# Patient Record
Sex: MALE | Race: WHITE | NOT HISPANIC OR LATINO | Employment: OTHER | URBAN - METROPOLITAN AREA
[De-identification: names, ages, dates, MRNs, and addresses within clinical notes are randomized per-mention and may not be internally consistent; named-entity substitution may affect disease eponyms.]

---

## 2017-01-04 ENCOUNTER — OFFICE VISIT (OUTPATIENT)
Dept: FAMILY MEDICINE | Facility: CLINIC | Age: 52
End: 2017-01-04
Payer: MEDICARE

## 2017-01-04 ENCOUNTER — MYC MEDICAL ADVICE (OUTPATIENT)
Dept: PHARMACY | Facility: CLINIC | Age: 52
End: 2017-01-04

## 2017-01-04 DIAGNOSIS — L30.9 ECZEMA, UNSPECIFIED TYPE: ICD-10-CM

## 2017-01-04 DIAGNOSIS — L71.9 ROSACEA: Primary | ICD-10-CM

## 2017-01-04 PROCEDURE — T1013 SIGN LANG/ORAL INTERPRETER: HCPCS | Mod: U3 | Performed by: FAMILY MEDICINE

## 2017-01-04 PROCEDURE — 99214 OFFICE O/P EST MOD 30 MIN: CPT | Performed by: FAMILY MEDICINE

## 2017-01-04 RX ORDER — TRIAMCINOLONE ACETONIDE 1 MG/G
OINTMENT TOPICAL 2 TIMES DAILY
Qty: 80 G | Refills: 0 | Status: SHIPPED | OUTPATIENT
Start: 2017-01-04 | End: 2019-02-06

## 2017-01-04 NOTE — MR AVS SNAPSHOT
"              After Visit Summary   1/4/2017    Oliver Agarwal    MRN: [id number]           Patient Information     Date Of Birth          1965        Visit Information        Provider Department      1/4/2017 2:00 PM Natty Dobson; Melisa Rose MD [facility] - Primary Care Skin        Today's Diagnoses     Rosacea    -  1     Eczema, unspecified type           Care Instructions    FUTURE APPOINTMENTS  Follow up as needed.    ROSACEA  Follow up as needed for re-fill of medications.  Continue applying metronidazole 0.75% gel twice daily to help prevent symptoms from flaring up.  Continue taking 1 capsule of doxycycline 50 mg once daily for control of symptoms.    Alcohol, chocolate, caffeine, spicy foods, and sun exposure can all aggravate rosacea, so be reasonable in preventing excessive consumption or exposure.    Apply the following in this order : 1) CeraVe moisturizer cream, 2) topical steroid ointment    TOPICAL STEROID INSTRUCTIONS  Triamcinolone 0.1% ointment.    Apply an amount equal to half of a fingertip (0.25 g) to the affected area(s) on the hands, two times per day for 10-14 days.    \"Fingertip Amount\"      Not to be used on face or groin.    Not to be used consecutively for more than 10-14 days.    Topical steroid use is for short-term treatment only. Although you can use this as needed for flare-ups, if after initial treatment, you are using this for prolonged periods of time (i.e. every day of the week), re-check for evaluation of treatment.    Keep in mind to also regularly use moisturizer, as this preventative measure can help maintain your skin's natural moisture barrier.    Wear white, cotton gloves (can purchase at any retail pharmacy) after application of moisturizer and topical steroid nightly at bedtime and wear until the morning.    You may continue using desonide 0.05% cream only as needed for symptoms on the eyelids.    DRY SKIN INSTRUCTIONS  Routine use of " moisturizer is important for healthy, resilient skin.    Twice daily use of a moisturizer such as over-the-counter (OTC) CeraVe moisturizer cream (in the jar) or OTC Cetaphil RestoraDerm moisturizer. These contain ceramides and filaggrin proteins that can help to maintain the body's moisture layer.    Always apply moisturizer after washing, within 3 minutes of drying off for best effect.    Do not overuse soap. Just apply soap on the groin and armpits, unless you have been sweating extensively. Recommended products include OTC unscented Dove sensitive skin or OTC Vanicream cleansing bar.    Good facial cleansers include OTC CeraVe hydrating facial cleanser or OTC Cetaphil daily facial cleanser.    Avoid use of scented products and/or antistatic dryer sheets.    Always try to wear latex-free gloves when washing dishes or cleaning.    You may use Sarna lotion OTC (put in the fridge for extra relief) for itchiness. Make sure to use a separate moisturizer cream as Sarna is not a moisturizer.        Follow-ups after your visit        Who to contact     If you have questions or need follow up information about today's clinic visit or your schedule please contact [facility] - PRIMARY CARE SKIN directly at [phone].  Normal or non-critical lab and imaging results will be communicated to you by [employer], letter or phone within 4 business days after the clinic has received the results. If you do not hear from us within 7 days, please contact the clinic through [employer] or phone. If you have a critical or abnormal lab result, we will notify you by phone as soon as possible.  Submit refill requests through [employer] or call your pharmacy and they will forward the refill request to us. Please allow 3 business days for your refill to be completed.          Additional Information About Your Visit        [employer] Information     [employer] gives you secure access to your electronic health record. If you see a primary care  provider, you can also send messages to your care team and make appointments. If you have questions, please call your primary care clinic.  If you do not have a primary care provider, please call [phone] and they will assist you.        Care EveryWhere ID     This is your Care EveryWhere ID. This could be used by other organizations to access your [place] medical records  [id number]         Blood Pressure from Last 3 Encounters:   10/11/16 116/69   09/02/16 119/75   05/24/16 118/64    Weight from Last 3 Encounters:   10/11/16 306 lb (138.801 kg)   09/02/16 301 lb 12.8 oz (136.896 kg)   05/24/16 293 lb 9.6 oz (133.176 kg)              Today, you had the following     No orders found for display         Today's Medication Changes          These changes are accurate as of: 1/4/17  2:17 PM.  If you have any questions, ask your nurse or doctor.               Start taking these medicines.        Dose/Directions    triamcinolone 0.1 % ointment   Commonly known as:  KENALOG   Used for:  Eczema, unspecified type   Started by:  Melisa Rose MD        Apply topically 2 times daily Apply to affected areas on hands bid for 14 days and then when needed.Not for face or groin   Quantity:  80 g   Refills:  0            Where to get your medicines      These medications were sent to [id number] - [place], [place] - [address] AT [facility]  [address], [place] [address]    Hours:  24-hours Phone:  [phone]    - triamcinolone 0.1 % ointment             Primary Care Provider Office Phone # Fax #    Angelica BROOK [facility] [phone]       [address] [address]        Thank you!     Thank you for choosing [facility] - PRIMARY CARE SKIN  for your care. Our goal is always to provide you with excellent care. Hearing back from our patients is one way we can continue to improve our services. Please  take a few minutes to complete the written survey that you may receive in the mail after your visit with us. Thank you!             Your Updated Medication List - Protect others around you: Learn how to safely use, store and throw away your medicines at www.disposemymeds.org.          This list is accurate as of: 1/4/17  2:17 PM.  Always use your most recent med list.                   Brand Name Dispense Instructions for use    * ADVAIR DISKUS 250-50 MCG/DOSE diskus inhaler   Generic drug:  fluticasone-salmeterol      Inhale 1 puff into the lungs 2 times daily Taking prn       * fluticasone-salmeterol 500-50 MCG/DOSE diskus inhaler    ADVAIR    3 Inhaler    Inhale 1 puff into the lungs 2 times daily       * albuterol (2.5 MG/3ML) 0.083% neb solution     90 vial    Take 1 vial (2.5 mg) by nebulization every 4 hours as needed for shortness of breath / dyspnea       * albuterol 108 (90 BASE) MCG/ACT Inhaler    albuterol    1 Inhaler    Inhale 2 puffs into the lungs every 6 hours as needed       alclomethasone 0.05 % cream    ACLOVATE    15 g    Apply to affected areas on face once per day but only for 3 consecutive days       azithromycin 250 MG tablet    ZITHROMAX    6 tablet    Two tablets first day, then one tablet daily for four days.       benzonatate 200 MG capsule    TESSALON    21 capsule    Take 1 capsule (200 mg) by mouth 3 times daily as needed for cough       blood glucose monitoring lancets     1 Box    Use to test blood sugars once daily as directed.       blood glucose monitoring test strip    ANTONIO CONTOUR NEXT    100 each    Use to test blood sugar one x  daily or as directed.       CENTRUM Tabs     100 tablet    Take 1 tablet by mouth daily       cyanocolbalamin 500 MCG tablet    vitamin  B-12     Take 500 mcg by mouth daily       desonide 0.05 % cream    DESOWEN    15 g    Apply sparingly to affected area two times per day for only 3 consecutive days       doxycycline Monohydrate 50 MG Caps capsule      90 capsule    1 tab per day       fluticasone 50 MCG/ACT spray    FLONASE         ibuprofen 600 MG tablet    ADVIL/MOTRIN    120 tablet    Take 1 tablet (600 mg) by mouth every 6 hours as needed for moderate pain       metroNIDAZOLE 0.75 % topical gel    METROGEL    45 g    Apply topically 2 times daily       montelukast 10 MG tablet    SINGULAIR    90 tablet    Take 1 tablet (10 mg) by mouth At Bedtime       triamcinolone 0.1 % ointment    KENALOG    80 g    Apply topically 2 times daily Apply to affected areas on hands bid for 14 days and then when needed.Not for face or groin       vitamin D 2000 UNITS Caps      Take 4,000 Units by mouth daily       * Notice:  This list has 4 medication(s) that are the same as other medications prescribed for you. Read the directions carefully, and ask your doctor or other care provider to review them with you.

## 2017-01-04 NOTE — PATIENT INSTRUCTIONS
"FUTURE APPOINTMENTS  Follow up as needed.    ROSACEA  Follow up as needed for re-fill of medications.  Continue applying metronidazole 0.75% gel twice daily to help prevent symptoms from flaring up.  Continue taking 1 capsule of doxycycline 50 mg once daily for control of symptoms.    Alcohol, chocolate, caffeine, spicy foods, and sun exposure can all aggravate rosacea, so be reasonable in preventing excessive consumption or exposure.    Apply the following in this order : 1) CeraVe moisturizer cream, 2) topical steroid ointment    TOPICAL STEROID INSTRUCTIONS  Triamcinolone 0.1% ointment.    Apply an amount equal to half of a fingertip (0.25 g) to the affected area(s) on the hands, two times per day for 10-14 days.    \"Fingertip Amount\"      Not to be used on face or groin.    Not to be used consecutively for more than 10-14 days.    Topical steroid use is for short-term treatment only. Although you can use this as needed for flare-ups, if after initial treatment, you are using this for prolonged periods of time (i.e. every day of the week), re-check for evaluation of treatment.    Keep in mind to also regularly use moisturizer, as this preventative measure can help maintain your skin's natural moisture barrier.    Wear white, cotton gloves (can purchase at any retail pharmacy) after application of moisturizer and topical steroid nightly at bedtime and wear until the morning.    You may continue using desonide 0.05% cream only as needed for symptoms on the eyelids.    DRY SKIN INSTRUCTIONS  Routine use of moisturizer is important for healthy, resilient skin.    Twice daily use of a moisturizer such as over-the-counter (OTC) CeraVe moisturizer cream (in the jar) or OTC Cetaphil RestoraDerm moisturizer. These contain ceramides and filaggrin proteins that can help to maintain the body's moisture layer.    Always apply moisturizer after washing, within 3 minutes of drying off for best effect.    Do not overuse soap. " Just apply soap on the groin and armpits, unless you have been sweating extensively. Recommended products include OTC unscented Dove sensitive skin or OTC Vanicream cleansing bar.    Good facial cleansers include OTC CeraVe hydrating facial cleanser or OTC Cetaphil daily facial cleanser.    Avoid use of scented products and/or antistatic dryer sheets.    Always try to wear latex-free gloves when washing dishes or cleaning.    You may use Sarna lotion OTC (put in the fridge for extra relief) for itchiness. Make sure to use a separate moisturizer cream as Sarna is not a moisturizer.

## 2017-01-04 NOTE — PROGRESS NOTES
[facility] - PRIMARY CARE SKIN    CC : Rosacea and dermatitis  SUBJECTIVE:                                                    Oliver Agarwal is a 51 year old male with his  who presents to clinic today for follow-up of rosacea and dermatitis.     Issue One : Rosacea follow-up  Symptoms have begun to improve with regular use of medication. He has recently discontinued use of doxycycline about 10 days ago. He has not been as motivated in regular application of metronidazole due to recent stressors at home. One bump noted on the nose, but he does not recall as many bumps with current treatment.    Current treatment : metronidazole 0.75% gel applied BID, doxycycline 50 mg QD  Response to treatment : Control of rosacea had begun to improve, but he is satisfied with current control of symptoms.    Issue Two : Eczema follow-up  Symptoms were previously on the eyelids, for which he was prescribed desonide 0.05% cream. However, he now complains of redness on the hands. He has also been washing his hands more frequently. Tenderness reported in areas of involvement.    Current treatment : desonide 0.05% cream applied on eyelids, CeraVe moisturizer. No medications applied to hands.    For complete history, please review office visit notes from 3/11/2016    Family history of eczema/psoriasis/rosacea: unknown - brother may have facial skin issues.    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    Patient Active Problem List   Diagnosis     CARDIOVASCULAR SCREENING; LDL GOAL LESS THAN 160     Atopic rhinitis     Prediabetes     Obesity     Hypovitaminosis D     Mild persistent asthma     Deaf - reads lips well       History reviewed. No pertinent past medical history. History reviewed. No pertinent past surgical history.   Social History   Substance Use Topics     Smoking status: Former Smoker     Quit date: 05/11/1999     Smokeless tobacco: Never Used     Alcohol Use: No    Family  History     Problem (# of Occurrences) Relation (Name,Age of Onset)    HEART DISEASE (1) Unspecified           Prescription Medications as of 1/4/2017             triamcinolone (KENALOG) 0.1 % ointment Apply topically 2 times daily Apply to affected areas on hands bid for 14 days and then when needed.Not for face or groin    fluticasone-salmeterol (ADVAIR) 500-50 MCG/DOSE diskus inhaler Inhale 1 puff into the lungs 2 times daily    benzonatate (TESSALON) 200 MG capsule Take 1 capsule (200 mg) by mouth 3 times daily as needed for cough    azithromycin (ZITHROMAX) 250 MG tablet Two tablets first day, then one tablet daily for four days.    metroNIDAZOLE (METROGEL) 0.75 % gel Apply topically 2 times daily    doxycycline Monohydrate 50 MG CAPS 1 tab per day    desonide (DESOWEN) 0.05 % cream Apply sparingly to affected area two times per day for only 3 consecutive days    alclomethasone (ACLOVATE) 0.05 % cream Apply to affected areas on face once per day but only for 3 consecutive days    albuterol (ALBUTEROL) 108 (90 BASE) MCG/ACT inhaler Inhale 2 puffs into the lungs every 6 hours as needed    montelukast (SINGULAIR) 10 MG tablet Take 1 tablet (10 mg) by mouth At Bedtime    ibuprofen (ADVIL,MOTRIN) 600 MG tablet Take 1 tablet (600 mg) by mouth every 6 hours as needed for moderate pain    blood glucose monitoring (ANTONIO CONTOUR NEXT) test strip Use to test blood sugar one x  daily or as directed.    blood glucose monitoring (ANTONIO MICROLET) lancets Use to test blood sugars once daily as directed.    fluticasone (FLONASE) 50 MCG/ACT nasal spray     fluticasone-salmeterol (ADVAIR DISKUS) 250-50 MCG/DOSE diskus inhaler Inhale 1 puff into the lungs 2 times daily Taking prn    albuterol (2.5 MG/3ML) 0.083% nebulizer solution Take 1 vial (2.5 mg) by nebulization every 4 hours as needed for shortness of breath / dyspnea    cyanocolbalamin (VITAMIN  B-12) 500 MCG tablet Take 500 mcg by mouth daily    Cholecalciferol (VITAMIN  D) 2000 UNITS CAPS Take 4,000 Units by mouth daily    Multiple Vitamins-Minerals (CENTRUM) TABS Take 1 tablet by mouth daily            Allergies   Allergen Reactions     Nkda [No Known Drug Allergies]         ROS : 14 point review of systems was negative except the symptoms listed above in the HPI.    This document serves as a record of the services and decisions personally performed and made by Reba Rose MD. It was created on her behalf by Alvaro Calvo, a trained medical scribe.  The creation of this document is based on the scribe's personal observations and the provider's statements to the medical scribe.  Alvaro Calvo, January 4, 2017 1:59 PM      OBJECTIVE:                                                    GENERAL: alert, no distress, obese and unable to speak  SKIN: Stone Skin Type - II.  Face and Hands were examined. The dermatoscope was used to help evaluate pigmented lesions.  Skin Pertinent Findings:  Nose : Minimal erythema. Single papule.    Hands, dorsa : Maculopapular eruption with scaling and fissures between both hands' 1st and 2nd metacarpals.    MDM :discussed preventive measures for decreasing eczema flares      ASSESSMENT:                                                      Encounter Diagnoses   Name Primary?     Rosacea Yes     Eczema, unspecified type          PLAN:                                                    Patient Instructions   FUTURE APPOINTMENTS  Follow up as needed.    ROSACEA  Follow up as needed for re-fill of medications.  Continue applying metronidazole 0.75% gel twice daily to help prevent symptoms from flaring up.  Continue taking 1 capsule of doxycycline 50 mg once daily for control of symptoms.    Alcohol, chocolate, caffeine, spicy foods, and sun exposure can all aggravate rosacea, so be reasonable in preventing excessive consumption or exposure.    Apply the following in this order : 1) CeraVe moisturizer cream, 2) topical steroid ointment    TOPICAL STEROID  "INSTRUCTIONS  Triamcinolone 0.1% ointment.    Apply an amount equal to half of a fingertip (0.25 g) to the affected area(s) on the hands, two times per day for 10-14 days.    \"Fingertip Amount\"      Not to be used on face or groin.    Not to be used consecutively for more than 10-14 days.    Topical steroid use is for short-term treatment only. Although you can use this as needed for flare-ups, if after initial treatment, you are using this for prolonged periods of time (i.e. every day of the week), re-check for evaluation of treatment.    Keep in mind to also regularly use moisturizer, as this preventative measure can help maintain your skin's natural moisture barrier.    Wear white, cotton gloves (can purchase at any retail pharmacy) after application of moisturizer and topical steroid nightly at bedtime and wear until the morning.    You may continue using desonide 0.05% cream only as needed for symptoms on the eyelids.    DRY SKIN INSTRUCTIONS  Routine use of moisturizer is important for healthy, resilient skin.    Twice daily use of a moisturizer such as over-the-counter (OTC) CeraVe moisturizer cream (in the jar) or OTC Cetaphil RestoraDerm moisturizer. These contain ceramides and filaggrin proteins that can help to maintain the body's moisture layer.    Always apply moisturizer after washing, within 3 minutes of drying off for best effect.    Do not overuse soap. Just apply soap on the groin and armpits, unless you have been sweating extensively. Recommended products include OTC unscented Dove sensitive skin or OTC Vanicream cleansing bar.    Good facial cleansers include OTC CeraVe hydrating facial cleanser or OTC Cetaphil daily facial cleanser.    Avoid use of scented products and/or antistatic dryer sheets.    Always try to wear latex-free gloves when washing dishes or cleaning.    You may use Sarna lotion OTC (put in the fridge for extra relief) for itchiness. Make sure to use a separate moisturizer cream " as Sarna is not a moisturizer.       PROCEDURES:                                                    None.    TT : 5minutes.  CT : 15 minutes.      The information in this document, created by the medical scribe for me, accurately reflects the services I personally performed and the decisions made by me. I have reviewed and approved this document for accuracy prior to leaving the patient care area.  Reba Rose MD January 4, 2017 1:59 PM  [facility] - PRIMARY CARE SKIN

## 2017-01-27 ENCOUNTER — TELEPHONE (OUTPATIENT)
Dept: FAMILY MEDICINE | Facility: CLINIC | Age: 52
End: 2017-01-27

## 2017-01-27 DIAGNOSIS — R73.01 ELEVATED FASTING GLUCOSE: ICD-10-CM

## 2017-01-27 DIAGNOSIS — Z13.6 CARDIOVASCULAR SCREENING; LDL GOAL LESS THAN 160: Primary | ICD-10-CM

## 2017-01-27 DIAGNOSIS — R73.03 PREDIABETES: ICD-10-CM

## 2017-01-27 NOTE — TELEPHONE ENCOUNTER
Patient is coming in for diabetes appt but would like to have labs done first. He will also be scheduling his annual preventative next month.   Please add orders for A1C, kidney and liver function, cholesterol and any other labs that may be needed for physical.     Patient is scheduled for fasting labs Monday 1/30/17

## 2017-01-30 DIAGNOSIS — R73.01 ELEVATED FASTING GLUCOSE: ICD-10-CM

## 2017-01-30 DIAGNOSIS — Z13.6 CARDIOVASCULAR SCREENING; LDL GOAL LESS THAN 160: ICD-10-CM

## 2017-01-30 DIAGNOSIS — R73.03 PREDIABETES: ICD-10-CM

## 2017-01-30 DIAGNOSIS — R52 PAIN: ICD-10-CM

## 2017-01-30 LAB — HBA1C MFR BLD: 5.7 % (ref 4.3–6)

## 2017-01-30 PROCEDURE — 80061 LIPID PANEL: CPT | Performed by: NURSE PRACTITIONER

## 2017-01-30 PROCEDURE — 83036 HEMOGLOBIN GLYCOSYLATED A1C: CPT | Performed by: NURSE PRACTITIONER

## 2017-01-30 PROCEDURE — 80053 COMPREHEN METABOLIC PANEL: CPT | Performed by: NURSE PRACTITIONER

## 2017-01-30 PROCEDURE — 36415 COLL VENOUS BLD VENIPUNCTURE: CPT | Performed by: NURSE PRACTITIONER

## 2017-01-31 LAB
ALBUMIN SERPL-MCNC: 3.9 G/DL (ref 3.4–5)
ALP SERPL-CCNC: 71 U/L (ref 40–150)
ALT SERPL W P-5'-P-CCNC: 53 U/L (ref 0–70)
ANION GAP SERPL CALCULATED.3IONS-SCNC: 7 MMOL/L (ref 3–14)
AST SERPL W P-5'-P-CCNC: 32 U/L (ref 0–45)
BILIRUB SERPL-MCNC: 0.6 MG/DL (ref 0.2–1.3)
BUN SERPL-MCNC: 17 MG/DL (ref 7–30)
CALCIUM SERPL-MCNC: 9.2 MG/DL (ref 8.5–10.1)
CHLORIDE SERPL-SCNC: 105 MMOL/L (ref 94–109)
CHOLEST SERPL-MCNC: 138 MG/DL
CO2 SERPL-SCNC: 26 MMOL/L (ref 20–32)
CREAT SERPL-MCNC: 0.92 MG/DL (ref 0.66–1.25)
GFR SERPL CREATININE-BSD FRML MDRD: 88 ML/MIN/1.7M2
GLUCOSE SERPL-MCNC: 102 MG/DL (ref 70–99)
HDLC SERPL-MCNC: 56 MG/DL
LDLC SERPL CALC-MCNC: 69 MG/DL
NONHDLC SERPL-MCNC: 82 MG/DL
POTASSIUM SERPL-SCNC: 4 MMOL/L (ref 3.4–5.3)
PROT SERPL-MCNC: 7.4 G/DL (ref 6.8–8.8)
SODIUM SERPL-SCNC: 138 MMOL/L (ref 133–144)
TRIGL SERPL-MCNC: 64 MG/DL

## 2017-04-18 ENCOUNTER — OFFICE VISIT (OUTPATIENT)
Dept: FAMILY MEDICINE | Facility: CLINIC | Age: 52
End: 2017-04-18
Payer: MEDICARE

## 2017-04-18 VITALS
HEART RATE: 70 BPM | RESPIRATION RATE: 18 BRPM | SYSTOLIC BLOOD PRESSURE: 109 MMHG | WEIGHT: 295.75 LBS | BODY MASS INDEX: 44.31 KG/M2 | OXYGEN SATURATION: 95 % | DIASTOLIC BLOOD PRESSURE: 55 MMHG | TEMPERATURE: 98 F

## 2017-04-18 DIAGNOSIS — M54.50 ACUTE RIGHT-SIDED LOW BACK PAIN WITHOUT SCIATICA: Primary | ICD-10-CM

## 2017-04-18 DIAGNOSIS — B35.3 TINEA PEDIS OF BOTH FEET: ICD-10-CM

## 2017-04-18 PROCEDURE — T1013 SIGN LANG/ORAL INTERPRETER: HCPCS | Mod: U3 | Performed by: FAMILY MEDICINE

## 2017-04-18 PROCEDURE — 99214 OFFICE O/P EST MOD 30 MIN: CPT | Performed by: FAMILY MEDICINE

## 2017-04-18 RX ORDER — CYCLOBENZAPRINE HCL 10 MG
5-10 TABLET ORAL 3 TIMES DAILY PRN
Qty: 30 TABLET | Refills: 1 | Status: SHIPPED | OUTPATIENT
Start: 2017-04-18 | End: 2018-12-18

## 2017-04-18 NOTE — PATIENT INSTRUCTIONS
1. Apply over the counter athlete's foot cream or powder to your feet two times a day for 2-3 weeks until the rash resolves.  2. Apply an over the counter antibiotic cream (Bacitracin) to the spot on the left side of your neck. You could also try applying a warm pack to help with drainage.  3. Schedule with the [facility] for physical therapy.  4. You can take Flexeril 5-10 MG up to three times daily as needed for pain.  5. You can continue taking Ibuprofen or Tylenol as well for pain.  6. If pain does not improve by 6-8 weeks with physical therapy, or if the pain is getting worse let me know and I will give you a referral to the back specialist.

## 2017-04-18 NOTE — PROGRESS NOTES
SUBJECTIVE:                                                    Oliver Agarwal is a 52 year old male who presents to clinic today with  for the following health issues:    Back Pain      Duration: on going issue        Specific cause: none    Description:   Location of pain: low back right  Character of pain: dull ache  Pain radiation:none  New numbness or weakness in legs, not attributed to pain:  YES- right leg    Intensity, moderate    History:   Pain interferes with job: Not applicable,   History of back problems: previous herniated disc  Any previous MRI or X-rays: Yes--at [facility].  Date 7851-3659  Sees a specialist for back pain:  No  Therapies tried without relief: None    Alleviating factors:   Improved by: nothing      Precipitating factors:  Worsened by: Sitting and Lying Flat    Functional and Psychosocial Screen ([employer] STar[facility] Back):      Not performed today       Accompanying Signs & Symptoms:  Risk of Fracture:  None  Risk of Cauda Equina: None  Risk of Infection:  None  Risk of Cancer:  None  Risk of Ankylosing Spondylitis:  Onset at age <35, male, AND morning back stiffness. no                    Patient has right side back pain that radiates down into his buttock. Since Saturday he's has difficulties getting out of bed- very painful. He has a herniated disc that he had  in 2011 as well. He was seen in the ER and did PT at that time. First session of PT did not work so he saw a different therapist and pain resolved at that time. Patient has been cautious since. 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 Four months ago the pain worsened. Last Saturday he moved three piles of chairs and experienced pain later that afternoon.     The pain is in a different area of the back than it was previous.   Yesterday and today he noticed difficulty when lifting his right leg to get into the car with weakness and pain.  Getting up from bed or out of the  chair is painful.  Denies urine or stool leakage, saddle anesthesia, or fever.   He's tried Ibuprofen without relief.     On his bilateral feet under his toes he has redness with itching and burning.      Problem list and histories reviewed & adjusted, as indicated.  Additional history: as documented    Patient Active Problem List   Diagnosis     CARDIOVASCULAR SCREENING; LDL GOAL LESS THAN 160     Atopic rhinitis     Prediabetes     Obesity     Hypovitaminosis D     Mild persistent asthma     Deaf - reads lips well     History reviewed. No pertinent surgical history.    Social History   Substance Use Topics     Smoking status: Former Smoker     Quit date: 5/11/1999     Smokeless tobacco: Never Used     Alcohol use No     Family History   Problem Relation Age of Onset     HEART DISEASE Other          Current Outpatient Prescriptions   Medication Sig Dispense Refill     cyclobenzaprine (FLEXERIL) 10 MG tablet Take 0.5-1 tablets (5-10 mg) by mouth 3 times daily as needed for muscle spasms 30 tablet 1     triamcinolone (KENALOG) 0.1 % ointment Apply topically 2 times daily Apply to affected areas on hands bid for 14 days and then when needed.Not for face or groin 80 g 0     fluticasone-salmeterol (ADVAIR) 500-50 MCG/DOSE diskus inhaler Inhale 1 puff into the lungs 2 times daily 3 Inhaler 3     benzonatate (TESSALON) 200 MG capsule Take 1 capsule (200 mg) by mouth 3 times daily as needed for cough 21 capsule 0     metroNIDAZOLE (METROGEL) 0.75 % gel Apply topically 2 times daily 45 g 11     doxycycline Monohydrate 50 MG CAPS 1 tab per day 90 capsule 1     desonide (DESOWEN) 0.05 % cream Apply sparingly to affected area two times per day for only 3 consecutive days 15 g 0     alclomethasone (ACLOVATE) 0.05 % cream Apply to affected areas on face once per day but only for 3 consecutive days 15 g 0     albuterol (ALBUTEROL) 108 (90 BASE) MCG/ACT inhaler Inhale 2 puffs into the lungs every 6 hours as needed 1 Inhaler 11      montelukast (SINGULAIR) 10 MG tablet Take 1 tablet (10 mg) by mouth At Bedtime 90 tablet 1     ibuprofen (ADVIL,MOTRIN) 600 MG tablet Take 1 tablet (600 mg) by mouth every 6 hours as needed for moderate pain 120 tablet 1     blood glucose monitoring (ANTONIO CONTOUR NEXT) test strip Use to test blood sugar one x  daily or as directed. 100 each 2     blood glucose monitoring (ANTONIO MICROLET) lancets Use to test blood sugars once daily as directed. 1 Box 11     fluticasone (FLONASE) 50 MCG/ACT nasal spray   6     fluticasone-salmeterol (ADVAIR DISKUS) 250-50 MCG/DOSE diskus inhaler Inhale 1 puff into the lungs 2 times daily Taking prn       albuterol (2.5 MG/3ML) 0.083% nebulizer solution Take 1 vial (2.5 mg) by nebulization every 4 hours as needed for shortness of breath / dyspnea 90 vial 0     cyanocolbalamin (VITAMIN  B-12) 500 MCG tablet Take 500 mcg by mouth daily       Cholecalciferol (VITAMIN D) 2000 UNITS CAPS Take 4,000 Units by mouth daily       Multiple Vitamins-Minerals (CENTRUM) TABS Take 1 tablet by mouth daily 100 tablet 3     Allergies   Allergen Reactions     Nkda [No Known Drug Allergies]      Recent Labs   Lab Test  01/30/17   1059  02/11/16   0950  11/04/15   1459  10/31/14   1109   A1C  5.7  5.9  5.8  6.0   LDL  69  88   --   96   HDL  56  54   --   50   TRIG  64  111   --   136   ALT  53   --    --    --    CR  0.92   --    --    --    GFRESTIMATED  88   --    --    --    GFRESTBLACK  >90   GFR Calc     --    --    --    POTASSIUM  4.0   --    --    --       BP Readings from Last 3 Encounters:   04/18/17 109/55   10/11/16 116/69   09/02/16 119/75    Wt Readings from Last 3 Encounters:   04/18/17 134.2 kg (295 lb 12 oz)   10/11/16 (!) 138.8 kg (306 lb)   09/02/16 (!) 136.9 kg (301 lb 12.8 oz)        Reviewed and updated as needed this visit by clinical staff  Reviewed and updated as needed this visit by Provider    ROS:  See above.    This document serves as a record of the services  and decisions personally performed and made by Irma Xavier MD. It was created on his/her behalf by Isha Tsai, trained medical scribe. The creation of this document is based the provider's statements to the medical scribes.    Scribrosa Tsai, April 18, 2017  OBJECTIVE:                                                    /55 (BP Location: Right arm, Patient Position: Chair, Cuff Size: Adult Large)  Pulse 70  Temp 98  F (36.7  C) (Oral)  Resp 18  Wt 134.2 kg (295 lb 12 oz)  SpO2 95%  BMI 44.31 kg/m2  Body mass index is 44.31 kg/(m^2).  GENERAL: healthy, alert and no distress  SKIN: skin spot on the left side of his neck  Comprehensive back pain exam:  Tenderness of right low back and upper buttock, no vertebral tenderness, Pain limits the following motions: all motions mildly restricted due to pain, Lower extremity strength functional and equal on both sides, Lower extremity reflexes within normal limits bilaterally, Lower extremity sensation normal and equal on both sides and Straight leg negative  BILATERAL FEET: bilateral plantar surfaces with peeling over the metatarsal heads and maceration between the toes  PSYCH: mentation appears normal, affect normal/bright    Diagnostic Test Results: none      ASSESSMENT/PLAN:                                                    1. Acute right-sided low back pain without sciatica  Prescribed Flexeril 5-10 MG for him to take up to three times daily PRN for pain. Discussed he can continue taking Ibuprofen and Tylenol as needed. He will schedule with PT for further evaluation and treatment. If pain not improved in 6-8 weeks with PT or if the pain worsens in the meantime, will refer him to the back specialist. Advised of sedating effect of Flexeril and that he should not drive after taking the medication.   - [facility] REFERRAL  - cyclobenzaprine (FLEXERIL) 10 MG tablet; Take 0.5-1 tablets (5-10 mg) by mouth 3 times daily as needed  for muscle spasms  Dispense: 30 tablet; Refill: 1    2. Tinea pedis of both feet  He will try using OTC athlete's foot cream or power bid for 2-3 weeks until the rash resolves.      Patient Instructions   1. Apply over the counter athlete's foot cream or powder to your feet two times a day for 2-3 weeks until the rash resolves.  2. Apply an over the counter antibiotic cream (Bacitracin) to the spot on the left side of your neck. You could also try applying a warm pack to help with drainage.  3. Schedule with the [facility] for physical therapy.  4. You can take Flexeril 5-10 MG up to three times daily as needed for pain.  5. You can continue taking Ibuprofen or Tylenol as well for pain.  6. If pain does not improve by 6-8 weeks with physical therapy, or if the pain is getting worse let me know and I will give you a referral to the back specialist.       The information in this document, created by the medical scribe for me, accurately reflects the services I personally performed and the decisions made by me. I have reviewed and approved this document for accuracy. 04/18/17      Irma Xavier MD  [facility] [place]

## 2017-04-18 NOTE — NURSING NOTE
"Chief Complaint   Patient presents with     Back Pain       Initial /55 (BP Location: Right arm, Patient Position: Chair, Cuff Size: Adult Large)  Pulse 70  Temp 98  F (36.7  C) (Oral)  Resp 18  Wt 295 lb 12 oz (134.2 kg)  SpO2 95%  BMI 44.31 kg/m2 Estimated body mass index is 44.31 kg/(m^2) as calculated from the following:    Height as of 9/2/16: 5' 8.5\" (1.74 m).    Weight as of this encounter: 295 lb 12 oz (134.2 kg).  Medication Reconciliation: complete  Theresa Hernandez/    "

## 2017-04-18 NOTE — MR AVS SNAPSHOT
After Visit Summary   4/18/2017    Oliver Agarwal    MRN: [id number]           Patient Information     Date Of Birth          1965        Visit Information        Provider Department      4/18/2017 1:40 PM Zhane Danielson; Irma Xavier MD [facility]        Today's Diagnoses     Acute right-sided low back pain without sciatica    -  1    Tinea pedis of both feet          Care Instructions    1. Apply over the counter athlete's foot cream or powder to your feet two times a day for 2-3 weeks until the rash resolves.  2. Apply an over the counter antibiotic cream (Bacitracin) to the spot on the left side of your neck. You could also try applying a warm pack to help with drainage.  3. Schedule with the [facility] for physical therapy.  4. You can take Flexeril 5-10 MG up to three times daily as needed for pain.  5. You can continue taking Ibuprofen or Tylenol as well for pain.  6. If pain does not improve by 6-8 weeks with physical therapy, or if the pain is getting worse let me know and I will give you a referral to the back specialist.         Follow-ups after your visit        Additional Services     [facility] REFERRAL       **This order will print in the [facility] Scheduling Office**    Physical Therapy, Hand Therapy and Chiropractic Care are available through:    *[facility]  *[facility]  *[facility]    Call one number to schedule at any of the above locations: [phone].    Your provider has referred you to: Physical Therapy at [facility] or [facility]    Indication/Reason for Referral: Low Back Pain  Onset of Illness: Three days ago   Therapy Orders: Evaluate and Treat  Special Programs: None  Special Request: None    Lulu Lawson      Additional Comments for the Therapist or Chiropractor:     Please be aware that coverage of these services is subject to the terms and limitations of  your health insurance plan.  Call member services at your health plan with any benefit or coverage questions.      Please bring the following to your appointment:    *Your personal calendar for scheduling future appointments  *Comfortable clothing                  Who to contact     If you have questions or need follow up information about today's clinic visit or your schedule please contact [facility] directly at [phone].  Normal or non-critical lab and imaging results will be communicated to you by MyChart, letter or phone within 4 business days after the clinic has received the results. If you do not hear from us within 7 days, please contact the clinic through [employer] or phone. If you have a critical or abnormal lab result, we will notify you by phone as soon as possible.  Submit refill requests through [employer] or call your pharmacy and they will forward the refill request to us. Please allow 3 business days for your refill to be completed.          Additional Information About Your Visit        [email or web address] Information     [employer] gives you secure access to your electronic health record. If you see a primary care provider, you can also send messages to your care team and make appointments. If you have questions, please call your primary care clinic.  If you do not have a primary care provider, please call [phone] and they will assist you.        Care EveryWhere ID     This is your Care EveryWhere ID. This could be used by other organizations to access your [facility] medical records  [id number]        Your Vitals Were     Pulse Temperature Respirations Pulse Oximetry BMI (Body Mass Index)       70 98  F (36.7  C) (Oral) 18 95% 44.31 kg/m2        Blood Pressure from Last 3 Encounters:   04/18/17 109/55   10/11/16 116/69   09/02/16 119/75    Weight from Last 3 Encounters:   04/18/17 295 lb 12 oz (134.2 kg)   10/11/16 (!) 306 lb (138.8 kg)   09/02/16 (!) 301 lb 12.8 oz (136.9 kg)               We Performed the Following     [facility] REFERRAL          Today's Medication Changes          These changes are accurate as of: 4/18/17  2:29 PM.  If you have any questions, ask your nurse or doctor.               Start taking these medicines.        Dose/Directions    cyclobenzaprine 10 MG tablet   Commonly known as:  FLEXERIL   Used for:  Acute right-sided low back pain without sciatica   Started by:  Irma Xavier MD        Dose:  5-10 mg   Take 0.5-1 tablets (5-10 mg) by mouth 3 times daily as needed for muscle spasms   Quantity:  30 tablet   Refills:  1            Where to get your medicines      These medications were sent to [id number] - [place], [place] - [address] AT [facility] [address]  [address], [place] [address]    Hours:  24-hours Phone:  [phone]     cyclobenzaprine 10 MG tablet                Primary Care Provider Office Phone # Fax #    Angelica BROOK [facility] [phone]       [address] [address]        Thank you!     Thank you for choosing [facility]  for your care. Our goal is always to provide you with excellent care. Hearing back from our patients is one way we can continue to improve our services. Please take a few minutes to complete the written survey that you may receive in the mail after your visit with us. Thank you!             Your Updated Medication List - Protect others around you: Learn how to safely use, store and throw away your medicines at www.disposemymeds.org.          This list is accurate as of: 4/18/17  2:29 PM.  Always use your most recent med list.                   Brand Name Dispense Instructions for use    * ADVAIR DISKUS 250-50 MCG/DOSE diskus inhaler   Generic drug:  fluticasone-salmeterol      Inhale 1 puff into the lungs 2 times daily Taking prn       * fluticasone-salmeterol 500-50 MCG/DOSE diskus inhaler     ADVAIR    3 Inhaler    Inhale 1 puff into the lungs 2 times daily       * albuterol (2.5 MG/3ML) 0.083% neb solution     90 vial    Take 1 vial (2.5 mg) by nebulization every 4 hours as needed for shortness of breath / dyspnea       * albuterol 108 (90 BASE) MCG/ACT Inhaler    albuterol    1 Inhaler    Inhale 2 puffs into the lungs every 6 hours as needed       alclomethasone 0.05 % cream    ACLOVATE    15 g    Apply to affected areas on face once per day but only for 3 consecutive days       benzonatate 200 MG capsule    TESSALON    21 capsule    Take 1 capsule (200 mg) by mouth 3 times daily as needed for cough       blood glucose monitoring lancets     1 Box    Use to test blood sugars once daily as directed.       blood glucose monitoring test strip    ANTONIO CONTOUR NEXT    100 each    Use to test blood sugar one x  daily or as directed.       CENTRUM Tabs     100 tablet    Take 1 tablet by mouth daily       cyanocolbalamin 500 MCG tablet    vitamin  B-12     Take 500 mcg by mouth daily       cyclobenzaprine 10 MG tablet    FLEXERIL    30 tablet    Take 0.5-1 tablets (5-10 mg) by mouth 3 times daily as needed for muscle spasms       desonide 0.05 % cream    DESOWEN    15 g    Apply sparingly to affected area two times per day for only 3 consecutive days       doxycycline Monohydrate 50 MG Caps capsule     90 capsule    1 tab per day       fluticasone 50 MCG/ACT spray    FLONASE         ibuprofen 600 MG tablet    ADVIL/MOTRIN    120 tablet    Take 1 tablet (600 mg) by mouth every 6 hours as needed for moderate pain       metroNIDAZOLE 0.75 % topical gel    METROGEL    45 g    Apply topically 2 times daily       montelukast 10 MG tablet    SINGULAIR    90 tablet    Take 1 tablet (10 mg) by mouth At Bedtime       triamcinolone 0.1 % ointment    KENALOG    80 g    Apply topically 2 times daily Apply to affected areas on hands bid for 14 days and then when needed.Not for face or groin       vitamin D 2000 UNITS Caps       Take 4,000 Units by mouth daily       * Notice:  This list has 4 medication(s) that are the same as other medications prescribed for you. Read the directions carefully, and ask your doctor or other care provider to review them with you.

## 2017-04-20 ENCOUNTER — THERAPY VISIT (OUTPATIENT)
Dept: PHYSICAL THERAPY | Facility: CLINIC | Age: 52
End: 2017-04-20
Payer: MEDICARE

## 2017-04-20 DIAGNOSIS — M54.50 LOW BACK PAIN: Primary | ICD-10-CM

## 2017-04-20 PROCEDURE — 97110 THERAPEUTIC EXERCISES: CPT | Mod: GP | Performed by: PHYSICAL THERAPIST

## 2017-04-20 PROCEDURE — 97161 PT EVAL LOW COMPLEX 20 MIN: CPT | Mod: GP | Performed by: PHYSICAL THERAPIST

## 2017-04-20 PROCEDURE — G8979 MOBILITY GOAL STATUS: HCPCS | Mod: GP | Performed by: PHYSICAL THERAPIST

## 2017-04-20 PROCEDURE — G8978 MOBILITY CURRENT STATUS: HCPCS | Mod: GP | Performed by: PHYSICAL THERAPIST

## 2017-04-20 NOTE — MR AVS SNAPSHOT
After Visit Summary   4/20/2017    Olivre Agarwal    MRN: [id number]           Patient Information     Date Of Birth          1965        Visit Information        Provider Department      4/20/2017 10:10 AM Keli Sheffield PT [facility] Physical Therapy        Today's Diagnoses     Low back pain    -  1       Follow-ups after your visit        Your next 10 appointments already scheduled     May 03, 2017 10:50 AM CDT   ZENAIDA Spine with Keli Sheffield PT   [facility] ([facility]  )    [address] [place] [address]   [phone]            May 10, 2017 12:40 PM CDT   ZENAIDA Spine with Keli Sheffield PT   [facility] ([facility]  )    [address] [place] [address]   [phone]            May 17, 2017 10:50 AM CDT   ZENAIDA Spine with Keli Sheffield PT   [facility] ([facility]  )    [address] [place] [id number] [phone]            May 24, 2017 10:50 AM CDT   ZENAIDA Spine with Keli Sheffield PT   [facility] ([facility]  )    [address] [place] [id number] [phone]            May 31, 2017 10:50 AM CDT   ZENAIDA Spine with Keli Sheffield PT   [facility] ([facility]  )    [address] [place] [address]   [phone]              Who to contact     If you have questions or need follow up information about today's clinic visit or your schedule please contact [facility]ERAP[facility] directly at [phone].  Normal or non-critical lab and imaging results will be communicated to you by MyChart, letter or phone within 4 business days after the clinic has received the results. If you do not  hear from us within 7 days, please contact the clinic through [employer] or phone. If you have a critical or abnormal lab result, we will notify you by phone as soon as possible.  Submit refill requests through [employer] or call your pharmacy and they will forward the refill request to us. Please allow 3 business days for your refill to be completed.          Additional Information About Your Visit        [employer] Information     [employer] gives you secure access to your electronic health record. If you see a primary care provider, you can also send messages to your care team and make appointments. If you have questions, please call your primary care clinic.  If you do not have a primary care provider, please call [phone] and they will assist you.        Care EveryWhere ID     This is your Care EveryWhere ID. This could be used by other organizations to access your [facility] medical records  [id number]         Blood Pressure from Last 3 Encounters:   04/18/17 109/55   10/11/16 116/69   09/02/16 119/75    Weight from Last 3 Encounters:   04/18/17 134.2 kg (295 lb 12 oz)   10/11/16 (!) 138.8 kg (306 lb)   09/02/16 (!) 136.9 kg (301 lb 12.8 oz)              We Performed the Following     HC PT EVAL, LOW COMPLEXITY     ZENAIDA CERT REPORT     THERAPEUTIC EXERCISES        Primary Care Provider Office Phone # Fax #    Angelica Pate APRBRAD [facility] [phone]       [address] [address]        Thank you!     Thank you for choosing [facility]ER[facility]  for your care. Our goal is always to provide you with excellent care. Hearing back from our patients is one way we can continue to improve our services. Please take a few minutes to complete the written survey that you may receive in the mail after your visit with us. Thank you!             Your Updated Medication List - Protect others around you: Learn how to safely use, store  and throw away your medicines at www.disposemymeds.org.          This list is accurate as of: 4/20/17  1:37 PM.  Always use your most recent med list.                   Brand Name Dispense Instructions for use    * ADVAIR DISKUS 250-50 MCG/DOSE diskus inhaler   Generic drug:  fluticasone-salmeterol      Inhale 1 puff into the lungs 2 times daily Taking prn       * fluticasone-salmeterol 500-50 MCG/DOSE diskus inhaler    ADVAIR    3 Inhaler    Inhale 1 puff into the lungs 2 times daily       * albuterol (2.5 MG/3ML) 0.083% neb solution     90 vial    Take 1 vial (2.5 mg) by nebulization every 4 hours as needed for shortness of breath / dyspnea       * albuterol 108 (90 BASE) MCG/ACT Inhaler    albuterol    1 Inhaler    Inhale 2 puffs into the lungs every 6 hours as needed       alclomethasone 0.05 % cream    ACLOVATE    15 g    Apply to affected areas on face once per day but only for 3 consecutive days       benzonatate 200 MG capsule    TESSALON    21 capsule    Take 1 capsule (200 mg) by mouth 3 times daily as needed for cough       blood glucose monitoring lancets     1 Box    Use to test blood sugars once daily as directed.       blood glucose monitoring test strip    ANTONIO CONTOUR NEXT    100 each    Use to test blood sugar one x  daily or as directed.       CENTRUM Tabs     100 tablet    Take 1 tablet by mouth daily       cyanocolbalamin 500 MCG tablet    vitamin  B-12     Take 500 mcg by mouth daily       cyclobenzaprine 10 MG tablet    FLEXERIL    30 tablet    Take 0.5-1 tablets (5-10 mg) by mouth 3 times daily as needed for muscle spasms       desonide 0.05 % cream    DESOWEN    15 g    Apply sparingly to affected area two times per day for only 3 consecutive days       doxycycline Monohydrate 50 MG Caps capsule     90 capsule    1 tab per day       fluticasone 50 MCG/ACT spray    FLONASE         ibuprofen 600 MG tablet    ADVIL/MOTRIN    120 tablet    Take 1 tablet (600 mg) by mouth every 6 hours as needed  for moderate pain       metroNIDAZOLE 0.75 % topical gel    METROGEL    45 g    Apply topically 2 times daily       montelukast 10 MG tablet    SINGULAIR    90 tablet    Take 1 tablet (10 mg) by mouth At Bedtime       triamcinolone 0.1 % ointment    KENALOG    80 g    Apply topically 2 times daily Apply to affected areas on hands bid for 14 days and then when needed.Not for face or groin       vitamin D 2000 UNITS Caps      Take 4,000 Units by mouth daily       * Notice:  This list has 4 medication(s) that are the same as other medications prescribed for you. Read the directions carefully, and ask your doctor or other care provider to review them with you.

## 2017-04-20 NOTE — PROGRESS NOTES
"Subjective:    Patient is a 52 year old male presenting with rehab back hpi.   Oliver Agarwal is a 52 year old male with a lumbar condition.  Condition occurred with:  Lifting.  Condition occurred: in the community.  This is a new condition  4/15/17.  Pt was lifting chairs, moving them with a jignesh.  back pain begain.  Felt a little sore, so he decided to stop; about an hour later, he had some pain, worse next morning.  R lower part of back, hard to get out bed.  Hard to go back to sleep after that. Pt stated if he is sitting and relaxing, he can feel the \"bones or discs bubbling\" like the nerves are irritated in lower back.    Pt has hx of herniated disc, feels that it was in a different area - was on left, down leg, feels different this time..    Patient reports pain:  Lumbar spine right.  Radiates to:  Gluteals right.  Pain is described as sharp, burning and other (throbbing) and is constant and reported as 6/10.  Associated symptoms:  Loss of strength (denies N/T). Pain is worse in the A.M..  Symptoms are exacerbated by lifting, sitting and bending and relieved by nothing (walking is ok).  Since onset symptoms are gradually improving.        General health as reported by patient is good and fair.  Pertinent medical history includes:  Overweight and asthma.  Medical allergies: possible latex allergy.  Other surgeries include:  No.  Medication history: list not provided by patient, see Epic chart.  Current occupation is none.    Primary job tasks include:  Prolonged sitting, prolonged standing and driving.    Barriers include:  Other (difficulty putting on shoes and socks, difficulty with self care in the bathroom).    Red flags:  None as reported by the patient.                        Objective:    Standing Alignment:        Lumbar:  Lordosis decr  Pelvis deviations alignment: CCW rotation.  Hip deviations alignment: B femoral rotation.                  Physical Exam     Functional Tests:  Forward bend: " increased sxs bending and upon return, reverses lumbar curve  Back bend: slight increase in sxs  L trunk rotation: no change in sxs  R trunk rotation: slight increase in sxs  L trunk lateral flexion: moderate increase in sxs  R trunk lateral flexion: increased sxs, limited ROM  L single leg stance: L trunk lean, no change in sxs  R single leg stance: Trendelenberg, no change in sxs  sidelying hip abd/LR: B rotates in lumbar spine    Sensation:  Light touch intact and symmetrical except slightly impaired L medial ankle    Strength:  Hip flexion: pain L 4/5, 5/5 R pain free  Knee extension: B 5/5 pain free  Dorsiflexion: B 5/5 pain free  Glut med: L 4+/5, R 5/5 pain free    ROM:  AROM of trunk WNL except for 10% R lateral flexion, limited by pain  Hip PROM: L IR 10%, R IR 25% with pain, R flexion 100% with pain, otherwise WNL and pain free    Relative Flexibility:  B hip joints are relatively more stiff than lumbar spine    Palpation:  Not assessed today    Edema:  None noted    Gait:  Antalgic, will assess further in subsequent sessions      General     ROS    Assessment/Plan:      Patient is a 52 year old male with lumbar complaints.    Patient has the following significant findings with corresponding treatment plan.                Diagnosis 1:  LBP  Pain -  hot/cold therapy, manual therapy, education and home program  Decreased ROM/flexibility - manual therapy, therapeutic exercise, therapeutic activity and home program  Decreased strength - therapeutic exercise, therapeutic activities and home program  Impaired gait - gait training and home program  Impaired muscle performance - neuro re-education and home program  Decreased function - therapeutic activities and home program  Impaired posture - neuro re-education, therapeutic activities and home program    Therapy Evaluation Codes:   1) History comprised of:   Personal factors that impact the plan of care:      Past/current experiences and impaired hearing.     Comorbidity factors that impact the plan of care are:      Asthma and Overweight.     Medications impacting care: Anti-inflammatory and Muscle relaxant.  2) Examination of Body Systems comprised of:   Body structures and functions that impact the plan of care:      Lumbar spine.   Activity limitations that impact the plan of care are:      Bending, Dressing, Lifting and Sitting.  3) Clinical presentation characteristics are:   Stable/Uncomplicated.  4) Decision-Making    Low complexity using standardized patient assessment instrument and/or measureable assessment of functional outcome.  Cumulative Therapy Evaluation is: Low complexity.    Previous and current functional limitations:  (See Goal Flow Sheet for this information)    Short term and Long term goals: (See Goal Flow Sheet for this information)     Communication ability:  Patient appears to be able to clearly communicate and understand verbal and written communication and follow directions correctly.  Treatment Explanation - The following has been discussed with the patient:   RX ordered/plan of care  Anticipated outcomes  Possible risks and side effects  This patient would benefit from PT intervention to resume normal activities.   Rehab potential is good.    Frequency:  1 X week, once daily  Duration:  for 6 weeks  Discharge Plan:  Achieve all LTG.  Independent in home treatment program.  Reach maximal therapeutic benefit.    Please refer to the daily flowsheet for treatment today, total treatment time and time spent performing 1:1 timed codes.

## 2017-04-20 NOTE — LETTER
"DEPARTMENT OF HEALTH AND HUMAN SERVICES  [facility] MEDICA[facility] SERVICES    PLAN/UPDATED PLAN OF PROGRESS FOR OUTPATIENT REHABILITATION    PATIENTS NAME:  Oliver Agarwal   : 1965  PROVIDER NUMBER:    [id number]  [id number]  PROVIDER NAME: [facility]  MEDICAL RECORD NUMBER: [id number]   START OF CARE DATE:  SOC Date: 17   TYPE:  PT  PRIMARY/TREATMENT DIAGNOSIS: (Pertinent Medical Diagnosis)  Low back pain  VISITS FROM START OF CARE:  1  Rxs Used: 1     Subjective:  Patient is a 52 year old male presenting with rehab back hpi.   Oliver Agarwal is a 52 year old male with a lumbar condition.  Condition occurred with:  Lifting.  Condition occurred: in the community.  This is a new condition  4/15/17.  Pt was lifting chairs, moving them with a jignesh.  back pain begain.  Felt a little sore, so he decided to stop; about an hour later, he had some pain, worse next morning.  R lower part of back, hard to get out bed.  Hard to go back to sleep after that. Pt stated if he is sitting and relaxing, he can feel the \"bones or discs bubbling\" like the nerves are irritated in lower back.    Pt has hx of herniated disc, feels that it was in a different area - was on left, down leg, feels different this time..    Patient reports pain:  Lumbar spine right.  Radiates to:  Gluteals right.  Pain is described as sharp, burning and other (throbbing) and is constant and reported as 6/10.  Associated symptoms:  Loss of strength (denies N/T). Pain is worse in the A.M..  Symptoms are exacerbated by lifting, sitting and bending and relieved by nothing (walking is ok).  Since onset symptoms are gradually improving.        General health as reported by patient is good and fair.  Pertinent medical history includes:  Overweight and asthma.  Medical allergies: possible latex allergy.  Other surgeries include:  No.  Medication history: list not provided by " patient, see Epic chart.  Current occupation is none.    Primary job tasks include:  Prolonged sitting, prolonged standing and driving.  Barriers include:  Other (difficulty putting on shoes and socks, difficulty with self care in the bathroom).  Red flags:  None as reported by the patient.                Objective:  Standing Alignment:    Lumbar:  Lordosis decr  Pelvis deviations alignment: CCW rotation.  Hip deviations alignment: B femoral rotation.  Functional Tests:  Forward bend: increased sxs bending and upon return, reverses lumbar curve  Back bend: slight increase in sxs  L trunk rotation: no change in sxs  R trunk rotation: slight increase in sxs  L trunk lateral flexion: moderate increase in sxs  PATIENTS NAME:  Oliver Agarwal   : 1965    R trunk lateral flexion: increased sxs, limited ROM  L single leg stance: L trunk lean, no change in sxs  R single leg stance: Trendelenberg, no change in sxs  sidelying hip abd/LR: B rotates in lumbar spine  Sensation:  Light touch intact and symmetrical except slightly impaired L medial ankle  Strength:  Hip flexion: pain L 4/5, 5/5 R pain free  Knee extension: B 5/5 pain free  Dorsiflexion: B 5/5 pain free  Glut med: L 4+/5, R 5/5 pain free  ROM:  AROM of trunk WNL except for 10% R lateral flexion, limited by pain  Hip PROM: L IR 10%, R IR 25% with pain, R flexion 100% with pain, otherwise WNL and pain free  Relative Flexibility:  B hip joints are relatively more stiff than lumbar spine  Palpation:  Not assessed today  Edema:  None noted  Gait:  Antalgic, will assess further in subsequent sessions    Assessment/Plan:    Patient is a 52 year old male with lumbar complaints.    Patient has the following significant findings with corresponding treatment plan.                Diagnosis 1:  LBP  Pain -  hot/cold therapy, manual therapy, education and home program  Decreased ROM/flexibility - manual therapy, therapeutic exercise, therapeutic activity and home  program  Decreased strength - therapeutic exercise, therapeutic activities and home program  Impaired gait - gait training and home program  Impaired muscle performance - neuro re-education and home program  Decreased function - therapeutic activities and home program  Impaired posture - neuro re-education, therapeutic activities and home program  Therapy Evaluation Codes:   1) History comprised of:   Personal factors that impact the plan of care:      Past/current experiences and impaired hearing.    Comorbidity factors that impact the plan of care are:      Asthma and Overweight.     Medications impacting care: Anti-inflammatory and Muscle relaxant.  2) Examination of Body Systems comprised of:   Body structures and functions that impact the plan of care:      Lumbar spine.   Activity limitations that impact the plan of care are:    PATIENTS NAME:  Oliver Agarwal   : 1965      Bending, Dressing, Lifting and Sitting.  3) Clinical presentation characteristics are:   Stable/Uncomplicated.  4) Decision-Making    Low complexity using standardized patient assessment instrument and/or measureable assessment of functional outcome.  Cumulative Therapy Evaluation is: Low complexity.  Previous and current functional limitations:  (See Goal Flow Sheet for this information)    Short term and Long term goals: (See Goal Flow Sheet for this information)   Communication ability:  Patient appears to be able to clearly communicate and understand verbal and written communication and follow directions correctly.  Treatment Explanation - The following has been discussed with the patient:   RX ordered/plan of care  Anticipated outcomes  Possible risks and side effects  This patient would benefit from PT intervention to resume normal activities.   Rehab potential is good.  Frequency:  1 X week, once daily  Duration:  for 6 weeks  Discharge Plan:  Achieve all LTG.  Independent in home treatment program.  Reach maximal  "therapeutic benefit.                  Caregiver Signature/Credentials _____________________________ Date ________            Keli Sheffield PT, DPT   I have reviewed and certified the need for these services and plan of treatment while under my care.        PHYSICIAN'S SIGNATURE:   ____________________________________  Date___________                 Irma Xavier MD    Certification period:  Beginning of Cert date period: 04/20/17 to  End of Cert period date: 07/18/17     Functional Level Progress Report: Please see attached \"Goal Flow sheet for Functional level.\"    ____X____ Continue Services or       ________ DC Services                Service dates: From  SOC Date: 04/20/17 date to present                         "

## 2017-05-03 ENCOUNTER — THERAPY VISIT (OUTPATIENT)
Dept: PHYSICAL THERAPY | Facility: CLINIC | Age: 52
End: 2017-05-03
Payer: MEDICARE

## 2017-05-03 DIAGNOSIS — M54.50 LOW BACK PAIN: Primary | ICD-10-CM

## 2017-05-03 PROCEDURE — 97112 NEUROMUSCULAR REEDUCATION: CPT | Mod: GP | Performed by: PHYSICAL THERAPIST

## 2017-05-03 PROCEDURE — 97110 THERAPEUTIC EXERCISES: CPT | Mod: GP | Performed by: PHYSICAL THERAPIST

## 2017-05-08 ENCOUNTER — MYC MEDICAL ADVICE (OUTPATIENT)
Dept: FAMILY MEDICINE | Facility: CLINIC | Age: 52
End: 2017-05-08

## 2017-05-17 ENCOUNTER — THERAPY VISIT (OUTPATIENT)
Dept: PHYSICAL THERAPY | Facility: CLINIC | Age: 52
End: 2017-05-17
Payer: MEDICARE

## 2017-05-17 DIAGNOSIS — M54.50 LOW BACK PAIN: ICD-10-CM

## 2017-05-17 PROCEDURE — 97110 THERAPEUTIC EXERCISES: CPT | Mod: GP | Performed by: PHYSICAL THERAPIST

## 2017-05-17 PROCEDURE — 97112 NEUROMUSCULAR REEDUCATION: CPT | Mod: GP | Performed by: PHYSICAL THERAPIST

## 2017-05-21 ENCOUNTER — MYC REFILL (OUTPATIENT)
Dept: FAMILY MEDICINE | Facility: CLINIC | Age: 52
End: 2017-05-21

## 2017-05-21 DIAGNOSIS — E56.9 VITAMIN DEFICIENCY: ICD-10-CM

## 2017-05-22 NOTE — TELEPHONE ENCOUNTER
Message from [employer]:  Original authorizing provider: BROOK Rosa CNP would like a refill of the following medications:  Multiple Vitamins-Minerals (CENTRUM) TABS [BROOK Rosa CNP]    Preferred pharmacy: [id number] - [place], [place] - [address] AT [facility]    Comment:

## 2017-05-22 NOTE — TELEPHONE ENCOUNTER
Significance: Moderate     Warning: CENTRUM may impair the gastrointestinal absorption and decrease the antimicrobial effectiveness of doxycycline Monohydrate.

## 2017-05-31 ENCOUNTER — THERAPY VISIT (OUTPATIENT)
Dept: PHYSICAL THERAPY | Facility: CLINIC | Age: 52
End: 2017-05-31
Payer: MEDICARE

## 2017-05-31 DIAGNOSIS — M54.50 LOW BACK PAIN: ICD-10-CM

## 2017-05-31 PROCEDURE — 97110 THERAPEUTIC EXERCISES: CPT | Mod: GP | Performed by: PHYSICAL THERAPIST

## 2017-05-31 PROCEDURE — 97530 THERAPEUTIC ACTIVITIES: CPT | Mod: GP | Performed by: PHYSICAL THERAPIST

## 2017-05-31 NOTE — MR AVS SNAPSHOT
After Visit Summary   5/31/2017    Oliver Agarwal    MRN: [id number]           Patient Information     Date Of Birth          1965        Visit Information        Provider Department      5/31/2017 10:50 AM Keli Sheffield PT [facility]        Today's Diagnoses     Low back pain           Follow-ups after your visit        Who to contact     If you have questions or need follow up information about today's clinic visit or your schedule please contact [facility] PHYSICAL [facility] directly at [phone].  Normal or non-critical lab and imaging results will be communicated to you by [employer], letter or phone within 4 business days after the clinic has received the results. If you do not hear from us within 7 days, please contact the clinic through [employer] or phone. If you have a critical or abnormal lab result, we will notify you by phone as soon as possible.  Submit refill requests through [employer] or call your pharmacy and they will forward the refill request to us. Please allow 3 business days for your refill to be completed.          Additional Information About Your Visit        MyChart Information     [employer] gives you secure access to your electronic health record. If you see a primary care provider, you can also send messages to your care team and make appointments. If you have questions, please call your primary care clinic.  If you do not have a primary care provider, please call [phone] and they will assist you.        Care EveryWhere ID     This is your Care EveryWhere ID. This could be used by other organizations to access your [facility] medical records  [id number]         Blood Pressure from Last 3 Encounters:   04/18/17 109/55   10/11/16 116/69   09/02/16 119/75    Weight from Last 3 Encounters:   04/18/17 134.2 kg (295 lb 12 oz)   10/11/16 (!) 138.8 kg (306 lb)   09/02/16 (!) 136.9 kg  (301 lb 12.8 oz)              We Performed the Following     THERAPEUTIC ACTIVITIES     THERAPEUTIC EXERCISES        Primary Care Provider Office Phone # Fax #    BROOK [facility] [phone]       [facility] [address]  [place] [address]        Thank you!     Thank you for [facility]  for your care. Our goal is always to provide you with excellent care. Hearing back from our patients is one way we can continue to improve our services. Please take a few minutes to complete the written survey that you may receive in the mail after your visit with us. Thank you!             Your Updated Medication List - Protect others around you: Learn how to safely use, store and throw away your medicines at www.disposemymeds.org.          This list is accurate as of: 5/31/17 11:38 AM.  Always use your most recent med list.                   Brand Name Dispense Instructions for use    * ADVAIR DISKUS 250-50 MCG/DOSE diskus inhaler   Generic drug:  fluticasone-salmeterol      Inhale 1 puff into the lungs 2 times daily Taking prn       * fluticasone-salmeterol 500-50 MCG/DOSE diskus inhaler    ADVAIR    3 Inhaler    Inhale 1 puff into the lungs 2 times daily       * albuterol (2.5 MG/3ML) 0.083% neb solution     90 vial    Take 1 vial (2.5 mg) by nebulization every 4 hours as needed for shortness of breath / dyspnea       * albuterol 108 (90 BASE) MCG/ACT Inhaler    albuterol    1 Inhaler    Inhale 2 puffs into the lungs every 6 hours as needed       alclomethasone 0.05 % cream    ACLOVATE    15 g    Apply to affected areas on face once per day but only for 3 consecutive days       benzonatate 200 MG capsule    TESSALON    21 capsule    Take 1 capsule (200 mg) by mouth 3 times daily as needed for cough       blood glucose monitoring lancets     1 Box    Use to test blood sugars once daily as directed.       blood glucose  monitoring test strip    ANTONIO CONTOUR NEXT    100 each    Use to test blood sugar one x  daily or as directed.       CENTRUM Tabs     100 tablet    Take 1 tablet by mouth daily       cyanocolbalamin 500 MCG tablet    vitamin  B-12     Take 500 mcg by mouth daily       cyclobenzaprine 10 MG tablet    FLEXERIL    30 tablet    Take 0.5-1 tablets (5-10 mg) by mouth 3 times daily as needed for muscle spasms       desonide 0.05 % cream    DESOWEN    15 g    Apply sparingly to affected area two times per day for only 3 consecutive days       doxycycline Monohydrate 50 MG Caps capsule     90 capsule    1 tab per day       fluticasone 50 MCG/ACT spray    FLONASE         ibuprofen 600 MG tablet    ADVIL/MOTRIN    120 tablet    Take 1 tablet (600 mg) by mouth every 6 hours as needed for moderate pain       metroNIDAZOLE 0.75 % topical gel    METROGEL    45 g    Apply topically 2 times daily       montelukast 10 MG tablet    SINGULAIR    90 tablet    Take 1 tablet (10 mg) by mouth At Bedtime       triamcinolone 0.1 % ointment    KENALOG    80 g    Apply topically 2 times daily Apply to affected areas on hands bid for 14 days and then when needed.Not for face or groin       vitamin D 2000 UNITS Caps      Take 4,000 Units by mouth daily       * Notice:  This list has 4 medication(s) that are the same as other medications prescribed for you. Read the directions carefully, and ask your doctor or other care provider to review them with you.

## 2017-06-06 ENCOUNTER — MEDICAL CORRESPONDENCE (OUTPATIENT)
Dept: HEALTH INFORMATION MANAGEMENT | Facility: CLINIC | Age: 52
End: 2017-06-06

## 2017-06-13 ENCOUNTER — OFFICE VISIT (OUTPATIENT)
Dept: PHARMACY | Facility: CLINIC | Age: 52
End: 2017-06-13
Payer: COMMERCIAL

## 2017-06-13 ENCOUNTER — ALLIED HEALTH/NURSE VISIT (OUTPATIENT)
Dept: NURSING | Facility: CLINIC | Age: 52
End: 2017-06-13
Payer: MEDICARE

## 2017-06-13 VITALS — WEIGHT: 296 LBS | BODY MASS INDEX: 44.35 KG/M2 | SYSTOLIC BLOOD PRESSURE: 126 MMHG | DIASTOLIC BLOOD PRESSURE: 72 MMHG

## 2017-06-13 DIAGNOSIS — J45.30 MILD PERSISTENT ASTHMA WITHOUT COMPLICATION: Primary | ICD-10-CM

## 2017-06-13 DIAGNOSIS — Z23 NEED FOR PROPHYLACTIC VACCINATION AGAINST STREPTOCOCCUS PNEUMONIAE (PNEUMOCOCCUS): Primary | ICD-10-CM

## 2017-06-13 DIAGNOSIS — Z23 ENCOUNTER FOR IMMUNIZATION: ICD-10-CM

## 2017-06-13 DIAGNOSIS — F51.02 TRANSIENT INSOMNIA: ICD-10-CM

## 2017-06-13 DIAGNOSIS — R73.03 PREDIABETES: ICD-10-CM

## 2017-06-13 DIAGNOSIS — E55.9 HYPOVITAMINOSIS D: ICD-10-CM

## 2017-06-13 LAB — HBA1C MFR BLD: 5.7 % (ref 4.3–6)

## 2017-06-13 PROCEDURE — 99207 ZZC NO CHARGE NURSE ONLY: CPT

## 2017-06-13 PROCEDURE — 99607 MTMS BY PHARM ADDL 15 MIN: CPT | Performed by: PHARMACIST

## 2017-06-13 PROCEDURE — 99605 MTMS BY PHARM NP 15 MIN: CPT | Performed by: PHARMACIST

## 2017-06-13 PROCEDURE — G0009 ADMIN PNEUMOCOCCAL VACCINE: HCPCS

## 2017-06-13 PROCEDURE — 90732 PPSV23 VACC 2 YRS+ SUBQ/IM: CPT

## 2017-06-13 PROCEDURE — 36415 COLL VENOUS BLD VENIPUNCTURE: CPT | Performed by: NURSE PRACTITIONER

## 2017-06-13 PROCEDURE — 83036 HEMOGLOBIN GLYCOSYLATED A1C: CPT | Performed by: NURSE PRACTITIONER

## 2017-06-13 NOTE — MR AVS SNAPSHOT
After Visit Summary   6/13/2017    Oliver Agarwal    MRN: [id number]           Patient Information     Date Of Birth          1965        Visit Information        Provider Department      6/13/2017 3:00 PM [facility]        Today's Diagnoses     Need for prophylactic vaccination against Streptococcus pneumoniae (pneumococcus)    -  1       Follow-ups after your visit        Who to contact     If you have questions or need follow up information about today's clinic visit or your schedule please contact [facility] directly at [phone].  Normal or non-critical lab and imaging results will be communicated to you by [employer], letter or phone within 4 business days after the clinic has received the results. If you do not hear from us within 7 days, please contact the clinic through [employer] or phone. If you have a critical or abnormal lab result, we will notify you by phone as soon as possible.  Submit refill requests through [employer] or call your pharmacy and they will forward the refill request to us. Please allow 3 business days for your refill to be completed.          Additional Information About Your Visit        MyChart Information     [employer] gives you secure access to your electronic health record. If you see a primary care provider, you can also send messages to your care team and make appointments. If you have questions, please call your primary care clinic.  If you do not have a primary care provider, please call [phone] and they will assist you.        Care EveryWhere ID     This is your Care EveryWhere ID. This could be used by other organizations to access your [facility] medical records  [id number]         Blood Pressure from Last 3 Encounters:   06/13/17 126/72   04/18/17 109/55   10/11/16 116/69    Weight from Last 3 Encounters:   06/13/17 296 lb (134.3 kg)   04/18/17 295 lb 12 oz (134.2 kg)   10/11/16 (!) 306 lb  (138.8 kg)              We Performed the Following     ADMIN 1st VACCINE     PNEUMOCOCCAL VACCINE,ADULT,SQ OR IM          Today's Medication Changes          These changes are accurate as of: 6/13/17  3:04 PM.  If you have any questions, ask your nurse or doctor.               These medicines have changed or have updated prescriptions.        Dose/Directions    ADVAIR DISKUS 250-50 MCG/DOSE diskus inhaler   This may have changed:  Another medication with the same name was removed. Continue taking this medication, and follow the directions you see here.   Generic drug:  fluticasone-salmeterol   Changed by:  Bernarda Mooney RPH        Dose:  1 puff   Inhale 1 puff into the lungs 2 times daily Taking prn   Refills:  0                Primary Care Provider Office Phone # Fax #    Angelica PerezBROOK Salomon [phone]       [address] [address]        Thank you!     Thank you for choosing [facility]  for your care. Our goal is always to provide you with excellent care. Hearing back from our patients is one way we can continue to improve our services. Please take a few minutes to complete the written survey that you may receive in the mail after your visit with us. Thank you!             Your Updated Medication List - Protect others around you: Learn how to safely use, store and throw away your medicines at www.disposemymeds.org.          This list is accurate as of: 6/13/17  3:04 PM.  Always use your most recent med list.                   Brand Name Dispense Instructions for use    ADVAIR DISKUS 250-50 MCG/DOSE diskus inhaler   Generic drug:  fluticasone-salmeterol      Inhale 1 puff into the lungs 2 times daily Taking prn       * albuterol (2.5 MG/3ML) 0.083% neb solution     90 vial    Take 1 vial (2.5 mg) by nebulization every 4 hours as needed for shortness of breath / dyspnea       * albuterol 108 (90 BASE) MCG/ACT Inhaler     albuterol    1 Inhaler    Inhale 2 puffs into the lungs every 6 hours as needed       alclomethasone 0.05 % cream    ACLOVATE    15 g    Apply to affected areas on face once per day but only for 3 consecutive days       benzonatate 200 MG capsule    TESSALON    21 capsule    Take 1 capsule (200 mg) by mouth 3 times daily as needed for cough       blood glucose monitoring lancets     1 Box    Use to test blood sugars once daily as directed.       blood glucose monitoring test strip    ANTONIO CONTOUR NEXT    100 each    Use to test blood sugar one x  daily or as directed.       CENTRUM Tabs     100 tablet    Take 1 tablet by mouth daily       cyanocolbalamin 500 MCG tablet    vitamin  B-12     Take 500 mcg by mouth daily       cyclobenzaprine 10 MG tablet    FLEXERIL    30 tablet    Take 0.5-1 tablets (5-10 mg) by mouth 3 times daily as needed for muscle spasms       desonide 0.05 % cream    DESOWEN    15 g    Apply sparingly to affected area two times per day for only 3 consecutive days       doxycycline Monohydrate 50 MG Caps capsule     90 capsule    1 tab per day       fluticasone 50 MCG/ACT spray    FLONASE         ibuprofen 600 MG tablet    ADVIL/MOTRIN    120 tablet    Take 1 tablet (600 mg) by mouth every 6 hours as needed for moderate pain       metroNIDAZOLE 0.75 % topical gel    METROGEL    45 g    Apply topically 2 times daily       montelukast 10 MG tablet    SINGULAIR    90 tablet    Take 1 tablet (10 mg) by mouth At Bedtime       triamcinolone 0.1 % ointment    KENALOG    80 g    Apply topically 2 times daily Apply to affected areas on hands bid for 14 days and then when needed.Not for face or groin       vitamin D 2000 UNITS Caps      Take 4,000 Units by mouth daily       * Notice:  This list has 2 medication(s) that are the same as other medications prescribed for you. Read the directions carefully, and ask your doctor or other care provider to review them with you.

## 2017-06-13 NOTE — PROGRESS NOTES
SUBJECTIVE/OBJECTIVE:                                                    Oliver Agarwal is a 52 year old male coming in for a follow  5- visit for Medication Therapy Management.  He was referred to me from his PCP.     Chief Complaint: Follow up blood sugar review    He is deaf --here at visit today with Interpretor Natty Dobson ,  interpretative services.        Personal Healthcare Goals: weight loss (goal of 275 lb), control of pre-diabetes with diet    Allergies/ADRs: Reviewed in Epic  Tobacco:  History of tobacco dependence - quit 1999  Alcohol: Quit 1999   Caffeine: Varies; 3 cups/day of coffee (2 cups in the am, 1 at night/afternoon), drinks coffee when socializing, started drinking Mountain Dew again.  Activity: Too busy at work to exercise  Diet: Got a new job ([employer], 4 total jobs), started working, lack of energy - started eating and drinking sugary drinks from the stress. Considering quitting job because he is going back to his old ways and because the hours aren't stable.  PMH: Reviewed in Epic    Medication Adherence: no issues reported by patient     Pre-diabetes: Patient brought last 18 daily readings at various times (breakfast to lunch/afternoon) - highest reading is 125 on 5/26/17     Patient not currently on any diabetic medication as of now. A1C today is 5.7%      Date FBG/ 2hours post Lunch/2hours post Dinner /2hours post    06/11/17  111     06/10 118      06/09 107      06/08 105      06/07  118     06/06 104      06/05 118            Insomnia: sleep is better --off trazodone due to SE's.       Asthma:  Current asthma medications: Albuterol MDI - uses when it is humid - always uses 4 puffs, maybe 1-2x per week. Using Nebs only once in a while.    Not taking Singulair - doesn't remember why - didn't think he needed it.     Per [facility] records, patient has not yet had PPSV23 pneumonia shot - may be necessary for adult patient with asthma.     ACT score today =  20  AAP on file: YES    Reports daily use of albuterol inhaler or nebulizer and PRN use of Advair. Discussed difference between controller and rescue medications and correct dosing.    Immunizations: Will get yearly influenza vaccine at work.not yet adult pneumovax.    Vitamin D3: level was 36 on 11-24-15. Not taking Vitamin D right now.    Vitamin B-12: Not taking right now - may start in future.    Current labs include:  BP Readings from Last 3 Encounters:   06/13/17 126/72   04/18/17 109/55   10/11/16 116/69     Today's Vitals: /72  Wt 296 lb (134.3 kg)  BMI 44.35 kg/m2  Lab Results   Component Value Date    A1C 5.7 06/13/2017   .  Lab Results   Component Value Date    CHOL 138 01/30/2017     Lab Results   Component Value Date    TRIG 64 01/30/2017     Lab Results   Component Value Date    HDL 56 01/30/2017     Lab Results   Component Value Date    LDL 69 01/30/2017       Liver Function Studies -   Recent Labs   Lab Test  01/30/17   1059   PROTTOTAL  7.4   ALBUMIN  3.9   BILITOTAL  0.6   ALKPHOS  71   AST  32   ALT  53       No results found for: UCRR, MICROL, UMALCR    Last Basic Metabolic Panel:  Lab Results   Component Value Date     01/30/2017      Lab Results   Component Value Date    POTASSIUM 4.0 01/30/2017     Lab Results   Component Value Date    CHLORIDE 105 01/30/2017     Lab Results   Component Value Date    BUN 17 01/30/2017     Lab Results   Component Value Date    CR 0.92 01/30/2017     GFR Estimate   Date Value Ref Range Status   01/30/2017 88 >60 mL/min/1.7m2 Final     Comment:     Non  GFR Calc     GFR Estimate If Black   Date Value Ref Range Status   01/30/2017 >90   GFR Calc   >60 mL/min/1.7m2 Final     No results found for: TSH]    Most Recent Immunizations   Administered Date(s) Administered     DTAP (<7y) 07/28/1982     Hepatitis A Vac Ped/Adol-2 Dose 10/12/2006     Hepatitis B 06/07/2001     Influenza (IIV3) 11/02/2016     MMR 07/29/1993      Pneumococcal 23 valent 06/13/2017     TD (ADULT, 7+) 10/12/2006     Tdap (Adacel,Boostrix) 09/24/2009           ASSESSMENT:                                                       Current medications were reviewed with him today.      Medication Adherence: no issues reported by patient    Pre-DM: a1c <6% --reassured patient he doesn't need medication today to treat his bs's --see plan for diet/exercise .    Asthma: Stable. Up to date and at goal of ACT > or equal to 20.  Just needs adult ppsv23    Insomnia: Stable.    Vitamin D3: is not at goal of 50-75ng/mL. Pt would benefit from vitamin D3 lab recheck in 6 months.    Immunizations: Got yearly flu shot last year - getting PPSV23 today per [place] Nurse.                                                           1. Asthma: Your ACT score today is 20 - this is at goal - we will take Singulair off your medication list for now. Stay on other medications for now - try to limit albuterol to 2 puffs per session.     Thank you for getting the Pneumovax vaccine today!    2. Your A1C today is 5.7% - continue good diet habits (low carbohydrate choices if possible), and try to walk/exercise more. We will keep an eye on this, and if need be, we can add a medication to help keep blood sugars low in future.    3. Try to remember to take one 5,000 unit Vitamin D pill per day - take with food. This will help with your immune system - you can buy this over the counter.    4. If you want to take Vitamin B12 for memory/energy, take 250 micrograms (mcg) per day - or 500 mcg every other day if you cannot find the 250 mcg in stores.      Next MTM visit: June 2018 - I will mychart you to let you know when you should make an appointment.    To schedule another MTM appointment, please call the clinic directly or you may call the MTM scheduling line at [phone] or toll-free at [phone].    I spent 45 minutes  with this patient today.  All changes were made via collaborative practice  agreement with Angelica Pate. A copy of the visit note was provided to the patient's primary care provider.    The patient was given a summary of these recommendations as an after visit summary.     Bernarda Mooney [facility].  Medication Therapy Management Provider  Pager: [phone]  Isaias Kim, Pharm-D-4.

## 2017-06-13 NOTE — MR AVS SNAPSHOT
After Visit Summary   6/13/2017    Oliver Agarwal    MRN: [id number]           Patient Information     Date Of Birth          1965        Visit Information        Provider Department      6/13/2017 2:00 PM Natty Dobson; Bernarda Mooney, [facility]        Today's Diagnoses     Prediabetes    -  1      Care Instructions    Recommendations from today's MT visit:                                                      1. Asthma: Your ACT score today is 20 - this is at goal - we will take Singulair off your medication list for now. Stay on other medications for now - try to limit albuterol to 2 puffs per session.     Thank you for getting the Pneumovax vaccine today!    2. Your A1C today is 5.7% - continue good diet habits (low carbohydrate choices if possible), and try to walk/exercise more. We will keep an eye on this, and if need be, we can add a medication to help keep blood sugars low in future.    3. Try to remember to take one 5,000 unit Vitamin D pill per day - take with food. This will help with your immune system - you can buy this over the counter.    4. If you want to take Vitamin B12 for memory/energy, take 250 micrograms (mcg) per day - or 500 mcg every other day if you cannot find the 250 mcg in stores.      Next MTM visit: June 2018 - I will mychart you to let you know when you should make an appointment.    To schedule another MT appointment, please call the clinic directly or you may call the MTM scheduling line at [phone] or toll-free at [phone].     My Clinical Pharmacist's contact information:                                                      It was a pleasure seeing you today!  Please feel free to contact me with any questions or concerns you have.      Bernarda Mooney [facility].  Medication Therapy Management Provider  [phone]  Isaias Kim, Pharm-D-4.      You may receive a survey about the MT services you received.  I would  appreciate your feedback to help me serve you better in the future. Please fill it out and return it when you can. Your comments will be anonymous.      My healthcare goals:                                                      **                Follow-ups after your visit        Who to contact     If you have questions or need follow up information about today's clinic visit or your schedule please contact [facility] MTM directly at [phone].  Normal or non-critical lab and imaging results will be communicated to you by [employer], letter or phone within 4 business days after the clinic has received the results. If you do not hear from us within 7 days, please contact the clinic through [employer] or phone. If you have a critical or abnormal lab result, we will notify you by phone as soon as possible.  Submit refill requests through [employer] or call your pharmacy and they will forward the refill request to us. Please allow 3 business days for your refill to be completed.          Additional Information About Your Visit        SecureDBhart Information     [employer] gives you secure access to your electronic health record. If you see a primary care provider, you can also send messages to your care team and make appointments. If you have questions, please call your primary care clinic.  If you do not have a primary care provider, please call [phone] and they will assist you.        Care EveryWhere ID     This is your Care EveryWhere ID. This could be used by other organizations to access your [facility] medical records  [id number]         Blood Pressure from Last 3 Encounters:   06/13/17 126/72   04/18/17 109/55   10/11/16 116/69    Weight from Last 3 Encounters:   04/18/17 295 lb 12 oz (134.2 kg)   10/11/16 (!) 306 lb (138.8 kg)   09/02/16 (!) 301 lb 12.8 oz (136.9 kg)                 Today's Medication Changes          These changes are accurate as of: 6/13/17  3:00 PM.  If you have any questions, ask  your nurse or doctor.               These medicines have changed or have updated prescriptions.        Dose/Directions    ADVAIR DISKUS 250-50 MCG/DOSE diskus inhaler   This may have changed:  Another medication with the same name was removed. Continue taking this medication, and follow the directions you see here.   Generic drug:  fluticasone-salmeterol   Changed by:  Bernarda Mooney RPH        Dose:  1 puff   Inhale 1 puff into the lungs 2 times daily Taking prn   Refills:  0                Primary Care Provider Office Phone # Fax #    Angelica Rodriguez [facility] [phone]       [address] [address]        Thank you!     Thank you for choosing [facility]  for your care. Our goal is always to provide you with excellent care. Hearing back from our patients is one way we can continue to improve our services. Please take a few minutes to complete the written survey that you may receive in the mail after your visit with us. Thank you!             Your Updated Medication List - Protect others around you: Learn how to safely use, store and throw away your medicines at www.disposemymeds.org.          This list is accurate as of: 6/13/17  3:00 PM.  Always use your most recent med list.                   Brand Name Dispense Instructions for use    ADVAIR DISKUS 250-50 MCG/DOSE diskus inhaler   Generic drug:  fluticasone-salmeterol      Inhale 1 puff into the lungs 2 times daily Taking prn       * albuterol (2.5 MG/3ML) 0.083% neb solution     90 vial    Take 1 vial (2.5 mg) by nebulization every 4 hours as needed for shortness of breath / dyspnea       * albuterol 108 (90 BASE) MCG/ACT Inhaler    albuterol    1 Inhaler    Inhale 2 puffs into the lungs every 6 hours as needed       alclomethasone 0.05 % cream    ACLOVATE    15 g    Apply to affected areas on face once per day but only for 3 consecutive days       benzonatate 200 MG capsule     TESSALON    21 capsule    Take 1 capsule (200 mg) by mouth 3 times daily as needed for cough       blood glucose monitoring lancets     1 Box    Use to test blood sugars once daily as directed.       blood glucose monitoring test strip    ANTONIO CONTOUR NEXT    100 each    Use to test blood sugar one x  daily or as directed.       CENTRUM Tabs     100 tablet    Take 1 tablet by mouth daily       cyanocolbalamin 500 MCG tablet    vitamin  B-12     Take 500 mcg by mouth daily       cyclobenzaprine 10 MG tablet    FLEXERIL    30 tablet    Take 0.5-1 tablets (5-10 mg) by mouth 3 times daily as needed for muscle spasms       desonide 0.05 % cream    DESOWEN    15 g    Apply sparingly to affected area two times per day for only 3 consecutive days       doxycycline Monohydrate 50 MG Caps capsule     90 capsule    1 tab per day       fluticasone 50 MCG/ACT spray    FLONASE         ibuprofen 600 MG tablet    ADVIL/MOTRIN    120 tablet    Take 1 tablet (600 mg) by mouth every 6 hours as needed for moderate pain       metroNIDAZOLE 0.75 % topical gel    METROGEL    45 g    Apply topically 2 times daily       montelukast 10 MG tablet    SINGULAIR    90 tablet    Take 1 tablet (10 mg) by mouth At Bedtime       triamcinolone 0.1 % ointment    KENALOG    80 g    Apply topically 2 times daily Apply to affected areas on hands bid for 14 days and then when needed.Not for face or groin       vitamin D 2000 UNITS Caps      Take 4,000 Units by mouth daily       * Notice:  This list has 2 medication(s) that are the same as other medications prescribed for you. Read the directions carefully, and ask your doctor or other care provider to review them with you.

## 2017-06-13 NOTE — NURSING NOTE
Prior to injection verified patient identity using patient's name and date of birth.    Screening Questionnaire for Adult Immunization    Are you sick today?   No   Do you have allergies to medications, food, a vaccine component or latex?   No   Have you ever had a serious reaction after receiving a vaccination?   No   Do you have a long-term health problem with heart disease, lung disease, asthma, kidney disease, metabolic disease (e.g. diabetes), anemia, or other blood disorder?   Asthma and prediabetic    Do you have cancer, leukemia, HIV/AIDS, or any other immune system problem?   No   In the past 3 months, have you taken medications that affect  your immune system, such as prednisone, other steroids, or anticancer drugs; drugs for the treatment of rheumatoid arthritis, Crohn s disease, or psoriasis; or have you had radiation treatments?   No, inhaler    Have you had a seizure, or a brain or other nervous system problem?   No   During the past year, have you received a transfusion of blood or blood     products, or been given immune (gamma) globulin or antiviral drug?   No   For women: Are you pregnant or is there a chance you could become        pregnant during the next month?   No   Have you received any vaccinations in the past 4 weeks?   No     Immunization questionnaire was positive for at least one answer.  Notified Bernarda ONEAL doesn't apply on this patient    Per orders of Bernarda Mooney, injection of Pneumovax 23 given by Glory Dietz. Patient instructed to remain in clinic for 20 minutes afterwards, and to report any adverse reaction to me immediately.       Screening performed by Glory Dietz on 6/13/2017 at 3:03 PM.

## 2017-06-13 NOTE — PATIENT INSTRUCTIONS
Recommendations from today's MTM visit:                                                      1. Asthma: Your ACT score today is 20 - this is at goal - we will take Singulair off your medication list for now. Stay on other medications for now - try to limit albuterol to 2 puffs per session.     Thank you for getting the Pneumovax vaccine today!    2. Your A1C today is 5.7% - continue good diet habits (low carbohydrate choices if possible), and try to walk/exercise more. We will keep an eye on this, and if need be, we can add a medication to help keep blood sugars low in future.    3. Try to remember to take one 5,000 unit Vitamin D pill per day - take with food. This will help with your immune system - you can buy this over the counter.    4. If you want to take Vitamin B12 for memory/energy, take 250 micrograms (mcg) per day - or 500 mcg every other day if you cannot find the 250 mcg in stores.      Next MTM visit: June 2018 - I will mychart you to let you know when you should make an appointment.    To schedule another MTM appointment, please call the clinic directly or you may call the MTM scheduling line at [phone] or toll-free at [phone].     My Clinical Pharmacist's contact information:                                                      It was a pleasure seeing you today!  Please feel free to contact me with any questions or concerns you have.      Bernarda Mooney [facility].  Medication Therapy Management Provider  [phone]  Isaias Kim, Pharm-D-4.      You may receive a survey about the MTM services you received.  I would appreciate your feedback to help me serve you better in the future. Please fill it out and return it when you can. Your comments will be anonymous.      My healthcare goals:                                                      **

## 2017-06-14 ASSESSMENT — ASTHMA QUESTIONNAIRES: ACT_TOTALSCORE: 20

## 2017-06-24 DIAGNOSIS — J45.30 MILD PERSISTENT ASTHMA WITHOUT COMPLICATION: ICD-10-CM

## 2017-06-24 DIAGNOSIS — R52 PAIN: ICD-10-CM

## 2017-06-24 NOTE — TELEPHONE ENCOUNTER
Medication Detail      Disp Refills Start End HELGA   ibuprofen (ADVIL,MOTRIN) 600 MG tablet 120 tablet 1 2/19/2016  No   Sig: Take 1 tablet (600 mg) by mouth every 6 hours as needed for moderate pain   Class: E-Prescribe   Notes to Pharmacy: PLEASE ASK SEBASTIÁN TO SCHEDULE LAB ONLY FOR MONITORING THIS MEDICATION.   Route: Oral   Order: [id number]       [place] [facility] [place] [facility] or [facility] prescribing provider: 4/18/2017       Creatinine   Date Value Ref Range Status   01/30/2017 0.92 0.66 - 1.25 mg/dL Final     Lab Results   Component Value Date    AST 32 01/30/2017     Lab Results   Component Value Date    ALT 53 01/30/2017     BP Readings from Last 3 Encounters:   06/13/17 126/72   04/18/17 109/55   10/11/16 116/69     Medication Detail      Disp Refills Start End HELGA   albuterol (ALBUTEROL) 108 (90 BASE) MCG/ACT inhaler 1 Inhaler 11 5/24/2016  No   Sig: Inhale 2 puffs into the lungs every 6 hours as needed   Class: E-Prescribe   Route: Inhalation        Last Office Visit [place] [facility] [place] [facility] [facility] or [facility] prescribing provider:  4/18/2017   Future Office Visit:       Date of Last Asthma Action Plan Letter:   Asthma Action Plan Q1 Year    Topic Date Due     Asthma Action Plan - yearly  12/01/2016      Asthma Control Test:   ACT Total Scores 6/13/2017   ACT TOTAL SCORE (Goal Greater than or Equal to 20) 20   In the past 12 months, how many times did you visit the emergency room for your asthma without being admitted to the hospital? 0   In the past 12 months, how many times were you hospitalized overnight because of your asthma? 0       Date of Last Spirometry Test:   No results found for this or any previous visit.

## 2017-06-26 RX ORDER — ALBUTEROL SULFATE 90 UG/1
AEROSOL, METERED RESPIRATORY (INHALATION)
Qty: 8.5 G | Refills: 0 | Status: SHIPPED | OUTPATIENT
Start: 2017-06-26 | End: 2018-06-18

## 2017-06-26 RX ORDER — IBUPROFEN 600 MG/1
TABLET, FILM COATED ORAL
Qty: 120 TABLET | Refills: 0 | Status: SHIPPED | OUTPATIENT
Start: 2017-06-26 | End: 2017-07-23

## 2017-06-26 NOTE — TELEPHONE ENCOUNTER
Routing refill request to provider for review/approval because:  Asthma action plan overdue    Ibuprofen [place]illed according [place] [facility] [place].    Angelica-please sign if agree.    Thank you!  EDUIN Sheikh, GEORGESN, RN

## 2017-06-29 ENCOUNTER — TELEPHONE (OUTPATIENT)
Dept: FAMILY MEDICINE | Facility: CLINIC | Age: 52
End: 2017-06-29

## 2017-06-29 DIAGNOSIS — M54.50 LOW BACK PAIN: Primary | ICD-10-CM

## 2017-06-29 NOTE — TELEPHONE ENCOUNTER
Patient called with  and spoke with writer.    Per patient:  1. Has back specialist appt on 7/10/17 but can Dr. Xavier prescribe a pain medication for him?   A. Muscle relaxer does not help back pain but oxycodone/acetaminophen 5-325 mg worked in the past  2. Has difficulty getting through the day with pain    See other 6/29/17 telephone encounter for more information.    Writer recommended patient schedule office visit and informed him of clinic policy regarding controlled substances and it is provider discretion whether or not to prescribe controlled substance.    Patient understood and in agreement with plan.    Appt scheduled with DINA Travis CNP, on 6/30/17.  Appt date, time and location confirmed with patient.    EDUIN Sheikh, GEORGESN, RN

## 2017-06-29 NOTE — TELEPHONE ENCOUNTER
Dr. Xavier contacted writer and asked writer to contact patient regarding today's appt:  1. Patient did PT for back pain, but instead of following up with primary care for back pain, recommend patient see back specialist.    Writer called patient on home phone, which is an  service and informed patient of Dr. Xavier's recommendation.    Patient in agreement with plan and stated he is available anytime after 1000 for back specialist appt.    Appt this afternoon with Dr. Xavier cancelled.    Orthopedic  referral placed.    XAVI Velázquez, , will contact orthopedic scheduling for appt and will contact patient with appt info.    GEORGES WoodsN, RN

## 2017-06-29 NOTE — PROGRESS NOTES
SUBJECTIVE:                                                    Oliver Agarwal is a 52 year old male who presents to clinic today for the following health issues:    Back Pain Follow Up      Description:   Location of pain:  Center, right low-back  Character of pain: constant, worsening  Pain radiation: Does not radiate  Since last visit, pain is:  worsened  New numbness or weakness in legs, not attributed to pain:  no     Intensity: Currently 7/10, At its worst 9/10    History:   Pain interferes with job: No, working part time  Therapies tried without relief: Physical Therapy  Therapies tried with relief: opioids     Interval history:  right low back pain that radiates to buttocks, flare ongoing since 1/2017.  Has done multiple physical therapy sessions.  Has tried ibuprofen 600mg tabs as well as flexeril.  Per PCP recommend follow up with spine specialist if not improving within 6-8 weeks of physical therapy.      Seeing spine specialist 7/10/17.  Never had MRI or seen spine specialist.      Update today:  Flexeril and physical therapy helped a little bit but then had worsening of symptoms.  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  Pain continues to come back.  Worse with walking.      He is taking ibuprofen once in a while for pain, sometimes it helps.      Oxycodone helped, he has that from 2013.  Took 1 last week and 2 yesterday.pain is to center lumbar spine and radiates to buttocks.  Worse with walking or steps.  Pain is relieved with sitting or laying down, constant with movement.  Does endorse weakness of right leg, difficulty lifting leg into car.  Has to lift his leg with his hand to get it into the car.   Npo paresthesias in right leg.      Patient Active Problem List   Diagnosis     CARDIOVASCULAR SCREENING; LDL GOAL LESS THAN 160     Atopic rhinitis     Prediabetes     Obesity     Hypovitaminosis D     Mild persistent asthma      Deaf - reads lips well     Low back pain     History reviewed. No pertinent surgical history.    Social History   Substance Use Topics     Smoking status: Former Smoker     Quit date: 5/11/1999     Smokeless tobacco: Never Used     Alcohol use No     Family History   Problem Relation Age of Onset     HEART DISEASE Other          Current Outpatient Prescriptions   Medication Sig Dispense Refill     oxyCODONE-acetaminophen (PERCOCET) 5-325 MG per tablet Take 1-2 tablets by mouth every 6 hours as needed for pain 18 tablet 0     ibuprofen (ADVIL/MOTRIN) 600 MG tablet TAKE 1 TABLET BY MOUTH EVERY 6 HOURS AS NEEDED FOR MODERATE PAIN 120 tablet 0     ALBUTEROL 108 (90 BASE) MCG/ACT inhaler INHALE 2 PUFFS BY MOUTH EVERY 6 HOURS AS NEEDED 8.5 g 0     blood glucose monitoring (ANTONIO CONTOUR NEXT) test strip Use to test blood sugar one x  daily or as directed. 100 each 2     blood glucose monitoring (ANTONIO MICROLET) lancets Use to test blood sugars once daily as directed. 1 Box 11     Multiple Vitamins-Minerals (CENTRUM) TABS Take 1 tablet by mouth daily 100 tablet 3     cyclobenzaprine (FLEXERIL) 10 MG tablet Take 0.5-1 tablets (5-10 mg) by mouth 3 times daily as needed for muscle spasms 30 tablet 1     triamcinolone (KENALOG) 0.1 % ointment Apply topically 2 times daily Apply to affected areas on hands bid for 14 days and then when needed.Not for face or groin 80 g 0     metroNIDAZOLE (METROGEL) 0.75 % gel Apply topically 2 times daily 45 g 11     doxycycline Monohydrate 50 MG CAPS 1 tab per day 90 capsule 1     desonide (DESOWEN) 0.05 % cream Apply sparingly to affected area two times per day for only 3 consecutive days 15 g 0     alclomethasone (ACLOVATE) 0.05 % cream Apply to affected areas on face once per day but only for 3 consecutive days 15 g 0     montelukast (SINGULAIR) 10 MG tablet Take 1 tablet (10 mg) by mouth At Bedtime 90 tablet 1     fluticasone (FLONASE) 50 MCG/ACT nasal spray   6     fluticasone-salmeterol  (ADVAIR DISKUS) 250-50 MCG/DOSE diskus inhaler Inhale 1 puff into the lungs 2 times daily Taking prn       albuterol (2.5 MG/3ML) 0.083% nebulizer solution Take 1 vial (2.5 mg) by nebulization every 4 hours as needed for shortness of breath / dyspnea 90 vial 0     cyanocolbalamin (VITAMIN  B-12) 500 MCG tablet Take 500 mcg by mouth daily       Cholecalciferol (VITAMIN D) 2000 UNITS CAPS Take 4,000 Units by mouth daily         ROS:  MSK, Neuro as above, otherwise negative       OBJECTIVE:                                                    /70 (BP Location: Right arm, Patient Position: Chair, Cuff Size: Adult Large)  Pulse 76  Temp 97.7  F (36.5  C) (Tympanic)  Resp 16  Wt 296 lb (134.3 kg)  SpO2 97%  BMI 44.35 kg/m2   GENERAL APPEARANCE: healthy, alert and no distress  EYES: Eyes grossly normal to inspection and conjunctivae and sclerae normal  RESP: respirations nonlabored  PSYCH: mentation appears normal and affect normal/bright        ASSESSMENT/PLAN:                                                    (M54.41,  G89.29) Chronic midline low back pain with right-sided sciatica  (primary encounter diagnosis)  Comment: no improvement with physical therapy, NSAIDs, flexeril.  No activity on MNPMP  Plan: oxyCODONE-acetaminophen (PERCOCET) 5-325 MG per        tablet        Follow up with spine specialist as scheduled.  Ok for limited supply percocet as needed for pain, reviewed not a good long term med for pain, he will not drive after taking it, discussed side effects of sedation and impaired judgment.  Discussed may benefit from interventional therapies through spine specialist, MRI may be indicated.  Discussed needs to follow up in office for refills but again stressed to be used for short term management    (Z51.81) Encounter for therapeutic drug monitoring  Plan: Basic metabolic panel                See Patient Instructions    Romelia Travis, CNP  [facility]    Patient Instructions   1.  See  spine specialist as scheduled    2.  For pain continue ibuprofen every 6 hours as needed.  Continue heat and ice and home exercises.  Avoid heavy lifting.      3.  Small supply of percocet for severe pain, not a long term medication for pain.  dont drive after taking it.

## 2017-06-30 ENCOUNTER — OFFICE VISIT (OUTPATIENT)
Dept: FAMILY MEDICINE | Facility: CLINIC | Age: 52
End: 2017-06-30
Payer: MEDICARE

## 2017-06-30 VITALS
BODY MASS INDEX: 44.35 KG/M2 | SYSTOLIC BLOOD PRESSURE: 132 MMHG | RESPIRATION RATE: 16 BRPM | TEMPERATURE: 97.7 F | DIASTOLIC BLOOD PRESSURE: 70 MMHG | HEART RATE: 76 BPM | OXYGEN SATURATION: 97 % | WEIGHT: 296 LBS

## 2017-06-30 DIAGNOSIS — M54.41 CHRONIC MIDLINE LOW BACK PAIN WITH RIGHT-SIDED SCIATICA: Primary | ICD-10-CM

## 2017-06-30 DIAGNOSIS — Z51.81 ENCOUNTER FOR THERAPEUTIC DRUG MONITORING: ICD-10-CM

## 2017-06-30 DIAGNOSIS — G89.29 CHRONIC MIDLINE LOW BACK PAIN WITH RIGHT-SIDED SCIATICA: Primary | ICD-10-CM

## 2017-06-30 PROCEDURE — 99213 OFFICE O/P EST LOW 20 MIN: CPT | Performed by: NURSE PRACTITIONER

## 2017-06-30 PROCEDURE — T1013 SIGN LANG/ORAL INTERPRETER: HCPCS | Mod: U3 | Performed by: NURSE PRACTITIONER

## 2017-06-30 RX ORDER — OXYCODONE AND ACETAMINOPHEN 5; 325 MG/1; MG/1
1-2 TABLET ORAL EVERY 6 HOURS PRN
Qty: 18 TABLET | Refills: 0 | Status: SHIPPED | OUTPATIENT
Start: 2017-06-30 | End: 2018-06-18

## 2017-06-30 NOTE — LETTER
My Asthma Action Plan  Name: Oliver Agarwal   YOB: 1965  Date: 6/30/2017   My doctor: BROOK Colunga CNP   My clinic: [facility]        My Control Medicine: singulair  My Rescue Medicine: Albuterol (Proair/Ventolin/Proventil) inhaler 2 puffs PRN   My Asthma Severity: mild persistent  Avoid your asthma triggers: Patient is unaware of triggers               GREEN ZONE   Good Control    I feel good    No cough or wheeze    Can work, sleep and play without asthma symptoms       Take your asthma control medicine every day.     1. If exercise triggers your asthma, take your rescue medication    15 minutes before exercise or sports, and    During exercise if you have asthma symptoms  2. Spacer to use with inhaler: If you have a spacer, make sure to use it with your inhaler             YELLOW ZONE Getting Worse  I have ANY of these:    I do not feel good    Cough or wheeze    Chest feels tight    Wake up at night   1. Keep taking your Green Zone medications  2. Start taking your rescue medicine:    every 20 minutes for up to 1 hour. Then every 4 hours for 24-48 hours.  3. If you stay in the Yellow Zone for more than 12-24 hours, contact your doctor.  4. If you do not return to the Green Zone in 12-24 hours or you get worse, start taking your oral steroid medicine if prescribed by your provider.           RED ZONE Medical Alert - Get Help  I have ANY of these:    I feel awful    Medicine is not helping    Breathing getting harder    Trouble walking or talking    Nose opens wide to breathe       1. Take your rescue medicine NOW  2. If your provider has prescribed an oral steroid medicine, start taking it NOW  3. Call your doctor NOW  4. If you are still in the Red Zone after 20 minutes and you have not reached your doctor:    Take your rescue medicine again and    Call 911 or go to the emergency room right away    See your regular doctor within 2 weeks of an Emergency Room or  Urgent Care visit for follow-up treatment.        Electronically signed by: Natty Haro, June 30, 2017    Annual Reminders:  Meet with Asthma Educator,  Flu Shot in the Fall, consider Pneumonia Vaccination for patients with asthma (aged 19 and older).    Pharmacy: [id number] - [place], [place] - [address] AT [facility] [address]                    Asthma Triggers  How To Control Things That Make Your Asthma Worse    Triggers are things that make your asthma worse.  Look at the list below to help you find your triggers and what you can do about them.  You can help prevent asthma flare-ups by staying away from your triggers.      Trigger                                                          What you can do   Cigarette Smoke  Tobacco smoke can make asthma worse. Do not allow smoking in your home, car or around you.  Be sure no one smokes at a child s day care or school.  If you smoke, ask your health care provider for ways to help you quit.  Ask family members to quit too.  Ask your health care provider for a referral to Quit Plan to help you quit smoking, or call 5-683-318-PLAN.     Colds, Flu, Bronchitis  These are common triggers of asthma. Wash your hands often.  Don t touch your eyes, nose or mouth.  Get a flu shot every year.     Dust Mites  These are tiny bugs that live in cloth or carpet. They are too small to see. Wash sheets and blankets in hot water every week.   Encase pillows and mattress in dust mite proof covers.  Avoid having carpet if you can. If you have carpet, vacuum weekly.   Use a dust mask and HEPA vacuum.   Pollen and Outdoor Mold  Some people are allergic to trees, grass, or weed pollen, or molds. Try to keep your windows closed.  Limit time out doors when pollen count is high.   Ask you health care provider about taking medicine during allergy season.     Animal Dander  Some people are allergic to skin flakes, urine or saliva from pets with fur or feathers.  Keep pets with fur or feathers out of your home.    If you can t keep the pet outdoors, then keep the pet out of your bedroom.  Keep the bedroom door closed.  Keep pets off cloth furniture and away from stuffed toys.     Mice, Rats, and Cockroaches  Some people are allergic to the waste from these pests.   Cover food and garbage.  Clean up spills and food crumbs.  Store grease in the refrigerator.   Keep food out of the bedroom.   Indoor Mold  This can be a trigger if your home has high moisture. Fix leaking faucets, pipes, or other sources of water.   Clean moldy surfaces.  Dehumidify basement if it is damp and smelly.   Smoke, Strong Odors, and Sprays  These can reduce air quality. Stay away from strong odors and sprays, such as perfume, powder, hair spray, paints, smoke incense, paint, cleaning products, candles and new carpet.   Exercise or Sports  Some people with asthma have this trigger. Be active!  Ask your doctor about taking medicine before sports or exercise to prevent symptoms.    Warm up for 5-10 minutes before and after sports or exercise.     Other Triggers of Asthma  Cold air:  Cover your nose and mouth with a scarf.  Sometimes laughing or crying can be a trigger.  Some medicines and food can trigger asthma.

## 2017-06-30 NOTE — MR AVS SNAPSHOT
After Visit Summary   6/30/2017    Oliver Agarwal    MRN: [id number]           Patient Information     Date Of Birth          1965        Visit Information        Provider Department      6/30/2017 3:15 PM Natty Dobson; Romelia Travis APRN CNP Formerly named [facility]        Today's Diagnoses     Chronic midline low back pain with right-sided sciatica    -  1    Encounter for therapeutic drug monitoring          Care Instructions    1.  See spine specialist as scheduled    2.  For pain continue ibuprofen every 6 hours as needed.  Continue heat and ice and home exercises.  Avoid heavy lifting.      3.  Small supply of percocet for severe pain, not a long term medication for pain.  dont drive after taking it.            Follow-ups after your visit        Your next 10 appointments already scheduled     Jul 10, 2017  2:20 PM CDT   MEDSPINE NEW with Cy Rowland, DO   [facility] ([facility])    [address]  [address]  [facility] [address]   [phone]              Who to contact     If you have questions or need follow up information about today's clinic visit or your schedule please contact [facility] directly at [phone].  Normal or non-critical lab and imaging results will be communicated to you by MyChart, letter or phone within 4 business days after the clinic has received the results. If you do not hear from us within 7 days, please contact the clinic through MyChart or phone. If you have a critical or abnormal lab result, we will notify you by phone as soon as possible.  Submit refill requests through [employer] or call your pharmacy and they will forward the refill request to us. Please allow 3 business days for your refill to be completed.          Additional Information About Your Visit        [employer] Information     [employer] gives you secure access to your electronic health record. If you see a primary  care provider, you can also send messages to your care team and make appointments. If you have questions, please call your primary care clinic.  If you do not have a primary care provider, please call [phone] and they will assist you.        Care EveryWhere ID     This is your Care EveryWhere ID. This could be used by other organizations to access your [place] medical records  [id number]        Your Vitals Were     Pulse Temperature Respirations Pulse Oximetry BMI (Body Mass Index)       76 97.7  F (36.5  C) (Tympanic) 16 97% 44.35 kg/m2        Blood Pressure from Last 3 Encounters:   06/30/17 132/70   06/13/17 126/72   04/18/17 109/55    Weight from Last 3 Encounters:   06/30/17 296 lb (134.3 kg)   06/13/17 296 lb (134.3 kg)   04/18/17 295 lb 12 oz (134.2 kg)              We Performed the Following     Asthma Action Plan (AAP)     Basic metabolic panel          Today's Medication Changes          These changes are accurate as of: 6/30/17  3:55 PM.  If you have any questions, ask your nurse or doctor.               Start taking these medicines.        Dose/Directions    oxyCODONE-acetaminophen 5-325 MG per tablet   Commonly known as:  PERCOCET   Used for:  Chronic midline low back pain with right-sided sciatica   Started by:  Romelia Travis APRN CNP        Dose:  1-2 tablet   Take 1-2 tablets by mouth every 6 hours as needed for pain   Quantity:  18 tablet   Refills:  0            Where to get your medicines      Some of these will need a paper prescription and others can be bought over the counter.  Ask your nurse if you have questions.     Bring a paper prescription for each of these medications     oxyCODONE-acetaminophen 5-325 MG per tablet                Primary Care Provider Office Phone # Fax #    BROOK Rosa [phone]       [address] [address]        Equal Access to Services     CATHERINE OCHOA AH: Daniel stockton  Mayurigwen, wadeniseda luqadaha, qakalinata kaella calloway, amadeo christiansonhansel sima. So [facility] [phone].    ATENCIÓN: Si casimiro sanchez, tiene a harkins disposición servicios gratuitos de asistencia lingüística. Rita al [phone].    We comply with applicable federal civil rights laws and [place] laws. We do not discriminate on the basis of race, color, national origin, age, disability sex, sexual orientation or gender identity.            Thank you!     Thank you for choosing [facility]  for your care. Our goal is always to provide you with excellent care. Hearing back from our patients is one way we can continue to improve our services. Please take a few minutes to complete the written survey that you may receive in the mail after your visit with us. Thank you!             Your Updated Medication List - Protect others around you: Learn how to safely use, store and throw away your medicines at www.disposemymeds.org.          This list is accurate as of: 6/30/17  3:55 PM.  Always use your most recent med list.                   Brand Name Dispense Instructions for use Diagnosis    ADVAIR DISKUS 250-50 MCG/DOSE diskus inhaler   Generic drug:  fluticasone-salmeterol      Inhale 1 puff into the lungs 2 times daily Taking prn        * albuterol (2.5 MG/3ML) 0.083% neb solution     90 vial    Take 1 vial (2.5 mg) by nebulization every 4 hours as needed for shortness of breath / dyspnea    Mild persistent asthma       * albuterol 108 (90 BASE) MCG/ACT Inhaler   Generic drug:  albuterol     8.5 g    INHALE 2 PUFFS BY MOUTH EVERY 6 HOURS AS NEEDED    Mild persistent asthma without complication       alclomethasone 0.05 % cream    ACLOVATE    15 g    Apply to affected areas on face once per day but only for 3 consecutive days    Seborrheic dermatitis       blood glucose monitoring lancets     1 Box    Use to test blood sugars once daily as directed.    Prediabetes       blood glucose monitoring  test strip    ANTONIO CONTOUR NEXT    100 each    Use to test blood sugar one x  daily or as directed.    Prediabetes       CENTRUM Tabs     100 tablet    Take 1 tablet by mouth daily    Vitamin deficiency       cyanocolbalamin 500 MCG tablet    vitamin  B-12     Take 500 mcg by mouth daily        cyclobenzaprine 10 MG tablet    FLEXERIL    30 tablet    Take 0.5-1 tablets (5-10 mg) by mouth 3 times daily as needed for muscle spasms    Acute right-sided low back pain without sciatica       desonide 0.05 % cream    DESOWEN    15 g    Apply sparingly to affected area two times per day for only 3 consecutive days    Eczema, unspecified type       doxycycline Monohydrate 50 MG Caps capsule     90 capsule    1 tab per day    Rosacea       fluticasone 50 MCG/ACT spray    FLONASE          ibuprofen 600 MG tablet    ADVIL/MOTRIN    120 tablet    TAKE 1 TABLET BY MOUTH EVERY 6 HOURS AS NEEDED FOR MODERATE PAIN    Pain       metroNIDAZOLE 0.75 % topical gel    METROGEL    45 g    Apply topically 2 times daily    Rosacea       montelukast 10 MG tablet    SINGULAIR    90 tablet    Take 1 tablet (10 mg) by mouth At Bedtime    Mild persistent asthma without complication       oxyCODONE-acetaminophen 5-325 MG per tablet    PERCOCET    18 tablet    Take 1-2 tablets by mouth every 6 hours as needed for pain    Chronic midline low back pain with right-sided sciatica       triamcinolone 0.1 % ointment    KENALOG    80 g    Apply topically 2 times daily Apply to affected areas on hands bid for 14 days and then when needed.Not for face or groin    Eczema, unspecified type       vitamin D 2000 UNITS Caps      Take 4,000 Units by mouth daily        * Notice:  This list has 2 medication(s) that are the same as other medications prescribed for you. Read the directions carefully, and ask your doctor or other care provider to review them with you.

## 2017-06-30 NOTE — NURSING NOTE
"Chief Complaint   Patient presents with     Back Pain     Pain       Initial /70 (BP Location: Right arm, Patient Position: Chair, Cuff Size: Adult Large)  Pulse 76  Temp 97.7  F (36.5  C) (Tympanic)  Resp 16  Wt 296 lb (134.3 kg)  SpO2 97%  BMI 44.35 kg/m2 Estimated body mass index is 44.35 kg/(m^2) as calculated from the following:    Height as of 9/2/16: 5' 8.5\" (1.74 m).    Weight as of this encounter: 296 lb (134.3 kg).  Medication Reconciliation: complete     Natty Haro, MICHAEL    "

## 2017-06-30 NOTE — PATIENT INSTRUCTIONS
1.  See spine specialist as scheduled    2.  For pain continue ibuprofen every 6 hours as needed.  Continue heat and ice and home exercises.  Avoid heavy lifting.      3.  Small supply of percocet for severe pain, not a long term medication for pain.  dont drive after taking it.

## 2017-07-10 ENCOUNTER — OFFICE VISIT (OUTPATIENT)
Dept: ORTHOPEDICS | Facility: CLINIC | Age: 52
End: 2017-07-10
Payer: MEDICARE

## 2017-07-10 ENCOUNTER — MYC REFILL (OUTPATIENT)
Dept: FAMILY MEDICINE | Facility: CLINIC | Age: 52
End: 2017-07-10

## 2017-07-10 ENCOUNTER — RADIANT APPOINTMENT (OUTPATIENT)
Dept: GENERAL RADIOLOGY | Facility: CLINIC | Age: 52
End: 2017-07-10
Attending: PHYSICAL MEDICINE & REHABILITATION
Payer: MEDICARE

## 2017-07-10 VITALS
SYSTOLIC BLOOD PRESSURE: 132 MMHG | WEIGHT: 296 LBS | DIASTOLIC BLOOD PRESSURE: 75 MMHG | HEIGHT: 69 IN | BODY MASS INDEX: 43.84 KG/M2

## 2017-07-10 DIAGNOSIS — M54.50 CHRONIC RIGHT-SIDED LOW BACK PAIN WITHOUT SCIATICA: Primary | ICD-10-CM

## 2017-07-10 DIAGNOSIS — G89.29 CHRONIC RIGHT-SIDED LOW BACK PAIN WITHOUT SCIATICA: Primary | ICD-10-CM

## 2017-07-10 DIAGNOSIS — G89.29 CHRONIC RIGHT-SIDED LOW BACK PAIN, WITH SCIATICA PRESENCE UNSPECIFIED: ICD-10-CM

## 2017-07-10 DIAGNOSIS — M54.5 CHRONIC RIGHT-SIDED LOW BACK PAIN, WITH SCIATICA PRESENCE UNSPECIFIED: ICD-10-CM

## 2017-07-10 DIAGNOSIS — L30.9 ECZEMA, UNSPECIFIED TYPE: ICD-10-CM

## 2017-07-10 DIAGNOSIS — M51.369 LUMBAR DEGENERATIVE DISC DISEASE: ICD-10-CM

## 2017-07-10 PROCEDURE — T1013 SIGN LANG/ORAL INTERPRETER: HCPCS | Mod: U3 | Performed by: PHYSICAL MEDICINE & REHABILITATION

## 2017-07-10 PROCEDURE — 99203 OFFICE O/P NEW LOW 30 MIN: CPT | Performed by: PHYSICAL MEDICINE & REHABILITATION

## 2017-07-10 PROCEDURE — 72100 X-RAY EXAM L-S SPINE 2/3 VWS: CPT

## 2017-07-10 NOTE — MR AVS SNAPSHOT
After Visit Summary   7/10/2017    Oliver Agarwal    MRN: [id number]           Patient Information     Date Of Birth          1965        Visit Information        Provider Department      7/10/2017 2:20 PM Zhane Danielson; Cy Rowland,  [facility]        Today's Diagnoses     Chronic right-sided low back pain without sciatica    -  1    Lumbar degenerative disc disease          Care Instructions    We addressed the following today:    1. Acute on chronic right-sided low back pain without radiation  2. Lumbar arthritis    Activity modification as discussed  Home exercise program as instructed  Physical therapy: PDR  Topical Treatments: Ice or heat  Over the counter medication: Acetaminophen (Tylenol) 1000 mg every 6 hours with food (Maximum of 3000 mg/day)  Ibuprofen (Advil) maximum of 800 mg four times a day with food  Call in 4-6 weeks if no improvement of symptoms for MRI of the lumbar spine without contrast for further evaluation/medical care (sooner if needed; call direct clinic number [[phone]] at any time with questions or concerns)              Follow-ups after your visit        Additional Services     PHYSICAL THERAPY REFERRAL       Diagnoses: Acute on chronic right-sided low back pain without radiation    Treatment: Formal physical therapy - specific exercises to include centralization with flexion/extension activities with mobilization/manipulation and core stabilization with use of modalities (ie ice, ultrasound, electrical stimulation, etc.) as needed with home exercise prescription.                  Who to contact     If you have questions or need follow up information about today's clinic visit or your schedule please contact [facility] directly at [phone].  Normal or non-critical lab and imaging results will be communicated to you by MyChart, letter or phone within 4 business days after the clinic has received the  "results. If you do not hear from us within 7 days, please contact the clinic through [employer] or phone. If you have a critical or abnormal lab result, we will notify you by phone as soon as possible.  Submit refill requests through [employer] or call your pharmacy and they will forward the refill request to us. Please allow 3 business days for your refill to be completed.          Additional Information About Your Visit        [employer] Information     [employer] gives you secure access to your electronic health record. If you see a primary care provider, you can also send messages to your care team and make appointments. If you have questions, please call your primary care clinic.  If you do not have a primary care provider, please call [phone] and they will assist you.        Care EveryWhere ID     This is your Care EveryWhere ID. This could be used by other organizations to access your [place] medical records  [id number]        Your Vitals Were     Height BMI (Body Mass Index)                5' 8.5\" (1.74 m) 44.35 kg/m2           Blood Pressure from Last 3 Encounters:   07/10/17 132/75   06/30/17 132/70   06/13/17 126/72    Weight from Last 3 Encounters:   07/10/17 296 lb (134.3 kg)   06/30/17 296 lb (134.3 kg)   06/13/17 296 lb (134.3 kg)              We Performed the Following     PHYSICAL THERAPY REFERRAL        Primary Care Provider Office Phone # Fax #    Angelica PerezBROOK Salomon [facility] [phone]       [place] [address] [facility] [address]        Equal Access to Services     CATHERINE OCHOA : Hadii aad ku hadasho Soomaali, waaxda luqadaha, qaybta kaalmada adeegyada, amadeo gao. So [facility] [phone].    ATENCIÓN: Si habla español, tiene a harkins disposición servicios gratuitos de asistencia lingüística. Llame al [phone].    We comply with applicable federal civil rights laws and [place] laws. We do not discriminate on the basis of race, " color, national origin, age, disability sex, sexual orientation or gender identity.            Thank you!     Thank you for choosing [facility]  for your care. Our goal is always to provide you with excellent care. Hearing back from our patients is one way we can continue to improve our services. Please take a few minutes to complete the written survey that you may receive in the mail after your visit with us. Thank you!             Your Updated Medication List - Protect others around you: Learn how to safely use, store and throw away your medicines at www.disposemymeds.org.          This list is accurate as of: 7/10/17  3:12 PM.  Always use your most recent med list.                   Brand Name Dispense Instructions for use Diagnosis    ADVAIR DISKUS 250-50 MCG/DOSE diskus inhaler   Generic drug:  fluticasone-salmeterol      Inhale 1 puff into the lungs 2 times daily Taking prn        * albuterol (2.5 MG/3ML) 0.083% neb solution     90 vial    Take 1 vial (2.5 mg) by nebulization every 4 hours as needed for shortness of breath / dyspnea    Mild persistent asthma       * albuterol 108 (90 BASE) MCG/ACT Inhaler   Generic drug:  albuterol     8.5 g    INHALE 2 PUFFS BY MOUTH EVERY 6 HOURS AS NEEDED    Mild persistent asthma without complication       alclomethasone 0.05 % cream    ACLOVATE    15 g    Apply to affected areas on face once per day but only for 3 consecutive days    Seborrheic dermatitis       blood glucose monitoring lancets     1 Box    Use to test blood sugars once daily as directed.    Prediabetes       blood glucose monitoring test strip    ANTONIO CONTOUR NEXT    100 each    Use to test blood sugar one x  daily or as directed.    Prediabetes       CENTRUM Tabs     100 tablet    Take 1 tablet by mouth daily    Vitamin deficiency       cyanocolbalamin 500 MCG tablet    vitamin  B-12     Take 500 mcg by mouth daily        cyclobenzaprine 10 MG tablet    FLEXERIL    30 tablet    Take  0.5-1 tablets (5-10 mg) by mouth 3 times daily as needed for muscle spasms    Acute right-sided low back pain without sciatica       desonide 0.05 % cream    DESOWEN    15 g    Apply sparingly to affected area two times per day for only 3 consecutive days    Eczema, unspecified type       doxycycline Monohydrate 50 MG Caps capsule     90 capsule    1 tab per day    Rosacea       fluticasone 50 MCG/ACT spray    FLONASE          ibuprofen 600 MG tablet    ADVIL/MOTRIN    120 tablet    TAKE 1 TABLET BY MOUTH EVERY 6 HOURS AS NEEDED FOR MODERATE PAIN    Pain       metroNIDAZOLE 0.75 % topical gel    METROGEL    45 g    Apply topically 2 times daily    Rosacea       montelukast 10 MG tablet    SINGULAIR    90 tablet    Take 1 tablet (10 mg) by mouth At Bedtime    Mild persistent asthma without complication       oxyCODONE-acetaminophen 5-325 MG per tablet    PERCOCET    18 tablet    Take 1-2 tablets by mouth every 6 hours as needed for pain    Chronic midline low back pain with right-sided sciatica       triamcinolone 0.1 % ointment    KENALOG    80 g    Apply topically 2 times daily Apply to affected areas on hands bid for 14 days and then when needed.Not for face or groin    Eczema, unspecified type       vitamin D 2000 UNITS Caps      Take 4,000 Units by mouth daily        * Notice:  This list has 2 medication(s) that are the same as other medications prescribed for you. Read the directions carefully, and ask your doctor or other care provider to review them with you.

## 2017-07-10 NOTE — NURSING NOTE
"Chief Complaint   Patient presents with     Musculoskeletal Problem     chronic low back pain       Initial /75  Ht 5' 8.5\" (1.74 m)  Wt 296 lb (134.3 kg)  BMI 44.35 kg/m2 Estimated body mass index is 44.35 kg/(m^2) as calculated from the following:    Height as of this encounter: 5' 8.5\" (1.74 m).    Weight as of this encounter: 296 lb (134.3 kg).  Medication Reconciliation: complete     Joe Neil, ATC      "

## 2017-07-10 NOTE — PROGRESS NOTES
" [facility]   Clinic Visit s Jul 10, 2017    Subjective:  Oliver Agarwal is a 52 year old male who is seen in consultation at the request of Dr. Xavier for evaluation of chronic right sided low back pain without radiation. Patient is seen in the presence of a .    Symptoms began 6 years ago and has been waxing and waning since that time.  Original injury happened while lifting something heavy in 2011, recently he aggravated his lower back while helping a friend mow his lawn at that time. Reports aching, sharp, and throbbing back pain that is located right low back with radiation absent.  Pain is 9/10 in maximal severity and 4/10 currently.  Symptoms are generally worse with transitional movements, lifting, with prolonged standing, and with bending backward and better with repositioning and activity modification.  Other treatment has consisted of Oxycodone, Flexeril, and previous physical therapy with minimal relief.  Associated symptoms include denies any bowel/bladder dysfunction or saddle anesthesia.  Denies any numbness/tingling/weakness of the lower extremities.  Denies any previous low back surgeries.    Patient's past medical, surgical, social, and family histories are reviewed today.  No pertinent past medical history    Review of Systems:  Constitutional: NEGATIVE for fever, chills, or change in weight  Skin: NEGATIVE for worrisome rashes, moles, or lesions  Neuro: NEGATIVE for weakness of the lower extremities  MSK: see HPI  Additional 10 point ROS is negative other than symptoms noted above and in HPI    Objective:  /75  Ht 5' 8.5\" (1.74 m)  Wt 296 lb (134.3 kg)  BMI 44.35 kg/m2  General: healthy, alert, no distress, and morbidly obese  Skin: no suspicious lesions or rashes  Psych: mentation appears normal and affect normal/bright  HEENT: no scleral icterus  CV: no pedal edema  Resp: normal respiratory effort without conversational dyspnea "   Neuro: decreased sensation to light touch of the left first/second dorsal web spaces.  Motor strength as noted below  Lymph: no palpable lymphadenopathy    MSK:    THORACIC/LUMBAR SPINE  Inspection:    No redness, swelling, overlying skin change, or gross deformity/asymmetry  Palpation:    No tenderness over the lumbar spinous processes, lumbar facet joints, left para lumbar muscles, right para lumbar muscles, left SI joint, right SI joint, left sciatic notch or right sciatic notch  Range of Motion:     Lumbar flexion within normal limits with pain    Lumbar extension within normal limits  Strength:    5/5 - quadriceps, hamstrings, tibialis anterior, gastrocsoleus, and extensor hallicus longus  Special Tests:    Positive: ASHOK (right)    Negative: Straight leg raise (right), SI joint compression (bilateral), and Gaenslen's test (right)    RIGHT HIP  Passive Range of Motion:    IR within normal limits / ER limited by pain  Special Tests:    Positive: None    Negative: Logroll and anterior impingement (FADIR)    Imaging:  Previous report was reviewed today  Lumbar spine x-rays were ordered, .independent visualization of images was performed, and interpreted in the office today  Preliminary Impression:   1. Degenerative changes with osteophytes present noted of the lower thoracic/upper lumbar spine region.  2. Negative for fracture, subluxation, or other acute osseous abnormalities.    ASSESSMENT:  1. Acute on chronic right-sided low back pain without radiation - differential diagnoses includes mechanical low back pain, lumbar disc herniation, lumbar facet arthropathy, etc.  2. Lumbar degenerative disc disease    PLAN:  1. Return to formal physical therapy at [facility] - specific exercises to include centralization with flexion/extension activities with mobilization/manipulation and core stabilization with use of modalities (ie ice, ultrasound, electrical stimulation, etc.) as needed with home exercise prescription..  2.  Activity modification as discussed, including limitation of activities that cause pain/discomfort.  3. Acetaminophen/Ibuprofen/ice/heat as needed for improved pain control.  4. Call in 4-6 weeks if no improvement of symptoms for MRI of the lumbar spine without contrast for further evaluation/medical care.  If symptoms resolve completely, can follow-up/call as needed.  Consider a right sacroiliac joint corticosteroid injection, etc. as deemed appropriate moving forward. Instructed to contact our office should the condition evolve or worsen, or should any new/progressive neurologic symptoms develop.    Patient's conditions were thoroughly discussed during today's visit with greater than 50% of the visit spent counseling the patient with total time spent face-to-face with the patient being 20 minutes.    Cy Rowland DO, [facility]  [facility]    Disclaimer: This note consists of symbols derived from keyboarding, dictation and/or voice recognition software. As a result, there may be errors in the script that have gone undetected. Please consider this when interpreting information found in this chart.

## 2017-07-10 NOTE — PATIENT INSTRUCTIONS
We addressed the following today:    1. Acute on chronic right-sided low back pain without radiation  2. Lumbar arthritis    Activity modification as discussed  Home exercise program as instructed  Physical therapy: PDR  Topical Treatments: Ice or heat  Over the counter medication: Acetaminophen (Tylenol) 1000 mg every 6 hours with food (Maximum of 3000 mg/day)  Ibuprofen (Advil) maximum of 800 mg four times a day with food  Call in 4-6 weeks if no improvement of symptoms for MRI of the lumbar spine without contrast for further evaluation/medical care (sooner if needed; call direct clinic number [[phone]] at any time with questions or concerns)

## 2017-07-10 NOTE — Clinical Note
Dear Oliver Almeida saw me at [facility] on Jul 10, 2017.  Please refer to the visit note at your convenience and feel free to contact me should you have any questions.  Sincerely,  Cy Rowland DO, [facility]

## 2017-07-12 RX ORDER — DESONIDE 0.5 MG/G
CREAM TOPICAL
Qty: 15 G | Refills: 1 | Status: SHIPPED | OUTPATIENT
Start: 2017-07-12 | End: 2018-08-22

## 2017-07-12 NOTE — TELEPHONE ENCOUNTER
desowen      Last Written Prescription Date: 09/14/16  Last Fill Quantity: 15g,  # refills: 0   Last Office Visit with G, P or [facility] prescribing provider: 01/14/17    Lashonda Tilley RN  [facility]  [phone]

## 2017-07-12 NOTE — TELEPHONE ENCOUNTER
Message from infibondhart:  Original authorizing provider: MD Oliver Bedoya would like a refill of the following medications:  desonide (DESOWEN) 0.05 % cream [Melisa Rose MD]    Preferred pharmacy: [id number] - [place], [place] - [address] AT [facility] [address]    Comment:

## 2017-07-17 ENCOUNTER — TELEPHONE (OUTPATIENT)
Dept: FAMILY MEDICINE | Facility: CLINIC | Age: 52
End: 2017-07-17

## 2017-07-17 NOTE — TELEPHONE ENCOUNTER
Called [facility] due to PA being cancelled on cover my meds. Was told that we should check with primary insurance to see if they will cover medication. Called pharmacy to get PA information for medicare. Called plan to start PA. Awaiting decision report.     Keely Barillas MA

## 2017-07-18 DIAGNOSIS — J45.30 MILD PERSISTENT ASTHMA WITHOUT COMPLICATION: ICD-10-CM

## 2017-07-18 NOTE — TELEPHONE ENCOUNTER
Medication Detail      Disp Refills Start End HELGA   ALBUTEROL 108 (90 BASE) MCG/ACT inhaler 8.5 g 0 6/26/2017  No   Sig: INHALE 2 PUFFS BY MOUTH EVERY 6 HOURS AS NEEDED        Last Office Visit with [facility], [facility] or [facility] prescribing provider:  6-30-17   Future Office Visit:       Date of Last Asthma Action Plan Letter:   Asthma Action Plan Q1 Year    Topic Date Due     Asthma Action Plan - yearly  06/30/2018      Asthma Control Test:   ACT Total Scores 6/13/2017   ACT TOTAL SCORE (Goal Greater than or Equal to 20) 20   In the past 12 months, how many times did you visit the emergency room for your asthma without being admitted to the hospital? 0   In the past 12 months, how many times were you hospitalized overnight because of your asthma? 0       Date of Last Spirometry Test:   No results found for this or any previous visit.

## 2017-07-20 RX ORDER — ALBUTEROL SULFATE 90 UG/1
AEROSOL, METERED RESPIRATORY (INHALATION)
Qty: 8.5 G | Refills: 3 | Status: SHIPPED | OUTPATIENT
Start: 2017-07-20 | End: 2018-05-03

## 2017-07-22 ENCOUNTER — MYC MEDICAL ADVICE (OUTPATIENT)
Dept: FAMILY MEDICINE | Facility: CLINIC | Age: 52
End: 2017-07-22

## 2017-07-23 DIAGNOSIS — R52 PAIN: ICD-10-CM

## 2017-07-24 NOTE — TELEPHONE ENCOUNTER
Please see My Chart message below and advise as appropriate.  Angie Lazo RN - Triage  [facility]

## 2017-07-24 NOTE — TELEPHONE ENCOUNTER
Received decision report from Appeals office and medication has been approved through 12/21/2017, pharmacy notified.     Keely Barillas MA

## 2017-07-25 PROBLEM — M54.50 LOW BACK PAIN: Status: RESOLVED | Noted: 2017-05-03 | Resolved: 2017-07-25

## 2017-07-25 RX ORDER — IBUPROFEN 600 MG/1
TABLET, FILM COATED ORAL
Qty: 120 TABLET | Refills: 0 | Status: SHIPPED | OUTPATIENT
Start: 2017-07-25 | End: 2018-05-06

## 2017-07-25 NOTE — PROGRESS NOTES
Patient has not returned to therapy.  Current status is unknown and discharge G code cannot be reported.  Discharging at this time.

## 2017-07-31 ENCOUNTER — TRANSFERRED RECORDS (OUTPATIENT)
Dept: HEALTH INFORMATION MANAGEMENT | Facility: CLINIC | Age: 52
End: 2017-07-31

## 2017-09-19 ENCOUNTER — OFFICE VISIT (OUTPATIENT)
Dept: FAMILY MEDICINE | Facility: CLINIC | Age: 52
End: 2017-09-19
Payer: MEDICARE

## 2017-09-19 VITALS
SYSTOLIC BLOOD PRESSURE: 121 MMHG | WEIGHT: 301 LBS | TEMPERATURE: 98.7 F | OXYGEN SATURATION: 95 % | BODY MASS INDEX: 45.1 KG/M2 | RESPIRATION RATE: 20 BRPM | HEART RATE: 60 BPM | DIASTOLIC BLOOD PRESSURE: 69 MMHG

## 2017-09-19 DIAGNOSIS — B35.3 TINEA PEDIS OF BOTH FEET: ICD-10-CM

## 2017-09-19 DIAGNOSIS — B07.9 VIRAL WARTS, UNSPECIFIED TYPE: Primary | ICD-10-CM

## 2017-09-19 PROCEDURE — 17110 DESTRUCTION B9 LES UP TO 14: CPT | Performed by: NURSE PRACTITIONER

## 2017-09-19 PROCEDURE — 99213 OFFICE O/P EST LOW 20 MIN: CPT | Mod: 25 | Performed by: NURSE PRACTITIONER

## 2017-09-19 RX ORDER — PRENATAL VIT 91/IRON/FOLIC/DHA 28-975-200
COMBINATION PACKAGE (EA) ORAL 2 TIMES DAILY
Qty: 42 G | Refills: 1 | Status: SHIPPED | OUTPATIENT
Start: 2017-09-19 | End: 2019-02-06

## 2017-09-19 NOTE — NURSING NOTE
"Chief Complaint   Patient presents with     Derm Problem       Initial /69  Pulse 60  Temp 98.7  F (37.1  C) (Oral)  Resp 20  Wt (!) 301 lb (136.5 kg)  SpO2 95%  BMI 45.1 kg/m2 Estimated body mass index is 45.1 kg/(m^2) as calculated from the following:    Height as of 7/10/17: 5' 8.5\" (1.74 m).    Weight as of this encounter: 301 lb (136.5 kg).  Medication Reconciliation: complete       Dominguez Harris MA       "

## 2017-09-19 NOTE — PATIENT INSTRUCTIONS
For your feet:  Use the cream twice a day for up to 2 weeks.  If your dry skin, itching, etc, does not complete resolve, follow up with dermatology.    Warts:  Have them retreated in 2-4 weeks if they do not resolve with today's treatment.

## 2017-09-19 NOTE — PROGRESS NOTES
SUBJECTIVE:   Oliver Agarwal is a 52 year old male who presents to clinic today accompanied by a  for the following health issues:  Chief Complaint   Patient presents with     Derm Problem     Dry, itchy scaly feet     Wart     On left lower leg and right hand fifth finger         Complains of itching on bottom of both feet. Has improved some. Small bump noted on the bottom of the foot.   This is been going on for more than 2 weeks.  [facility] and [facility]. He uses the shower at that facility.  He has not used over-the-counter products for her symptoms.    2 warts:  1 on right hand fifth finger and one on his left lower leg.  He has not treated these.  He is requesting treatment today.     Bump on back of the neck.  This started a few weeks ago as a small irritated bump. It did go away for approximately week and now it is back again. No acute pain.  He wonders if this is a wart.       Problem list and histories reviewed & adjusted, as indicated.  Additional history: as documented    Patient Active Problem List   Diagnosis     CARDIOVASCULAR SCREENING; LDL GOAL LESS THAN 160     Atopic rhinitis     Prediabetes     Obesity     Hypovitaminosis D     Mild persistent asthma     Deaf - reads lips well     History reviewed. No pertinent surgical history.    Social History   Substance Use Topics     Smoking status: Former Smoker     Quit date: 5/11/1999     Smokeless tobacco: Never Used     Alcohol use No     Family History   Problem Relation Age of Onset     HEART DISEASE Other          Current Outpatient Prescriptions   Medication Sig Dispense Refill     terbinafine (LAMISIL AT) 1 % cream Apply topically 2 times daily For fungal infection not resolved with other antifungals (e.g. Clotrimazole) 42 g 1     ibuprofen (ADVIL/MOTRIN) 600 MG tablet TAKE 1 TABLET BY MOUTH EVERY 6 HOURS AS NEEDED FOR MODERATE PAIN 120 tablet 0     ALBUTEROL 108 (90 BASE) MCG/ACT inhaler  INHALE 2 PUFFS BY MOUTH EVERY 6 HOURS AS NEEDED 8.5 g 3     desonide (DESOWEN) 0.05 % cream Apply sparingly to affected area two times per day for only 3 consecutive days 15 g 1     oxyCODONE-acetaminophen (PERCOCET) 5-325 MG per tablet Take 1-2 tablets by mouth every 6 hours as needed for pain 18 tablet 0     ALBUTEROL 108 (90 BASE) MCG/ACT inhaler INHALE 2 PUFFS BY MOUTH EVERY 6 HOURS AS NEEDED 8.5 g 0     blood glucose monitoring (ANTONIO CONTOUR NEXT) test strip Use to test blood sugar one x  daily or as directed. 100 each 2     blood glucose monitoring (ANTONIO MICROLET) lancets Use to test blood sugars once daily as directed. 1 Box 11     Multiple Vitamins-Minerals (CENTRUM) TABS Take 1 tablet by mouth daily 100 tablet 3     cyclobenzaprine (FLEXERIL) 10 MG tablet Take 0.5-1 tablets (5-10 mg) by mouth 3 times daily as needed for muscle spasms 30 tablet 1     triamcinolone (KENALOG) 0.1 % ointment Apply topically 2 times daily Apply to affected areas on hands bid for 14 days and then when needed.Not for face or groin 80 g 0     metroNIDAZOLE (METROGEL) 0.75 % gel Apply topically 2 times daily 45 g 11     doxycycline Monohydrate 50 MG CAPS 1 tab per day 90 capsule 1     alclomethasone (ACLOVATE) 0.05 % cream Apply to affected areas on face once per day but only for 3 consecutive days 15 g 0     montelukast (SINGULAIR) 10 MG tablet Take 1 tablet (10 mg) by mouth At Bedtime 90 tablet 1     fluticasone (FLONASE) 50 MCG/ACT nasal spray   6     fluticasone-salmeterol (ADVAIR DISKUS) 250-50 MCG/DOSE diskus inhaler Inhale 1 puff into the lungs 2 times daily Taking prn       albuterol (2.5 MG/3ML) 0.083% nebulizer solution Take 1 vial (2.5 mg) by nebulization every 4 hours as needed for shortness of breath / dyspnea 90 vial 0     cyanocolbalamin (VITAMIN  B-12) 500 MCG tablet Take 500 mcg by mouth daily       Cholecalciferol (VITAMIN D) 2000 UNITS CAPS Take 4,000 Units by mouth daily       Allergies   Allergen Reactions      Nkda [No Known Drug Allergies]      Recent Labs   Lab Test  06/13/17   1413  01/30/17   1059  02/11/16   0950   10/31/14   1109   A1C  5.7  5.7  5.9   < >  6.0   LDL   --   69  88   --   96   HDL   --   56  54   --   50   TRIG   --   64  111   --   136   ALT   --   53   --    --    --    CR   --   0.92   --    --    --    GFRESTIMATED   --   88   --    --    --    GFRESTBLACK   --   >90   GFR Calc     --    --    --    POTASSIUM   --   4.0   --    --    --     < > = values in this interval not displayed.      BP Readings from Last 3 Encounters:   09/19/17 121/69   07/10/17 132/75   06/30/17 132/70    Wt Readings from Last 3 Encounters:   09/19/17 (!) 301 lb (136.5 kg)   07/10/17 296 lb (134.3 kg)   06/30/17 296 lb (134.3 kg)                  Labs reviewed in EPIC          Reviewed and updated as needed this visit by clinical staff       Reviewed and updated as needed this visit by Provider         ROS:  Constitutional, HEENT, cardiovascular, pulmonary, gi and gu systems are negative, except as otherwise noted.      OBJECTIVE:   /69  Pulse 60  Temp 98.7  F (37.1  C) (Oral)  Resp 20  Wt (!) 301 lb (136.5 kg)  SpO2 95%  BMI 45.1 kg/m2  Body mass index is 45.1 kg/(m^2).  GENERAL: healthy, alert and no distress  NECK: no adenopathy, supple. There is a small irritated papule at the posterior base of the skull on the edge of his hairline consistent with an irritated hair follicle.  SKIN: Warm and dry.  Bilateral bottoms of his feet are dry, and scaly.   He has 2 irritated papules that he reports are consistent with areas of resolving blisters.  On his right hand fifth finger distal tip just proximal to the base of the nail and on his left lower anterior shin there are raised, rough papules consistent with warts.    PSYCH: mentation appears normal, affect normal/bright      ASSESSMENT/PLAN:     (B07.9) Viral warts, unspecified type  (primary encounter diagnosis)  Comment: Uncontrolled  Plan:  DESTRUCT BENIGN LESION, UP TO 14, DERMATOLOGY         REFERRAL          The warts on his right hand fifth digit and left anterior lower shin are both treated with liquid nitrogen for 2 20-second bursts. The expected skin reaction including erythema, pain, scabbing, blistering and hypopigmented scar formation was discussed. See at intervals until warts resolved. These may be retreated either here in primary care or by dermatology.      (B35.3) Tinea pedis of both feet  Comment: Uncontrolled  Plan: terbinafine (LAMISIL AT) 1 % cream, DERMATOLOGY        REFERRAL        He will start with the cream treatment.  If his symptoms do not improve, he will follow up with dermatology.  I did discuss with him that he may have a small wart in the ball of his left foot, plantar region. The skin around this area is extremely dry, scaly, athlete's foot. If this area becomes more evident for a wart after the athlete's foot proves, I did discuss with him he should have this treated as well. He will keep an eye on it and will have it treated if necessary.    BROOK Alarcon [facility]

## 2017-09-19 NOTE — MR AVS SNAPSHOT
After Visit Summary   9/19/2017    Oliver Agarwal    MRN: [id number]           Patient Information     Date Of Birth          1965        Visit Information        Provider Department      9/19/2017 1:20 PM Sarah Escobar APRN CNP; [facility]        Today's Diagnoses     Viral warts, unspecified type    -  1    Tinea pedis of both feet          Care Instructions    For your feet:  Use the cream twice a day for up to 2 weeks.  If your dry skin, itching, etc, does not complete resolve, follow up with dermatology.    Warts:  Have them retreated in 2-4 weeks if they do not resolve with today's treatment.              Follow-ups after your visit        Additional Services     DERMATOLOGY REFERRAL       Your provider has referred you to:   JENNIE: [facility] [place] [phone]  FHN: [facility] [place] [phone]   [email or web address]    Please be aware that coverage of these services is subject to the terms and limitations of your health insurance plan.  Call member services at your health plan with any benefit or coverage questions.      Please bring the following with you to your appointment:    (1) Any X-Rays, CTs or MRIs which have been performed.  Contact the facility where they were done to arrange for  prior to your scheduled appointment.    (2) List of current medications  (3) This referral request   (4) Any documents/labs given to you for this referral                  Who to contact     If you have questions or need follow up information about today's clinic visit or your schedule please contact [facility] directly at [phone].  Normal or non-critical lab and imaging results will be communicated to you by MyChart, letter or phone within 4 business days after the clinic has received the results. If you do not hear from us within 7 days, please contact the clinic through  Funj[employer] or phone. If you have a critical or abnormal lab result, we will notify you by phone as soon as possible.  Submit refill requests through [employer] or call your pharmacy and they will forward the refill request to us. Please allow 3 business days for your refill to be completed.          Additional Information About Your Visit        The [employer] Information     [employer] gives you secure access to your electronic health record. If you see a primary care provider, you can also send messages to your care team and make appointments. If you have questions, please call your primary care clinic.  If you do not have a primary care provider, please call [phone] and they will assist you.        Care EveryWhere ID     This is your Care EveryWhere ID. This could be used by other organizations to access your [place] medical records  [id number]        Your Vitals Were     Pulse Temperature Respirations Pulse Oximetry BMI (Body Mass Index)       60 98.7  F (37.1  C) (Oral) 20 95% 45.1 kg/m2        Blood Pressure from Last 3 Encounters:   09/19/17 121/69   07/10/17 132/75   06/30/17 132/70    Weight from Last 3 Encounters:   09/19/17 (!) 301 lb (136.5 kg)   07/10/17 296 lb (134.3 kg)   06/30/17 296 lb (134.3 kg)              We Performed the Following     DERMATOLOGY REFERRAL     DESTRUCT BENIGN LESION, UP TO 14          Today's Medication Changes          These changes are accurate as of: 9/19/17  1:40 PM.  If you have any questions, ask your nurse or doctor.               Start taking these medicines.        Dose/Directions    terbinafine 1 % cream   Commonly known as:  lamISIL AT   Used for:  Tinea pedis of both feet   Started by:  Sarah Escobar APRN CNP        Apply topically 2 times daily For fungal infection not resolved with other antifungals (e.g. Clotrimazole)   Quantity:  42 g   Refills:  1            Where to get your medicines      These medications were sent to [id number] - [place] - [address] AT [facility] [address] [facility] [address]  [address], [place] [address]    Hours:  24-hours Phone:  [phone]     terbinafine 1 % cream                Primary Care Provider Office Phone # Fax #    BROOK Rosa [phone] [address] [facility] [address]        Equal Access to Services     CATHERINE OCHOA AH: Hadii aad ku hadasho Soomaali, waaxda luqadaha, qaybta kaalmada adeegyada, waxay idiin hayaan adeeg kharash la'aan ah. So [facility] [phone].    ATENCIÓN: Si habla español, tiene a harkins disposición servicios gratuitos de asistencia lingüística. [facility] [phone].    We comply with applicable federal civil rights laws and [place] laws. We do not discriminate on the basis of race, color, national origin, age, disability sex, sexual orientation or gender identity.            Thank you!     Thank you for choosing [facility]  for your care. Our goal is always to provide you with excellent care. Hearing back from our patients is one way we can continue to improve our services. Please take a few minutes to complete the written survey that you may receive in the mail after your visit with us. Thank you!             Your Updated Medication List - Protect others around you: Learn how to safely use, store and throw away your medicines at www.disposemymeds.org.          This list is accurate as of: 9/19/17  1:40 PM.  Always use your most recent med list.                   Brand Name Dispense Instructions for use Diagnosis    ADVAIR DISKUS 250-50 MCG/DOSE diskus inhaler   Generic drug:  fluticasone-salmeterol      Inhale 1 puff into the lungs 2 times daily Taking prn        * albuterol (2.5 MG/3ML) 0.083% neb solution     90 vial    Take 1 vial (2.5 mg) by nebulization every 4 hours as needed for shortness of breath / dyspnea    Mild persistent asthma       * PROAIR  (90 BASE) MCG/ACT Inhaler   Generic drug:  albuterol      8.5 g    INHALE 2 PUFFS BY MOUTH EVERY 6 HOURS AS NEEDED    Mild persistent asthma without complication       * PROAIR  (90 BASE) MCG/ACT Inhaler   Generic drug:  albuterol     8.5 g    INHALE 2 PUFFS BY MOUTH EVERY 6 HOURS AS NEEDED    Mild persistent asthma without complication       alclomethasone 0.05 % cream    ACLOVATE    15 g    Apply to affected areas on face once per day but only for 3 consecutive days    Seborrheic dermatitis       blood glucose monitoring lancets     1 Box    Use to test blood sugars once daily as directed.    Prediabetes       blood glucose monitoring test strip    ANTONIO CONTOUR NEXT    100 each    Use to test blood sugar one x  daily or as directed.    Prediabetes       CENTRUM Tabs     100 tablet    Take 1 tablet by mouth daily    Vitamin deficiency       cyanocolbalamin 500 MCG tablet    vitamin  B-12     Take 500 mcg by mouth daily        cyclobenzaprine 10 MG tablet    FLEXERIL    30 tablet    Take 0.5-1 tablets (5-10 mg) by mouth 3 times daily as needed for muscle spasms    Acute right-sided low back pain without sciatica       desonide 0.05 % cream    DESOWEN    15 g    Apply sparingly to affected area two times per day for only 3 consecutive days    Eczema, unspecified type       doxycycline Monohydrate 50 MG Caps capsule     90 capsule    1 tab per day    Rosacea       fluticasone 50 MCG/ACT spray    FLONASE          ibuprofen 600 MG tablet    ADVIL/MOTRIN    120 tablet    TAKE 1 TABLET BY MOUTH EVERY 6 HOURS AS NEEDED FOR MODERATE PAIN    Pain       metroNIDAZOLE 0.75 % topical gel    METROGEL    45 g    Apply topically 2 times daily    Rosacea       montelukast 10 MG tablet    SINGULAIR    90 tablet    Take 1 tablet (10 mg) by mouth At Bedtime    Mild persistent asthma without complication       oxyCODONE-acetaminophen 5-325 MG per tablet    PERCOCET    18 tablet    Take 1-2 tablets by mouth every 6 hours as needed for pain    Chronic midline low back pain with  right-sided sciatica       terbinafine 1 % cream    lamISIL AT    42 g    Apply topically 2 times daily For fungal infection not resolved with other antifungals (e.g. Clotrimazole)    Tinea pedis of both feet       triamcinolone 0.1 % ointment    KENALOG    80 g    Apply topically 2 times daily Apply to affected areas on hands bid for 14 days and then when needed.Not for face or groin    Eczema, unspecified type       vitamin D 2000 UNITS Caps      Take 4,000 Units by mouth daily        * Notice:  This list has 3 medication(s) that are the same as other medications prescribed for you. Read the directions carefully, and ask your doctor or other care provider to review them with you.

## 2017-10-16 ENCOUNTER — TRANSFERRED RECORDS (OUTPATIENT)
Dept: HEALTH INFORMATION MANAGEMENT | Facility: CLINIC | Age: 52
End: 2017-10-16

## 2017-10-16 ENCOUNTER — TELEPHONE (OUTPATIENT)
Dept: FAMILY MEDICINE | Facility: CLINIC | Age: 52
End: 2017-10-16

## 2017-10-16 DIAGNOSIS — L71.9 ROSACEA: ICD-10-CM

## 2017-10-16 NOTE — TELEPHONE ENCOUNTER
Patient called regarding (reason for call): refill request  Is this regarding a medication?: yes  If yes, which medication?: doxycycline Monohydrate 50 MG CAPS  Pt Provider?: Dr. Rose  Phone Number Pt can be reached at: NA  Best Time: NA  Can we leave a detailed message on this number?: MANUEL    Central Scheduling  Johanna ARROYO

## 2017-10-17 RX ORDER — DOXYCYCLINE 50 MG/1
CAPSULE ORAL
Qty: 90 CAPSULE | Refills: 1 | Status: SHIPPED | OUTPATIENT
Start: 2017-10-17 | End: 2017-12-22

## 2017-10-17 NOTE — TELEPHONE ENCOUNTER
patient states that he has been taking continuously and needs refills. Has appointment scheduled for recheck    Lashonda SMITH RN  [facility]  [phone]  \

## 2017-10-17 NOTE — TELEPHONE ENCOUNTER
Called patient via [employer] for [employer]-  Need to know if he has been taking doxycycline daily? - last refill was 9/14/17 for 6 months- should have been out by March 2017  Why does he want a refill now.  DX is rosacea    Left message via interpretor for patient to call clinic and speak with a nurse.    Lashonda SMITH RN  [facility]  [phone]

## 2017-10-26 DIAGNOSIS — J45.30 MILD PERSISTENT ASTHMA WITHOUT COMPLICATION: ICD-10-CM

## 2017-10-30 RX ORDER — ALBUTEROL SULFATE 90 UG/1
AEROSOL, METERED RESPIRATORY (INHALATION)
Qty: 8.5 G | Refills: 1 | Status: SHIPPED | OUTPATIENT
Start: 2017-10-30 | End: 2018-06-18

## 2017-11-08 ENCOUNTER — OFFICE VISIT (OUTPATIENT)
Dept: FAMILY MEDICINE | Facility: CLINIC | Age: 52
End: 2017-11-08
Payer: MEDICARE

## 2017-11-08 ENCOUNTER — TELEPHONE (OUTPATIENT)
Dept: FAMILY MEDICINE | Facility: CLINIC | Age: 52
End: 2017-11-08

## 2017-11-08 DIAGNOSIS — L30.9 ECZEMA, UNSPECIFIED TYPE: ICD-10-CM

## 2017-11-08 DIAGNOSIS — L71.9 ROSACEA: Primary | ICD-10-CM

## 2017-11-08 DIAGNOSIS — L21.9 SEBORRHEIC DERMATITIS: ICD-10-CM

## 2017-11-08 PROCEDURE — 99214 OFFICE O/P EST MOD 30 MIN: CPT | Performed by: FAMILY MEDICINE

## 2017-11-08 RX ORDER — IVERMECTIN 10 MG/G
CREAM TOPICAL
Qty: 30 G | Refills: 3 | Status: SHIPPED | OUTPATIENT
Start: 2017-11-08 | End: 2019-02-06

## 2017-11-08 RX ORDER — AZELAIC ACID 0.15 G/G
0.5 GEL TOPICAL 2 TIMES DAILY
Qty: 50 G | Refills: 11 | Status: SHIPPED | OUTPATIENT
Start: 2017-11-08 | End: 2019-02-06

## 2017-11-08 RX ORDER — DOXYCYCLINE 50 MG/1
50 CAPSULE ORAL 2 TIMES DAILY
Qty: 60 CAPSULE | Refills: 3 | Status: SHIPPED | OUTPATIENT
Start: 2017-11-08 | End: 2018-06-18

## 2017-11-08 NOTE — TELEPHONE ENCOUNTER
Change this to finacea . Metronidazole gel has not been effective. Please call and let the patient know.

## 2017-11-08 NOTE — NURSING NOTE
Due to language barrier, an  was present during the history-taking and subsequent discussion (and for part of the physical exam) with this patient.

## 2017-11-08 NOTE — TELEPHONE ENCOUNTER
Incoming fax from [facility] pharmacy requesting a medication change for ivermectin (SOOLANTRA) 1 % cream due to medication not being on patients rx benefit plan. Alternatives include metronidazole or finacea.     Keely Barillas MA

## 2017-11-08 NOTE — MR AVS SNAPSHOT
"              After Visit Summary   11/8/2017    Oliver Agarwal    MRN: [id number]           Patient Information     Date Of Birth          1965        Visit Information        Provider Department      11/8/2017 11:20 AM Melisa Rose MD; [facility]        Today's Diagnoses     Rosacea    -  1    Eczema, unspecified type        Seborrheic dermatitis          Care Instructions    FUTURE APPOINTMENTS  Follow up in 4 weeks.    TOPICAL MEDICATION INSTRUCTIONS - for control of rosacea on mid-portion of face  Ivermectin (Soolantra) 1% cream.    Apply a thin layer to each affected area(s) of rosacea on nose and cheeks one time per day, everyday regardless if symptoms are present or not.    Keep in mind to also regularly use moisturizer, as this preventative measure can help maintain your skin's natural moisture barrier.    Apply moisturizer after application of medication.  Discontinue use of, but do not discard metronidazole gel    ORAL ANTIBIOTIC - for control of rosacea  Take by mouth doxycycline 50 mg two time(s) a day.    Avoid excessive alcohol, caffeine, chocolate, spicy food consumption or sun exposure.    TOPICAL STEROID INSTRUCTIONS - for control of eczema on eyelids  Desonide 0.05% cream.    Apply an amount equal to half of a fingertip (0.25 g) to the affected area(s) on the eyelids, two times per day for 3-5 days.    \"Fingertip Amount\"      This is a weak strength steroid, and it can be used on the face.    Topical steroid use is for short-term treatment only. If after initial treatment, you are using this for prolonged periods of time, return to clinic for re-evaluation of treatment.    Keep in mind to also regularly use moisturizer, as this preventative measure can help maintain your skin's natural moisture barrier.    Use Aquaphor ointment for moisturizer on the chin.    Apply in this order after showering : 1) topical medications, 2) Cetaphil facial " moisturizer.          Follow-ups after your visit        Who to contact     If you have questions or need follow up information about today's clinic visit or your schedule please contact [facility] directly at [phone].  Normal or non-critical lab and imaging results will be communicated to you by MyChart, letter or phone within 4 business days after the clinic has received the results. If you do not hear from us within 7 days, please contact the clinic through [employer]z!hart or phone. If you have a critical or abnormal lab result, we will notify you by phone as soon as possible.  Submit refill requests through [employer] or call your pharmacy and they will forward the refill request to us. Please allow 3 business days for your refill to be completed.          Additional Information About Your Visit        MyChart Information     [employer] gives you secure access to your electronic health record. If you see a primary care provider, you can also send messages to your care team and make appointments. If you have questions, please call your primary care clinic.  If you do not have a primary care provider, please call [phone] and they will assist you.        Care EveryWhere ID     This is your Care EveryWhere ID. This could be used by other organizations to access your [place] medical records  [id number]         Blood Pressure from Last 3 Encounters:   09/19/17 121/69   07/10/17 132/75   06/30/17 132/70    Weight from Last 3 Encounters:   09/19/17 (!) 301 lb (136.5 kg)   07/10/17 296 lb (134.3 kg)   06/30/17 296 lb (134.3 kg)              Today, you had the following     No orders found for display         Today's Medication Changes          These changes are accurate as of: 11/8/17 11:52 AM.  If you have any questions, ask your nurse or doctor.               Start taking these medicines.        Dose/Directions    ivermectin 1 % cream   Commonly known as:  SOOLANTRA   Used for:  Rosacea         Apply to the affected areas of the face once daily. Use a pea-size amount for each area of the face  that is affected. THIN LAYER   Quantity:  30 g   Refills:  3         These medicines have changed or have updated prescriptions.        Dose/Directions    * doxycycline monohydrate 50 MG capsule   This may have changed:  Another medication with the same name was added. Make sure you understand how and when to take each.   Used for:  Rosacea        1 tab per day   Quantity:  90 capsule   Refills:  1       * doxycycline monohydrate 50 MG capsule   This may have changed:  You were already taking a medication with the same name, and this prescription was added. Make sure you understand how and when to take each.   Used for:  Rosacea        Dose:  50 mg   Take 1 capsule (50 mg) by mouth 2 times daily   Quantity:  60 capsule   Refills:  3       * Notice:  This list has 2 medication(s) that are the same as other medications prescribed for you. Read the directions carefully, and ask your doctor or other care provider to review them with you.         Where to get your medicines      These medications were sent to [facility] [id number] - [place], [place] - [address] AT [facility]  [address], [place] [address]    Hours:  24-hours Phone:  [phone]     doxycycline monohydrate 50 MG capsule    ivermectin 1 % cream                Primary Care Provider Office Phone # Fax #    Angelica Pate, APRN [facility] [phone]       [address] [facility] [address]        Equal Access to Services     CATHERINE OCHOA AH: Hadii juaquin ku hadasho Soomaali, waaxda luqadaha, qaybta kaalmada adeegyada, amadeo gao. So [facility] [phone].    ATENCIÓN: Si habla español, tiene a harkins disposición servicios gratuitos de asistencia lingüística. Llame al [phone].    We comply with applicable federal civil rights laws and [place] laws. We do not discriminate on  the basis of race, color, national origin, age, disability, sex, sexual orientation, or gender identity.            Thank you!     Thank you for choosing [facility]  for your care. Our goal is always to provide you with excellent care. Hearing back from our patients is one way we can continue to improve our services. Please take a few minutes to complete the written survey that you may receive in the mail after your visit with us. Thank you!             Your Updated Medication List - Protect others around you: Learn how to safely use, store and throw away your medicines at www.disposemymeds.org.          This list is accurate as of: 11/8/17 11:52 AM.  Always use your most recent med list.                   Brand Name Dispense Instructions for use Diagnosis    ADVAIR DISKUS 250-50 MCG/DOSE diskus inhaler   Generic drug:  fluticasone-salmeterol      Inhale 1 puff into the lungs 2 times daily Taking prn        * albuterol (2.5 MG/3ML) 0.083% neb solution     90 vial    Take 1 vial (2.5 mg) by nebulization every 4 hours as needed for shortness of breath / dyspnea    Mild persistent asthma       * PROAIR  (90 BASE) MCG/ACT Inhaler   Generic drug:  albuterol     8.5 g    INHALE 2 PUFFS BY MOUTH EVERY 6 HOURS AS NEEDED    Mild persistent asthma without complication       * PROAIR  (90 BASE) MCG/ACT Inhaler   Generic drug:  albuterol     8.5 g    INHALE 2 PUFFS BY MOUTH EVERY 6 HOURS AS NEEDED    Mild persistent asthma without complication       * PROAIR  (90 BASE) MCG/ACT Inhaler   Generic drug:  albuterol     8.5 g    INHALE 2 PUFFS BY MOUTH EVERY 6 HOURS AS NEEDED    Mild persistent asthma without complication       alclomethasone 0.05 % cream    ACLOVATE    15 g    Apply to affected areas on face once per day but only for 3 consecutive days    Seborrheic dermatitis       blood glucose monitoring lancets     1 Box    Use to test blood sugars once daily as directed.     Prediabetes       blood glucose monitoring test strip    ANTONIO CONTOUR NEXT    100 each    Use to test blood sugar one x  daily or as directed.    Prediabetes       CENTRUM Tabs     100 tablet    Take 1 tablet by mouth daily    Vitamin deficiency       cyanocolbalamin 500 MCG tablet    vitamin  B-12     Take 500 mcg by mouth daily        cyclobenzaprine 10 MG tablet    FLEXERIL    30 tablet    Take 0.5-1 tablets (5-10 mg) by mouth 3 times daily as needed for muscle spasms    Acute right-sided low back pain without sciatica       desonide 0.05 % cream    DESOWEN    15 g    Apply sparingly to affected area two times per day for only 3 consecutive days    Eczema, unspecified type       * doxycycline monohydrate 50 MG capsule     90 capsule    1 tab per day    Rosacea       * doxycycline monohydrate 50 MG capsule     60 capsule    Take 1 capsule (50 mg) by mouth 2 times daily    Rosacea       fluticasone 50 MCG/ACT spray    FLONASE          ibuprofen 600 MG tablet    ADVIL/MOTRIN    120 tablet    TAKE 1 TABLET BY MOUTH EVERY 6 HOURS AS NEEDED FOR MODERATE PAIN    Pain       ivermectin 1 % cream    SOOLANTRA    30 g    Apply to the affected areas of the face once daily. Use a pea-size amount for each area of the face  that is affected. THIN LAYER    Rosacea       metroNIDAZOLE 0.75 % topical gel    METROGEL    45 g    Apply topically 2 times daily    Rosacea       montelukast 10 MG tablet    SINGULAIR    90 tablet    Take 1 tablet (10 mg) by mouth At Bedtime    Mild persistent asthma without complication       oxyCODONE-acetaminophen 5-325 MG per tablet    PERCOCET    18 tablet    Take 1-2 tablets by mouth every 6 hours as needed for pain    Chronic midline low back pain with right-sided sciatica       terbinafine 1 % cream    lamISIL AT    42 g    Apply topically 2 times daily For fungal infection not resolved with other antifungals (e.g. Clotrimazole)    Tinea pedis of both feet       triamcinolone 0.1 % ointment     KENALOG    80 g    Apply topically 2 times daily Apply to affected areas on hands bid for 14 days and then when needed.Not for face or groin    Eczema, unspecified type       vitamin D 2000 UNITS Caps      Take 4,000 Units by mouth daily        * Notice:  This list has 6 medication(s) that are the same as other medications prescribed for you. Read the directions carefully, and ask your doctor or other care provider to review them with you.

## 2017-11-08 NOTE — PROGRESS NOTES
"[facility] - PRIMARY CARE SKIN    CC : Rash   SUBJECTIVE:                                                    Oliver Agarwal is a 52 year old male who presents to clinic today with  for follow-up of skin irritation on the face. \"Acne\" on the face and nose is described as red bumps but not pimple. He has been treated for rosacea. He notes that itchiness on the face. He also complains of dry skin on ears but denies any dandruff on scalp.    Previous therapies include : doxycycline although he has been irregularly taking doxycycline (approximately 5-6 times/week) and applying metronidazole. He has not used desonide 0.05% on the eyelids He had previously been satisfied with control of rosacea at last office visit in January when using metronidazole gel and doxycycline 50 mg per day.  Products used : Cetaphil bar soap. Moisturizer infrequently used.        For complete history, please review office visit notes from 3/11/2016     Family history of eczema/psoriasis/rosacea: unknown - brother may have facial skin issues.     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    Patient Active Problem List   Diagnosis     CARDIOVASCULAR SCREENING; LDL GOAL LESS THAN 160     Atopic rhinitis     Prediabetes     Obesity     Hypovitaminosis D     Mild persistent asthma     Deaf - reads lips well       No past medical history on file. No past surgical history on file.   Social History   Substance Use Topics     Smoking status: Former Smoker     Quit date: 5/11/1999     Smokeless tobacco: Never Used     Alcohol use No    Family History     Problem (# of Occurrences) Relation (Name,Age of Onset)    HEART DISEASE (1) Other           Prescription Medications as of 11/8/2017             ivermectin (SOOLANTRA) 1 % cream Apply to the affected areas of the face once daily. Use a pea-size amount for each area of the face  that is affected. THIN LAYER    doxycycline monohydrate 50 MG capsule Take 1 " capsule (50 mg) by mouth 2 times daily    PROAIR  (90 BASE) MCG/ACT inhaler INHALE 2 PUFFS BY MOUTH EVERY 6 HOURS AS NEEDED    doxycycline monohydrate 50 MG capsule 1 tab per day    terbinafine (LAMISIL AT) 1 % cream Apply topically 2 times daily For fungal infection not resolved with other antifungals (e.g. Clotrimazole)    ibuprofen (ADVIL/MOTRIN) 600 MG tablet TAKE 1 TABLET BY MOUTH EVERY 6 HOURS AS NEEDED FOR MODERATE PAIN    ALBUTEROL 108 (90 BASE) MCG/ACT inhaler INHALE 2 PUFFS BY MOUTH EVERY 6 HOURS AS NEEDED    desonide (DESOWEN) 0.05 % cream Apply sparingly to affected area two times per day for only 3 consecutive days    oxyCODONE-acetaminophen (PERCOCET) 5-325 MG per tablet Take 1-2 tablets by mouth every 6 hours as needed for pain    ALBUTEROL 108 (90 BASE) MCG/ACT inhaler INHALE 2 PUFFS BY MOUTH EVERY 6 HOURS AS NEEDED    blood glucose monitoring (ANTONIO CONTOUR NEXT) test strip Use to test blood sugar one x  daily or as directed.    blood glucose monitoring (ANTONIO MICROLET) lancets Use to test blood sugars once daily as directed.    Multiple Vitamins-Minerals (CENTRUM) TABS Take 1 tablet by mouth daily    cyclobenzaprine (FLEXERIL) 10 MG tablet Take 0.5-1 tablets (5-10 mg) by mouth 3 times daily as needed for muscle spasms    triamcinolone (KENALOG) 0.1 % ointment Apply topically 2 times daily Apply to affected areas on hands bid for 14 days and then when needed.Not for face or groin    metroNIDAZOLE (METROGEL) 0.75 % gel Apply topically 2 times daily    alclomethasone (ACLOVATE) 0.05 % cream Apply to affected areas on face once per day but only for 3 consecutive days    montelukast (SINGULAIR) 10 MG tablet Take 1 tablet (10 mg) by mouth At Bedtime    fluticasone (FLONASE) 50 MCG/ACT nasal spray     fluticasone-salmeterol (ADVAIR DISKUS) 250-50 MCG/DOSE diskus inhaler Inhale 1 puff into the lungs 2 times daily Taking prn    albuterol (2.5 MG/3ML) 0.083% nebulizer solution Take 1 vial (2.5 mg) by  nebulization every 4 hours as needed for shortness of breath / dyspnea    cyanocolbalamin (VITAMIN  B-12) 500 MCG tablet Take 500 mcg by mouth daily    Cholecalciferol (VITAMIN D) 2000 UNITS CAPS Take 4,000 Units by mouth daily            Allergies   Allergen Reactions     Albumin, Egg Difficulty breathing     Nkda [No Known Drug Allergies]         INTEGUMENTARY/SKIN: POSITIVE for rash  ROS : 14 point review of systems was negative except the symptoms listed above in the HPI.    This document serves as a record of the services and decisions personally performed and made by Reba Rose MD. It was created on her behalf by Alvaro Calvo, a trained medical scribe.  The creation of this document is based on the scribe's personal observations and the provider's statements to the medical scribe.  Avlaro Calvo, November 8, 2017 11:33 AM      OBJECTIVE:                                                    GENERAL: alert, no distress, obese and unable to speak  SKIN: Stone Skin Type - II.  Face were examined. The dermatoscope was used to help evaluate pigmented lesions.  Skin Pertinent Findings:  Face : Scattered inflammatory papules across mid-portion of face with some erythema on the tip of the nose and few inflammatory papules.    Upper eyelids : Scaling and lichenification    Chin area : Few scattered erythematous papules and some light scaling.    Behind the ears : Clear          ASSESSMENT:                                                      Encounter Diagnoses   Name Primary?     Rosacea Yes     Eczema, unspecified type      Seborrheic dermatitis          PLAN:                                                    Patient Instructions   FUTURE APPOINTMENTS  Follow up in 4 weeks.    TOPICAL MEDICATION INSTRUCTIONS - for control of rosacea on mid-portion of face  Ivermectin (Soolantra) 1% cream.    Apply a thin layer to each affected area(s) of rosacea on nose and cheeks one time per day, everyday regardless if symptoms  "are present or not.    Keep in mind to also regularly use moisturizer, as this preventative measure can help maintain your skin's natural moisture barrier.    Apply moisturizer after application of medication.  Discontinue use of, but do not discard metronidazole gel    ORAL ANTIBIOTIC - for control of rosacea  Take by mouth doxycycline 50 mg two time(s) a day.    Avoid excessive alcohol, caffeine, chocolate, spicy food consumption or sun exposure.    TOPICAL STEROID INSTRUCTIONS - for control of eczema on eyelids  Desonide 0.05% cream.    Apply an amount equal to half of a fingertip (0.25 g) to the affected area(s) on the eyelids, two times per day for 3-5 days.    \"Fingertip Amount\"      This is a weak strength steroid, and it can be used on the face.    Topical steroid use is for short-term treatment only. If after initial treatment, you are using this for prolonged periods of time, return to clinic for re-evaluation of treatment.    Keep in mind to also regularly use moisturizer, as this preventative measure can help maintain your skin's natural moisture barrier.    Use Aquaphor ointment for moisturizer on the chin.    Apply in this order after showering : 1) topical medications, 2) Cetaphil facial moisturizer.      The patient was counseled to use products free of fragrance, dyes, and plants. The importance of using bland cleansers and the regular use of heavy bland emollient creams was impressed upon the patient.      PROCEDURES:                                                    None.    TT 25 minutes.  CT : 15 minutes.      The information in this document, created by the medical scribe for me, accurately reflects the services I personally performed and the decisions made by me. I have reviewed and approved this document for accuracy prior to leaving the patient care area.  Reba Rose MD November 8, 2017 11:33 AM  [facility] - PRIMARY CARE SKIN  "

## 2017-11-08 NOTE — LETTER
"    11/8/2017         RE: Oliver Agarwal  [address]  [place] [address]        Dear Colleague,    Thank you for referring your patient, Oliver Agarwal, to the [facility] - PRIMARY CARE SKIN. Please see a copy of my visit note below.    [facility] PRIMARY CARE SKIN    CC : Rash   SUBJECTIVE:                                                    Oliver Agarwal is a 52 year old male who presents to clinic today with  for follow-up of skin irritation on the face. \"Acne\" on the face and nose is described as red bumps but not pimple. He has been treated for rosacea. He notes that itchiness on the face. He also complains of dry skin on ears but denies any dandruff on scalp.    Previous therapies include : doxycycline although he has been irregularly taking doxycycline (approximately 5-6 times/week) and applying metronidazole. He has not used desonide 0.05% on the eyelids He had previously been satisfied with control of rosacea at last office visit in January when using metronidazole gel and doxycycline 50 mg per day.  Products used : Cetaphil bar soap. Moisturizer infrequently used.        For complete history, please review office visit notes from 3/11/2016     Family history of eczema/psoriasis/rosacea: unknown - brother may have facial skin issues.     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    Patient Active Problem List   Diagnosis     CARDIOVASCULAR SCREENING; LDL GOAL LESS THAN 160     Atopic rhinitis     Prediabetes     Obesity     Hypovitaminosis D     Mild persistent asthma     Deaf - reads lips well       No past medical history on file. No past surgical history on file.   Social History   Substance Use Topics     Smoking status: Former Smoker     Quit date: 5/11/1999     Smokeless tobacco: Never Used     Alcohol use No    Family History     Problem (# of Occurrences) Relation (Name,Age of Onset)    HEART DISEASE (1) Other       "     Prescription Medications as of 11/8/2017             ivermectin (SOOLANTRA) 1 % cream Apply to the affected areas of the face once daily. Use a pea-size amount for each area of the face  that is affected. THIN LAYER    doxycycline monohydrate 50 MG capsule Take 1 capsule (50 mg) by mouth 2 times daily    PROAIR  (90 BASE) MCG/ACT inhaler INHALE 2 PUFFS BY MOUTH EVERY 6 HOURS AS NEEDED    doxycycline monohydrate 50 MG capsule 1 tab per day    terbinafine (LAMISIL AT) 1 % cream Apply topically 2 times daily For fungal infection not resolved with other antifungals (e.g. Clotrimazole)    ibuprofen (ADVIL/MOTRIN) 600 MG tablet TAKE 1 TABLET BY MOUTH EVERY 6 HOURS AS NEEDED FOR MODERATE PAIN    ALBUTEROL 108 (90 BASE) MCG/ACT inhaler INHALE 2 PUFFS BY MOUTH EVERY 6 HOURS AS NEEDED    desonide (DESOWEN) 0.05 % cream Apply sparingly to affected area two times per day for only 3 consecutive days    oxyCODONE-acetaminophen (PERCOCET) 5-325 MG per tablet Take 1-2 tablets by mouth every 6 hours as needed for pain    ALBUTEROL 108 (90 BASE) MCG/ACT inhaler INHALE 2 PUFFS BY MOUTH EVERY 6 HOURS AS NEEDED    blood glucose monitoring (ANTONIO CONTOUR NEXT) test strip Use to test blood sugar one x  daily or as directed.    blood glucose monitoring (ANTONIO MICROLET) lancets Use to test blood sugars once daily as directed.    Multiple Vitamins-Minerals (CENTRUM) TABS Take 1 tablet by mouth daily    cyclobenzaprine (FLEXERIL) 10 MG tablet Take 0.5-1 tablets (5-10 mg) by mouth 3 times daily as needed for muscle spasms    triamcinolone (KENALOG) 0.1 % ointment Apply topically 2 times daily Apply to affected areas on hands bid for 14 days and then when needed.Not for face or groin    metroNIDAZOLE (METROGEL) 0.75 % gel Apply topically 2 times daily    alclomethasone (ACLOVATE) 0.05 % cream Apply to affected areas on face once per day but only for 3 consecutive days    montelukast (SINGULAIR) 10 MG tablet Take 1 tablet (10 mg) by  mouth At Bedtime    fluticasone (FLONASE) 50 MCG/ACT nasal spray     fluticasone-salmeterol (ADVAIR DISKUS) 250-50 MCG/DOSE diskus inhaler Inhale 1 puff into the lungs 2 times daily Taking prn    albuterol (2.5 MG/3ML) 0.083% nebulizer solution Take 1 vial (2.5 mg) by nebulization every 4 hours as needed for shortness of breath / dyspnea    cyanocolbalamin (VITAMIN  B-12) 500 MCG tablet Take 500 mcg by mouth daily    Cholecalciferol (VITAMIN D) 2000 UNITS CAPS Take 4,000 Units by mouth daily            Allergies   Allergen Reactions     Albumin, Egg Difficulty breathing     Nkda [No Known Drug Allergies]         INTEGUMENTARY/SKIN: POSITIVE for rash  ROS : 14 point review of systems was negative except the symptoms listed above in the HPI.    This document serves as a record of the services and decisions personally performed and made by Reba Rose MD. It was created on her behalf by Alvaro Calvo, a trained medical scribe.  The creation of this document is based on the scribe's personal observations and the provider's statements to the medical scribe.  Alvaro Calvo, November 8, 2017 11:33 AM      OBJECTIVE:                                                    GENERAL: alert, no distress, obese and unable to speak  SKIN: Stone Skin Type - II.  Face were examined. The dermatoscope was used to help evaluate pigmented lesions.  Skin Pertinent Findings:  Face : Scattered inflammatory papules across mid-portion of face with some erythema on the tip of the nose and few inflammatory papules.    Upper eyelids : Scaling and lichenification    Chin area : Few scattered erythematous papules and some light scaling.    Behind the ears : Clear          ASSESSMENT:                                                      Encounter Diagnoses   Name Primary?     Rosacea Yes     Eczema, unspecified type      Seborrheic dermatitis          PLAN:                                                    Patient Instructions   FUTURE  "APPOINTMENTS  Follow up in 4 weeks.    TOPICAL MEDICATION INSTRUCTIONS - for control of rosacea on mid-portion of face  Ivermectin (Soolantra) 1% cream.    Apply a thin layer to each affected area(s) of rosacea on nose and cheeks one time per day, everyday regardless if symptoms are present or not.    Keep in mind to also regularly use moisturizer, as this preventative measure can help maintain your skin's natural moisture barrier.    Apply moisturizer after application of medication.  Discontinue use of, but do not discard metronidazole gel    ORAL ANTIBIOTIC - for control of rosacea  Take by mouth doxycycline 50 mg two time(s) a day.    Avoid excessive alcohol, caffeine, chocolate, spicy food consumption or sun exposure.    TOPICAL STEROID INSTRUCTIONS - for control of eczema on eyelids  Desonide 0.05% cream.    Apply an amount equal to half of a fingertip (0.25 g) to the affected area(s) on the eyelids, two times per day for 3-5 days.    \"Fingertip Amount\"      This is a weak strength steroid, and it can be used on the face.    Topical steroid use is for short-term treatment only. If after initial treatment, you are using this for prolonged periods of time, return to clinic for re-evaluation of treatment.    Keep in mind to also regularly use moisturizer, as this preventative measure can help maintain your skin's natural moisture barrier.    Use Aquaphor ointment for moisturizer on the chin.    Apply in this order after showering : 1) topical medications, 2) Cetaphil facial moisturizer.      The patient was counseled to use products free of fragrance, dyes, and plants. The importance of using bland cleansers and the regular use of heavy bland emollient creams was impressed upon the patient.      PROCEDURES:                                                    None.    TT 25 minutes.  CT : 15 minutes.      The information in this document, created by the medical scribe for me, accurately reflects the services I " personally performed and the decisions made by me. I have reviewed and approved this document for accuracy prior to leaving the patient care area.  Reba Rose MD November 8, 2017 11:33 AM  [facility] - PRIMARY CARE SKIN    Again, thank you for allowing me to participate in the care of your patient.        Sincerely,        Melisa Rose MD

## 2017-11-09 NOTE — TELEPHONE ENCOUNTER
Left detailed message through service of the medication change  Lashonda Tilley,RN  [facility]  [phone]

## 2017-11-21 ENCOUNTER — TRANSFERRED RECORDS (OUTPATIENT)
Dept: HEALTH INFORMATION MANAGEMENT | Facility: CLINIC | Age: 52
End: 2017-11-21

## 2017-12-05 ENCOUNTER — OFFICE VISIT (OUTPATIENT)
Dept: PEDIATRICS | Facility: CLINIC | Age: 52
End: 2017-12-05
Payer: MEDICARE

## 2017-12-05 ENCOUNTER — OFFICE VISIT (OUTPATIENT)
Dept: OPTOMETRY | Facility: CLINIC | Age: 52
End: 2017-12-05
Payer: MEDICARE

## 2017-12-05 VITALS
SYSTOLIC BLOOD PRESSURE: 112 MMHG | DIASTOLIC BLOOD PRESSURE: 68 MMHG | WEIGHT: 301 LBS | HEART RATE: 72 BPM | BODY MASS INDEX: 45.1 KG/M2 | OXYGEN SATURATION: 98 % | TEMPERATURE: 98 F

## 2017-12-05 DIAGNOSIS — L71.9 BLEPHARITIS OF BOTH EYES WITH ROSACEA: ICD-10-CM

## 2017-12-05 DIAGNOSIS — J30.89 CHRONIC ALLERGIC RHINITIS DUE TO OTHER ALLERGIC TRIGGER, UNSPECIFIED SEASONALITY: ICD-10-CM

## 2017-12-05 DIAGNOSIS — H01.006 BLEPHARITIS OF BOTH EYES WITH ROSACEA: ICD-10-CM

## 2017-12-05 DIAGNOSIS — H16.001 CORNEAL EROSION, RIGHT: Primary | ICD-10-CM

## 2017-12-05 DIAGNOSIS — S05.01XA ABRASION OF RIGHT CORNEA, INITIAL ENCOUNTER: Primary | ICD-10-CM

## 2017-12-05 DIAGNOSIS — H01.003 BLEPHARITIS OF BOTH EYES WITH ROSACEA: ICD-10-CM

## 2017-12-05 PROCEDURE — 99213 OFFICE O/P EST LOW 20 MIN: CPT | Performed by: INTERNAL MEDICINE

## 2017-12-05 PROCEDURE — 99203 OFFICE O/P NEW LOW 30 MIN: CPT | Performed by: OPTOMETRIST

## 2017-12-05 RX ORDER — FLUTICASONE PROPIONATE 50 MCG
2 SPRAY, SUSPENSION (ML) NASAL DAILY
Qty: 1 BOTTLE | Refills: 5 | Status: SHIPPED | OUTPATIENT
Start: 2017-12-05 | End: 2018-06-18

## 2017-12-05 RX ORDER — TOBRAMYCIN AND DEXAMETHASONE 3; 1 MG/ML; MG/ML
1 SUSPENSION/ DROPS OPHTHALMIC 4 TIMES DAILY
Qty: 1 BOTTLE | Refills: 0 | Status: SHIPPED | OUTPATIENT
Start: 2017-12-05 | End: 2018-12-18

## 2017-12-05 RX ORDER — ERYTHROMYCIN 5 MG/G
0.25 OINTMENT OPHTHALMIC AT BEDTIME
Qty: 1 TUBE | Refills: 0 | Status: SHIPPED | OUTPATIENT
Start: 2017-12-05 | End: 2017-12-12

## 2017-12-05 ASSESSMENT — VISUAL ACUITY
METHOD: SNELLEN - LINEAR
OD_CC+: -1
OD_CC: 20/20
CORRECTION_TYPE: GLASSES

## 2017-12-05 ASSESSMENT — SLIT LAMP EXAM - LIDS: COMMENTS: MEIBOMIAN GLAND DYSFUNCTION

## 2017-12-05 NOTE — MR AVS SNAPSHOT
After Visit Summary   12/5/2017    Oliver Agarwal    MRN: [id number]           Patient Information     Date Of Birth          1965        Visit Information        Provider Department      12/5/2017 8:20 AM Atiya Kenny, OD; [facility]        Today's Diagnoses     Corneal erosion, right    -  1      Care Instructions    Recurrent corneal erosion is a condition which produces a painful sensation  on the eye. It most commonly occurs during sleep or upon first waking. This  happens due to upper eyelid sticking to the corneal surface and therefore  pulls off a layer of cells. Often the patient has a history of trauma or infection  in the eye.    Use artificial tears  During the day such as refresh or systane up to 4x daily    Use drops as prescribed 4 times per day for one week   Use prescription ointment at night for one week then,   After the initial erosion is healed use   Use OTC Kaylah 128 5 % ( NaCl) ointment at night before bedtime for 6 weeks.  Be sure that the eye does not get too dry by using OTC artificial tears 2-3x daily (Refresh tears or Liquigel) after treatment with ointment.   After 6 weeks you can switch to systane gel or refresh pm or genteal gel at bedtime.    Use artificial tears  As needed for dryness such as systane or refresh drops up to 4 x daily                 Follow-ups after your visit        Your next 10 appointments already scheduled     Dec 13, 2017  2:00 PM CST   Office Visit with Melisa Rose MD   [facility] ([facility] )    [address] [place] [id number] [phone]           Bring a current list of meds and any records pertaining to this visit. For Physicals, please bring immunization records and any forms needing to be filled out. Please arrive 10 minutes early to complete paperwork.              Who to contact     If you have  questions or need follow up information about today's clinic visit or your schedule please contact [facility] directly at [phone].  Normal or non-critical lab and imaging results will be communicated to you by Berry [employer], letter or phone within 4 business days after the clinic has received the results. If you do not hear from us within 7 days, please contact the clinic through "[employer]t or phone. If you have a critical or abnormal lab result, we will notify you by phone as soon as possible.  Submit refill requests through [employer] or call your pharmacy and they will forward the refill request to us. Please allow 3 business days for your refill to be completed.          Additional Information About Your Visit        [employer] Information     [employer] gives you secure access to your electronic health record. If you see a primary care provider, you can also send messages to your care team and make appointments. If you have questions, please call your primary care clinic.  If you do not have a primary care provider, please call [phone] and they will assist you.        Care EveryWhere ID     This is your Care EveryWhere ID. This could be used by other organizations to access your [place] medical records  [id number]         Blood Pressure from Last 3 Encounters:   12/05/17 112/68   09/19/17 121/69   07/10/17 132/75    Weight from Last 3 Encounters:   12/05/17 (!) 136.5 kg (301 lb)   09/19/17 (!) 136.5 kg (301 lb)   07/10/17 134.3 kg (296 lb)              Today, you had the following     No orders found for display         Today's Medication Changes          These changes are accurate as of: 12/5/17  9:13 AM.  If you have any questions, ask your nurse or doctor.               These medicines have changed or have updated prescriptions.        Dose/Directions    fluticasone 50 MCG/ACT spray   Commonly known as:  FLONASE   This may have changed:    - how much to take  - how to take this  - when to take this    Used for:  Chronic allergic rhinitis due to other allergic trigger, unspecified seasonality   Changed by:  Cy Chappell MD        Dose:  2 spray   Spray 2 sprays into both nostrils daily   Quantity:  1 Bottle   Refills:  5            Where to get your medicines      These medications were sent to [id number] - [place], [place] - [address] AT [facility]  [address], [place] [address]    Hours:  24-hours Phone:  [phone]     fluticasone 50 MCG/ACT spray                Primary Care Provider Office Phone # Fax #    Angelica Pate, APRN [facility] [phone] [address] [facility] [address]        Equal Access to Services     CATHERINE OCHOA AH: Hadii juaquin wilson hadasho Soomaali, waaxda luqadaha, qaybta kaalmada adeegyada, amadeo pruett haysamanta chaney . So [facility] [phone].    ATENCIÓN: Si habla español, tiene a harkins disposición servicios gratuitos de asistencia lingüística. Ll[facility] [phone].    We comply with applicable federal civil rights laws and [place] laws. We do not discriminate on the basis of race, color, national origin, age, disability, sex, sexual orientation, or gender identity.            Thank you!     Thank you for choosing [facility]  for your care. Our goal is always to provide you with excellent care. Hearing back from our patients is one way we can continue to improve our services. Please take a few minutes to complete the written survey that you may receive in the mail after your visit with us. Thank you!             Your Updated Medication List - Protect others around you: Learn how to safely use, store and throw away your medicines at www.disposemymeds.org.          This list is accurate as of: 12/5/17  9:13 AM.  Always use your most recent med list.                   Brand Name Dispense Instructions for use Diagnosis    ADVAIR DISKUS 250-50 MCG/DOSE diskus inhaler   Generic drug:   fluticasone-salmeterol      Inhale 1 puff into the lungs 2 times daily Taking prn        * albuterol (2.5 MG/3ML) 0.083% neb solution     90 vial    Take 1 vial (2.5 mg) by nebulization every 4 hours as needed for shortness of breath / dyspnea    Mild persistent asthma       * PROAIR  (90 BASE) MCG/ACT Inhaler   Generic drug:  albuterol     8.5 g    INHALE 2 PUFFS BY MOUTH EVERY 6 HOURS AS NEEDED    Mild persistent asthma without complication       * PROAIR  (90 BASE) MCG/ACT Inhaler   Generic drug:  albuterol     8.5 g    INHALE 2 PUFFS BY MOUTH EVERY 6 HOURS AS NEEDED    Mild persistent asthma without complication       * PROAIR  (90 BASE) MCG/ACT Inhaler   Generic drug:  albuterol     8.5 g    INHALE 2 PUFFS BY MOUTH EVERY 6 HOURS AS NEEDED    Mild persistent asthma without complication       alclomethasone 0.05 % cream    ACLOVATE    15 g    Apply to affected areas on face once per day but only for 3 consecutive days    Seborrheic dermatitis       Azelaic Acid 15 % gel     50 g    Apply 0.5 inches topically 2 times daily Massage thin film gently into afected areas morning and evening.    Rosacea       blood glucose monitoring lancets     1 Box    Use to test blood sugars once daily as directed.    Prediabetes       blood glucose monitoring test strip    ANTONIO CONTOUR NEXT    100 each    Use to test blood sugar one x  daily or as directed.    Prediabetes       CENTRUM Tabs     100 tablet    Take 1 tablet by mouth daily    Vitamin deficiency       cyanocolbalamin 500 MCG tablet    vitamin  B-12     Take 500 mcg by mouth daily        cyclobenzaprine 10 MG tablet    FLEXERIL    30 tablet    Take 0.5-1 tablets (5-10 mg) by mouth 3 times daily as needed for muscle spasms    Acute right-sided low back pain without sciatica       desonide 0.05 % cream    DESOWEN    15 g    Apply sparingly to affected area two times per day for only 3 consecutive days    Eczema, unspecified type       * doxycycline  monohydrate 50 MG capsule     90 capsule    1 tab per day    Rosacea       * doxycycline monohydrate 50 MG capsule     60 capsule    Take 1 capsule (50 mg) by mouth 2 times daily    Rosacea       fluticasone 50 MCG/ACT spray    FLONASE    1 Bottle    Spray 2 sprays into both nostrils daily    Chronic allergic rhinitis due to other allergic trigger, unspecified seasonality       ibuprofen 600 MG tablet    ADVIL/MOTRIN    120 tablet    TAKE 1 TABLET BY MOUTH EVERY 6 HOURS AS NEEDED FOR MODERATE PAIN    Pain       ivermectin 1 % cream    SOOLANTRA    30 g    Apply to the affected areas of the face once daily. Use a pea-size amount for each area of the face  that is affected. THIN LAYER    Rosacea       metroNIDAZOLE 0.75 % topical gel    METROGEL    45 g    Apply topically 2 times daily    Rosacea       montelukast 10 MG tablet    SINGULAIR    90 tablet    Take 1 tablet (10 mg) by mouth At Bedtime    Mild persistent asthma without complication       oxyCODONE-acetaminophen 5-325 MG per tablet    PERCOCET    18 tablet    Take 1-2 tablets by mouth every 6 hours as needed for pain    Chronic midline low back pain with right-sided sciatica       terbinafine 1 % cream    lamISIL AT    42 g    Apply topically 2 times daily For fungal infection not resolved with other antifungals (e.g. Clotrimazole)    Tinea pedis of both feet       triamcinolone 0.1 % ointment    KENALOG    80 g    Apply topically 2 times daily Apply to affected areas on hands bid for 14 days and then when needed.Not for face or groin    Eczema, unspecified type       vitamin D 2000 UNITS Caps      Take 4,000 Units by mouth daily        * Notice:  This list has 6 medication(s) that are the same as other medications prescribed for you. Read the directions carefully, and ask your doctor or other care provider to review them with you.

## 2017-12-05 NOTE — PATIENT INSTRUCTIONS
Recurrent corneal erosion is a condition which produces a painful sensation  on the eye. It most commonly occurs during sleep or upon first waking. This  happens due to upper eyelid sticking to the corneal surface and therefore  pulls off a layer of cells. Often the patient has a history of trauma or infection  in the eye.    Use artificial tears  During the day such as refresh or systane up to 4x daily    Use drops as prescribed 4 times per day for one week   Use prescription ointment at night for one week then,   After the initial erosion is healed use   Use OTC Kaylah 128 5 % ( NaCl) ointment at night before bedtime for 6 weeks.  Be sure that the eye does not get too dry by using OTC artificial tears 2-3x daily (Refresh tears or Liquigel) after treatment with ointment.   After 6 weeks you can switch to systane gel or refresh pm or genteal gel at bedtime.    Use artificial tears  As needed for dryness such as systane or refresh drops up to 4 x daily

## 2017-12-05 NOTE — PROGRESS NOTES
SUBJECTIVE:   Oliver Agarwal is a 52 year old male who presents with a sign language interpretor to clinic today for the following health issues:      Eye(s) Problem      Duration: 3 days.     Description:  Location: right  Pain: YES  Redness: YES  Discharge: YES    Accompanying signs and symptoms: Sinus drainage.     History (Trauma, foreign body exposure,): None    Precipitating or alleviating factors (contact use): None    Therapies tried and outcome: Eye drops to help redness.     Rubbed his eye last Sunday, noted onset of pain following. No known foreign body in the right eye.  Felt as if something was on the eye intermittently for the past 2 days.  Scleral hyperemia. Increased tearing. Intermittent photophobia noted.   No colored discharge.   No blurry or double vision.    Is having sinus congestion for the past 1-2 days. Not currently taking allergy medications.    Problem list and histories reviewed & adjusted, as indicated.    Reviewed and updated as needed this visit by clinical staffTobacco  Allergies  Meds  Med Hx  Surg Hx  Fam Hx  Soc Hx      Reviewed and updated as needed this visit by Provider  Problems           OBJECTIVE:     /68 (Cuff Size: Adult Large)  Pulse 72  Temp 98  F (36.7  C) (Oral)  Wt (!) 301 lb (136.5 kg)  SpO2 98%  BMI 45.1 kg/m2  Body mass index is 45.1 kg/(m^2).  GEN: No distress  E: PERRL. EOMI. Right scleral hyperemia, increased tearing. No pain noted w/ direct or indirect light.   Fluorescein applied to the right eye, noted punctate ulceration just inferior and medial to the pupil, over the iris.   ENT: No nasal d/c. OP moist.     ASSESSMENT/PLAN:       ICD-10-CM    1. Abrasion of right cornea, initial encounter S05.01XA    2. Chronic allergic rhinitis due to other allergic trigger, unspecified seasonality J30.89 fluticasone (FLONASE) 50 MCG/ACT spray     Corneal abrasion. Since over the potential field of vision, recommend optometry evaluation.  I was  able to help schedule an urgent appointment at the eye clinic here this morning.     Refilled Flonase. Use Allegra or similar for congestion. Sx < 10 days so no clear need for atbx at this point.     Cy Chappell MD  [facility]

## 2017-12-05 NOTE — NURSING NOTE
"Chief Complaint   Patient presents with     Eye Problem       Initial /68 (Cuff Size: Adult Large)  Pulse 72  Temp 98  F (36.7  C) (Oral)  Wt (!) 301 lb (136.5 kg)  SpO2 98%  BMI 45.1 kg/m2 Estimated body mass index is 45.1 kg/(m^2) as calculated from the following:    Height as of 7/10/17: 5' 8.5\" (1.74 m).    Weight as of this encounter: 301 lb (136.5 kg).  Medication Reconciliation: complete    Elisabeth Roa   "

## 2017-12-07 ENCOUNTER — MYC MEDICAL ADVICE (OUTPATIENT)
Dept: OPTOMETRY | Facility: CLINIC | Age: 52
End: 2017-12-07

## 2017-12-13 ENCOUNTER — OFFICE VISIT (OUTPATIENT)
Dept: FAMILY MEDICINE | Facility: CLINIC | Age: 52
End: 2017-12-13
Payer: MEDICARE

## 2017-12-13 DIAGNOSIS — B07.8 COMMON WART: Primary | ICD-10-CM

## 2017-12-13 DIAGNOSIS — L08.89 PITTED KERATOLYSIS: ICD-10-CM

## 2017-12-13 PROCEDURE — 17110 DESTRUCTION B9 LES UP TO 14: CPT | Performed by: FAMILY MEDICINE

## 2017-12-13 PROCEDURE — 99213 OFFICE O/P EST LOW 20 MIN: CPT | Mod: 25 | Performed by: FAMILY MEDICINE

## 2017-12-13 NOTE — LETTER
12/13/2017         RE: Oliver Agarwal  [address]  [place] [address]        Dear Colleague,    Thank you for referring your patient, Oliver Agarwal, to the [facility]. Please see a copy of my visit note below.    [facility]    CC : Wart(s)   SUBJECTIVE:                                                    Oliver Agarwal is a 52 year old male who presents to clinic today with  because of warts on the hands and leg.    Associated symptoms : tender and enlarging.  Treatments tried : He has had at least one treatment of cryotherapy on 9/19/2017.    Issue Two : He complains of skin changes on the feet.  Issue Three : An area of constant friction on the left abdomen has not healed.    Issue Four : He has developed eye irritation in the last 1 week. He has had prescription Tobradex eye drops and erythromycin ophthalmic ointment. A provider has diagnosed this as a corneal abrasion. He is concerned about ocular rosacea.    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    Refer to electronic medical record (EMR) for past medical history and medications.    INTEGUMENTARY/SKIN: POSITIVE for non-healing lesions  ROS : 14 point review of systems was negative except the symptoms listed above in the HPI.    This document serves as a record of the services and decisions personally performed and made by Reba Rose MD. It was created on her behalf by Alvaro Calvo, a trained medical scribe.  The creation of this document is based on the scribe's personal observations and the provider's statements to the medical scribe.  Alvaro Calvo, December 13, 2017 2:11 PM      OBJECTIVE:                                                    GENERAL: alert and no distress, obese, unable to speak  SKIN: Stone Skin Type - II.  Legs, Hands and Feet were examined. The  dermatoscope was used to help evaluate pigmented lesions.  Skin Pertinent Findings:  Left foot :  most consistent with pitted keratolysis.    Left lateral abdomen : 4 mm in size superficial ulceration with some exudate and surrounding erythema at waistband area.    Right fifth finger : 3 mm in size verrucous lesion(s).  Left lower leg : 7 mm in size verrucous lesion(s).  Name : Liquid Nitrogen Cry-Ac Cryotherapy.  Indication : verrucous lesion(s).  Location(s) : right fifth finger, left lower leg.  Completed by : Reba Rose MD  Note : Prior to treatment, I discussed the risk of pain, blistering, infection, scarring, hypopigmentation, hyperpigmentation and recurrence or need for retreatment. Benefits of treatment and alternative treatments were also discussed.  Discussed that like all wart treatments, this is not a 100% effective treatment.    Clean technique was used throughout the procedure. Skin was cleaned and prepped with a soak of warm, soapy water. Hyperkeratotic tissue of the wart(s) was pared with a scalpel blade. Liquid nitrogen was applied to freeze the entire lesion(s) including a narrow margin of surrounding skin. After thawing, freezing was repeated once.     Patient tolerated the procedure well and left in satisfactory condition.  Total number of lesions treated : 2.  Treatment number : 1.    Diagnostic Test Results:  none     MDM : . Discussed the viral cause of warts and potential need for multiple treatments. Treatment options include observation, cryotherapy, curettage, candida, cantharidin and contact immunotherapy. Potential side effects include blister formation, scarring, and pain depending on the treatment. The patient decided to continue treatment of the wart(s) with cryotherapy.      ASSESSMENT:                                                      Encounter Diagnoses   Name Primary?     Common wart Yes     Pitted keratolysis          PLAN:                                                     Patient Instructions   FUTURE APPOINTMENTS  Follow up in 2-3 weeks.  If eye symptoms persist, consider following up with ophthalmology.    TOPICAL ANTIBIOTIC  Apply mupirocin (Bactroban) 2% ointment three time(s) a day for five days to the affected areas. Keep the area covered and well moisturized.    1. Change dressing daily.  2. Wash hands before every dressing change.  3. Wash the wound area with a mild soap, then rinse  4. Gently pat dry with a sterile gauze or Q-tip.  5. Apply mupirocin or Vaseline only over entire wound. Do NOT use Neosporin - as many people react to neomycin.  6. Finally, cover with a bandage or sterile non-stick gauze with micropore paper tape.  7. Repeat once daily until wound has healed.    Apply Lac-Hydrin 12% cream every night for 2 weeks.  After 2 weeks, decrease to every other night application and continue use as needed.  Apply moisturizer such as OTC Flexitol heel balm regularly on the feet.    CRYOTHERAPY FOR WARTS POST-TREATMENT CARE INSTRUCTIONS  Freezing a wart with liquid nitrogen is a procedure that is used to destroy the warty tissue and to activate your immune system to remove the remainder of the wart. The goal of this treatment is to lift the lesion out of the skin with a blister. However, multiple treatments may be necessary. Liquid nitrogen is mildly uncomfortable when applied to the skin, but the discomfort rapidly subsides.    Post-Treatment:  You may experience burning and/or stinging immediately following the procedure. The discomfort from the procedure may persist over the next 12-24 hours. The area treated will look pinker and slightly swollen before the healing process begins. You may also notice redness, swelling, tenderness, weeping and crusts or scabs. Healing time is approximately 10-14 days.    On occasion, you may also notice that there is a dark violet color to the blister (a blood blister). These are expected changes and indicate that the immune system is  responding.    Blister - You may or may notice blistering from the freezing. If you develop an uncomfortable blister from today's treatment, you may gently puncture this with a needle that has been cleaned with alcohol. However, do not remove the protective skin layer of the blister.    Scab - After a few days, you may notice scaliness or scab formation. Do not pick at the scabs because this may cause slower healing and a permanent scar.    The skin may appear temporarily darker at the treatment site, but this usually fades over a period of months, provided that the area is protected from the sun.    Care of the areas treated:    Wash the area with a mild cleanser.    Gently pat dry.    Do not rub.     Keep protected from the sun during the healing process and for a full year following treatment as the skin continues to remodel during this time.    If you experience dryness or persistent burning, you may use Vaseline or Aquaphor ointment sparingly.    Do not use Neosporin, as many people eventually develop a medication allergy, that can easily be confused with an infection, to Neomycin.    Rest and/or Elevate the treated area and use acetaminophen (Tylenol) or ibuprofen (Advil) to alleviate discomfort.    Signs of Infection:  Thankfully this is rare. However if you notice persistent colored drainage, increasing pain, fever or other signs of infection, please call us at: [phone]        PROCEDURES:                                                    None.    TT : 20 minutes.  CT : 15 minutes, discussing treatment options (including insurance considerations), side effects, risks and benefits of the treatment options, management of pain and blister formation and when to return if not improving.      The information in this document, created by the medical scribe for me, accurately reflects the services I personally performed and the decisions made by me. I have reviewed and approved this document for accuracy prior  to leaving the patient care area.  Reba Rose MD December 13, 2017 2:11 PM  [facility]    Again, thank you for allowing me to participate in the care of your patient.        Sincerely,        Melisa Rose MD

## 2017-12-13 NOTE — MR AVS SNAPSHOT
After Visit Summary   12/13/2017    Oliver Agarwal    MRN: [id number]           Patient Information     Date Of Birth          1965        Visit Information        Provider Department      12/13/2017 2:00 PM Melisa Rose MD; [facility] - Primary Care Skin        Today's Diagnoses     Common wart    -  1    Pitted keratolysis          Care Instructions    FUTURE APPOINTMENTS  Follow up in 2-3 weeks for re-treatment of warts and re-evaluation of facial rash.  If eye symptoms persist, consider following up with ophthalmology.    TOPICAL ANTIBIOTIC  Apply mupirocin (Bactroban) 2% ointment three time(s) a day for five days to the affected areas. Keep the area covered and well moisturized.    1. Change dressing daily.  2. Wash hands before every dressing change.  3. Wash the wound area with a mild soap, then rinse  4. Gently pat dry with a sterile gauze or Q-tip.  5. Apply mupirocin or Vaseline only over entire wound. Do NOT use Neosporin - as many people react to neomycin.  6. Finally, cover with a bandage or sterile non-stick gauze with micropore paper tape.  7. Repeat once daily until wound has healed.    Apply Lac-Hydrin 12% cream every night for 2 weeks.  After 2 weeks, decrease to every other night application and continue use as needed.  Apply moisturizer such as OTC Flexitol heel balm regularly on the feet.    CRYOTHERAPY FOR WARTS POST-TREATMENT CARE INSTRUCTIONS  Freezing a wart with liquid nitrogen is a procedure that is used to destroy the warty tissue and to activate your immune system to remove the remainder of the wart. The goal of this treatment is to lift the lesion out of the skin with a blister. However, multiple treatments may be necessary. Liquid nitrogen is mildly uncomfortable when applied to the skin, but the discomfort rapidly subsides.    Post-Treatment:  You may experience burning and/or stinging immediately following the procedure. The  discomfort from the procedure may persist over the next 12-24 hours. The area treated will look pinker and slightly swollen before the healing process begins. You may also notice redness, swelling, tenderness, weeping and crusts or scabs. Healing time is approximately 10-14 days.    On occasion, you may also notice that there is a dark violet color to the blister (a blood blister). These are expected changes and indicate that the immune system is responding.    Blister - You may or may notice blistering from the freezing. If you develop an uncomfortable blister from today's treatment, you may gently puncture this with a needle that has been cleaned with alcohol. However, do not remove the protective skin layer of the blister.    Scab - After a few days, you may notice scaliness or scab formation. Do not pick at the scabs because this may cause slower healing and a permanent scar.    The skin may appear temporarily darker at the treatment site, but this usually fades over a period of months, provided that the area is protected from the sun.    Care of the areas treated:    Wash the area with a mild cleanser.    Gently pat dry.    Do not rub.     Keep protected from the sun during the healing process and for a full year following treatment as the skin continues to remodel during this time.    If you experience dryness or persistent burning, you may use Vaseline or Aquaphor ointment sparingly.    Do not use Neosporin, as many people eventually develop a medication allergy, that can easily be confused with an infection, to Neomycin.    Rest and/or Elevate the treated area and use acetaminophen (Tylenol) or ibuprofen (Advil) to alleviate discomfort.    Signs of Infection:  Thankfully this is rare. However if you notice persistent colored drainage, increasing pain, fever or other signs of infection, please call us at: [phone]          Follow-ups after your visit        Who to contact     If you have questions or need  follow up information about today's clinic visit or your schedule please contact [facility] - PRIMARY CARE SKIN directly at [phone].  Normal or non-critical lab and imaging results will be communicated to you by MyChart, letter or phone within 4 business days after the clinic has received the results. If you do not hear from us within 7 days, please contact the clinic through [employer] or phone. If you have a critical or abnormal lab result, we will notify you by phone as soon as possible.  Submit refill requests through [employer] or call your pharmacy and they will forward the refill request to us. Please allow 3 business days for your refill to be completed.          Additional Information About Your Visit        [employer] Information     [employer] gives you secure access to your electronic health record. If you see a primary care provider, you can also send messages to your care team and make appointments. If you have questions, please call your primary care clinic.  If you do not have a primary care provider, please call [phone] and they will assist you.        Care EveryWhere ID     This is your Care EveryWhere ID. This could be used by other organizations to access your [facility] medical records  [id number]         Blood Pressure from Last 3 Encounters:   12/05/17 112/68   09/19/17 121/69   07/10/17 132/75    Weight from Last 3 Encounters:   12/05/17 (!) 301 lb (136.5 kg)   09/19/17 (!) 301 lb (136.5 kg)   07/10/17 296 lb (134.3 kg)              Today, you had the following     No orders found for display       Primary Care Provider Office Phone # Fax #    BROOK Rosa [facility] [phone] [address] [facility] [address] [facility] [address] [address]        Equal Access to Services     [facility] GABRIELA : Daniel Gamez, wacarlyle mcgowan, qaybta gamaalamadeo dhaliwal. So [facility] [phone].    ATENCIÓN: Si habla español, tiene a harkins disposición  servicios gratuitos de asistencia lingüística. Rita weeks [phone].    We comply with applicable federal civil rights laws and [place] laws. We do not discriminate on the basis of race, color, national origin, age, disability, sex, sexual orientation, or gender identity.            Thank you!     Thank you for choosing [facility] - PRIMARY CARE [facility]  for your care. Our goal is always to provide you with excellent care. Hearing back from our patients is one way we can continue to improve our services. Please take a few minutes to complete the written survey that you may receive in the mail after your visit with us. Thank you!             Your Updated Medication List - Protect others around you: Learn how to safely use, store and throw away your medicines at www.disposemymeds.org.          This list is accurate as of: 12/13/17  2:25 PM.  Always use your most recent med list.                   Brand Name Dispense Instructions for use Diagnosis    ADVAIR DISKUS 250-50 MCG/DOSE diskus inhaler   Generic drug:  fluticasone-salmeterol      Inhale 1 puff into the lungs 2 times daily Taking prn        * albuterol (2.5 MG/3ML) 0.083% neb solution     90 vial    Take 1 vial (2.5 mg) by nebulization every 4 hours as needed for shortness of breath / dyspnea    Mild persistent asthma       * PROAIR  (90 BASE) MCG/ACT Inhaler   Generic drug:  albuterol     8.5 g    INHALE 2 PUFFS BY MOUTH EVERY 6 HOURS AS NEEDED    Mild persistent asthma without complication       * PROAIR  (90 BASE) MCG/ACT Inhaler   Generic drug:  albuterol     8.5 g    INHALE 2 PUFFS BY MOUTH EVERY 6 HOURS AS NEEDED    Mild persistent asthma without complication       * PROAIR  (90 BASE) MCG/ACT Inhaler   Generic drug:  albuterol     8.5 g    INHALE 2 PUFFS BY MOUTH EVERY 6 HOURS AS NEEDED    Mild persistent asthma without complication       * PROAIR  (90 BASE) MCG/ACT Inhaler   Generic drug:  albuterol     8.5 g    INHALE 2  PUFFS BY MOUTH EVERY 6 HOURS AS NEEDED    Mild persistent asthma without complication       alclomethasone 0.05 % cream    ACLOVATE    15 g    Apply to affected areas on face once per day but only for 3 consecutive days    Seborrheic dermatitis       Azelaic Acid 15 % gel     50 g    Apply 0.5 inches topically 2 times daily Massage thin film gently into afected areas morning and evening.    Rosacea       blood glucose monitoring lancets     1 Box    Use to test blood sugars once daily as directed.    Prediabetes       blood glucose monitoring test strip    ANTONIO CONTOUR NEXT    100 each    Use to test blood sugar one x  daily or as directed.    Prediabetes       CENTRUM Tabs     100 tablet    Take 1 tablet by mouth daily    Vitamin deficiency       cyanocolbalamin 500 MCG tablet    vitamin  B-12     Take 500 mcg by mouth daily        cyclobenzaprine 10 MG tablet    FLEXERIL    30 tablet    Take 0.5-1 tablets (5-10 mg) by mouth 3 times daily as needed for muscle spasms    Acute right-sided low back pain without sciatica       desonide 0.05 % cream    DESOWEN    15 g    Apply sparingly to affected area two times per day for only 3 consecutive days    Eczema, unspecified type       * doxycycline monohydrate 50 MG capsule     90 capsule    1 tab per day    Rosacea       * doxycycline monohydrate 50 MG capsule     60 capsule    Take 1 capsule (50 mg) by mouth 2 times daily    Rosacea       fluticasone 50 MCG/ACT spray    FLONASE    1 Bottle    Spray 2 sprays into both nostrils daily    Chronic allergic rhinitis due to other allergic trigger, unspecified seasonality       ibuprofen 600 MG tablet    ADVIL/MOTRIN    120 tablet    TAKE 1 TABLET BY MOUTH EVERY 6 HOURS AS NEEDED FOR MODERATE PAIN    Pain       ivermectin 1 % cream    SOOLANTRA    30 g    Apply to the affected areas of the face once daily. Use a pea-size amount for each area of the face  that is affected. THIN LAYER    Rosacea       metroNIDAZOLE 0.75 % topical  gel    METROGEL    45 g    Apply topically 2 times daily    Rosacea       montelukast 10 MG tablet    SINGULAIR    90 tablet    Take 1 tablet (10 mg) by mouth At Bedtime    Mild persistent asthma without complication       oxyCODONE-acetaminophen 5-325 MG per tablet    PERCOCET    18 tablet    Take 1-2 tablets by mouth every 6 hours as needed for pain    Chronic midline low back pain with right-sided sciatica       terbinafine 1 % cream    lamISIL AT    42 g    Apply topically 2 times daily For fungal infection not resolved with other antifungals (e.g. Clotrimazole)    Tinea pedis of both feet       tobramycin-dexamethasone 0.3-0.1 % ophthalmic susp    TOBRADEX    1 Bottle    Place 1 drop into the right eye 4 times daily    Corneal erosion, right       triamcinolone 0.1 % ointment    KENALOG    80 g    Apply topically 2 times daily Apply to affected areas on hands bid for 14 days and then when needed.Not for face or groin    Eczema, unspecified type       vitamin D 2000 UNITS Caps      Take 4,000 Units by mouth daily        * Notice:  This list has 7 medication(s) that are the same as other medications prescribed for you. Read the directions carefully, and ask your doctor or other care provider to review them with you.

## 2017-12-13 NOTE — PROGRESS NOTES
[facility] - PRIMARY CARE SKIN    CC : Wart(s)   SUBJECTIVE:                                                    Oliver Agarwal is a 52 year old male who presents to clinic today with  because of warts on the hands and leg.    Associated symptoms : tender and enlarging.  Treatments tried : He has had at least one treatment of cryotherapy on 9/19/2017.    Issue Two : He complains of skin changes on the feet.  Issue Three : An area of constant friction on the left abdomen has not healed.    Issue Four : He has developed eye irritation in the last 1 week. He has had prescription Tobradex eye drops and erythromycin ophthalmic ointment. A provider has diagnosed this as a corneal abrasion. He is concerned about ocular rosacea.    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    Refer to electronic medical record (EMR) for past medical history and medications.    INTEGUMENTARY/SKIN: POSITIVE for non-healing lesions  ROS : 14 point review of systems was negative except the symptoms listed above in the HPI.    This document serves as a record of the services and decisions personally performed and made by Reba Rose MD. It was created on her behalf by Alvaro Calvo, a trained medical scribe.  The creation of this document is based on the scribe's personal observations and the provider's statements to the medical scribe.  Alvaro Calvo, December 13, 2017 2:11 PM      OBJECTIVE:                                                    GENERAL: alert and no distress, obese, unable to speak  SKIN: Stone Skin Type - II.  Legs, Hands and Feet were examined. The dermatoscope was used to help evaluate pigmented lesions.  Skin Pertinent Findings:  Left foot :  most consistent with pitted keratolysis.    Left lateral abdomen : 4 mm in size superficial ulceration with some exudate and surrounding erythema at waistband area.    Right fifth  finger : 3 mm in size verrucous lesion(s).  Left lower leg : 7 mm in size verrucous lesion(s).  Name : Liquid Nitrogen Cry-Ac Cryotherapy.  Indication : verrucous lesion(s).  Location(s) : right fifth finger, left lower leg.  Completed by : Reba Rose MD  Note : Prior to treatment, I discussed the risk of pain, blistering, infection, scarring, hypopigmentation, hyperpigmentation and recurrence or need for retreatment. Benefits of treatment and alternative treatments were also discussed.  Discussed that like all wart treatments, this is not a 100% effective treatment.    Clean technique was used throughout the procedure. Skin was cleaned and prepped with a soak of warm, soapy water. Hyperkeratotic tissue of the wart(s) was pared with a scalpel blade. Liquid nitrogen was applied to freeze the entire lesion(s) including a narrow margin of surrounding skin. After thawing, freezing was repeated once.     Patient tolerated the procedure well and left in satisfactory condition.  Total number of lesions treated : 2.  Treatment number : 1.    Diagnostic Test Results:  none     MDM : . Discussed the viral cause of warts and potential need for multiple treatments. Treatment options include observation, cryotherapy, curettage, candida, cantharidin and contact immunotherapy. Potential side effects include blister formation, scarring, and pain depending on the treatment. The patient decided to continue treatment of the wart(s) with cryotherapy.      ASSESSMENT:                                                      Encounter Diagnoses   Name Primary?     Common wart Yes     Pitted keratolysis          PLAN:                                                    Patient Instructions   FUTURE APPOINTMENTS  Follow up in 2-3 weeks.  If eye symptoms persist, consider following up with ophthalmology.    TOPICAL ANTIBIOTIC  Apply mupirocin (Bactroban) 2% ointment three time(s) a day for five days to the affected areas. Keep the area  covered and well moisturized.    1. Change dressing daily.  2. Wash hands before every dressing change.  3. Wash the wound area with a mild soap, then rinse  4. Gently pat dry with a sterile gauze or Q-tip.  5. Apply mupirocin or Vaseline only over entire wound. Do NOT use Neosporin - as many people react to neomycin.  6. Finally, cover with a bandage or sterile non-stick gauze with micropore paper tape.  7. Repeat once daily until wound has healed.    Apply Lac-Hydrin 12% cream every night for 2 weeks.  After 2 weeks, decrease to every other night application and continue use as needed.  Apply moisturizer such as OTC Flexitol heel balm regularly on the feet.    CRYOTHERAPY FOR WARTS POST-TREATMENT CARE INSTRUCTIONS  Freezing a wart with liquid nitrogen is a procedure that is used to destroy the warty tissue and to activate your immune system to remove the remainder of the wart. The goal of this treatment is to lift the lesion out of the skin with a blister. However, multiple treatments may be necessary. Liquid nitrogen is mildly uncomfortable when applied to the skin, but the discomfort rapidly subsides.    Post-Treatment:  You may experience burning and/or stinging immediately following the procedure. The discomfort from the procedure may persist over the next 12-24 hours. The area treated will look pinker and slightly swollen before the healing process begins. You may also notice redness, swelling, tenderness, weeping and crusts or scabs. Healing time is approximately 10-14 days.    On occasion, you may also notice that there is a dark violet color to the blister (a blood blister). These are expected changes and indicate that the immune system is responding.    Blister - You may or may notice blistering from the freezing. If you develop an uncomfortable blister from today's treatment, you may gently puncture this with a needle that has been cleaned with alcohol. However, do not remove the protective skin layer of  the blister.    Scab - After a few days, you may notice scaliness or scab formation. Do not pick at the scabs because this may cause slower healing and a permanent scar.    The skin may appear temporarily darker at the treatment site, but this usually fades over a period of months, provided that the area is protected from the sun.    Care of the areas treated:    Wash the area with a mild cleanser.    Gently pat dry.    Do not rub.     Keep protected from the sun during the healing process and for a full year following treatment as the skin continues to remodel during this time.    If you experience dryness or persistent burning, you may use Vaseline or Aquaphor ointment sparingly.    Do not use Neosporin, as many people eventually develop a medication allergy, that can easily be confused with an infection, to Neomycin.    Rest and/or Elevate the treated area and use acetaminophen (Tylenol) or ibuprofen (Advil) to alleviate discomfort.    Signs of Infection:  Thankfully this is rare. However if you notice persistent colored drainage, increasing pain, fever or other signs of infection, please call us at: [phone]        PROCEDURES:                                                    None.    TT : 20 minutes.  CT : 15 minutes, discussing treatment options (including insurance considerations), side effects, risks and benefits of the treatment options, management of pain and blister formation and when to return if not improving.      The information in this document, created by the medical scribe for me, accurately reflects the services I personally performed and the decisions made by me. I have reviewed and approved this document for accuracy prior to leaving the patient care area.  Reba Rose MD December 13, 2017 2:11 PM  [facility] - PRIMARY CARE SKIN

## 2017-12-19 ENCOUNTER — OFFICE VISIT (OUTPATIENT)
Dept: OPTOMETRY | Facility: CLINIC | Age: 52
End: 2017-12-19
Payer: MEDICARE

## 2017-12-19 DIAGNOSIS — L71.9 BLEPHARITIS OF BOTH EYES WITH ROSACEA: ICD-10-CM

## 2017-12-19 DIAGNOSIS — H01.003 BLEPHARITIS OF BOTH EYES WITH ROSACEA: ICD-10-CM

## 2017-12-19 DIAGNOSIS — H04.123 DRY EYES: Primary | ICD-10-CM

## 2017-12-19 DIAGNOSIS — H01.006 BLEPHARITIS OF BOTH EYES WITH ROSACEA: ICD-10-CM

## 2017-12-19 PROCEDURE — 99213 OFFICE O/P EST LOW 20 MIN: CPT | Performed by: OPTOMETRIST

## 2017-12-19 ASSESSMENT — VISUAL ACUITY
OS_CC+: -1
CORRECTION_TYPE: GLASSES
OD_CC: 20/30
METHOD: SNELLEN - LINEAR
OD_PH_CC: 20/20
OD_CC+: -1
OS_CC: 20/20

## 2017-12-19 ASSESSMENT — TONOMETRY
IOP_METHOD: APPLANATION
OD_IOP_MMHG: 14

## 2017-12-19 NOTE — PROGRESS NOTES
Chief Complaint   Patient presents with     Eye Problem Right Eye     Follow up   accompanied by a sign   He is better , his pain is gone, he is just having watery eyes and used prescription drops for 2 weeks and did not pay attention that he was to use them one week  However did get artificial tears  And has used once daily  Has systane gel for at bedtime and drops for during the day the pharmacist helped him with  Discontinued the other medication prescribed   He never read the AVS I gave him at the last visit    Do you wear contact lenses? No  Feeling better, using drops and gel as prescribed    HPI    Symptoms:     Tearing         Do you have eye pain now?:  No                Atiya Kenny, OD     See Review Of Systems     HPI and ROS performed by Optometrist  scribed by [unfilled]    Medical, surgical and family histories reviewed and updated 12/19/2017.         OBJECTIVE: See Ophthalmology exam    ASSESSMENT:    ICD-10-CM    1. Dry eyes H04.123 propylene glycol (SYSTANE BALANCE) 0.6 % SOLN ophthalmic solution     DISCONTINUED: propylene glycol (SYSTANE BALANCE) 0.6 % SOLN ophthalmic solution   2. Blepharitis of both eyes with rosacea H01.003 DISCONTINUED: propylene glycol (SYSTANE BALANCE) 0.6 % SOLN ophthalmic solution    H01.006     L71.9       PLAN:  45 min spent face to face with patient   Multiple questions answered  He is to follow the plan and return to clinic in about 6 weeks for an eye exam  He read today's AVS and asked questions until it made sense to him  He would like a note to go to his derm as he was told his rosacea was not related, I said it was not related to the recurrent corneal erosion which is what he had at the initial visit, but he also has blepharitis/ dry eye.      Atiya Kenny OD

## 2017-12-19 NOTE — Clinical Note
Hello! LAMAR, I just finished seeing this patient for dry eye, rosacea blepharitis, he wanted me to let you know as he thought you stated unrelated to his rosacea and I explained I think she meant unrelated to your recurrent corneal erosion. Thanks   Atiya Kenny OD

## 2017-12-19 NOTE — PATIENT INSTRUCTIONS
Warm compresses twice daily     Directions for warm soaks  There are few methods for hot compresses. Moisten a washcloth with hot water, or microwave for 10 seconds, being careful to not get the cloth too hot.   Then put the washcloth onto your eyelids for 5 minutes. It will cool quickly so a rice pack or eyemask that can be heated and laid on top of the washcloth will help retain the heat.         Overabundance of bacterial microorganisms along the eyelashes and lid margins induce stress on the tear film and promote inflammation.  Regular lid hygiene helps diminish the bacterial population to prevent inflammation and infection.  Use a warm compress to loosen crusts   Cleanse lids once daily with a lid cleansing product as directed such as** Ocusoft or Sterilid which can be purchased at most pharmacies. Diluted baby shampoo will also work, but not as well.         Artificial tears    I recommend using artificial tears for your dry eye. There are over the counter drops that work well and may be used up to 4 x daily. ( systane ,) If you need more than 4 drops daily, use a preservative free product which come in individual vials and may be used for 24 hours until finished and discarded.       NACL gel in R eye at bedtime for 6 weeks then switch to systane gel

## 2017-12-19 NOTE — MR AVS SNAPSHOT
After Visit Summary   12/19/2017    Oliver Agarwal    MRN: [id number]           Patient Information     Date Of Birth          1965        Visit Information        Provider Department      12/19/2017 12:40 PM Atiya Kenny, JOSH; MOE VANEGAS [facility] [place]        Care Instructions    Warm compresses twice daily     Directions for warm soaks  There are few methods for hot compresses. Moisten a washcloth with hot water, or microwave for 10 seconds, being careful to not get the cloth too hot.   Then put the washcloth onto your eyelids for 5 minutes. It will cool quickly so a rice pack or eyemask that can be heated and laid on top of the washcloth will help retain the heat.         Overabundance of bacterial microorganisms along the eyelashes and lid margins induce stress on the tear film and promote inflammation.  Regular lid hygiene helps diminish the bacterial population to prevent inflammation and infection.  Use a warm compress to loosen crusts   Cleanse lids once daily with a lid cleansing product as directed such as** Ocusoft or Sterilid which can be purchased at most pharmacies. Diluted baby shampoo will also work, but not as well.         Artificial tears    I recommend using artificial tears for your dry eye. There are over the counter drops that work well and may be used up to 4 x daily. ( systane ,) If you need more than 4 drops daily, use a preservative free product which come in individual vials and may be used for 24 hours until finished and discarded.       NACL gel in R eye at bedtime for 6 weeks then switch to systane gel                Follow-ups after your visit        Your next 10 appointments already scheduled     Valentin 10, 2018  2:20 PM CST   Office Visit with Melisa Rose MD   [facility] ([facility] )    [address] [place] [address]  [address] [place] [id number]   [phone]           Bring a  current list of meds and any records pertaining to this visit. For Physicals, please bring immunization records and any forms needing to be filled out. Please arrive 10 minutes early to complete paperwork.              Who to contact     If you have questions or need follow up information about today's clinic visit or your schedule please contact [facility] directly at [phone].  Normal or non-critical lab and imaging results will be communicated to you by MyChart, letter or phone within 4 business days after the clinic has received the results. If you do not hear from us within 7 days, please contact the clinic through LSA Sportshart or phone. If you have a critical or abnormal lab result, we will notify you by phone as soon as possible.  Submit refill requests through SensibleSelf or call your pharmacy and they will forward the refill request to us. Please allow 3 business days for your refill to be completed.          Additional Information About Your Visit        LSA SportsharFrameri Information     SensibleSelf gives you secure access to your electronic health record. If you see a primary care provider, you can also send messages to your care team and make appointments. If you have questions, please call your primary care clinic.  If you do not have a primary care provider, please call [phone] and they will assist you.        Care EveryWhere ID     This is your Care EveryWhere ID. This could be used by other organizations to access your [place] medical records  [id number]         Blood Pressure from Last 3 Encounters:   12/05/17 112/68   09/19/17 121/69   07/10/17 132/75    Weight from Last 3 Encounters:   12/05/17 (!) 136.5 kg (301 lb)   09/19/17 (!) 136.5 kg (301 lb)   07/10/17 134.3 kg (296 lb)              Today, you had the following     No orders found for display       Primary Care Provider Office Phone # Fax #    BROOK Rosa [facility] [phone] [address] [facility]  [address]        Equal Access to Services     [facility]: Hadii juaquin wilson mahin Gamez, wadeniseda luqadaha, qaybta gamabenluca calloway, amadeo izaguirrefeltonkaur gao. So [facility] [phone].    ATENCIÓN: Si habla español, tiene a harkins disposición servicios gratuitos de asistencia lingüística. Llame al [phone].    We comply with applicable federal civil rights laws and [place] laws. We do not discriminate on the basis of race, color, national origin, age, disability, sex, sexual orientation, or gender identity.            Thank you!     Thank you for choosing [facility]  for your care. Our goal is always to provide you with excellent care. Hearing back from our patients is one way we can continue to improve our services. Please take a few minutes to complete the written survey that you may receive in the mail after your visit with us. Thank you!             Your Updated Medication List - Protect others around you: Learn how to safely use, store and throw away your medicines at www.disposemymeds.org.          This list is accurate as of: 12/19/17  1:24 PM.  Always use your most recent med list.                   Brand Name Dispense Instructions for use Diagnosis    ADVAIR DISKUS 250-50 MCG/DOSE diskus inhaler   Generic drug:  fluticasone-salmeterol      Inhale 1 puff into the lungs 2 times daily Taking prn        * albuterol (2.5 MG/3ML) 0.083% neb solution     90 vial    Take 1 vial (2.5 mg) by nebulization every 4 hours as needed for shortness of breath / dyspnea    Mild persistent asthma       * PROAIR  (90 BASE) MCG/ACT Inhaler   Generic drug:  albuterol     8.5 g    INHALE 2 PUFFS BY MOUTH EVERY 6 HOURS AS NEEDED    Mild persistent asthma without complication       * PROAIR  (90 BASE) MCG/ACT Inhaler   Generic drug:  albuterol     8.5 g    INHALE 2 PUFFS BY MOUTH EVERY 6 HOURS AS NEEDED    Mild persistent asthma without complication       * PROAIR  (90 BASE) MCG/ACT  Inhaler   Generic drug:  albuterol     8.5 g    INHALE 2 PUFFS BY MOUTH EVERY 6 HOURS AS NEEDED    Mild persistent asthma without complication       * PROAIR  (90 BASE) MCG/ACT Inhaler   Generic drug:  albuterol     8.5 g    INHALE 2 PUFFS BY MOUTH EVERY 6 HOURS AS NEEDED    Mild persistent asthma without complication       alclomethasone 0.05 % cream    ACLOVATE    15 g    Apply to affected areas on face once per day but only for 3 consecutive days    Seborrheic dermatitis       Azelaic Acid 15 % gel     50 g    Apply 0.5 inches topically 2 times daily Massage thin film gently into afected areas morning and evening.    Rosacea       blood glucose monitoring lancets     1 Box    Use to test blood sugars once daily as directed.    Prediabetes       blood glucose monitoring test strip    ANTONIO CONTOUR NEXT    100 each    Use to test blood sugar one x  daily or as directed.    Prediabetes       CENTRUM Tabs     100 tablet    Take 1 tablet by mouth daily    Vitamin deficiency       cyanocolbalamin 500 MCG tablet    vitamin  B-12     Take 500 mcg by mouth daily        cyclobenzaprine 10 MG tablet    FLEXERIL    30 tablet    Take 0.5-1 tablets (5-10 mg) by mouth 3 times daily as needed for muscle spasms    Acute right-sided low back pain without sciatica       desonide 0.05 % cream    DESOWEN    15 g    Apply sparingly to affected area two times per day for only 3 consecutive days    Eczema, unspecified type       * doxycycline monohydrate 50 MG capsule     90 capsule    1 tab per day    Rosacea       * doxycycline monohydrate 50 MG capsule     60 capsule    Take 1 capsule (50 mg) by mouth 2 times daily    Rosacea       fluticasone 50 MCG/ACT spray    FLONASE    1 Bottle    Spray 2 sprays into both nostrils daily    Chronic allergic rhinitis due to other allergic trigger, unspecified seasonality       ibuprofen 600 MG tablet    ADVIL/MOTRIN    120 tablet    TAKE 1 TABLET BY MOUTH EVERY 6 HOURS AS NEEDED FOR  MODERATE PAIN    Pain       ivermectin 1 % cream    SOOLANTRA    30 g    Apply to the affected areas of the face once daily. Use a pea-size amount for each area of the face  that is affected. THIN LAYER    Rosacea       metroNIDAZOLE 0.75 % topical gel    METROGEL    45 g    Apply topically 2 times daily    Rosacea       montelukast 10 MG tablet    SINGULAIR    90 tablet    Take 1 tablet (10 mg) by mouth At Bedtime    Mild persistent asthma without complication       oxyCODONE-acetaminophen 5-325 MG per tablet    PERCOCET    18 tablet    Take 1-2 tablets by mouth every 6 hours as needed for pain    Chronic midline low back pain with right-sided sciatica       terbinafine 1 % cream    lamISIL AT    42 g    Apply topically 2 times daily For fungal infection not resolved with other antifungals (e.g. Clotrimazole)    Tinea pedis of both feet       tobramycin-dexamethasone 0.3-0.1 % ophthalmic susp    TOBRADEX    1 Bottle    Place 1 drop into the right eye 4 times daily    Corneal erosion, right       triamcinolone 0.1 % ointment    KENALOG    80 g    Apply topically 2 times daily Apply to affected areas on hands bid for 14 days and then when needed.Not for face or groin    Eczema, unspecified type       vitamin D 2000 UNITS Caps      Take 4,000 Units by mouth daily        * Notice:  This list has 7 medication(s) that are the same as other medications prescribed for you. Read the directions carefully, and ask your doctor or other care provider to review them with you.

## 2017-12-22 ENCOUNTER — MYC REFILL (OUTPATIENT)
Dept: FAMILY MEDICINE | Facility: CLINIC | Age: 52
End: 2017-12-22

## 2017-12-22 ENCOUNTER — MYC MEDICAL ADVICE (OUTPATIENT)
Dept: OPTOMETRY | Facility: CLINIC | Age: 52
End: 2017-12-22

## 2017-12-22 DIAGNOSIS — L71.9 ROSACEA: ICD-10-CM

## 2017-12-22 DIAGNOSIS — J45.30 MILD PERSISTENT ASTHMA: ICD-10-CM

## 2017-12-22 DIAGNOSIS — R73.03 PREDIABETES: ICD-10-CM

## 2017-12-22 DIAGNOSIS — J45.30 MILD PERSISTENT ASTHMA NOT DEPENDENT ON SYSTEMIC STEROIDS: Primary | ICD-10-CM

## 2017-12-22 NOTE — TELEPHONE ENCOUNTER
Message from [employer]:  Original authorizing provider: BROOK Rosa CNP would like a refill of the following medications:  albuterol (2.5 MG/3ML) 0.083% nebulizer solution [BROOK Rosa CNP]  blood glucose monitoring (ANTONIO MICROLET) lancets [BROOK Rosa CNP]    Preferred pharmacy: [id number] - [place], [place] - [address] AT [facility] [address]    Comment:      Medication renewals requested in this message routed to other providers:  doxycycline monohydrate 50 MG capsule [Melisa Rose MD]

## 2017-12-26 NOTE — TELEPHONE ENCOUNTER
Requested Prescriptions   Pending Prescriptions Disp Refills     doxycycline monohydrate 50 MG capsule 90 capsule 1     Si tab per day    There is no refill protocol information for this order        Lashonda Tilley RN  [facility]  [phone]

## 2017-12-27 RX ORDER — ALBUTEROL SULFATE 0.83 MG/ML
1 SOLUTION RESPIRATORY (INHALATION) EVERY 4 HOURS PRN
Qty: 30 VIAL | Refills: 0 | Status: SHIPPED | OUTPATIENT
Start: 2017-12-27 | End: 2019-02-06

## 2017-12-27 RX ORDER — DOXYCYCLINE 50 MG/1
CAPSULE ORAL
Qty: 90 CAPSULE | Refills: 1 | Status: SHIPPED | OUTPATIENT
Start: 2017-12-27 | End: 2019-02-06

## 2017-12-27 NOTE — TELEPHONE ENCOUNTER
Routing refill request to provider for review/approval because:  -Overdue for asthma follow up visit    -Lancet refills available    Angelica-Please sign if agree.    Writer responded as per below.      Thank you!  LEANNE Fraga, RN          ACT Total Scores 5/24/2016 5/24/2016 6/13/2017   ACT TOTAL SCORE - - -   ASTHMA ER VISITS - - -   ASTHMA HOSPITALIZATIONS - - -   ACT TOTAL SCORE (Goal Greater than or Equal to 20) 12 17 20   In the past 12 months, how many times did you visit the emergency room for your asthma without being admitted to the hospital? 0 0 0   In the past 12 months, how many times were you hospitalized overnight because of your asthma? 0 0 0       Requested Prescriptions   Pending Prescriptions Disp Refills     albuterol (2.5 MG/3ML) 0.083% neb solution 90 vial 0     Sig: Take 1 vial (2.5 mg) by nebulization every 4 hours as needed for shortness of breath / dyspnea    Asthma Maintenance Inhalers - Anticholinergics Failed    12/22/2017  1:06 PM       Failed - Asthma control test score is 20 or greater in last 6 months    Please review ACT score.          Passed - Patient is age 12 years or older       Passed - Recent (6 mo) or future visit with authorizing provider's specialty    Patient had office visit in the last 6 months or has a visit in the next 30 days with authorizing provider.  See chart review.             blood glucose monitoring (ANTONIO MICROLET) lancets       Sig: Use to test blood sugars once daily as directed.    Diabetic Supplies Protocol Passed    12/22/2017  1:06 PM       Passed - Patient is 18 years of age or older       Passed - Patient has had appt within past 6 mos    IV to PO - Antibiotics     None

## 2017-12-29 RX ORDER — AMMONIUM LACTATE 12 G/100G
CREAM TOPICAL
Qty: 385 G | Refills: 3 | Status: SHIPPED | OUTPATIENT
Start: 2017-12-29 | End: 2018-12-18

## 2018-01-07 DIAGNOSIS — J45.30 MILD PERSISTENT ASTHMA WITHOUT COMPLICATION: ICD-10-CM

## 2018-01-09 ENCOUNTER — TELEPHONE (OUTPATIENT)
Dept: FAMILY MEDICINE | Facility: CLINIC | Age: 53
End: 2018-01-09

## 2018-01-09 ENCOUNTER — MYC MEDICAL ADVICE (OUTPATIENT)
Dept: OPTOMETRY | Facility: CLINIC | Age: 53
End: 2018-01-09

## 2018-01-09 NOTE — TELEPHONE ENCOUNTER
Reason for Call: Request for an order or referral:    Order or referral being requested: A1C lab order    Date needed: as soon as possible    Has the patient been seen by the PCP for this problem? Not Applicable    Additional comments: Patient called to schedule a lab only visit for A1C however there is no current order. Please place, if appropriate, and call patient for scheduling. Thanks!    Phone number Patient can be reached at:  Home number on file [phone] (home)    Best Time:  Any    Can we leave a detailed message on this number?  YES    Call taken on 1/9/2018 at 9:17 AM by Berkley Diaz

## 2018-01-10 ENCOUNTER — OFFICE VISIT (OUTPATIENT)
Dept: FAMILY MEDICINE | Facility: CLINIC | Age: 53
End: 2018-01-10
Payer: MEDICARE

## 2018-01-10 DIAGNOSIS — Z09 FOLLOW-UP FOR RESOLVED CONDITION: ICD-10-CM

## 2018-01-10 DIAGNOSIS — L08.89 PITTED KERATOLYSIS: ICD-10-CM

## 2018-01-10 DIAGNOSIS — L71.9 ROSACEA: Primary | ICD-10-CM

## 2018-01-10 PROCEDURE — 99213 OFFICE O/P EST LOW 20 MIN: CPT | Performed by: FAMILY MEDICINE

## 2018-01-10 PROCEDURE — T1013 SIGN LANG/ORAL INTERPRETER: HCPCS | Mod: U3 | Performed by: FAMILY MEDICINE

## 2018-01-10 RX ORDER — ALBUTEROL SULFATE 90 UG/1
2 AEROSOL, METERED RESPIRATORY (INHALATION) EVERY 6 HOURS PRN
Qty: 8.5 G | Refills: 0 | Status: SHIPPED | OUTPATIENT
Start: 2018-01-10 | End: 2018-02-02

## 2018-01-10 NOTE — MR AVS SNAPSHOT
After Visit Summary   1/10/2018    Oliver Agarwal    MRN: [id number]           Patient Information     Date Of Birth          1965        Visit Information        Provider Department      1/10/2018 2:20 PM Jacques Dobson Debra A Solberg, MD [facility]        Today's Diagnoses     Rosacea    -  1    Pitted keratolysis        Follow-up for resolved condition          Care Instructions    FUTURE APPOINTMENTS  Follow up in 3 weeks for re-evaluation of previous wart treatment sites. Always bring medications you are using to each office visit.    Continue monitoring weekly for recurrence of wart(s) at right fifth finger and left lower leg.    Application instructions on feet  Apply Lac-Hydrin 12% cream every night to affected areas of skin on feet for 2 weeks.  After 2 weeks, decrease to every other night application and continue use as needed.    Apply moisturizer such as OTC Flexitol heel balm regularly on the feet.    WOUND CARE INSTRUCTIONS - for left lower leg  1. Change dressing daily.  2. Wash hands before every dressing change.  3. Wash the wound area with a mild soap, then rinse  4. Gently pat dry with a sterile gauze or Q-tip.  5. Apply Vaseline or Aquaphor only over entire wound. Do NOT use Neosporin - as many people react to neomycin.  6. Finally, cover with a bandage or sterile non-stick gauze with micropore paper tape.  7. Repeat once daily until wound has healed.    Do not let the wound dry out.  The wound will heal faster and with better cosmetic results if routinely kept moist with step #5. It is a false belief that a wound heals better when it is exposed to air and allowed to dry out.      Soap, water and shampoo will not hurt this area.    Do not go swimming or take baths, but showering is encouraged.    Limit use of the area where the procedure was done for a few days to allow for optimal healing.    If you experience bleeding:  Repeat steps  "#2-5 and hold firm pressure on the area for 10 minutes without checking to see if the bleeding has stopped. \"Checking\" pulls off the protective wound clot and restarts the bleeding all over again. Re-apply pressure for 10 minutes if necessary to stop bleeding.  Use additional sterile gauze and tape to maintain pressure once bleeding has stopped.    Signs of Infection:  Infection can occur in any area where skin has been disrupted.  If you notice persistent redness, swelling, colored drainage, increasing pain, fever or other signs of infection, please call us at: [phone] and ask to have me or my colleague paged. We will call you back to discuss.    TOPICAL MEDICATION  Apply topical azelaic acid 15% gel twice daily to face  Continue taking doxycycline as previously instructed.    TOPICAL STEROID INSTRUCTIONS  Desonide 0.05% cream.    Only as needed apply an amount equal to half of a fingertip (0.25 g) to the affected area(s) on the eyelids.    \"Fingertip Amount\"      This is a weak strength steroid, and it can be used on the face.    Topical steroid use is for short-term treatment only. If after initial treatment, you are using this for prolonged periods of time, return to clinic for re-evaluation of treatment.    Keep in mind to also regularly use moisturizer, as this preventative measure can help maintain your skin's natural moisture barrier.          Follow-ups after your visit        Your next 10 appointments already scheduled     Jan 24, 2018  1:00 PM CST   [facility]   [facility]    [address] [place] [address] [address]  [facility] [address] [phone]              Who to contact     If you have questions or need follow up information about today's clinic visit or your schedule please contact [facility] directly at [phone].  Normal or non-critical lab and imaging results will be communicated to you " by Newgisticshart, letter or phone within 4 business days after the clinic has received the results. If you do not hear from us within 7 days, please contact the clinic through SkillWizt or phone. If you have a critical or abnormal lab result, we will notify you by phone as soon as possible.  Submit refill requests through Unified Office or call your pharmacy and they will forward the refill request to us. Please allow 3 business days for your refill to be completed.          Additional Information About Your Visit        NewgisticsharINSOMENIA Information     Unified Office gives you secure access to your electronic health record. If you see a primary care provider, you can also send messages to your care team and make appointments. If you have questions, please call your primary care clinic.  If you do not have a primary care provider, please call [phone] and they will assist you.        Care EveryWhere ID     This is your Care EveryWhere ID. This could be used by other organizations to access your [place] medical records  [id number]         Blood Pressure from Last 3 Encounters:   12/05/17 112/68   09/19/17 121/69   07/10/17 132/75    Weight from Last 3 Encounters:   12/05/17 (!) 301 lb (136.5 kg)   09/19/17 (!) 301 lb (136.5 kg)   07/10/17 296 lb (134.3 kg)              Today, you had the following     No orders found for display       Primary Care Provider Office Phone # Fax #    Angelica SternBROOK contreras [facility] [phone] [address] [facility] [address]        Equal Access to Services     CATHERINE OCHOA : Hadii aad ku hadasho Soomaali, waaxda luqadaha, qaybta kaalmada adeegyada, waxay seble chaney . So [facility] [phone].    ATENCIÓN: Si habla español, tiene a harkins disposición servicios gratuitos de asistencia lingüística. Llame al [phone].    We comply with applicable federal civil rights laws and [place] laws. We do not discriminate on the basis of race, color, national origin, age,  disability, sex, sexual orientation, or gender identity.            Thank you!     Thank you for choosing [facility]  for your care. Our goal is always to provide you with excellent care. Hearing back from our patients is one way we can continue to improve our services. Please take a few minutes to complete the written survey that you may receive in the mail after your visit with us. Thank you!             Your Updated Medication List - Protect others around you: Learn how to safely use, store and throw away your medicines at www.disposemymeds.org.          This list is accurate as of: 1/10/18  2:44 PM.  Always use your most recent med list.                   Brand Name Dispense Instructions for use Diagnosis    ADVAIR DISKUS 250-50 MCG/DOSE diskus inhaler   Generic drug:  fluticasone-salmeterol      Inhale 1 puff into the lungs 2 times daily Taking prn        alclomethasone 0.05 % cream    ACLOVATE    15 g    Apply to affected areas on face once per day but only for 3 consecutive days    Seborrheic dermatitis       ammonium lactate 12 % cream    LAC-HYDRIN    385 g    Apply topically nightly as needed for dry skin    Pitted keratolysis       Azelaic Acid 15 % gel     50 g    Apply 0.5 inches topically 2 times daily Massage thin film gently into afected areas morning and evening.    Rosacea       blood glucose monitoring lancets     1 Box    Use to test blood sugars once daily as directed.    Prediabetes       blood glucose monitoring test strip    ANTONIO CONTOUR NEXT    100 each    Use to test blood sugar one x  daily or as directed.    Prediabetes       CENTRUM Tabs     100 tablet    Take 1 tablet by mouth daily    Vitamin deficiency       cyanocolbalamin 500 MCG tablet    vitamin  B-12     Take 500 mcg by mouth daily        cyclobenzaprine 10 MG tablet    FLEXERIL    30 tablet    Take 0.5-1 tablets (5-10 mg) by mouth 3 times daily as needed for muscle spasms    Acute right-sided low back  pain without sciatica       desonide 0.05 % cream    DESOWEN    15 g    Apply sparingly to affected area two times per day for only 3 consecutive days    Eczema, unspecified type       * doxycycline monohydrate 50 MG capsule     60 capsule    Take 1 capsule (50 mg) by mouth 2 times daily    Rosacea       * doxycycline monohydrate 50 MG capsule     90 capsule    1 tab per day    Rosacea       fluticasone 50 MCG/ACT spray    FLONASE    1 Bottle    Spray 2 sprays into both nostrils daily    Chronic allergic rhinitis due to other allergic trigger, unspecified seasonality       ibuprofen 600 MG tablet    ADVIL/MOTRIN    120 tablet    TAKE 1 TABLET BY MOUTH EVERY 6 HOURS AS NEEDED FOR MODERATE PAIN    Pain       ivermectin 1 % cream    SOOLANTRA    30 g    Apply to the affected areas of the face once daily. Use a pea-size amount for each area of the face  that is affected. THIN LAYER    Rosacea       metroNIDAZOLE 0.75 % topical gel    METROGEL    45 g    Apply topically 2 times daily    Rosacea       montelukast 10 MG tablet    SINGULAIR    90 tablet    Take 1 tablet (10 mg) by mouth At Bedtime    Mild persistent asthma without complication       oxyCODONE-acetaminophen 5-325 MG per tablet    PERCOCET    18 tablet    Take 1-2 tablets by mouth every 6 hours as needed for pain    Chronic midline low back pain with right-sided sciatica       * PROAIR  (90 BASE) MCG/ACT Inhaler   Generic drug:  albuterol     8.5 g    INHALE 2 PUFFS BY MOUTH EVERY 6 HOURS AS NEEDED    Mild persistent asthma without complication       * PROAIR  (90 BASE) MCG/ACT Inhaler   Generic drug:  albuterol     8.5 g    INHALE 2 PUFFS BY MOUTH EVERY 6 HOURS AS NEEDED    Mild persistent asthma without complication       * PROAIR  (90 BASE) MCG/ACT Inhaler   Generic drug:  albuterol     8.5 g    INHALE 2 PUFFS BY MOUTH EVERY 6 HOURS AS NEEDED    Mild persistent asthma without complication       * PROAIR  (90 BASE) MCG/ACT  Inhaler   Generic drug:  albuterol     8.5 g    INHALE 2 PUFFS BY MOUTH EVERY 6 HOURS AS NEEDED    Mild persistent asthma without complication       * albuterol (2.5 MG/3ML) 0.083% neb solution     30 vial    Take 1 vial (2.5 mg) by nebulization every 4 hours as needed for shortness of breath / dyspnea    Mild persistent asthma not dependent on systemic steroids       propylene glycol 0.6 % Soln ophthalmic solution    SYSTANE BALANCE    1 Bottle    Place 1 drop into both eyes 4 times daily    Dry eyes       terbinafine 1 % cream    lamISIL AT    42 g    Apply topically 2 times daily For fungal infection not resolved with other antifungals (e.g. Clotrimazole)    Tinea pedis of both feet       tobramycin-dexamethasone 0.3-0.1 % ophthalmic susp    TOBRADEX    1 Bottle    Place 1 drop into the right eye 4 times daily    Corneal erosion, right       triamcinolone 0.1 % ointment    KENALOG    80 g    Apply topically 2 times daily Apply to affected areas on hands bid for 14 days and then when needed.Not for face or groin    Eczema, unspecified type       vitamin D 2000 UNITS Caps      Take 4,000 Units by mouth daily        * Notice:  This list has 7 medication(s) that are the same as other medications prescribed for you. Read the directions carefully, and ask your doctor or other care provider to review them with you.

## 2018-01-10 NOTE — TELEPHONE ENCOUNTER
Last ACT was 20 on 6/13/17  Albuterol refilled one time so he does not run out of his albuterol    Message was sent to pt yesterday that he needs appt.     Atiya Macdonald RN, BSN

## 2018-01-10 NOTE — PROGRESS NOTES
[facility] - PRIMARY CARE SKIN    CC : Wart(s), pitted keratolysis, rosacea  SUBJECTIVE:                                                    Oliver Agarwal is a 53 year old male who presents to clinic today with  for follow-up of warts on the hands and leg.    Treatment : cryotherapy   Treatment number : 1, one previous cryotherapy on 9/19/2017  Response to treatment : Wart on right fifth finger may have fully resolved; blistering developed and has fallen off after 2 weeks. Blistering and scab have developed at treatment site on left lower leg as well.  Side effects : None    Issue Two : He has been using Lac-Hydrin 12% cream on the left foot. However, he only received it last week, and he expresses confusion about instructions for application.    Issue Three : He has been evaluated by optometry since last office visit. He continues to be concerned about an ocular component to rosacea.    Current treatment : doxycycline, azelaic acid.  Previous treatment : metronidazole has been ineffective.    Occupation : [employer] (indoor).    Refer to electronic medical record (EMR) for past medical history and medications.    INTEGUMENTARY/SKIN: POSITIVE for non-healing lesions, redness, skin irritation  EYES: POSITIVE for itching  ROS : 14 point review of systems was negative except the symptoms listed above in the HPI.    This document serves as a record of the services and decisions personally performed and made by Reba Rose MD. It was created on her behalf by Alvaro Calvo, a trained medical scribe.  The creation of this document is based on the scribe's personal observations and the provider's statements to the medical scribe.  Alvaro Calvo, January 10, 2018 2:25 PM      OBJECTIVE:                                                    GENERAL: alert and no distress, obese, unable to speak  SKIN: Stone Skin Type - II.  Legs, Hands, Foot were examined. The dermatoscope  was used to help evaluate pigmented lesions.  Skin Pertinent Findings:  Right fifth finger : No visible evidence of verrucous lesion(s).  Left lower leg : residual eschar after previous cryotherapy No evidence of verrucous lesion(s). Removed eschar  Both feet : dry skin    Diagnostic Test Results:  none           ASSESSMENT:                                                      Encounter Diagnoses   Name Primary?     Rosacea Yes     Pitted keratolysis      Follow-up for resolved condition          PLAN:                                                    Patient Instructions   FUTURE APPOINTMENTS  Follow up in 3 weeks for re-evaluation of previous wart treatment sites. Always bring medications you are using to each office visit.    Continue monitoring weekly for recurrence of wart(s) at right fifth finger and left lower leg.    Application instructions on feet  Apply Lac-Hydrin 12% cream every night to affected areas of skin on feet for 2 weeks.  After 2 weeks, decrease to every other night application and continue use as needed.    Apply moisturizer such as OTC Flexitol heel balm regularly on the feet.    WOUND CARE INSTRUCTIONS - for left lower leg  1. Change dressing daily.  2. Wash hands before every dressing change.  3. Wash the wound area with a mild soap, then rinse  4. Gently pat dry with a sterile gauze or Q-tip.  5. Apply Vaseline or Aquaphor only over entire wound. Do NOT use Neosporin - as many people react to neomycin.  6. Finally, cover with a bandage or sterile non-stick gauze with micropore paper tape.  7. Repeat once daily until wound has healed.    Do not let the wound dry out.  The wound will heal faster and with better cosmetic results if routinely kept moist with step #5. It is a false belief that a wound heals better when it is exposed to air and allowed to dry out.      Soap, water and shampoo will not hurt this area.    Do not go swimming or take baths, but showering is encouraged.    Limit use  "of the area where the procedure was done for a few days to allow for optimal healing.    If you experience bleeding:  Repeat steps #2-5 and hold firm pressure on the area for 10 minutes without checking to see if the bleeding has stopped. \"Checking\" pulls off the protective wound clot and restarts the bleeding all over again. Re-apply pressure for 10 minutes if necessary to stop bleeding.  Use additional sterile gauze and tape to maintain pressure once bleeding has stopped.    Signs of Infection:  Infection can occur in any area where skin has been disrupted.  If you notice persistent redness, swelling, colored drainage, increasing pain, fever or other signs of infection, please call us at: [phone] and ask to have me or my colleague paged. We will call you back to discuss.    TOPICAL MEDICATION  Apply topical azelaic acid 15% gel twice daily to face  Continue taking doxycycline as previously instructed.    TOPICAL STEROID INSTRUCTIONS  Desonide 0.05% cream.    Only as needed apply an amount equal to half of a fingertip (0.25 g) to the affected area(s) on the eyelids.    \"Fingertip Amount\"      This is a weak strength steroid, and it can be used on the face.    Topical steroid use is for short-term treatment only. If after initial treatment, you are using this for prolonged periods of time, return to clinic for re-evaluation of treatment.    Keep in mind to also regularly use moisturizer, as this preventative measure can help maintain your skin's natural moisture barrier.        PROCEDURES:                                                    None.    TT : 25 minutes.  CT : 20 minutes      The information in this document, created by the medical scribe for me, accurately reflects the services I personally performed and the decisions made by me. I have reviewed and approved this document for accuracy prior to leaving the patient care area.  Reba Rose MD January 10, 2018 2:25 PM  [facility] - PRIMARY " CARE SKIN

## 2018-01-10 NOTE — LETTER
1/10/2018         RE: Oliver Agarwal  [address]  [place] [address]        Dear Colleague,    Thank you for referring your patient, Oliver Agarwal, to the [facility] - PRIMARY CARE SKIN. Please see a copy of my visit note below.    [facility] PRIMARY CARE SKIN    CC : Wart(s), pitted keratolysis, rosacea  SUBJECTIVE:                                                    Oliver Agarwal is a 53 year old male who presents to clinic today with  for follow-up of warts on the hands and leg.    Treatment : cryotherapy   Treatment number : 1, one previous cryotherapy on 9/19/2017  Response to treatment : Wart on right fifth finger may have fully resolved; blistering developed and has fallen off after 2 weeks. Blistering and scab have developed at treatment site on left lower leg as well.  Side effects : None    Issue Two : He has been using Lac-Hydrin 12% cream on the left foot. However, he only received it last week, and he expresses confusion about instructions for application.    Issue Three : He has been evaluated by optometry since last office visit. He continues to be concerned about an ocular component to rosacea.    Current treatment : doxycycline, azelaic acid.  Previous treatment : metronidazole has been ineffective.    Occupation : [employer] (indoor).    Refer to electronic medical record (EMR) for past medical history and medications.    INTEGUMENTARY/SKIN: POSITIVE for non-healing lesions, redness, skin irritation  EYES: POSITIVE for itching  ROS : 14 point review of systems was negative except the symptoms listed above in the HPI.    This document serves as a record of the services and decisions personally performed and made by Reba Rose MD. It was created on her behalf by Alvaro Calvo, a trained medical scribe.  The creation of this document is based on the scribe's personal observations and the provider's  statements to the medical scribe.  Alvaro Calvo, January 10, 2018 2:25 PM      OBJECTIVE:                                                    GENERAL: alert and no distress, obese, unable to speak  SKIN: Stone Skin Type - II.  Legs, Hands, Foot were examined. The dermatoscope was used to help evaluate pigmented lesions.  Skin Pertinent Findings:  Right fifth finger : No visible evidence of verrucous lesion(s).  Left lower leg : residual eschar after previous cryotherapy No evidence of verrucous lesion(s). Removed eschar  Both feet : dry skin    Diagnostic Test Results:  none           ASSESSMENT:                                                      Encounter Diagnoses   Name Primary?     Rosacea Yes     Pitted keratolysis      Follow-up for resolved condition          PLAN:                                                    Patient Instructions   FUTURE APPOINTMENTS  Follow up in 3 weeks for re-evaluation of previous wart treatment sites. Always bring medications you are using to each office visit.    Continue monitoring weekly for recurrence of wart(s) at right fifth finger and left lower leg.    Application instructions on feet  Apply Lac-Hydrin 12% cream every night to affected areas of skin on feet for 2 weeks.  After 2 weeks, decrease to every other night application and continue use as needed.    Apply moisturizer such as OTC Flexitol heel balm regularly on the feet.    WOUND CARE INSTRUCTIONS - for left lower leg  1. Change dressing daily.  2. Wash hands before every dressing change.  3. Wash the wound area with a mild soap, then rinse  4. Gently pat dry with a sterile gauze or Q-tip.  5. Apply Vaseline or Aquaphor only over entire wound. Do NOT use Neosporin - as many people react to neomycin.  6. Finally, cover with a bandage or sterile non-stick gauze with micropore paper tape.  7. Repeat once daily until wound has healed.    Do not let the wound dry out.  The wound will heal faster and with better  "cosmetic results if routinely kept moist with step #5. It is a false belief that a wound heals better when it is exposed to air and allowed to dry out.      Soap, water and shampoo will not hurt this area.    Do not go swimming or take baths, but showering is encouraged.    Limit use of the area where the procedure was done for a few days to allow for optimal healing.    If you experience bleeding:  Repeat steps #2-5 and hold firm pressure on the area for 10 minutes without checking to see if the bleeding has stopped. \"Checking\" pulls off the protective wound clot and restarts the bleeding all over again. Re-apply pressure for 10 minutes if necessary to stop bleeding.  Use additional sterile gauze and tape to maintain pressure once bleeding has stopped.    Signs of Infection:  Infection can occur in any area where skin has been disrupted.  If you notice persistent redness, swelling, colored drainage, increasing pain, fever or other signs of infection, please call us at: [phone] and ask to have me or my colleague paged. We will call you back to discuss.    TOPICAL MEDICATION  Apply topical azelaic acid 15% gel twice daily to face  Continue taking doxycycline as previously instructed.    TOPICAL STEROID INSTRUCTIONS  Desonide 0.05% cream.    Only as needed apply an amount equal to half of a fingertip (0.25 g) to the affected area(s) on the eyelids.    \"Fingertip Amount\"      This is a weak strength steroid, and it can be used on the face.    Topical steroid use is for short-term treatment only. If after initial treatment, you are using this for prolonged periods of time, return to clinic for re-evaluation of treatment.    Keep in mind to also regularly use moisturizer, as this preventative measure can help maintain your skin's natural moisture barrier.        PROCEDURES:                                                    None.    TT : 25 minutes.  CT : 20 minutes      The information in this document, created " by the medical scribe for me, accurately reflects the services I personally performed and the decisions made by me. I have reviewed and approved this document for accuracy prior to leaving the patient care area.  Reba Rose MD January 10, 2018 2:25 PM  [facility] - PRIMARY CARE SKIN    Again, thank you for allowing me to participate in the care of your patient.        Sincerely,        Melisa Rose MD

## 2018-01-10 NOTE — PATIENT INSTRUCTIONS
"FUTURE APPOINTMENTS  Follow up in 3 weeks for re-evaluation of previous wart treatment sites. Always bring medications you are using to each office visit.    Continue monitoring weekly for recurrence of wart(s) at right fifth finger and left lower leg.    Application instructions on feet  Apply Lac-Hydrin 12% cream every night to affected areas of skin on feet for 2 weeks.  After 2 weeks, decrease to every other night application and continue use as needed.    Apply moisturizer such as OTC Flexitol heel balm regularly on the feet.    WOUND CARE INSTRUCTIONS - for left lower leg  1. Change dressing daily.  2. Wash hands before every dressing change.  3. Wash the wound area with a mild soap, then rinse  4. Gently pat dry with a sterile gauze or Q-tip.  5. Apply Vaseline or Aquaphor only over entire wound. Do NOT use Neosporin - as many people react to neomycin.  6. Finally, cover with a bandage or sterile non-stick gauze with micropore paper tape.  7. Repeat once daily until wound has healed.    Do not let the wound dry out.  The wound will heal faster and with better cosmetic results if routinely kept moist with step #5. It is a false belief that a wound heals better when it is exposed to air and allowed to dry out.      Soap, water and shampoo will not hurt this area.    Do not go swimming or take baths, but showering is encouraged.    Limit use of the area where the procedure was done for a few days to allow for optimal healing.    If you experience bleeding:  Repeat steps #2-5 and hold firm pressure on the area for 10 minutes without checking to see if the bleeding has stopped. \"Checking\" pulls off the protective wound clot and restarts the bleeding all over again. Re-apply pressure for 10 minutes if necessary to stop bleeding.  Use additional sterile gauze and tape to maintain pressure once bleeding has stopped.    Signs of Infection:  Infection can occur in any area where skin has been disrupted.  If you notice " "persistent redness, swelling, colored drainage, increasing pain, fever or other signs of infection, please call us at: [phone] and ask to have me or my colleague paged. We will call you back to discuss.    TOPICAL MEDICATION  Apply topical azelaic acid 15% gel twice daily to face  Continue taking doxycycline as previously instructed.    TOPICAL STEROID INSTRUCTIONS  Desonide 0.05% cream.    Only as needed apply an amount equal to half of a fingertip (0.25 g) to the affected area(s) on the eyelids.    \"Fingertip Amount\"      This is a weak strength steroid, and it can be used on the face.    Topical steroid use is for short-term treatment only. If after initial treatment, you are using this for prolonged periods of time, return to clinic for re-evaluation of treatment.    Keep in mind to also regularly use moisturizer, as this preventative measure can help maintain your skin's natural moisture barrier.  "

## 2018-01-24 ENCOUNTER — OFFICE VISIT (OUTPATIENT)
Dept: OPTOMETRY | Facility: CLINIC | Age: 53
End: 2018-01-24
Payer: MEDICARE

## 2018-01-24 DIAGNOSIS — L71.9 BLEPHARITIS OF BOTH EYES WITH ROSACEA: ICD-10-CM

## 2018-01-24 DIAGNOSIS — H52.4 PRESBYOPIA: ICD-10-CM

## 2018-01-24 DIAGNOSIS — H52.03 HYPEROPIA OF BOTH EYES WITH ASTIGMATISM: Primary | ICD-10-CM

## 2018-01-24 DIAGNOSIS — H04.123 DRY EYES: ICD-10-CM

## 2018-01-24 DIAGNOSIS — H01.003 BLEPHARITIS OF BOTH EYES WITH ROSACEA: ICD-10-CM

## 2018-01-24 DIAGNOSIS — H01.006 BLEPHARITIS OF BOTH EYES WITH ROSACEA: ICD-10-CM

## 2018-01-24 DIAGNOSIS — H52.203 HYPEROPIA OF BOTH EYES WITH ASTIGMATISM: Primary | ICD-10-CM

## 2018-01-24 PROCEDURE — T1013 SIGN LANG/ORAL INTERPRETER: HCPCS | Mod: U3 | Performed by: OPTOMETRIST

## 2018-01-24 PROCEDURE — 92015 DETERMINE REFRACTIVE STATE: CPT | Mod: GY | Performed by: OPTOMETRIST

## 2018-01-24 PROCEDURE — 92014 COMPRE OPH EXAM EST PT 1/>: CPT | Performed by: OPTOMETRIST

## 2018-01-24 ASSESSMENT — REFRACTION_WEARINGRX
SPECS_TYPE: PAL
OD_CYLINDER: +2.00
OS_CYLINDER: +0.75
OS_SPHERE: +2.00
OD_SPHERE: +0.75
OS_AXIS: 180
OD_AXIS: 009
OS_ADD: +2.00
OD_ADD: +2.00

## 2018-01-24 ASSESSMENT — EXTERNAL EXAM - LEFT EYE: OS_EXAM: NORMAL

## 2018-01-24 ASSESSMENT — CONF VISUAL FIELD
OD_NORMAL: 1
OS_NORMAL: 1

## 2018-01-24 ASSESSMENT — EXTERNAL EXAM - RIGHT EYE: OD_EXAM: NORMAL

## 2018-01-24 ASSESSMENT — REFRACTION_MANIFEST
OS_SPHERE: +2.00
OD_CYLINDER: +1.25
OD_AXIS: 006
OD_CYLINDER: +2.00
METHOD_AUTOREFRACTION: 1
OD_ADD: +2.25
OS_AXIS: 180
OS_SPHERE: +2.00
OD_SPHERE: +0.75
OS_ADD: +2.25
OD_SPHERE: +1.00
OS_CYLINDER: +0.75
OS_CYLINDER: +0.50
OD_AXIS: 174
OS_AXIS: 173

## 2018-01-24 ASSESSMENT — VISUAL ACUITY
OS_SC: 20/40
OS_CC: 20/40
OD_CC: 20/50
OD_SC: 20/30
CORRECTION_TYPE: GLASSES
OD_CC: 20/40
OS_CC+: -2
OS_CC: 20/20
METHOD: SNELLEN - LINEAR

## 2018-01-24 ASSESSMENT — SLIT LAMP EXAM - LIDS
COMMENTS: TELANGIECTASIA
COMMENTS: TELANGIECTASIA

## 2018-01-24 ASSESSMENT — TONOMETRY
OS_IOP_MMHG: 15
OD_IOP_MMHG: 15
IOP_METHOD: APPLANATION

## 2018-01-24 ASSESSMENT — CUP TO DISC RATIO
OS_RATIO: 0.2
OD_RATIO: 0.15

## 2018-01-24 NOTE — PATIENT INSTRUCTIONS
Continue with dry eye treatment   Artificial tears  2-4x daily  Lid cleansing at bedtime and ointment in lower lid for a couple more months to prevent another erosion, you can do this long term if  You want, it is only going to help    Change in R eye by a couple of steps, more farsighted, very little change in L eye, just one step in your astigmatism    Bifocal power also went up a step , so you will have more change in reading with right than left  Both eyes see 20/20 with change  Currently right eye is only 20/50

## 2018-01-24 NOTE — LETTER
1/24/2018         RE: Oliver Agarwal  [address]  [place] [address]        Dear Colleague,    Thank you for referring your patient, Oliver Agarwal, to the [facility]. Please see a copy of my visit note below.    Chief Complaint   Patient presents with     COMPREHENSIVE EYE EXAM     Watery OD, Blurry vision    R eye not as clear, concerns with recent scratch if his eye is ok, difficulty with reading  His doxy was changed to two times daily   Accompanied by   Last Eye Exam: 1yr  Dilated Previously: Yes    What are you currently using to see?  glasses       Distance Vision Acuity: Noticed gradual change in both eyes    Near Vision Acuity: Not satisfied     Eye Comfort: watery, started using drops from previous episode, it came back after 6 weeks.  Do you use eye drops? : Yes: Started using previously prescribed  Drops again  Occupation or Hobbies: Everyday      HPI    Symptoms:     Blurred vision   Tearing                      Medical, surgical and family histories reviewed and updated 1/24/2018.       OBJECTIVE: See Ophthalmology exam    ASSESSMENT:    ICD-10-CM    1. Hyperopia of both eyes with astigmatism H52.03 EYE EXAM (SIMPLE-NONBILLABLE)    H52.203 REFRACTION   2. Presbyopia H52.4    3. Blepharitis of both eyes with rosacea H01.003     H01.006     L71.9    4. Dry eyes H04.123     reflex tearing OD, vision change     PLAN:       Atiya Kenny OD     Again, thank you for allowing me to participate in the care of your patient.        Sincerely,        Atiya Kenny, OD

## 2018-01-24 NOTE — PROGRESS NOTES
Chief Complaint   Patient presents with     COMPREHENSIVE EYE EXAM     Watery OD, Blurry vision    R eye not as clear, concerns with recent scratch if his eye is ok, difficulty with reading  His doxy was changed to two times daily   Accompanied by   Last Eye Exam: 1yr  Dilated Previously: Yes    What are you currently using to see?  glasses       Distance Vision Acuity: Noticed gradual change in both eyes    Near Vision Acuity: Not satisfied     Eye Comfort: watery, started using drops from previous episode, it came back after 6 weeks.  Do you use eye drops? : Yes: Started using previously prescribed  Drops again  Occupation or Hobbies: Everyday      HPI    Symptoms:     Blurred vision   Tearing                      Medical, surgical and family histories reviewed and updated 1/24/2018.       OBJECTIVE: See Ophthalmology exam    ASSESSMENT:    ICD-10-CM    1. Hyperopia of both eyes with astigmatism H52.03 EYE EXAM (SIMPLE-NONBILLABLE)    H52.203 REFRACTION   2. Presbyopia H52.4    3. Blepharitis of both eyes with rosacea H01.003     H01.006     L71.9    4. Dry eyes H04.123     reflex tearing OD, vision change     PLAN:       Atiya Kenny OD

## 2018-01-24 NOTE — MR AVS SNAPSHOT
After Visit Summary   1/24/2018    Oliver Agarwal    MRN: [id number]           Patient Information     Date Of Birth          1965        Visit Information        Provider Department      1/24/2018 1:00 PM Zhane Danielson Kristine Louise, [facility]        Care Instructions    Continue with dry eye treatment   Artificial tears  2-4x daily  Lid cleansing at bedtime and ointment in lower lid for a couple more months to prevent another erosion, you can do this long term if  You want, it is only going to help    Change in R eye by a couple of steps, more farsighted, very little change in L eye, just one step in your astigmatism    Bifocal power also went up a step , so you will have more change in reading with right than left  Both eyes see 20/20 with change  Currently right eye is only 20/50           Follow-ups after your visit        Your next 10 appointments already scheduled     Jan 30, 2018  2:00 PM CST   Office Visit with Melisa Rose MD   [facility] ([facility])    [address] [place] [id number] [phone]           Bring a current list of meds and any records pertaining to this visit. For Physicals, please bring immunization records and any forms needing to be filled out. Please arrive 10 minutes early to complete paperwork.              Who to contact     If you have questions or need follow up information about today's clinic visit or your schedule please contact [facility] directly at [phone].  Normal or non-critical lab and imaging results will be communicated to you by MyChart, letter or phone within 4 business days after the clinic has received the results. If you do not hear from us within 7 days, please contact the clinic through MyChart or phone. If you have a critical or abnormal lab result, we will notify you by phone as soon as possible.  Submit refill  requests through [employer] ([facility]) or call your pharmacy and they will forward the refill request to us. Please allow 3 business days for your refill to be completed.          Additional Information About Your Visit        [facility] Information     [employer] ([employer]) gives you secure access to your electronic health record. If you see a primary care provider, you can also send messages to your care team and make appointments. If you have questions, please call your primary care clinic.  If you do not have a primary care provider, please call [phone] and they will assist you.        Care EveryWhere ID     This is your Care EveryWhere ID. This could be used by other organizations to access your [place] medical records  [id number]         Blood Pressure from Last 3 Encounters:   12/05/17 112/68   09/19/17 121/69   07/10/17 132/75    Weight from Last 3 Encounters:   12/05/17 (!) 136.5 kg (301 lb)   09/19/17 (!) 136.5 kg (301 lb)   07/10/17 134.3 kg (296 lb)              Today, you had the following     No orders found for display       Primary Care Provider Office Phone # Fax #    Angelica Rodriguez BROOK Pate [phone] [address] [facility] [address]        Equal Access to Services     CATHERINE OCHOA : Hadii juaquin ku hadasho Soomaali, waaxda luqadaha, qaybta kaalmada adeegyada, amadeo chaney . So [facility] [phone].    ATENCIÓN: Si habla español, tiene a harkins disposición servicios gratuitos de asistencia lingüística. Llame al [phone].    We comply with applicable federal civil rights laws and [place] laws. We do not discriminate on the basis of race, color, national origin, age, disability, sex, sexual orientation, or gender identity.            Thank you!     Thank you for choosing [facility]  for your care. Our goal is always to provide you with excellent care. Hearing back from our patients is one way we can continue to improve our services. Please take a few  minutes to complete the written survey that you may receive in the mail after your visit with us. Thank you!             Your Updated Medication List - Protect others around you: Learn how to safely use, store and throw away your medicines at www.disposemymeds.org.          This list is accurate as of 1/24/18  1:59 PM.  Always use your most recent med list.                   Brand Name Dispense Instructions for use Diagnosis    ADVAIR DISKUS 250-50 MCG/DOSE diskus inhaler   Generic drug:  fluticasone-salmeterol      Inhale 1 puff into the lungs 2 times daily Taking prn        alclomethasone 0.05 % cream    ACLOVATE    15 g    Apply to affected areas on face once per day but only for 3 consecutive days    Seborrheic dermatitis       ammonium lactate 12 % cream    LAC-HYDRIN    385 g    Apply topically nightly as needed for dry skin    Pitted keratolysis       Azelaic Acid 15 % gel     50 g    Apply 0.5 inches topically 2 times daily Massage thin film gently into afected areas morning and evening.    Rosacea       blood glucose monitoring lancets     1 Box    Use to test blood sugars once daily as directed.    Prediabetes       blood glucose monitoring test strip    ANTONIO CONTOUR NEXT    100 each    Use to test blood sugar one x  daily or as directed.    Prediabetes       CENTRUM Tabs     100 tablet    Take 1 tablet by mouth daily    Vitamin deficiency       cyanocolbalamin 500 MCG tablet    vitamin  B-12     Take 500 mcg by mouth daily        cyclobenzaprine 10 MG tablet    FLEXERIL    30 tablet    Take 0.5-1 tablets (5-10 mg) by mouth 3 times daily as needed for muscle spasms    Acute right-sided low back pain without sciatica       desonide 0.05 % cream    DESOWEN    15 g    Apply sparingly to affected area two times per day for only 3 consecutive days    Eczema, unspecified type       * doxycycline monohydrate 50 MG capsule     60 capsule    Take 1 capsule (50 mg) by mouth 2 times daily    Rosacea       *  doxycycline monohydrate 50 MG capsule     90 capsule    1 tab per day    Rosacea       fluticasone 50 MCG/ACT spray    FLONASE    1 Bottle    Spray 2 sprays into both nostrils daily    Chronic allergic rhinitis due to other allergic trigger, unspecified seasonality       ibuprofen 600 MG tablet    ADVIL/MOTRIN    120 tablet    TAKE 1 TABLET BY MOUTH EVERY 6 HOURS AS NEEDED FOR MODERATE PAIN    Pain       ivermectin 1 % cream    SOOLANTRA    30 g    Apply to the affected areas of the face once daily. Use a pea-size amount for each area of the face  that is affected. THIN LAYER    Rosacea       metroNIDAZOLE 0.75 % topical gel    METROGEL    45 g    Apply topically 2 times daily    Rosacea       montelukast 10 MG tablet    SINGULAIR    90 tablet    Take 1 tablet (10 mg) by mouth At Bedtime    Mild persistent asthma without complication       oxyCODONE-acetaminophen 5-325 MG per tablet    PERCOCET    18 tablet    Take 1-2 tablets by mouth every 6 hours as needed for pain    Chronic midline low back pain with right-sided sciatica       * PROAIR  (90 BASE) MCG/ACT Inhaler   Generic drug:  albuterol     8.5 g    INHALE 2 PUFFS BY MOUTH EVERY 6 HOURS AS NEEDED    Mild persistent asthma without complication       * PROAIR  (90 BASE) MCG/ACT Inhaler   Generic drug:  albuterol     8.5 g    INHALE 2 PUFFS BY MOUTH EVERY 6 HOURS AS NEEDED    Mild persistent asthma without complication       * PROAIR  (90 BASE) MCG/ACT Inhaler   Generic drug:  albuterol     8.5 g    INHALE 2 PUFFS BY MOUTH EVERY 6 HOURS AS NEEDED    Mild persistent asthma without complication       * PROAIR  (90 BASE) MCG/ACT Inhaler   Generic drug:  albuterol     8.5 g    INHALE 2 PUFFS BY MOUTH EVERY 6 HOURS AS NEEDED    Mild persistent asthma without complication       * albuterol (2.5 MG/3ML) 0.083% neb solution     30 vial    Take 1 vial (2.5 mg) by nebulization every 4 hours as needed for shortness of breath / dyspnea    Mild  persistent asthma not dependent on systemic steroids       * albuterol 108 (90 BASE) MCG/ACT Inhaler    PROAIR HFA    8.5 g    Inhale 2 puffs into the lungs every 6 hours as needed for shortness of breath / dyspnea or wheezing Needs yearly appt now    Mild persistent asthma without complication       propylene glycol 0.6 % Soln ophthalmic solution    SYSTANE BALANCE    1 Bottle    Place 1 drop into both eyes 4 times daily    Dry eyes       terbinafine 1 % cream    lamISIL AT    42 g    Apply topically 2 times daily For fungal infection not resolved with other antifungals (e.g. Clotrimazole)    Tinea pedis of both feet       tobramycin-dexamethasone 0.3-0.1 % ophthalmic susp    TOBRADEX    1 Bottle    Place 1 drop into the right eye 4 times daily    Corneal erosion, right       triamcinolone 0.1 % ointment    KENALOG    80 g    Apply topically 2 times daily Apply to affected areas on hands bid for 14 days and then when needed.Not for face or groin    Eczema, unspecified type       vitamin D 2000 UNITS Caps      Take 4,000 Units by mouth daily        * Notice:  This list has 8 medication(s) that are the same as other medications prescribed for you. Read the directions carefully, and ask your doctor or other care provider to review them with you.

## 2018-02-02 DIAGNOSIS — J45.30 MILD PERSISTENT ASTHMA WITHOUT COMPLICATION: ICD-10-CM

## 2018-02-03 NOTE — TELEPHONE ENCOUNTER
"Requested Prescriptions   Pending Prescriptions Disp Refills     PROAIR  (90 BASE) MCG/ACT inhaler [Pharmacy Med Name: PROAIR HFA ORAL INH (200  PFS) 8.5G]  Last Written Prescription Date:  1/10/2018  Last Fill Quantity: 8.5 g,  # refills: 0   Last Office Visit with [facility] or [facility] prescribing provider:  6/13/2017  Future Office Visit:    Next 5 appointments (look out 90 days)     Feb 15, 2018 12:00 PM CST   Office Visit with Melisa Rose MD, ASL IS   [facility] ([facility])    [address] [place] [id number] [phone]                  8.5 g 0     Sig: INHALE 2 PUFFS BY MOUTH EVERY 6 HOURS AS NEEDED SHORTNESS OF BREATH OR WHEEZING    Asthma Maintenance Inhalers - Anticholinergics Failed    2/2/2018  4:04 AM       Failed - Asthma control test score is 20 or greater in last 6 months    Please review ACT score.          Passed - Patient is age 12 years or older       Passed - Recent (6 mo) or future visit with authorizing provider's specialty    Patient had office visit in the last 6 months or has a visit in the next 30 days with authorizing provider.  See \"Patient Info\" tab in inbasket, or \"Choose Columns\" in Meds & Orders section of the refill encounter.              "

## 2018-02-05 RX ORDER — ALBUTEROL SULFATE 90 UG/1
AEROSOL, METERED RESPIRATORY (INHALATION)
Qty: 8.5 G | Refills: 0 | Status: SHIPPED | OUTPATIENT
Start: 2018-02-05 | End: 2018-02-28

## 2018-02-05 NOTE — TELEPHONE ENCOUNTER
Routing refill request to provider for review/approval because:  María given x1 and patient did not follow up, please advise         Normal vision: sees adequately in most situations; can see medication labels, newsprint

## 2018-02-15 ENCOUNTER — OFFICE VISIT (OUTPATIENT)
Dept: FAMILY MEDICINE | Facility: CLINIC | Age: 53
End: 2018-02-15
Payer: MEDICARE

## 2018-02-15 DIAGNOSIS — L71.9 ROSACEA: ICD-10-CM

## 2018-02-15 DIAGNOSIS — B07.8 COMMON WART: Primary | ICD-10-CM

## 2018-02-15 PROCEDURE — 99213 OFFICE O/P EST LOW 20 MIN: CPT | Mod: 25 | Performed by: FAMILY MEDICINE

## 2018-02-15 PROCEDURE — 17110 DESTRUCTION B9 LES UP TO 14: CPT | Performed by: FAMILY MEDICINE

## 2018-02-15 RX ORDER — IVERMECTIN 10 MG/G
CREAM TOPICAL
Qty: 30 G | Refills: 0 | Status: CANCELLED | OUTPATIENT
Start: 2018-02-15

## 2018-02-15 NOTE — PROGRESS NOTES
[facility] - PRIMARY CARE SKIN    CC : Wart(s), rosacea  SUBJECTIVE:                                                    Oliver Agarwal is a 53 year old male who presents to clinic today with  for follow-up of warts on the hands and leg.    Treatment : cryotherapy   Treatment number : 1, one previous  Treatment of cryotherapy on 9/19/2017. No evidence of verrucous lesion(s) at previous office visit on 1/10/18  Response to treatment : Warts previously on right fifth finger and left lower leg  Side effects : None    Issue Two : Rosacea  He also requests consultation regarding rosacea medications on the face.    Current treatment : doxycycline, metronidazole. He has not used any of the azelaic acid, although he has obtained it. He has also been applying desonide 0.05% cream on eyelids, CeraVe cream as moisturizer.  Previous treatment : metronidazole has been ineffective.    He has been using Ocusoft eyelid scrub since Dec 2017.    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    Refer to electronic medical record (EMR) for past medical history and medications.    INTEGUMENTARY/SKIN: POSITIVE for non-healing lesions  ROS : 14 point review of systems was negative except the symptoms listed above in the HPI.    This document serves as a record of the services and decisions personally performed and made by Reba Rose MD. It was created on her behalf by Alvaro Calvo, a trained medical scribe.  The creation of this document is based on the scribe's personal observations and the provider's statements to the medical scribe.  Alvaro Calvo, February 15, 2018 12:10 PM      OBJECTIVE:                                                    GENERAL: alert and no distress, obese, unable to speak  SKIN: Stone Skin Type - II.  Face, Legs, Hands were examined. The dermatoscope was used to help evaluate pigmented lesions.  Skin Pertinent Findings:  Face : Erythema of nose and few papules on tip of  nose with slight erythema extending onto cheeks.    Left upper eyelid : some erythema on the upper eyelid    Left lateral lower leg : Residual eschar and some erythema.    Right fifth finger, proximal nailbed : 1 mm in size verrucous lesion(s).  Name : Liquid Nitrogen Cry-Ac Cryotherapy.  Indication : verrucous lesion(s).  Completed by : Reba Rose MD  Note : Prior to treatment, I discussed the risk of pain, blistering, infection, scarring, hypopigmentation, hyperpigmentation and recurrence or need for retreatment. Benefits of treatment and alternative treatments were also discussed.  Discussed that like all wart treatments, this is not a 100% effective treatment.    Clean technique was used throughout the procedure. Skin was cleaned and prepped with a soak of warm, soapy water. Hyperkeratotic tissue of the wart(s) was pared with a scalpel blade. Liquid nitrogen was applied to freeze the entire lesion(s) including a narrow margin of surrounding skin. After thawing, freezing was repeated once.     Patient tolerated the procedure well and left in satisfactory condition.  Total number of lesions treated : 1.  Treatment number : 1.    Diagnostic Test Results:  none           ASSESSMENT:                                                      Encounter Diagnosis   Name Primary?     Common wart Yes         PLAN:                                                    Patient Instructions   FUTURE APPOINTMENTS  Follow up in 2 months.    Apply azelaic acid 15% gel twice daily to affected areas on mid-portion of face - cheeks and nose.    Continue taking 1 capsule of doxycycline 50 mg once daily.    Discontinue metronidazole 0.75% gel at this time.    Use desonide 0.05% cream only twice daily for two days as needed for recurrent itchiness of the eyelids.    Avoid using Ocusoft eyelid scrub at this time; use CeraVe hydrating facial cleanser or Cetaphil facial cleanser. (Do not use CeraVe foaming facial cleanser.) You can consider  using again on the eyelids after skin irritation has resolved, as long as it does eyelid rash does not recur.    Continue applying CeraVe moisturizer every day on the body. Consider using CeraVe facial moisturizer instead of cream on the face.    CRYOTHERAPY FOR WARTS POST-TREATMENT CARE INSTRUCTIONS  Scab - After a few days, you may notice scaliness or scab formation. Do not pick at the scabs because this may cause slower healing and a permanent scar.    The skin may appear temporarily darker at the treatment site, but this usually fades over a period of months, provided that the area is protected from the sun.    Care of the areas treated:    Wash the area with a mild cleanser.    Gently pat dry.    Do not rub.    If you experience dryness or persistent burning, you may use Vaseline or Aquaphor ointment sparingly.        PROCEDURES:                                                    None.    TT : 20 minutes.  CT : 15 minutes      The information in this document, created by the medical scribe for me, accurately reflects the services I personally performed and the decisions made by me. I have reviewed and approved this document for accuracy prior to leaving the patient care area.  Reba Rose MD February 15, 2018 12:10 PM  [facility] - PRIMARY CARE SKIN

## 2018-02-15 NOTE — MR AVS SNAPSHOT
After Visit Summary   2/15/2018    Oliver Agarwal    MRN: [id number]           Patient Information     Date Of Birth          1965        Visit Information        Provider Department      2/15/2018 12:00 PM Melisa Rose MD; [facility]        Today's Diagnoses     Common wart    -  1      Care Instructions    FUTURE APPOINTMENTS  Follow up in 2 months.    Apply azelaic acid 15% gel twice daily to affected areas on mid-portion of face - cheeks and nose.    Continue taking 1 capsule of doxycycline 50 mg once daily.    Discontinue metronidazole 0.75% gel at this time.    Use desonide 0.05% cream only twice daily for two days as needed for recurrent itchiness of the eyelids.    Avoid using Ocusoft eyelid scrub at this time; use CeraVe hydrating facial cleanser or Cetaphil facial cleanser. (Do not use CeraVe foaming facial cleanser.) You can consider using again on the eyelids after skin irritation has resolved, as long as it does eyelid rash does not recur.    Continue applying CeraVe moisturizer every day on the body. Consider using CeraVe facial moisturizer instead of cream on the face.    CRYOTHERAPY FOR WARTS POST-TREATMENT CARE INSTRUCTIONS  Scab - After a few days, you may notice scaliness or scab formation. Do not pick at the scabs because this may cause slower healing and a permanent scar.    The skin may appear temporarily darker at the treatment site, but this usually fades over a period of months, provided that the area is protected from the sun.    Care of the areas treated:    Wash the area with a mild cleanser.    Gently pat dry.    Do not rub.    If you experience dryness or persistent burning, you may use Vaseline or Aquaphor ointment sparingly.          Follow-ups after your visit        Who to contact     If you have questions or need follow up information about today's clinic visit or your schedule please contact [facility] directly at [phone].  Normal or non-critical lab and imaging results will be communicated to you by MyChart, letter or phone within 4 business days after the clinic has received the results. If you do not hear from us within 7 days, please contact the clinic through [employer] or phone. If you have a critical or abnormal lab result, we will notify you by phone as soon as possible.  Submit refill requests through [employer] or call your pharmacy and they will forward the refill request to us. Please allow 3 business days for your refill to be completed.          Additional Information About Your Visit        [employer] Information     [employer] gives you secure access to your electronic health record. If you see a primary care provider, you can also send messages to your care team and make appointments. If you have questions, please call your primary care clinic.  If you do not have a primary care provider, please call [phone] and they will assist you.        Care EveryWhere ID     This is your Care EveryWhere ID. This could be used by other organizations to access your [place] medical records  [id number]         Blood Pressure from Last 3 Encounters:   12/05/17 112/68   09/19/17 121/69   07/10/17 132/75    Weight from Last 3 Encounters:   12/05/17 (!) 301 lb (136.5 kg)   09/19/17 (!) 301 lb (136.5 kg)   07/10/17 296 lb (134.3 kg)              Today, you had the following     No orders found for display       Primary Care Provider Office Phone # Fax #    Angelica BROOK [facility] [phone] [address] [facility] [address]        Equal Access to Services     [facility] GABRIELA AH: Hadii juaquin Gamez, waaxda luqadaha, qaybta kaalmada amadeo calloway. So [facility] [phone].    ATENCIÓN: Si habla español, tiene a harkins disposición servicios gratuitos de asistencia lingüística. Llame al [phone].    We comply with applicable federal civil  rights laws and [place] laws. We do not discriminate on the basis of race, color, national origin, age, disability, sex, sexual orientation, or gender identity.            Thank you!     Thank you for choosing [facility]  for your care. Our goal is always to provide you with excellent care. Hearing back from our patients is one way we can continue to improve our services. Please take a few minutes to complete the written survey that you may receive in the mail after your visit with us. Thank you!             Your Updated Medication List - Protect others around you: Learn how to safely use, store and throw away your medicines at www.disposemymeds.org.          This list is accurate as of 2/15/18 12:24 PM.  Always use your most recent med list.                   Brand Name Dispense Instructions for use Diagnosis    ADVAIR DISKUS 250-50 MCG/DOSE diskus inhaler   Generic drug:  fluticasone-salmeterol      Inhale 1 puff into the lungs 2 times daily Taking prn        alclomethasone 0.05 % cream    ACLOVATE    15 g    Apply to affected areas on face once per day but only for 3 consecutive days    Seborrheic dermatitis       ammonium lactate 12 % cream    LAC-HYDRIN    385 g    Apply topically nightly as needed for dry skin    Pitted keratolysis       Azelaic Acid 15 % gel     50 g    Apply 0.5 inches topically 2 times daily Massage thin film gently into afected areas morning and evening.    Rosacea       blood glucose monitoring lancets     1 Box    Use to test blood sugars once daily as directed.    Prediabetes       blood glucose monitoring test strip    ANTONIO CONTOUR NEXT    100 each    Use to test blood sugar one x  daily or as directed.    Prediabetes       CENTRUM Tabs     100 tablet    Take 1 tablet by mouth daily    Vitamin deficiency       cyanocolbalamin 500 MCG tablet    vitamin  B-12     Take 500 mcg by mouth daily        cyclobenzaprine 10 MG tablet    FLEXERIL    30 tablet    Take 0.5-1  tablets (5-10 mg) by mouth 3 times daily as needed for muscle spasms    Acute right-sided low back pain without sciatica       desonide 0.05 % cream    DESOWEN    15 g    Apply sparingly to affected area two times per day for only 3 consecutive days    Eczema, unspecified type       * doxycycline monohydrate 50 MG capsule     60 capsule    Take 1 capsule (50 mg) by mouth 2 times daily    Rosacea       * doxycycline monohydrate 50 MG capsule     90 capsule    1 tab per day    Rosacea       fluticasone 50 MCG/ACT spray    FLONASE    1 Bottle    Spray 2 sprays into both nostrils daily    Chronic allergic rhinitis due to other allergic trigger, unspecified seasonality       ibuprofen 600 MG tablet    ADVIL/MOTRIN    120 tablet    TAKE 1 TABLET BY MOUTH EVERY 6 HOURS AS NEEDED FOR MODERATE PAIN    Pain       ivermectin 1 % cream    SOOLANTRA    30 g    Apply to the affected areas of the face once daily. Use a pea-size amount for each area of the face  that is affected. THIN LAYER    Rosacea       metroNIDAZOLE 0.75 % topical gel    METROGEL    45 g    Apply topically 2 times daily    Rosacea       montelukast 10 MG tablet    SINGULAIR    90 tablet    Take 1 tablet (10 mg) by mouth At Bedtime    Mild persistent asthma without complication       oxyCODONE-acetaminophen 5-325 MG per tablet    PERCOCET    18 tablet    Take 1-2 tablets by mouth every 6 hours as needed for pain    Chronic midline low back pain with right-sided sciatica       * PROAIR  (90 BASE) MCG/ACT Inhaler   Generic drug:  albuterol     8.5 g    INHALE 2 PUFFS BY MOUTH EVERY 6 HOURS AS NEEDED    Mild persistent asthma without complication       * PROAIR  (90 BASE) MCG/ACT Inhaler   Generic drug:  albuterol     8.5 g    INHALE 2 PUFFS BY MOUTH EVERY 6 HOURS AS NEEDED    Mild persistent asthma without complication       * PROAIR  (90 BASE) MCG/ACT Inhaler   Generic drug:  albuterol     8.5 g    INHALE 2 PUFFS BY MOUTH EVERY 6 HOURS AS  NEEDED    Mild persistent asthma without complication       * PROAIR  (90 BASE) MCG/ACT Inhaler   Generic drug:  albuterol     8.5 g    INHALE 2 PUFFS BY MOUTH EVERY 6 HOURS AS NEEDED    Mild persistent asthma without complication       * albuterol (2.5 MG/3ML) 0.083% neb solution     30 vial    Take 1 vial (2.5 mg) by nebulization every 4 hours as needed for shortness of breath / dyspnea    Mild persistent asthma not dependent on systemic steroids       * PROAIR  (90 BASE) MCG/ACT Inhaler   Generic drug:  albuterol     8.5 g    INHALE 2 PUFFS BY MOUTH EVERY 6 HOURS AS NEEDED SHORTNESS OF BREATH OR WHEEZING    Mild persistent asthma without complication       propylene glycol 0.6 % Soln ophthalmic solution    SYSTANE BALANCE    1 Bottle    Place 1 drop into both eyes 4 times daily    Dry eyes       terbinafine 1 % cream    lamISIL AT    42 g    Apply topically 2 times daily For fungal infection not resolved with other antifungals (e.g. Clotrimazole)    Tinea pedis of both feet       tobramycin-dexamethasone 0.3-0.1 % ophthalmic susp    TOBRADEX    1 Bottle    Place 1 drop into the right eye 4 times daily    Corneal erosion, right       triamcinolone 0.1 % ointment    KENALOG    80 g    Apply topically 2 times daily Apply to affected areas on hands bid for 14 days and then when needed.Not for face or groin    Eczema, unspecified type       vitamin D 2000 UNITS Caps      Take 4,000 Units by mouth daily        * Notice:  This list has 8 medication(s) that are the same as other medications prescribed for you. Read the directions carefully, and ask your doctor or other care provider to review them with you.

## 2018-02-15 NOTE — LETTER
2/15/2018         RE: Oliver Agarwal  [address]  [place] [address]        Dear Colleague,    Thank you for referring your patient, [place]wal, [place] [facility] Please see a copy of my visit note below.    [facility] - PRIMARY CARE SKIN    CC : Wart(s), rosacea  SUBJECTIVE:                                                    Oliver Agarwal is a 53 year old male who presents to clinic today with  for follow-up of warts on the hands and leg.    Treatment : cryotherapy   Treatment number : 1, one previous  Treatment of cryotherapy on 9/19/2017. No evidence of verrucous lesion(s) at previous office visit on 1/10/18  Response to treatment : Warts previously on right fifth finger and left lower leg  Side effects : None    Issue Two : Rosacea  He also requests consultation regarding rosacea medications on the face.    Current treatment : doxycycline, metronidazole. He has not used any of the azelaic acid, although he has obtained it. He has also been applying desonide 0.05% cream on eyelids, CeraVe cream as moisturizer.  Previous treatment : metronidazole has been ineffective.    He has been using Ocusoft eyelid scrub since Dec 2017.    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    Refer to electronic medical record (EMR) for past medical history and medications.    INTEGUMENTARY/SKIN: POSITIVE for non-healing lesions  ROS : 14 point review of systems was negative except the symptoms listed above in the HPI.    This document serves as a record of the services and decisions personally performed and made by Reba Rose MD. It was created on her behalf by Alvaro Calvo, a trained medical scribe.  The creation of this document is based on the scribe's personal observations and the provider's statements to the medical scribe.  Alvaro Calvo, February 15, 2018 12:10 PM      OBJECTIVE:                                                     GENERAL: alert and no distress, obese, unable to speak  SKIN: Stone Skin Type - II.  Face, Legs, Hands were examined. The dermatoscope was used to help evaluate pigmented lesions.  Skin Pertinent Findings:  Face : Erythema of nose and few papules on tip of nose with slight erythema extending onto cheeks.    Left upper eyelid : some erythema on the upper eyelid    Left lateral lower leg : Residual eschar and some erythema.    Right fifth finger, proximal nailbed : 1 mm in size verrucous lesion(s).  Name : Liquid Nitrogen Cry-Ac Cryotherapy.  Indication : verrucous lesion(s).  Completed by : Reba Rose MD  Note : Prior to treatment, I discussed the risk of pain, blistering, infection, scarring, hypopigmentation, hyperpigmentation and recurrence or need for retreatment. Benefits of treatment and alternative treatments were also discussed.  Discussed that like all wart treatments, this is not a 100% effective treatment.    Clean technique was used throughout the procedure. Skin was cleaned and prepped with a soak of warm, soapy water. Hyperkeratotic tissue of the wart(s) was pared with a scalpel blade. Liquid nitrogen was applied to freeze the entire lesion(s) including a narrow margin of surrounding skin. After thawing, freezing was repeated once.     Patient tolerated the procedure well and left in satisfactory condition.  Total number of lesions treated : 1.  Treatment number : 1.    Diagnostic Test Results:  none           ASSESSMENT:                                                      Encounter Diagnosis   Name Primary?     Common wart Yes         PLAN:                                                    Patient Instructions   FUTURE APPOINTMENTS  Follow up in 2 months.    Apply azelaic acid 15% gel twice daily to affected areas on mid-portion of face - cheeks and nose.    Continue taking 1 capsule of doxycycline 50 mg once daily.    Discontinue metronidazole 0.75% gel at this time.    Use desonide  0.05% cream only twice daily for two days as needed for recurrent itchiness of the eyelids.    Avoid using Ocusoft eyelid scrub at this time; use CeraVe hydrating facial cleanser or Cetaphil facial cleanser. (Do not use CeraVe foaming facial cleanser.) You can consider using again on the eyelids after skin irritation has resolved, as long as it does eyelid rash does not recur.    Continue applying CeraVe moisturizer every day on the body. Consider using CeraVe facial moisturizer instead of cream on the face.    CRYOTHERAPY FOR WARTS POST-TREATMENT CARE INSTRUCTIONS  Scab - After a few days, you may notice scaliness or scab formation. Do not pick at the scabs because this may cause slower healing and a permanent scar.    The skin may appear temporarily darker at the treatment site, but this usually fades over a period of months, provided that the area is protected from the sun.    Care of the areas treated:    Wash the area with a mild cleanser.    Gently pat dry.    Do not rub.    If you experience dryness or persistent burning, you may use Vaseline or Aquaphor ointment sparingly.        PROCEDURES:                                                    None.    TT : 20 minutes.  CT : 15 minutes      The information in this document, created by the medical scribe for me, accurately reflects the services I personally performed and the decisions made by me. I have reviewed and approved this document for accuracy prior to leaving the patient care area.  Reba Rose MD February 15, 2018 12:10 PM  [facility] - PRIMARY CARE SKIN    Again, thank you for allowing me to participate in the care of your patient.        Sincerely,        Melisa Rose MD

## 2018-02-15 NOTE — PATIENT INSTRUCTIONS
FUTURE APPOINTMENTS  Follow up in 2 months.    Apply azelaic acid 15% gel twice daily to affected areas on mid-portion of face - cheeks and nose.    Continue taking 1 capsule of doxycycline 50 mg once daily.    Discontinue metronidazole 0.75% gel at this time.    Use desonide 0.05% cream only twice daily for two days as needed for recurrent itchiness of the eyelids.    Avoid using Ocusoft eyelid scrub at this time; use CeraVe hydrating facial cleanser or Cetaphil facial cleanser. (Do not use CeraVe foaming facial cleanser.) You can consider using again on the eyelids after skin irritation has resolved, as long as it does eyelid rash does not recur.    Continue applying CeraVe moisturizer every day on the body. Consider using CeraVe facial moisturizer instead of cream on the face.    CRYOTHERAPY FOR WARTS POST-TREATMENT CARE INSTRUCTIONS  Scab - After a few days, you may notice scaliness or scab formation. Do not pick at the scabs because this may cause slower healing and a permanent scar.    The skin may appear temporarily darker at the treatment site, but this usually fades over a period of months, provided that the area is protected from the sun.    Care of the areas treated:    Wash the area with a mild cleanser.    Gently pat dry.    Do not rub.    If you experience dryness or persistent burning, you may use Vaseline or Aquaphor ointment sparingly.

## 2018-03-21 DIAGNOSIS — Z51.81 ENCOUNTER FOR THERAPEUTIC DRUG MONITORING: ICD-10-CM

## 2018-03-21 PROCEDURE — 80048 BASIC METABOLIC PNL TOTAL CA: CPT | Performed by: NURSE PRACTITIONER

## 2018-03-21 PROCEDURE — 36415 COLL VENOUS BLD VENIPUNCTURE: CPT | Performed by: NURSE PRACTITIONER

## 2018-03-22 ENCOUNTER — TRANSFERRED RECORDS (OUTPATIENT)
Dept: HEALTH INFORMATION MANAGEMENT | Facility: CLINIC | Age: 53
End: 2018-03-22

## 2018-03-22 LAB
ANION GAP SERPL CALCULATED.3IONS-SCNC: 7 MMOL/L (ref 3–14)
BUN SERPL-MCNC: 16 MG/DL (ref 7–30)
CALCIUM SERPL-MCNC: 9.4 MG/DL (ref 8.5–10.1)
CHLORIDE SERPL-SCNC: 104 MMOL/L (ref 94–109)
CO2 SERPL-SCNC: 30 MMOL/L (ref 20–32)
CREAT SERPL-MCNC: 0.91 MG/DL (ref 0.66–1.25)
GFR SERPL CREATININE-BSD FRML MDRD: 88 ML/MIN/1.7M2
GLUCOSE SERPL-MCNC: 85 MG/DL (ref 70–99)
POTASSIUM SERPL-SCNC: 4.1 MMOL/L (ref 3.4–5.3)
SODIUM SERPL-SCNC: 141 MMOL/L (ref 133–144)

## 2018-04-01 ENCOUNTER — MYC MEDICAL ADVICE (OUTPATIENT)
Dept: FAMILY MEDICINE | Facility: CLINIC | Age: 53
End: 2018-04-01

## 2018-04-09 DIAGNOSIS — J45.30 MILD PERSISTENT ASTHMA WITHOUT COMPLICATION: ICD-10-CM

## 2018-04-09 NOTE — TELEPHONE ENCOUNTER
"Requested Prescriptions   Pending Prescriptions Disp Refills     PROAIR  (90 BASE) MCG/ACT inhaler [Pharmacy Med Name: PROAIR HFA ORAL INH (200  PFS) 8.5G]  Last Written Prescription Date:  7-20-17  Last Fill Quantity: 8.5 G,  # refills: 3   Last office visit: 9/19/2017 with prescribing provider:  Angelica Pate Office Visit:   Next 5 appointments (look out 90 days)     Apr 24, 2018 12:20 PM CDT   Office Visit with Melisa Rose MD   [facility] ([facility]    [address] [place] [id number] [phone]               8.5 g 0     Sig: INHALE 2 PUFFS BY MOUTH EVERY 6 HOURS AS NEEDED    Asthma Maintenance Inhalers - Anticholinergics Failed    4/9/2018 12:02 PM       Failed - Asthma control assessment score within normal limits in last 6 months    Please review ACT score.   ACT Total Scores 5/24/2016 5/24/2016 6/13/2017   ACT TOTAL SCORE - - -   ASTHMA ER VISITS - - -   ASTHMA HOSPITALIZATIONS - - -   ACT TOTAL SCORE (Goal Greater than or Equal to 20) 12 17 20   In the past 12 months, how many times did you visit the emergency room for your asthma without being admitted to the hospital? 0 0 0   In the past 12 months, how many times were you hospitalized overnight because of your asthma? 0 0 0          Failed - Recent (6 mo) or future (30 days) visit within the authorizing provider's specialty    Patient had office visit in the last 6 months or has a visit in the next 30 days with authorizing provider or within the authorizing provider's specialty.  See \"Patient Info\" tab in inbasket, or \"Choose Columns\" in Meds & Orders section of the refill encounter.           Passed - Patient is age 12 years or older          "

## 2018-04-10 ENCOUNTER — TELEPHONE (OUTPATIENT)
Dept: FAMILY MEDICINE | Facility: CLINIC | Age: 53
End: 2018-04-10

## 2018-04-10 DIAGNOSIS — R73.03 PREDIABETES: Primary | ICD-10-CM

## 2018-04-11 NOTE — TELEPHONE ENCOUNTER
Sent pt a msg- to clarify pcp  As he hasn't seen Angelica Pate CNP in >1 year and has seen a dr lewis several times.   Await return message.   Angie Mares RN

## 2018-04-11 NOTE — TELEPHONE ENCOUNTER
Patient calling again to check on the status of his order request.  Please sign if appropriate and follow up with the patient.

## 2018-04-11 NOTE — TELEPHONE ENCOUNTER
Last refill    PROAIR  (90 BASE) MCG/ACT inhaler 8.5 g 0 3/27/2018  No      Sig: INHALE 2 PUFFS BY MOUTH EVERY 6 HOURS AS NEEDED     Class: E-Prescribe     Notes to Pharmacy: Medication is being filled for 1 time refill only due to:  Patient needs to be seen. INSTRUCT PT TO MAKE APPT IN CLINIC BEFORE FURTHER REFILLS NEEDED     Order: [id number]     E-Prescribing Status: Receipt confirmed by pharmacy (3/27/2018 11:16 AM CDT)         Routing refill request to provider for review/approval because:  María given x1 and patient did not follow up, please advise    Routing to HP Reception - please advise patient office visit for future refills    Thank you,  Sangita Taylor RN

## 2018-04-13 RX ORDER — ALBUTEROL SULFATE 90 UG/1
AEROSOL, METERED RESPIRATORY (INHALATION)
Qty: 8.5 G | Refills: 0 | Status: SHIPPED | OUTPATIENT
Start: 2018-04-13 | End: 2018-06-18

## 2018-05-03 ENCOUNTER — TELEPHONE (OUTPATIENT)
Dept: FAMILY MEDICINE | Facility: CLINIC | Age: 53
End: 2018-05-03

## 2018-05-03 DIAGNOSIS — R73.03 PREDIABETES: ICD-10-CM

## 2018-05-03 DIAGNOSIS — J45.30 MILD PERSISTENT ASTHMA WITHOUT COMPLICATION: ICD-10-CM

## 2018-05-03 LAB — HBA1C MFR BLD: 5.8 % (ref 0–5.6)

## 2018-05-03 PROCEDURE — 36415 COLL VENOUS BLD VENIPUNCTURE: CPT | Performed by: NURSE PRACTITIONER

## 2018-05-03 PROCEDURE — 83036 HEMOGLOBIN GLYCOSYLATED A1C: CPT | Performed by: NURSE PRACTITIONER

## 2018-05-03 RX ORDER — ALBUTEROL SULFATE 90 UG/1
AEROSOL, METERED RESPIRATORY (INHALATION)
Qty: 8.5 G | Refills: 2 | Status: SHIPPED | OUTPATIENT
Start: 2018-05-03 | End: 2018-06-18

## 2018-05-03 NOTE — TELEPHONE ENCOUNTER
Reason for call:  Medication   If this is a refill request, has the caller requested the refill from the pharmacy already? No  Will the patient be using a [facility]? No  Name of the pharmacy and phone number for the current request: [facility] at [address]    Name of the medication requested: Pro Air HFA    Other request: Patient stopped in office and dropped off Asthma Action Plan and is requesting a refill on inhaler    Phone number to reach patient:  Cell number on file:    Telephone Information:   Mobile [phone]       Best Time:  ANY    Can we leave a detailed message on this number?  Yes

## 2018-05-03 NOTE — TELEPHONE ENCOUNTER
Prescription approved per [facility] Refill Protocol.  Attempted to call pt unable to get through.   ACT score is 20.  Theresa Dunn RN

## 2018-05-04 ASSESSMENT — ASTHMA QUESTIONNAIRES: ACT_TOTALSCORE: 20

## 2018-06-05 DIAGNOSIS — E56.9 VITAMIN DEFICIENCY: ICD-10-CM

## 2018-06-05 RX ORDER — MULTIVITAMIN/IRON/FOLIC ACID 18MG-0.4MG
TABLET ORAL
Qty: 100 TABLET | Refills: 2 | Status: SHIPPED | OUTPATIENT
Start: 2018-06-05 | End: 2018-12-18

## 2018-06-05 NOTE — TELEPHONE ENCOUNTER
"Requested Prescriptions   Pending Prescriptions Disp Refills     Multiple Vitamins-Minerals (CENTROVITE) TABS [Pharmacy Med Name: CEROVITE ADV FORMULA TAB]  Last Written Prescription Date:  5-22-17  Last Fill Quantity: 100tab,  # refills: 3   Last office visit: 2/15/2018 with prescribing provider:  Angelica Pate    Future Office Visit:    100 tablet 0     Sig: TAKE 1 TABLET BY MOUTH EVERY DAY    Vitamin Supplements (Adult) Protocol Passed    6/5/2018 12:46 PM       Passed - High dose Vitamin D not ordered       Passed - Recent (12 mo) or future (30 days) visit within the authorizing provider's specialty    Patient had office visit in the last 12 months or has a visit in the next 30 days with authorizing provider or within the authorizing provider's specialty.  See \"Patient Info\" tab in inbasket, or \"Choose Columns\" in Meds & Orders section of the refill encounter.              "

## 2018-06-05 NOTE — TELEPHONE ENCOUNTER
Prescription approved per [facility] Refill Protocol.    Signed Prescriptions:                        Disp   Refills    Multiple Vitamins-Minerals (CENTROVITE) TA*100 ta*2        Sig: TAKE 1 TABLET BY MOUTH EVERY DAY  Authorizing Provider: KRYSTIN ROLDAN  Ordering User: SHAD TAYLOR      Closing encounter - no further actions needed at this time    Shad Taylor RN

## 2018-06-08 ENCOUNTER — THERAPY VISIT (OUTPATIENT)
Dept: PHYSICAL THERAPY | Facility: CLINIC | Age: 53
End: 2018-06-08
Attending: PHYSICAL MEDICINE & REHABILITATION
Payer: MEDICARE

## 2018-06-08 DIAGNOSIS — M54.41 RIGHT-SIDED LOW BACK PAIN WITH RIGHT-SIDED SCIATICA: Primary | ICD-10-CM

## 2018-06-08 PROCEDURE — G8978 MOBILITY CURRENT STATUS: HCPCS | Mod: GP | Performed by: PHYSICAL THERAPIST

## 2018-06-08 PROCEDURE — 97161 PT EVAL LOW COMPLEX 20 MIN: CPT | Mod: GP | Performed by: PHYSICAL THERAPIST

## 2018-06-08 PROCEDURE — G8979 MOBILITY GOAL STATUS: HCPCS | Mod: GP | Performed by: PHYSICAL THERAPIST

## 2018-06-08 PROCEDURE — 97110 THERAPEUTIC EXERCISES: CPT | Mod: GP | Performed by: PHYSICAL THERAPIST

## 2018-06-08 NOTE — LETTER
DEPARTMENT OF HEALTH AND HUMAN SERVICES  CENTERS FOR MEDICARE & MEDICAID SERVICES    PLAN/UPDATED PLAN OF PROGRESS FOR OUTPATIENT REHABILITATION    PATIENTS NAME:  Oliver Agarwal   : 1965  PROVIDER NUMBER:    [id number]  [id number]   PROVIDER NAME: ZENAIDA HOFFMAN PT  MEDICAL RECORD NUMBER: [id number]   START OF CARE DATE:  SOC Date: 18   TYPE:  PT  PRIMARY/TREATMENT DIAGNOSIS: Right-sided low back pain with right-sided sciatica  VISITS FROM START OF CARE:  Rxs Used: 1     [facility] Initial Evaluation  Oliver Agarwal is a 53 year old  male referred to physical therapy by Dr. Rowland for treatment of acute on chronic right sided low back pain with Precautions/Restrictions/MD instructions eval and treat     Physical Therapy Initial Evaluation: Subjective History      Injury/Condition Details:  Presenting Complaint Right sided low back pain with occasional hip and groin pain   Onset Timing/Date 2018   Mechanism Lifting and bent over a wrong way while picking up a pale that was about 60 lbs.       Symptom Behavior Details    Primary Pain Symptoms Location: Right sided low back pain with occasional hip and groin pain  Quality: Burning, at time achy   Frequency: constant   Worst Pain 8/10   Best Pain 2/10   Symptom Provocators Prolonged sitting, forward bending, turning in bed   Symptom Relievers Medication   Time of day dependent? Worse during the evening   Recent symptom change? None      Prior Testing/Intervention for current condition:  Prior Tests X-ray of lumbar spine on 7/10/17 indicating:  IMPRESSION: Mild degenerative disc disease at L2-L3, similar to the previous exam. There is hyperostosis at L2-L3 and T11-T12. Slight disc space narrowing at L4-L5.   Prior Treatment PT in the past with some benefit                 PATIENTS NAME:  Oliver Agarwal   : 1965       Lifestyle & General Medical History:  General Health Reported by Patient fair    Employment None   Usual physical activities  (within past year) Work on boat, fish   Orthopaedic history None   Notable medical history Asthma, sleep disorder/apnea     Lumbar Evaluation  Neuro Exam: Normal  Posture/Observation: Demonstrates anterior pelvic tilt in standing    Lumbar AROM and PROM:   Flexion: 90%, mild pain   Extension:90%, mild pain   R Lat Flex.75%, painfree   L Lat Flex:75%, painfre      Strength Left Right   Hip flex 4/5 4/5   Hip abd 4/5 4/5   Hip ext 4/5 4/5   Knee ext 5/5 5/5   Knee flex 5/5 5/5   DF 5/5 5/5   PF 5/5 5/5          Nerve Tension tests:     SLR: normal   SLUMP: normal    Hip Screen: normal    SIJ Screen: normal    Segmental Mobility: Mild pain and hypomobility with L2-L5 spring testing    Palpation: unremarkable           PATIENTS NAME:  Oliver Agarwal   : 1965          Assessment/Plan:    Patient is a 53 year old male with lumbar complaints.    Patient has the following significant findings with corresponding treatment plan.                Diagnosis 1:  Right sided low back pain  Pain -  manual therapy, splint/taping/bracing/orthotics, self management, education, directional preference exercise and home program  Decreased joint mobility - manual therapy, therapeutic exercise, therapeutic activity and home program  Decreased strength - therapeutic exercise, therapeutic activities and home program  Decreased proprioception - neuro re-education, therapeutic activities and home program  Impaired muscle performance - neuro re-education and home program    Therapy Evaluation Codes:   1) History comprised of:   Personal factors that impact the plan of care:      Overall behavior pattern and Past/current experiences.    Comorbidity factors that impact the plan of care are:      Asthma and Sleep disorder/apnea.     Medications impacting care: None.  2) Examination of Body Systems comprised of:   Body structures and functions that impact the plan of care:      Lumbar  "spine.   Activity limitations that impact the plan of care are:      Bending, Sitting, Standing and Walking.  3) Clinical presentation characteristics are:   Stable/Uncomplicated.  4) Decision-Making    Low complexity using standardized patient assessment instrument and/or measureable assessment of functional outcome.  Cumulative Therapy Evaluation is: Low complexity.    Previous and current functional limitations:  (See Goal Flow Sheet for this information)    Short term and Long term goals: (See Goal Flow Sheet for this information)     Communication ability:  Patient appears to be able to clearly communicate and understand verbal and written communication and follow directions correctly.  Treatment Explanation - The following has been discussed with the patient:   RX ordered/plan of care  Anticipated outcomes  Possible risks and side effects  This patient would benefit from PT intervention to resume normal activities.   Rehab potential is good.            PATIENTS NAME:  Oliver Agarwal   : 1965      Frequency:  1 X week, once daily  Duration:  for 6 weeks  Discharge Plan:  Achieve all LTG.  Independent in home treatment program.  Reach maximal therapeutic benefit.        Caregiver Signature/Credentials _____________________________ Date ________       Treating Provider: Oleg Rios, WALTER, OCS     I have reviewed and certified the need for these services and plan of treatment while under my care.        PHYSICIAN'S SIGNATURE:   ___________________________________ Date___________     Cy Rowland DO    Certification period:  Beginning of Cert date period: 18 to  End of Cert period date: 18     Functional Level Progress Report: Please see attached \"Goal Flow sheet for Functional level.\"    ____X____ Continue Services or       ________ DC Services                Service dates: From  SOC Date: 18 date to present                         "

## 2018-06-11 NOTE — PROGRESS NOTES
[facility] Initial Evaluation  Oliver Agarwal is a 53 year old  male referred to physical therapy by Dr. Rowland for treatment of acute on chronic right sided low back pain with Precautions/Restrictions/MD instructions eval and treat     Physical Therapy Initial Evaluation: Subjective History      Injury/Condition Details:  Presenting Complaint Right sided low back pain with occasional hip and groin pain   Onset Timing/Date June 2018   Mechanism Lifting and bent over a wrong way while picking up a pale that was about 60 lbs.       Symptom Behavior Details    Primary Pain Symptoms Location: Right sided low back pain with occasional hip and groin pain  Quality: Burning, at time achy   Frequency: constant   Worst Pain 8/10   Best Pain 2/10   Symptom Provocators Prolonged sitting, forward bending, turning in bed   Symptom Relievers Medication   Time of day dependent? Worse during the evening   Recent symptom change? None      Prior Testing/Intervention for current condition:  Prior Tests X-ray of lumbar spine on 7/10/17 indicating:  IMPRESSION: Mild degenerative disc disease at L2-L3, similar to the previous exam. There is hyperostosis at L2-L3 and T11-T12. Slight disc space narrowing at L4-L5.   Prior Treatment PT in the past with some benefit      Lifestyle & General Medical History:  General Health Reported by Patient fair   Employment None   Usual physical activities  (within past year) Work on boat, fish   Orthopaedic history None   Notable medical history Asthma, sleep disorder/apnea     Lumbar Evaluation  Neuro Exam: Normal  Posture/Observation: Demonstrates anterior pelvic tilt in standing    Lumbar AROM and PROM:   Flexion: 90%, mild pain   Extension:90%, mild pain   R Lat Flex.75%, painfree   L Lat Flex:75%, painfre      Strength Left Right   Hip flex 4/5 4/5   Hip abd 4/5 4/5   Hip ext 4/5 4/5   Knee ext 5/5 5/5   Knee flex 5/5 5/5   DF 5/5 5/5   PF 5/5 5/5          Nerve Tension  tests:     SLR: normal   SLUMP: normal    Hip Screen: normal    SIJ Screen: normal    Segmental Mobility: Mild pain and hypomobility with L2-L5 spring testing    Palpation: unremarkable   HPI                    Objective:  System    Physical Exam    General     ROS    Assessment/Plan:    Patient is a 53 year old male with lumbar complaints.    Patient has the following significant findings with corresponding treatment plan.                Diagnosis 1:  Right sided low back pain  Pain -  manual therapy, splint/taping/bracing/orthotics, self management, education, directional preference exercise and home program  Decreased joint mobility - manual therapy, therapeutic exercise, therapeutic activity and home program  Decreased strength - therapeutic exercise, therapeutic activities and home program  Decreased proprioception - neuro re-education, therapeutic activities and home program  Impaired muscle performance - neuro re-education and home program    Therapy Evaluation Codes:   1) History comprised of:   Personal factors that impact the plan of care:      Overall behavior pattern and Past/current experiences.    Comorbidity factors that impact the plan of care are:      Asthma and Sleep disorder/apnea.     Medications impacting care: None.  2) Examination of Body Systems comprised of:   Body structures and functions that impact the plan of care:      Lumbar spine.   Activity limitations that impact the plan of care are:      Bending, Sitting, Standing and Walking.  3) Clinical presentation characteristics are:   Stable/Uncomplicated.  4) Decision-Making    Low complexity using standardized patient assessment instrument and/or measureable assessment of functional outcome.  Cumulative Therapy Evaluation is: Low complexity.    Previous and current functional limitations:  (See Goal Flow Sheet for this information)    Short term and Long term goals: (See Goal Flow Sheet for this information)     Communication ability:   Patient appears to be able to clearly communicate and understand verbal and written communication and follow directions correctly.  Treatment Explanation - The following has been discussed with the patient:   RX ordered/plan of care  Anticipated outcomes  Possible risks and side effects  This patient would benefit from PT intervention to resume normal activities.   Rehab potential is good.    Frequency:  1 X week, once daily  Duration:  for 6 weeks  Discharge Plan:  Achieve all LTG.  Independent in home treatment program.  Reach maximal therapeutic benefit.    Please refer to the daily flowsheet for treatment today, total treatment time and time spent performing 1:1 timed codes.

## 2018-06-12 ENCOUNTER — TELEPHONE (OUTPATIENT)
Dept: PEDIATRICS | Facility: CLINIC | Age: 53
End: 2018-06-12

## 2018-06-12 NOTE — TELEPHONE ENCOUNTER
Panel Management Review      Patient has the following on his problem list: None      Composite cancer screening  Chart review shows that this patient is due/due soon for the following Colonoscopy  Summary:    Patient is due/failing the following:   COLONOSCOPY    Action needed:   Patient needs office visit for colon.    Type of outreach:    Phone, left message for patient to call back.     Questions for provider review:    None                                                                                                                                    Donny Vera [facility]       Chart routed to Care Team .

## 2018-06-18 ENCOUNTER — OFFICE VISIT (OUTPATIENT)
Dept: FAMILY MEDICINE | Facility: CLINIC | Age: 53
End: 2018-06-18
Payer: MEDICARE

## 2018-06-18 ENCOUNTER — TELEPHONE (OUTPATIENT)
Dept: FAMILY MEDICINE | Facility: CLINIC | Age: 53
End: 2018-06-18

## 2018-06-18 VITALS
TEMPERATURE: 98.1 F | SYSTOLIC BLOOD PRESSURE: 115 MMHG | HEART RATE: 59 BPM | BODY MASS INDEX: 45.25 KG/M2 | DIASTOLIC BLOOD PRESSURE: 72 MMHG | WEIGHT: 302 LBS | OXYGEN SATURATION: 97 %

## 2018-06-18 DIAGNOSIS — M54.41 CHRONIC MIDLINE LOW BACK PAIN WITH RIGHT-SIDED SCIATICA: ICD-10-CM

## 2018-06-18 DIAGNOSIS — R73.03 PREDIABETES: ICD-10-CM

## 2018-06-18 DIAGNOSIS — G89.29 CHRONIC MIDLINE LOW BACK PAIN WITH RIGHT-SIDED SCIATICA: ICD-10-CM

## 2018-06-18 DIAGNOSIS — Z11.4 SCREENING FOR HIV (HUMAN IMMUNODEFICIENCY VIRUS): ICD-10-CM

## 2018-06-18 DIAGNOSIS — J30.89 CHRONIC ALLERGIC RHINITIS DUE TO OTHER ALLERGIC TRIGGER, UNSPECIFIED SEASONALITY: ICD-10-CM

## 2018-06-18 DIAGNOSIS — Z12.11 SCREEN FOR COLON CANCER: ICD-10-CM

## 2018-06-18 DIAGNOSIS — G47.30 SLEEP APNEA, UNSPECIFIED TYPE: Primary | ICD-10-CM

## 2018-06-18 DIAGNOSIS — J45.30 MILD PERSISTENT ASTHMA WITHOUT COMPLICATION: ICD-10-CM

## 2018-06-18 PROBLEM — E66.01 MORBID OBESITY (H): Status: ACTIVE | Noted: 2018-06-18

## 2018-06-18 PROCEDURE — 99214 OFFICE O/P EST MOD 30 MIN: CPT | Performed by: NURSE PRACTITIONER

## 2018-06-18 PROCEDURE — T1013 SIGN LANG/ORAL INTERPRETER: HCPCS | Mod: U3 | Performed by: NURSE PRACTITIONER

## 2018-06-18 RX ORDER — OXYCODONE AND ACETAMINOPHEN 5; 325 MG/1; MG/1
1-2 TABLET ORAL EVERY 6 HOURS PRN
Qty: 10 TABLET | Refills: 0 | Status: SHIPPED | OUTPATIENT
Start: 2018-06-18 | End: 2019-02-06

## 2018-06-18 RX ORDER — OXYCODONE AND ACETAMINOPHEN 5; 325 MG/1; MG/1
1-2 TABLET ORAL EVERY 6 HOURS PRN
Qty: 18 TABLET | Refills: 0 | Status: CANCELLED | OUTPATIENT
Start: 2018-06-18

## 2018-06-18 RX ORDER — FLUTICASONE PROPIONATE 50 MCG
2 SPRAY, SUSPENSION (ML) NASAL DAILY
Qty: 1 BOTTLE | Refills: 5 | Status: SHIPPED | OUTPATIENT
Start: 2018-06-18 | End: 2019-02-06

## 2018-06-18 RX ORDER — ALBUTEROL SULFATE 90 UG/1
AEROSOL, METERED RESPIRATORY (INHALATION)
Qty: 8.5 G | Refills: 2 | Status: SHIPPED | OUTPATIENT
Start: 2018-06-18 | End: 2018-10-10

## 2018-06-18 NOTE — TELEPHONE ENCOUNTER
This Rx for oxyCODONE-acetaminophen (PERCOCET) 5-325 MG per tablet cannot be ordered per ins.  Please put in a new Rx for an alternative.  Suggested alternative/s is/are: Endocettabmg.

## 2018-06-18 NOTE — MR AVS SNAPSHOT
After Visit Summary   6/18/2018    Oliver Agarwal    MRN: [id number]           Patient Information     Date Of Birth          1965        Visit Information        Provider Department      6/18/2018 12:40 PM Natty Dobson; Angelica Pate APRN CNP [facility]        Today's Diagnoses     Sleep apnea, unspecified type    -  1    Mild persistent asthma without complication        Chronic midline low back pain with right-sided sciatica        Prediabetes        Screen for colon cancer        Screening for HIV (human immunodeficiency virus)        Chronic allergic rhinitis due to other allergic trigger, unspecified seasonality           Follow-ups after your visit        Additional Services     SLEEP EVALUATION & MANAGEMENT REFERRAL - [facility]  [phone] (Age 15 and up)       Please be aware that coverage of these services is subject to the terms and limitations of your health insurance plan.  Call member services at your health plan with any benefit or coverage questions.      Please bring the following to your appointment:    >>   List of current medications   >>   This referral request   >>   Any documents/labs given to you for this referral                      Your next 10 appointments already scheduled     Jun 25, 2018 11:40 AM CDT   [facility] with Isaias Rios, PT   [facility] ([facility] Hoffman  )    [address] [address] [address]   [phone]              Future tests that were ordered for you today     Open Future Orders        Priority Expected Expires Ordered    SLEEP EVALUATION & MANAGEMENT REFERRAL - [facility]  [phone] (Age 15 and up) Routine  6/18/2019 6/18/2018            Who to contact     If you have questions or need follow up information about today's clinic visit or your schedule please contact [facility] directly at  [phone].  Normal or non-critical lab and imaging results will be communicated to you by [employer], letter or phone within 4 business days after the clinic has received the results. If you do not hear from us within 7 days, please contact the clinic through [employer] or phone. If you have a critical or abnormal lab result, we will notify you by phone as soon as possible.  Submit refill requests through [employer] or call your pharmacy and they will forward the refill request to us. Please allow 3 business days for your refill to be completed.          Additional Information About Your Visit        [employer] Information     [employer] gives you secure access to your electronic health record. If you see a primary care provider, you can also send messages to your care team and make appointments. If you have questions, please call your primary care clinic.  If you do not have a primary care provider, please call [phone] and they will assist you.        Care EveryWhere ID     This is your Care EveryWhere ID. This could be used by other organizations to access your [place] medical records  [id number]        Your Vitals Were     Pulse Temperature Pulse Oximetry BMI (Body Mass Index)          59 98.1  F (36.7  C) (Oral) 97% 45.25 kg/m2         Blood Pressure from Last 3 Encounters:   06/18/18 115/72   12/05/17 112/68   09/19/17 121/69    Weight from Last 3 Encounters:   06/18/18 302 lb (137 kg)   12/05/17 (!) 301 lb (136.5 kg)   09/19/17 (!) 301 lb (136.5 kg)                 Today's Medication Changes          These changes are accurate as of 6/18/18  1:09 PM.  If you have any questions, ask your nurse or doctor.               These medicines have changed or have updated prescriptions.        Dose/Directions    * albuterol (2.5 MG/3ML) 0.083% neb solution   This may have changed:  Another medication with the same name was removed. Continue taking this medication, and follow the directions you see here.   Used for:  Mild  persistent asthma not dependent on systemic steroids        Dose:  1 vial   Take 1 vial (2.5 mg) by nebulization every 4 hours as needed for shortness of breath / dyspnea   Quantity:  30 vial   Refills:  0       * albuterol 108 (90 Base) MCG/ACT Inhaler   Commonly known as:  PROAIR HFA   This may have changed:  Another medication with the same name was removed. Continue taking this medication, and follow the directions you see here.   Used for:  Mild persistent asthma without complication        INHALE 2 PUFFS BY MOUTH EVERY 6 HOURS AS NEEDED   Quantity:  8.5 g   Refills:  2       doxycycline monohydrate 50 MG capsule   This may have changed:  Another medication with the same name was removed. Continue taking this medication, and follow the directions you see here.   Used for:  Rosacea        1 tab per day   Quantity:  90 capsule   Refills:  1       * Notice:  This list has 2 medication(s) that are the same as other medications prescribed for you. Read the directions carefully, and ask your doctor or other care provider to review them with you.         Where to get your medicines      These medications were sent to [id number] - [place], [place] - [address] AT [facility] [address]  [facility] [address], [place] [address]    Hours:  24-hours Phone:  [phone]     albuterol 108 (90 Base) MCG/ACT Inhaler    fluticasone 50 MCG/ACT spray    fluticasone-salmeterol 250-50 MCG/DOSE diskus inhaler                Primary Care Provider Office Phone # Fax #    Angelica BROOK [facility] [phone]       [facility] [facility] [address]        Equal Access to Services     [facility] HospitalVALERIA AH: Hadii aad ku hadasho Soomaali, waaxda luqadaha, qaybta kaalmada adeegyada, amadeo gao. So [facility] [phone].    ATENCIÓN: Si habla español, tiene a harkins disposición servicios gratuitos de asistencia lingüística. Llame al [phone].    We comply with  applicable federal civil rights laws and [place] laws. We do not discriminate on the basis of race, color, national origin, age, disability, sex, sexual orientation, or gender identity.            Thank you!     Thank you for choosing [facility]  for your care. Our goal is always to provide you with excellent care. Hearing back from our patients is one way we can continue to improve our services. Please take a few minutes to complete the written survey that you may receive in the mail after your visit with us. Thank you!             Your Updated Medication List - Protect others around you: Learn how to safely use, store and throw away your medicines at www.disposemymeds.org.          This list is accurate as of 6/18/18  1:09 PM.  Always use your most recent med list.                   Brand Name Dispense Instructions for use Diagnosis    * albuterol (2.5 MG/3ML) 0.083% neb solution     30 vial    Take 1 vial (2.5 mg) by nebulization every 4 hours as needed for shortness of breath / dyspnea    Mild persistent asthma not dependent on systemic steroids       * albuterol 108 (90 Base) MCG/ACT Inhaler    PROAIR HFA    8.5 g    INHALE 2 PUFFS BY MOUTH EVERY 6 HOURS AS NEEDED    Mild persistent asthma without complication       alclomethasone 0.05 % cream    ACLOVATE    15 g    Apply to affected areas on face once per day but only for 3 consecutive days    Seborrheic dermatitis       ammonium lactate 12 % cream    LAC-HYDRIN    385 g    Apply topically nightly as needed for dry skin    Pitted keratolysis       Azelaic Acid 15 % gel     50 g    Apply 0.5 inches topically 2 times daily Massage thin film gently into afected areas morning and evening.    Rosacea       blood glucose monitoring lancets     1 Box    Use to test blood sugars once daily as directed.    Prediabetes       blood glucose monitoring test strip    ANTONIO CONTOUR NEXT    100 each    Use to test blood sugar one x  daily or as directed.     Prediabetes       CENTROVITE Tabs     100 tablet    TAKE 1 TABLET BY MOUTH EVERY DAY    Vitamin deficiency       cyanocolbalamin 500 MCG tablet    vitamin  B-12     Take 500 mcg by mouth daily        cyclobenzaprine 10 MG tablet    FLEXERIL    30 tablet    Take 0.5-1 tablets (5-10 mg) by mouth 3 times daily as needed for muscle spasms    Acute right-sided low back pain without sciatica       desonide 0.05 % cream    DESOWEN    15 g    Apply sparingly to affected area two times per day for only 3 consecutive days    Eczema, unspecified type       doxycycline monohydrate 50 MG capsule     90 capsule    1 tab per day    Rosacea       fluticasone 50 MCG/ACT spray    FLONASE    1 Bottle    Spray 2 sprays into both nostrils daily    Chronic allergic rhinitis due to other allergic trigger, unspecified seasonality       fluticasone-salmeterol 250-50 MCG/DOSE diskus inhaler    ADVAIR DISKUS    60 Inhaler    Inhale 1 puff into the lungs 2 times daily Taking prn    Mild persistent asthma without complication       ibuprofen 600 MG tablet    ADVIL/MOTRIN    120 tablet    TAKE 1 TABLET BY MOUTH EVERY 6 HOURS AS NEEDED FOR MODERATE PAIN    Pain       ivermectin 1 % cream    SOOLANTRA    30 g    Apply to the affected areas of the face once daily. Use a pea-size amount for each area of the face  that is affected. THIN LAYER    Rosacea       metroNIDAZOLE 0.75 % topical gel    METROGEL    45 g    Apply topically 2 times daily    Rosacea       montelukast 10 MG tablet    SINGULAIR    90 tablet    Take 1 tablet (10 mg) by mouth At Bedtime    Mild persistent asthma without complication       oxyCODONE-acetaminophen 5-325 MG per tablet    PERCOCET    18 tablet    Take 1-2 tablets by mouth every 6 hours as needed for pain    Chronic midline low back pain with right-sided sciatica       propylene glycol 0.6 % Soln ophthalmic solution    SYSTANE BALANCE    1 Bottle    Place 1 drop into both eyes 4 times daily    Dry eyes        terbinafine 1 % cream    lamISIL AT    42 g    Apply topically 2 times daily For fungal infection not resolved with other antifungals (e.g. Clotrimazole)    Tinea pedis of both feet       tobramycin-dexamethasone 0.3-0.1 % ophthalmic susp    TOBRADEX    1 Bottle    Place 1 drop into the right eye 4 times daily    Corneal erosion, right       triamcinolone 0.1 % ointment    KENALOG    80 g    Apply topically 2 times daily Apply to affected areas on hands bid for 14 days and then when needed.Not for face or groin    Eczema, unspecified type       vitamin D 2000 units Caps      Take 4,000 Units by mouth daily        * Notice:  This list has 2 medication(s) that are the same as other medications prescribed for you. Read the directions carefully, and ask your doctor or other care provider to review them with you.

## 2018-06-18 NOTE — TELEPHONE ENCOUNTER
Called pharmacy and they mistakenly had wrong insurance.  The medicare will cover the percocet and the RX is ready for the patient. All is correct.   Theresa Dunn RN

## 2018-06-18 NOTE — TELEPHONE ENCOUNTER
oxyCODONE-acetaminophen (PERCOCET) 5-325 MG per tablet      Last Written Prescription Date:  6/18/18  Last Fill Quantity: 10,   # refills: 0  Last Office Visit: 6/18/2018  Future Office visit:

## 2018-06-18 NOTE — PROGRESS NOTES
SUBJECTIVE:   Oliver Agarwal is a 53 year old male who presents to clinic today for the following health issues:    Had a sleep study many years ago and sleep specialist will not order any more tubing.  Has been getting new tubing since then every 6 months and does not understand why they want him to keep going in for tubing.    Cpap. Needs order for supplies- tubing.     Refill advair for BID use year round.    Helps control his SOB and asthma.      Would like refills of percocet for PRN use when he has severe back pain episodes.        Medication refills. Advair, proair and percocet.     Problem list and histories reviewed & adjusted, as indicated.  Additional history: as documented.        Reviewed and updated as needed this visit by clinical staff       Reviewed and updated as needed this visit by Provider         ROS:  Constitutional, HEENT, cardiovascular, pulmonary, GI, , musculoskeletal, neuro, skin, endocrine and psych systems are negative, except as otherwise noted.    OBJECTIVE:     /72  Pulse 59  Temp 98.1  F (36.7  C) (Oral)  Wt 302 lb (137 kg)  SpO2 97%  BMI 45.25 kg/m2  Body mass index is 45.25 kg/(m^2).  GENERAL: healthy, alert and no distress  RESP: lungs clear to auscultation - no rales, rhonchi or wheezes  CV: regular rate and rhythm, normal S1 S2, no S3 or S4, no murmur, click or rub, no peripheral edema and peripheral pulses strong  MS: no gross musculoskeletal defects noted, no edema    Diagnostic Test Results:  none     ASSESSMENT/PLAN:       ICD-10-CM    1. Sleep apnea, unspecified type G47.30 SLEEP EVALUATION & MANAGEMENT REFERRAL - [facility]  [phone] (Age 15 and up)   2. Mild persistent asthma without complication J45.30 albuterol (PROAIR HFA) 108 (90 Base) MCG/ACT Inhaler     fluticasone-salmeterol (ADVAIR DISKUS) 250-50 MCG/DOSE diskus inhaler   3. Chronic midline low back pain with right-sided sciatica M54.41  oxyCODONE-acetaminophen (PERCOCET) 5-325 MG per tablet    G89.29    4. Prediabetes R73.03    5. Screen for colon cancer Z12.11    6. Screening for HIV (human immunodeficiency virus) Z11.4    7. Chronic allergic rhinitis due to other allergic trigger, unspecified seasonality J30.89 fluticasone (FLONASE) 50 MCG/ACT spray     Refer to sleep specialist for ongoing management of sleep apnea.      Refill proair for PRN use, advair BID daily.    Refill flonase for seasonal use.    See Patient Instructions    BROOK Rosa [facility]

## 2018-06-25 ENCOUNTER — THERAPY VISIT (OUTPATIENT)
Dept: PHYSICAL THERAPY | Facility: CLINIC | Age: 53
End: 2018-06-25
Payer: MEDICARE

## 2018-06-25 DIAGNOSIS — M54.41 RIGHT-SIDED LOW BACK PAIN WITH RIGHT-SIDED SCIATICA: ICD-10-CM

## 2018-06-25 PROCEDURE — 97110 THERAPEUTIC EXERCISES: CPT | Mod: GP | Performed by: PHYSICAL THERAPIST

## 2018-06-25 PROCEDURE — 97112 NEUROMUSCULAR REEDUCATION: CPT | Mod: GP | Performed by: PHYSICAL THERAPIST

## 2018-06-27 ENCOUNTER — OFFICE VISIT (OUTPATIENT)
Dept: SLEEP MEDICINE | Facility: CLINIC | Age: 53
End: 2018-06-27
Attending: NURSE PRACTITIONER
Payer: MEDICARE

## 2018-06-27 VITALS
BODY MASS INDEX: 45.62 KG/M2 | RESPIRATION RATE: 18 BRPM | HEART RATE: 61 BPM | SYSTOLIC BLOOD PRESSURE: 110 MMHG | HEIGHT: 68 IN | OXYGEN SATURATION: 94 % | DIASTOLIC BLOOD PRESSURE: 50 MMHG | WEIGHT: 301 LBS

## 2018-06-27 DIAGNOSIS — E66.01 MORBID OBESITY WITH BMI OF 40.0-44.9, ADULT (H): ICD-10-CM

## 2018-06-27 DIAGNOSIS — G47.33 OSA (OBSTRUCTIVE SLEEP APNEA): Primary | ICD-10-CM

## 2018-06-27 PROCEDURE — 99204 OFFICE O/P NEW MOD 45 MIN: CPT | Performed by: INTERNAL MEDICINE

## 2018-06-27 RX ORDER — ZOLPIDEM TARTRATE 10 MG/1
TABLET ORAL
Qty: 1 TABLET | Refills: 0 | Status: SHIPPED | OUTPATIENT
Start: 2018-06-27 | End: 2018-12-18

## 2018-06-27 NOTE — PATIENT INSTRUCTIONS
MY TREATMENT INFORMATION FOR SLEEP APNEA-  Oliver Agarwal    MY CONTACT NUMBERS ARE:  [phone]  DOCTOR : Jim VALDERRAMA [facility]  SLEEP CENTER :  [place]    Your sleep apnea is not controlled controlled with CPAP.  We recommend all night titration study.  Continue use of CPAP:  - Recommend using CPAP every night for at least 7-8 hours each night  - Daytime naps are not advised, but use CPAP if taking naps.    - Do not remove mask headgear when you go to bathroom at night, but just unplug the hose.    Objective measure goal  Compliance  Goal >70% (preferrably 100%)  Leak   Goal < 10% (less than 24 L/min)  AHI  Goal < 5 events per hour   Usage  Goal 7-8 hours.      Patient was advised not to drive if drowsy or sleepy.      Follow up after sleep study      Your BMI is Body mass index is 45.77 kg/(m^2).  Weight management is a personal decision.  If you are interested in exploring weight loss strategies, the following discussion covers the approaches that may be successful. Body mass index (BMI) is one way to tell whether you are at a healthy weight, overweight, or obese. It measures your weight in relation to your height.  A BMI of 18.5 to 24.9 is in the healthy range. A person with a BMI of 25 to 29.9 is considered overweight, and someone with a BMI of 30 or greater is considered obese. More than two-thirds of American adults are considered overweight or obese.  Being overweight or obese increases the risk for further weight gain. Excess weight may lead to heart disease and diabetes.  Creating and following plans for healthy eating and physical activity may help you improve your health.  Weight control is part of healthy lifestyle and includes exercise, emotional health, and healthy eating habits. Careful eating habits lifelong are the mainstay of weight control. Though there are significant health benefits from weight loss, long-term weight loss with diet alone may be very difficult to achieve- studies show  long-term success with dietary management in less than 10% of people. Attaining a healthy weight may be especially difficult to achieve in those with severe obesity. In some cases, medications, devices and surgical management might be considered.  What can you do?  If you are overweight or obese and are interested in methods for weight loss, you should discuss this with your provider.     Consider reducing daily calorie intake by 500 calories.     Keep a food journal.     Avoiding skipping meals, consider cutting portions instead.    Diet combined with exercise helps maintain muscle while optimizing fat loss. Strength training is particularly important for building and maintaining muscle mass. Exercise helps reduce stress, increase energy, and improves fitness. Increasing exercise without diet control, however, may not burn enough calories to loose weight.       Start walking three days a week 10-20 minutes at a time    Work towards walking thirty minutes five days a week     Eventually, increase the speed of your walking for 1-2 minutes at time    In addition, we recommend that you review healthy lifestyles and methods for weight loss available through the National Institutes of Health patient information sites:  http://win.niddk.nih.gov/publications/index.htm    And look into health and wellness programs that may be available through your health insurance provider, employer, local community center, or debra club.    Weight management plan: Patient was referred to their PCP to discuss a diet and exercise plan.     Your blood pressure was checked while you were in clinic today.  Please read the guidelines below about what these numbers mean and what you should do about them.  Your systolic blood pressure is the top number.  This is the pressure when the heart is pumping.  Your diastolic blood pressure is the bottom number.  This is the pressure in between beats.  If your systolic blood pressure is less than 120 and  your diastolic blood pressure is less than 80, then your blood pressure is normal. There is nothing more that you need to do about it  If your systolic blood pressure is 120-139 or your diastolic blood pressure is 80-89, your blood pressure may be higher than it should be.  You should have your blood pressure re-checked within a year by a primary care provider.  If your systolic blood pressure is 140 or greater or your diastolic blood pressure is 90 or greater, you may have high blood pressure.  High blood pressure is treatable, but if left untreated over time it can put you at risk for heart attack, stroke, or kidney failure.  You should have your blood pressure re-checked by a primary care provider within the next four weeks.

## 2018-06-27 NOTE — MR AVS SNAPSHOT
After Visit Summary   6/27/2018    Oliver Agarwal    MRN: [id number]           Patient Information     Date Of Birth          1965        Visit Information        Provider Department      6/27/2018 1:00 PM Jim Reina MD; [facility]        Today's Diagnoses     ARNULFO (obstructive sleep apnea)    -  1    Sleep apnea, unspecified type          Care Instructions    MY TREATMENT INFORMATION FOR SLEEP APNEA-  Oliver Agarwal    MY CONTACT NUMBERS ARE:  [phone]  DOCTOR : Jim Reina  SLEEP CENTER :  [place]    Your sleep apnea is not controlled controlled with CPAP.  We recommend all night titration study.  Continue use of CPAP:  - Recommend using CPAP every night for at least 7-8 hours each night  - Daytime naps are not advised, but use CPAP if taking naps.    - Do not remove mask headgear when you go to bathroom at night, but just unplug the hose.    Objective measure goal  Compliance  Goal >70% (preferrably 100%)  Leak   Goal < 10% (less than 24 L/min)  AHI  Goal < 5 events per hour   Usage  Goal 7-8 hours.      Patient was advised not to drive if drowsy or sleepy.      Follow up after sleep study      Your BMI is Body mass index is 45.77 kg/(m^2).  Weight management is a personal decision.  If you are interested in exploring weight loss strategies, the following discussion covers the approaches that may be successful. Body mass index (BMI) is one way to tell whether you are at a healthy weight, overweight, or obese. It measures your weight in relation to your height.  A BMI of 18.5 to 24.9 is in the healthy range. A person with a BMI of 25 to 29.9 is considered overweight, and someone with a BMI of 30 or greater is considered obese. More than two-thirds of American adults are considered overweight or obese.  Being overweight or obese increases the risk for further weight gain. Excess weight may lead to heart disease and  diabetes.  Creating and following plans for healthy eating and physical activity may help you improve your health.  Weight control is part of healthy lifestyle and includes exercise, emotional health, and healthy eating habits. Careful eating habits lifelong are the mainstay of weight control. Though there are significant health benefits from weight loss, long-term weight loss with diet alone may be very difficult to achieve- studies show long-term success with dietary management in less than 10% of people. Attaining a healthy weight may be especially difficult to achieve in those with severe obesity. In some cases, medications, devices and surgical management might be considered.  What can you do?  If you are overweight or obese and are interested in methods for weight loss, you should discuss this with your provider.     Consider reducing daily calorie intake by 500 calories.     Keep a food journal.     Avoiding skipping meals, consider cutting portions instead.    Diet combined with exercise helps maintain muscle while optimizing fat loss. Strength training is particularly important for building and maintaining muscle mass. Exercise helps reduce stress, increase energy, and improves fitness. Increasing exercise without diet control, however, may not burn enough calories to loose weight.       Start walking three days a week 10-20 minutes at a time    Work towards walking thirty minutes five days a week     Eventually, increase the speed of your walking for 1-2 minutes at time    In addition, we recommend that you review healthy lifestyles and methods for weight loss available through the National Institutes of Health patient information sites:  http://win.niddk.nih.gov/publications/index.htm    And look into health and wellness programs that may be available through your health insurance provider, employer, local community center, or debra club.    Weight management plan: Patient was referred to their PCP to  discuss a diet and exercise plan.     Your blood pressure was checked while you were in clinic today.  Please read the guidelines below about what these numbers mean and what you should do about them.  Your systolic blood pressure is the top number.  This is the pressure when the heart is pumping.  Your diastolic blood pressure is the bottom number.  This is the pressure in between beats.  If your systolic blood pressure is less than 120 and your diastolic blood pressure is less than 80, then your blood pressure is normal. There is nothing more that you need to do about it  If your systolic blood pressure is 120-139 or your diastolic blood pressure is 80-89, your blood pressure may be higher than it should be.  You should have your blood pressure re-checked within a year by a primary care provider.  If your systolic blood pressure is 140 or greater or your diastolic blood pressure is 90 or greater, you may have high blood pressure.  High blood pressure is treatable, but if left untreated over time it can put you at risk for heart attack, stroke, or kidney failure.  You should have your blood pressure re-checked by a primary care provider within the next four weeks.              Follow-ups after your visit        Your next 10 appointments already scheduled     Aug 15, 2018  8:30 PM CDT   PSG Titration w/TCM with BED 3 SH SLEEP   [facility] ([facility])    [address]  [place] [facility] [place] [id number] [phone]            Aug 23, 2018  2:00 PM CDT   Return Sleep Patient with Jim Reina MD   [facility] [place] [facility]    [address]  [place] [id number] [phone]              Future tests that were ordered for you today     Open Future Orders        Priority Expected Expires Ordered    Comprehensive Sleep Study Routine  12/24/2018 6/27/2018            Who to contact     If you have  "questions or need follow up information about today's clinic visit or your schedule please contact [facility] directly at [phone].  Normal or non-critical lab and imaging results will be communicated to you by MyChart, letter or phone within 4 business days after the clinic has received the results. If you do not hear from us within 7 days, please contact the clinic through mChronhart or phone. If you have a critical or abnormal lab result, we will notify you by phone as soon as possible.  Submit refill requests through [employer] or call your pharmacy and they will forward the refill request to us. Please allow 3 business days for your refill to be completed.          Additional Information About Your Visit        m[facility]ronhar[facility]ra ([employer]) Information     [employer] gives you secure access to your electronic health record. If you see a primary care provider, you can also send messages to your care team and make appointments. If you have questions, please call your primary care clinic.  If you do not have a primary care provider, please call [phone] and they will assist you.        Care EveryWhere ID     This is your Care EveryWhere ID. This could be used by other organizations to access your [place] medical records  [id number]        Your Vitals Were     Pulse Respirations Height Pulse Oximetry BMI (Body Mass Index)       61 18 1.727 m (5' 8\") 94% 45.77 kg/m2        Blood Pressure from Last 3 Encounters:   06/27/18 110/50   06/18/18 115/72   12/05/17 112/68    Weight from Last 3 Encounters:   06/27/18 136.5 kg (301 lb)   06/18/18 137 kg (302 lb)   12/05/17 (!) 136.5 kg (301 lb)              We Performed the Following     SLEEP EVALUATION & MANAGEMENT REFERRAL - ADULT -[place] [facility]  [phone] (Age 15 and up)          Today's Medication Changes          These changes are accurate as of 6/27/18  2:34 PM.  If you have any questions, ask your nurse or doctor.               Start " taking these medicines.        Dose/Directions    zolpidem 10 MG tablet   Commonly known as:  AMBIEN   Used for:  ARNULFO (obstructive sleep apnea)        Take tablet by mouth 15 minutes prior to sleep, for Sleep Study   Quantity:  1 tablet   Refills:  0            Where to get your medicines      Some of these will need a paper prescription and others can be bought over the counter.  Ask your nurse if you have questions.     Bring a paper prescription for each of these medications     zolpidem 10 MG tablet                Primary Care Provider Office Phone # Fax #    Angelica Pate, APRN [facility] [phone] [address] [address]        Equal Access to Services     CATHERINE OCHOA : Hadii juaquin Gamez, waaxda lukennedyadaha, qaybta kaalmada elli, amadeo chaney . So [facility] [phone].    ATENCIÓN: Si habla español, tiene a harkins disposición servicios gratuitos de asistencia lingüística. [facility] [phone].    We comply with applicable federal civil rights laws and [place] laws. We do not discriminate on the basis of race, color, national origin, age, disability, sex, sexual orientation, or gender identity.            Thank you!     Thank you for choosing [facility] [facility]  for your care. Our goal is always to provide you with excellent care. Hearing back from our patients is one way we can continue to improve our services. Please take a few minutes to complete the written survey that you may receive in the mail after your visit with us. Thank you!             Your Updated Medication List - Protect others around you: Learn how to safely use, store and throw away your medicines at www.disposemymeds.org.          This list is accurate as of 6/27/18  2:34 PM.  Always use your most recent med list.                   Brand Name Dispense Instructions for use Diagnosis    * albuterol (2.5 MG/3ML) 0.083% neb solution     30 vial    Take 1  vial (2.5 mg) by nebulization every 4 hours as needed for shortness of breath / dyspnea    Mild persistent asthma not dependent on systemic steroids       * albuterol 108 (90 Base) MCG/ACT Inhaler    PROAIR HFA    8.5 g    INHALE 2 PUFFS BY MOUTH EVERY 6 HOURS AS NEEDED    Mild persistent asthma without complication       alclomethasone 0.05 % cream    ACLOVATE    15 g    Apply to affected areas on face once per day but only for 3 consecutive days    Seborrheic dermatitis       ammonium lactate 12 % cream    LAC-HYDRIN    385 g    Apply topically nightly as needed for dry skin    Pitted keratolysis       Azelaic Acid 15 % gel     50 g    Apply 0.5 inches topically 2 times daily Massage thin film gently into afected areas morning and evening.    Rosacea       blood glucose monitoring lancets     1 Box    Use to test blood sugars once daily as directed.    Prediabetes       blood glucose monitoring test strip    ANTONIO CONTOUR NEXT    100 each    Use to test blood sugar one x  daily or as directed.    Prediabetes       CENTROVITE Tabs     100 tablet    TAKE 1 TABLET BY MOUTH EVERY DAY    Vitamin deficiency       cyanocolbalamin 500 MCG tablet    vitamin  B-12     Take 500 mcg by mouth daily        cyclobenzaprine 10 MG tablet    FLEXERIL    30 tablet    Take 0.5-1 tablets (5-10 mg) by mouth 3 times daily as needed for muscle spasms    Acute right-sided low back pain without sciatica       desonide 0.05 % cream    DESOWEN    15 g    Apply sparingly to affected area two times per day for only 3 consecutive days    Eczema, unspecified type       doxycycline monohydrate 50 MG capsule     90 capsule    1 tab per day    Rosacea       fluticasone 50 MCG/ACT spray    FLONASE    1 Bottle    Spray 2 sprays into both nostrils daily    Chronic allergic rhinitis due to other allergic trigger, unspecified seasonality       fluticasone-salmeterol 250-50 MCG/DOSE diskus inhaler    ADVAIR DISKUS    60 Inhaler    Inhale 1 puff into the  lungs 2 times daily Taking prn    Mild persistent asthma without complication       ibuprofen 600 MG tablet    ADVIL/MOTRIN    120 tablet    TAKE 1 TABLET BY MOUTH EVERY 6 HOURS AS NEEDED FOR MODERATE PAIN    Pain       ivermectin 1 % cream    SOOLANTRA    30 g    Apply to the affected areas of the face once daily. Use a pea-size amount for each area of the face  that is affected. THIN LAYER    Rosacea       metroNIDAZOLE 0.75 % topical gel    METROGEL    45 g    Apply topically 2 times daily    Rosacea       montelukast 10 MG tablet    SINGULAIR    90 tablet    Take 1 tablet (10 mg) by mouth At Bedtime    Mild persistent asthma without complication       oxyCODONE-acetaminophen 5-325 MG per tablet    PERCOCET    10 tablet    Take 1-2 tablets by mouth every 6 hours as needed for pain    Chronic midline low back pain with right-sided sciatica       propylene glycol 0.6 % Soln ophthalmic solution    SYSTANE BALANCE    1 Bottle    Place 1 drop into both eyes 4 times daily    Dry eyes       terbinafine 1 % cream    lamISIL AT    42 g    Apply topically 2 times daily For fungal infection not resolved with other antifungals (e.g. Clotrimazole)    Tinea pedis of both feet       tobramycin-dexamethasone 0.3-0.1 % ophthalmic susp    TOBRADEX    1 Bottle    Place 1 drop into the right eye 4 times daily    Corneal erosion, right       triamcinolone 0.1 % ointment    KENALOG    80 g    Apply topically 2 times daily Apply to affected areas on hands bid for 14 days and then when needed.Not for face or groin    Eczema, unspecified type       vitamin D 2000 units Caps      Take 4,000 Units by mouth daily        zolpidem 10 MG tablet    AMBIEN    1 tablet    Take tablet by mouth 15 minutes prior to sleep, for Sleep Study    ARNULFO (obstructive sleep apnea)       * Notice:  This list has 2 medication(s) that are the same as other medications prescribed for you. Read the directions carefully, and ask your doctor or other care provider to  review them with you.

## 2018-06-27 NOTE — NURSING NOTE
"Chief Complaint   Patient presents with     Consult     Establish care, uses cpap every night.  SS done at [facility] in [place] in 2006.       Initial /50  Pulse 61  Resp 18  Ht 1.727 m (5' 8\")  Wt 136.5 kg (301 lb)  SpO2 94%  BMI 45.77 kg/m2 Estimated body mass index is 45.77 kg/(m^2) as calculated from the following:    Height as of this encounter: 1.727 m (5' 8\").    Weight as of this encounter: 136.5 kg (301 lb).    Medication Reconciliation: complete    Neck circumference: 18 inches / 46 centimeters.  Ess 5    DME: [place] Salvador LPN/MICHAEL  "

## 2018-06-27 NOTE — PROGRESS NOTES
Sleep Center [facility]  Outpatient Sleep Medicine Consultation  June 27, 2018      Name: Oliver Agarwal MRN# [id number]   Age: 53 year old YOB: 1965     Date of Consultation: June 27, 2018  Consultation is requested by: BROOK Rosa CNP  [address] [place] [address]  Primary care provider: Angelica Pate  Home clinic: [facility]       Reason for Sleep Consult:     Oliver Agarwal is a 53 year old male for re-evaluation of sleep apnea and establish care         Assessment and Plan:     Summary Sleep Diagnoses/Recommendations:    1. Obstructive Sleep Apnea:  - Full compliance of PAP use is residual AHI of 8.7/hr on excessive daytime sleepiness.  The pressure he he is was prescribing 2006 also he gained about 44 pounds since that, will schedule PAP titration in second half if patient meets criteria for ARNULFO in first half of PSG with TCM CO2.    - Clinical response: Fair  - Recommend using CPAP every night for at least 7-8 hours each night.  Mask and supplies ordered to continue with CPAP use until asleep titration was done  - Daytime naps are not advised, but use CPAP if taking naps  - Follow up in sleep clinic after sleep study.    2. Obesity:  Counseled regarding weight loss through diet modification and increased physical activity. Patient was given instuctions of weight loss and advised to follow up her PCP for further weight loss interventions.      Orders Placed This Encounter   Procedures     Comprehensive Sleep Study     Comprehensive DME       Summary Counseling:  See instructions    Counseling included a comprehensive review of diagnostic and therapeutic strategies as well as risks of inadequate therapy.  Educational materials provided in instructions.    All questions were answered.  The patient indicates understanding of the above issues and agrees with the plan set forth.           History of Present Illness:     Oliver PONCE  Stefano is a 53 year old male with history of severe obstructive sleep apnea, asthma, chronic lower back pain, chronic allergic rhinitis and prediabetes who presents to the [facility] in [place] for evaluation of sleep apnea and establish care. I was asked to see Mr. Agarwal regarding sleep apnea by BROOK Rosa CNP.   Patient had polysomnography sleep study on 8/16/2006 at [facility] sleep center, the sleep study showed severe obstructive sleep apnea with AHI of 59.8/hr, his lowest oxygen saturation was 81%.  He was titrated with CPAP pressure of 12 cm water with residual RDI of 5.3/hr.  He uses his CPAP every night and has good benefits and he is for supplies and mask. His machine is old given in 2006. He did not followed sleep clinic patient was interviewed through .  She did not fill the sleep questionnaire which caused him frustrated for questions asked at on physical examination.  Patient is not sure if snores as he and his wife are deaf. He sleeps throughout the night except bathroom break.  He has gained 44 pounds in 10 years (since last sleep study).    Please see below for sleep ROS details.    PREVIOUS IN- LAB or HOME SLEEP STUDIES:  Date: 8/16/2006  TYPE: PSG  AHI: 59.8 /hr  BMI: 39.1 kg/m2  Intervention: CPAP  Sleep Architecture: Sleep latency 4.5 minutes, REM latency 70.5 minutes, sleep efficiency 94.2%, REM sleep 24%.  Lowest O2 saturation: 81 %  PLM's: 0 /hr    CPAP COMPLIANCE:  Dates:  5/28/2018-  6/26/2018         96 % >4hour use     Days used: 29/30  Average daily use: 7.16 hrs  Leak 95% percentile: 12.2 l/min  Residual AHI: 8.7 /hr  Pressure:  12 cmH2O      PAP machine: ResMed old machine    Mask: Nasal mask  Chin strap: No  Leak: No  Humidity: Yes    Difficulties with therapy:    [-] Difficulty tolerating the pressure  [-] Epistaxis:   [-] Nasal congestion   [-] Dry mouth:  [-] Mouth breathing:   [-] Pain/skin breakdown:     Improvements noted  with CPAP:   [+] waking up more refreshed  [+] sleeping better with less arousals  [+] nocturia improved   [+] improved energy level during the day      SLEEP-WAKE SCHEDULE:     Oliver Agarwal     -Describes themself as a night person;      -ON WEEKDAYS and ON WEEKENDS, goes to sleep at 12:30-2  AM during the week; awakens  8:00 AM without an alarm; falls asleep in 5 minutes; denies difficulty falling asleep.     -Awakens 0 time a night      -Total sleep time: 7-8 hours per night.    -Naps 1-2 times/days per week for 60 minutes, feels refreshed sometimes after naps; takes no inadvertant naps.       BEDTIME ACTIVITIES AND SHIFT WORK:    Oliver Agarwal    -does not use electronics in bed, watch TV in bed and read in bed.     -does not do shift work.  He works day shifts.      Occupation: assistant     [employer]       SLEEP APNEA: Stopbang score: 6       INSOMNIA:  Insomnia severity score: N/A       SLEEPINESS: Wright sleepiness scale (ESS): 5   Drowsy driving/near accidents: No          PHQ9: N/A    SLEEP COMPLAINTS: prior to CPAP  Snoring- 7 days/week  Witness apnea: No  Gasping/Choking: No  Excessive daytime sleep: Yes  Toss/turn: No  Excessive tiredness/fatigue:  Yes  Morning headaches: No  Dry mouth/throat: Yes  Dyspnea: No  Coexisting Lung disease: asthma    Coexisting Heart disease: No    Does patient have a bed partner: refused  Has bed partner been sleeping separately because of snoring:  refused            RLS Screen: When you try to relax in the evening or sleep at  night, do you ever have unpleasant, restless feelings in your  legs that can be relieved by walking or movement? No    Periodic limb movement: No    Narcolepsy:       denies sudden urges of sleep attacks     denies cataplexy     denies sleep paralysis      denies hallucinations     Sleep Behaviors:     denies leg symptoms/movements     denies motor restlessness     denies night terrors     yes bruxism     denies automatic  behaviors    Other subjective complaints:     denies anxiety or rumination      denies pain and discomfort at  night     denies waking up with heart pounding or racing     denies GERD/heartburn         Parasomnia:   NREM - denies recurrent persistent confusional arousal, night eating, sleep walking or sleep terrors   REM  - denies dream enactment; no injuries.     Safety: None             Medications:     Current Outpatient Prescriptions   Medication Sig     albuterol (2.5 MG/3ML) 0.083% neb solution Take 1 vial (2.5 mg) by nebulization every 4 hours as needed for shortness of breath / dyspnea     albuterol (PROAIR HFA) 108 (90 Base) MCG/ACT Inhaler INHALE 2 PUFFS BY MOUTH EVERY 6 HOURS AS NEEDED     alclomethasone (ACLOVATE) 0.05 % cream Apply to affected areas on face once per day but only for 3 consecutive days     ammonium lactate (LAC-HYDRIN) 12 % cream Apply topically nightly as needed for dry skin     Azelaic Acid 15 % gel Apply 0.5 inches topically 2 times daily Massage thin film gently into afected areas morning and evening.     blood glucose monitoring (ANTONIO CONTOUR NEXT) test strip Use to test blood sugar one x  daily or as directed.     blood glucose monitoring (ANTONIO MICROLET) lancets Use to test blood sugars once daily as directed.     Cholecalciferol (VITAMIN D) 2000 UNITS CAPS Take 4,000 Units by mouth daily     cyanocolbalamin (VITAMIN  B-12) 500 MCG tablet Take 500 mcg by mouth daily     cyclobenzaprine (FLEXERIL) 10 MG tablet Take 0.5-1 tablets (5-10 mg) by mouth 3 times daily as needed for muscle spasms     desonide (DESOWEN) 0.05 % cream Apply sparingly to affected area two times per day for only 3 consecutive days     doxycycline monohydrate 50 MG capsule 1 tab per day     fluticasone (FLONASE) 50 MCG/ACT spray Spray 2 sprays into both nostrils daily     fluticasone-salmeterol (ADVAIR DISKUS) 250-50 MCG/DOSE diskus inhaler Inhale 1 puff into the lungs 2 times daily Taking prn     ibuprofen  (ADVIL/MOTRIN) 600 MG tablet TAKE 1 TABLET BY MOUTH EVERY 6 HOURS AS NEEDED FOR MODERATE PAIN     ivermectin (SOOLANTRA) 1 % cream Apply to the affected areas of the face once daily. Use a pea-size amount for each area of the face  that is affected. THIN LAYER     metroNIDAZOLE (METROGEL) 0.75 % gel Apply topically 2 times daily     montelukast (SINGULAIR) 10 MG tablet Take 1 tablet (10 mg) by mouth At Bedtime     Multiple Vitamins-Minerals (CENTROVITE) TABS TAKE 1 TABLET BY MOUTH EVERY DAY     oxyCODONE-acetaminophen (PERCOCET) 5-325 MG per tablet Take 1-2 tablets by mouth every 6 hours as needed for pain     propylene glycol (SYSTANE BALANCE) 0.6 % SOLN ophthalmic solution Place 1 drop into both eyes 4 times daily     terbinafine (LAMISIL AT) 1 % cream Apply topically 2 times daily For fungal infection not resolved with other antifungals (e.g. Clotrimazole)     tobramycin-dexamethasone (TOBRADEX) 0.3-0.1 % ophthalmic susp Place 1 drop into the right eye 4 times daily     triamcinolone (KENALOG) 0.1 % ointment Apply topically 2 times daily Apply to affected areas on hands bid for 14 days and then when needed.Not for face or groin     No current facility-administered medications for this visit.         Medication that can affect sleep: Flexeril and Percocet as needed    Allergies   Allergen Reactions     Albumin, Egg Difficulty breathing     Nkda [No Known Drug Allergies]             Past Medical History:     Does not need 02 supplement at night     No past medical history on file.            Past Surgical History:    No previous upper airway surgery     No past surgical history on file.         Social History:     Social History   Substance Use Topics     Smoking status: Former Smoker     Quit date: 5/11/1999     Smokeless tobacco: Never Used     Alcohol use No         Chemical History:     Tobacco: No/former smoker     Uses 1-2 cups/week of coffee, 1 energy drinks/day. Last caffeine intake is usually before  "variable/not answered    EtOH: No   Recreational Drugs: No    Psych Hx:   None    Current dangers to self or others: None           Family History:     Family History   Problem Relation Age of Onset     HEART DISEASE Other      Glaucoma No family hx of      Macular Degeneration No family hx of         Sleep Family Hx:        RLS- No  ARNULFO - No  Insomnia - No  Parasomnia - No         Review of Systems:     A complete 10 point review of systems was negative other than HPI or as commented below:   Patient denies chest pain, dyspnea with activity and or rest, wheezing, abdominal pain, n&v, fever, chills, dysuria, leg pain or swelling. Patient is also denies ear pain, sore throat, postnasal drip, running nose, dry cough.      Oliver Agarwal has gained 44 pounds in 10 years.            Physical Examination:   /50  Pulse 61  Resp 18  Ht 1.727 m (5' 8\")  Wt 136.5 kg (301 lb)  SpO2 94%  BMI 45.77 kg/m2     Neck Circumference: 46 cm   Constitutional: . Awake, alert, cooperative, in no apparent distress  Mood: euthymic; affect congruent with full range and intensity.  Attention/Concentration:  Normal   Eyes: Pupils round and reactive. No icterus.  ENT: Mallampati Class: IV.   Tonsillar Stage: 1  hidden by pillars   Deaf  Clear nasal passages. Enlarged inferior turbinates. Nasal deviated septum to left.  Oropharynx: No high arched palate. No pharyngeal erythema or exudates, small and crowded oropharynx,  low-lying soft palate, elongated uvula. No lateral narrowing  Tongue: No relative macroglossia   Dentition: Good.  Dentures: None  Neck: Supple, no thyroid enlargement.   Cardiovascular: Regular S1 and S2, no murmurs or gallops.    Pulmonary:  Chest symmetric, lungs clear bilaterally and no crackles, wheezes or rales.  Abdomen: Soft, obese, non tender.  Extremities:  No pedal edema.  Muscle/joint: Strength and tone normal   Skin:  No rash or significant lesions examined areas of skin.   Neurologic: Alert, " oriented x3, no focal neurological deficit except deafness.           Data: All pertinent previous laboratory data reviewed     No results found for: PH, PHARTERIAL, PO2, [id number], SAT, PCO2, HCO3, BASEEXCESS, BRIE, BEB  No results found for: TSH  Lab Results   Component Value Date    GLC 85 03/21/2018     (H) 01/30/2017     No results found for: HGB  Lab Results   Component Value Date    BUN 16 03/21/2018    BUN 17 01/30/2017    CR 0.91 03/21/2018    CR 0.92 01/30/2017     Lab Results   Component Value Date    CO2 30 03/21/2018    CO2 26 01/30/2017     No results found for: KENNETH      Echocardiography: No    Chest x-ray: 8/25/2014 3:43 PM    COMPARISON:  None.     IMPRESSION:  Negative    PFT: No        Copy to: Angelica Pate  Copy to: Angelica Reina MD 6/27/2018   [facility]  [address], [place] [address]   [phone] Clinic    Total time spent with patient: 61 minutes with this patient today in which 25 minutes was spent in counseling/coordination of care and going over planned testing and recommendations.

## 2018-07-06 ENCOUNTER — OFFICE VISIT (OUTPATIENT)
Dept: FAMILY MEDICINE | Facility: CLINIC | Age: 53
End: 2018-07-06
Payer: MEDICARE

## 2018-07-06 ENCOUNTER — RADIANT APPOINTMENT (OUTPATIENT)
Dept: GENERAL RADIOLOGY | Facility: CLINIC | Age: 53
End: 2018-07-06
Attending: NURSE PRACTITIONER
Payer: COMMERCIAL

## 2018-07-06 VITALS
OXYGEN SATURATION: 95 % | TEMPERATURE: 98.3 F | RESPIRATION RATE: 18 BRPM | DIASTOLIC BLOOD PRESSURE: 79 MMHG | HEART RATE: 68 BPM | SYSTOLIC BLOOD PRESSURE: 125 MMHG

## 2018-07-06 DIAGNOSIS — R73.03 PREDIABETES: ICD-10-CM

## 2018-07-06 DIAGNOSIS — M25.561 ACUTE PAIN OF RIGHT KNEE: Primary | ICD-10-CM

## 2018-07-06 DIAGNOSIS — E66.01 MORBID OBESITY (H): ICD-10-CM

## 2018-07-06 DIAGNOSIS — L71.9 ROSACEA: ICD-10-CM

## 2018-07-06 DIAGNOSIS — M25.561 ACUTE PAIN OF RIGHT KNEE: ICD-10-CM

## 2018-07-06 PROCEDURE — 99214 OFFICE O/P EST MOD 30 MIN: CPT | Performed by: NURSE PRACTITIONER

## 2018-07-06 PROCEDURE — T1013 SIGN LANG/ORAL INTERPRETER: HCPCS | Mod: U3 | Performed by: FAMILY MEDICINE

## 2018-07-06 PROCEDURE — 73560 X-RAY EXAM OF KNEE 1 OR 2: CPT | Mod: RT

## 2018-07-06 NOTE — PROGRESS NOTES
SUBJECTIVE:   Oliver Agarwal is a 53 year old male who presents to clinic today for the following health issues:    Patient is here to discuss plan to see dietitian.   wants to lose weight  Unable to exercise due to back pain  Wants advise on how to eat better to prevent progression of prediabetes.      Patient fell two weeks ago and has burning sensation and pain to touch right knee.  Would like an xray to make sure nothing is broken.    Rating pain +2/10 when weight bearing.    Old injury several years ago to right knee.      Seeing dermatology in [place] and does not think the medication is helping, wants to get a second opinion on management options for his rosacea.          Reviewed and updated as needed this visit by clinical staff  Tobacco  Allergies  Meds  Problems  Med Hx  Surg Hx  Fam Hx  Soc Hx        Reviewed and updated as needed this visit by Provider  Tobacco  Allergies  Meds  Problems  Med Hx  Surg Hx  Fam Hx  Soc Hx          ROS:  Constitutional, HEENT, cardiovascular, pulmonary, GI, , musculoskeletal, neuro, skin, endocrine and psych systems are negative, except as otherwise noted.    OBJECTIVE:     /79  Pulse 68  Temp 98.3  F (36.8  C) (Oral)  Resp 18  SpO2 95%  There is no height or weight on file to calculate BMI.  GENERAL: healthy, alert and no distress  EYES: Eyes grossly normal to inspection, PERRL and conjunctivae and sclerae normal  HENT: ear canals and TM's normal, nose and mouth without ulcers or lesions  NECK: no adenopathy, no asymmetry, masses, or scars and thyroid normal to palpation  RESP: lungs clear to auscultation - no rales, rhonchi or wheezes  CV: regular rate and rhythm, normal S1 S2, no S3 or S4, no murmur, click or rub, no peripheral edema and peripheral pulses strong  MS: normal muscle tone, normal range of motion, no edema and tenderness to palpation to right anterior knee  SKIN:  erythema - bilateral cheeks    Xray interpreted as  negative in the clinic.  Pending radiologist review.        ASSESSMENT/PLAN:       ICD-10-CM    1. Acute pain of right knee M25.561 XR Knee Standing AP Bilat & Lateral Right   2. Prediabetes R73.03 NUTRITION REFERRAL   3. Morbid obesity (H) E66.01 NUTRITION REFERRAL   4. Rosacea L71.9 DERMATOLOGY REFERRAL     XR negative.  If pains persist or worsen may f/u with ortho.      Refer to nutrition for counseling on weight loss and prediabetes diet choices.  Discussed portion control and reducing simple carbs.  Increase exercise.  Pt aware and wants more specifics.      Referral to derm for a second opinion on rosacea options.    See Patient Instructions    BROOK Rosa CNP  [facility]

## 2018-07-06 NOTE — LETTER
My Asthma Action Plan  Name: Oliver Agarwal   YOB: 1965  Date: 7/6/2018   My doctor: BROOK Rosa CNP   My clinic: [facility]        My Control Medicine: None  My Rescue Medicine: Albuterol (Proair/Ventolin/Proventil) inhaler 108 mcg  Advair Diskus 250-50 mcg inhaler   My Asthma Severity: mild persistent             GREEN ZONE   Good Control    I feel good    No cough or wheeze    Can work, sleep and play without asthma symptoms       Take your asthma control medicine every day.     1. If exercise triggers your asthma, take your rescue medication    15 minutes before exercise or sports, and    During exercise if you have asthma symptoms  2. Spacer to use with inhaler: If you have a spacer, make sure to use it with your inhaler             YELLOW ZONE Getting Worse  I have ANY of these:    I do not feel good    Cough or wheeze    Chest feels tight    Wake up at night   1. Keep taking your Green Zone medications  2. Start taking your rescue medicine:    every 20 minutes for up to 1 hour. Then every 4 hours for 24-48 hours.  3. If you stay in the Yellow Zone for more than 12-24 hours, contact your doctor.  4. If you do not return to the Green Zone in 12-24 hours or you get worse, start taking your oral steroid medicine if prescribed by your provider.           RED ZONE Medical Alert - Get Help  I have ANY of these:    I feel awful    Medicine is not helping    Breathing getting harder    Trouble walking or talking    Nose opens wide to breathe       1. Take your rescue medicine NOW  2. If your provider has prescribed an oral steroid medicine, start taking it NOW  3. Call your doctor NOW  4. If you are still in the Red Zone after 20 minutes and you have not reached your doctor:    Take your rescue medicine again and    Call 911 or go to the emergency room right away    See your regular doctor within 2 weeks of an Emergency Room or Urgent Care visit for follow-up  treatment.          Annual Reminders:  Meet with Asthma Educator,  Flu Shot in the Fall, consider Pneumonia Vaccination for patients with asthma (aged 19 and older).    Pharmacy: [id number] - [place], [place] - [address] AT [facility] [address]                      Asthma Triggers  How To Control Things That Make Your Asthma Worse    Triggers are things that make your asthma worse.  Look at the list below to help you find your triggers and what you can do about them.  You can help prevent asthma flare-ups by staying away from your triggers.      Trigger                                                          What you can do   Cigarette Smoke  Tobacco smoke can make asthma worse. Do not allow smoking in your home, car or around you.  Be sure no one smokes at a child s day care or school.  If you smoke, ask your health care provider for ways to help you quit.  Ask family members to quit too.  Ask your health care provider for a referral to Quit Plan to help you quit smoking, or call 8-650-818-PLAN.     Colds, Flu, Bronchitis  These are common triggers of asthma. Wash your hands often.  Don t touch your eyes, nose or mouth.  Get a flu shot every year.     Dust Mites  These are tiny bugs that live in cloth or carpet. They are too small to see. Wash sheets and blankets in hot water every week.   Encase pillows and mattress in dust mite proof covers.  Avoid having carpet if you can. If you have carpet, vacuum weekly.   Use a dust mask and HEPA vacuum.   Pollen and Outdoor Mold  Some people are allergic to trees, grass, or weed pollen, or molds. Try to keep your windows closed.  Limit time out doors when pollen count is high.   Ask you health care provider about taking medicine during allergy season.     Animal Dander  Some people are allergic to skin flakes, urine or saliva from pets with fur or feathers. Keep pets with fur or feathers out of your home.    If you can t keep the pet outdoors,  then keep the pet out of your bedroom.  Keep the bedroom door closed.  Keep pets off cloth furniture and away from stuffed toys.     Mice, Rats, and Cockroaches  Some people are allergic to the waste from these pests.   Cover food and garbage.  Clean up spills and food crumbs.  Store grease in the refrigerator.   Keep food out of the bedroom.   Indoor Mold  This can be a trigger if your home has high moisture. Fix leaking faucets, pipes, or other sources of water.   Clean moldy surfaces.  Dehumidify basement if it is damp and smelly.   Smoke, Strong Odors, and Sprays  These can reduce air quality. Stay away from strong odors and sprays, such as perfume, powder, hair spray, paints, smoke incense, paint, cleaning products, candles and new carpet.   Exercise or Sports  Some people with asthma have this trigger. Be active!  Ask your doctor about taking medicine before sports or exercise to prevent symptoms.    Warm up for 5-10 minutes before and after sports or exercise.     Other Triggers of Asthma  Cold air:  Cover your nose and mouth with a scarf.  Sometimes laughing or crying can be a trigger.  Some medicines and food can trigger asthma.

## 2018-07-06 NOTE — PATIENT INSTRUCTIONS
Nutrition referral.    Make sure you verify coverage with your insurance.    Overall reduce portions  Increase fiber and water.   Decrease calories  Increase exercise.      The xray looks normal to me, the radiologist will review it later today.  If they find something I did not, I will contact you.

## 2018-07-06 NOTE — MR AVS SNAPSHOT
After Visit Summary   7/6/2018    Oliver Agarwal    MRN: [id number]           Patient Information     Date Of Birth          1965        Visit Information        Provider Department      7/6/2018 11:40 AM Zhane Danielson; Angelica Pate APRN CNP [facility]        Today's Diagnoses     Acute pain of right knee    -  1    Prediabetes        Morbid obesity (H)        Rosacea          Care Instructions    Nutrition referral.    Make sure you verify coverage with your insurance.    Overall reduce portions  Increase fiber and water.   Decrease calories  Increase exercise.      The xray looks normal to me, the radiologist will review it later today.  If they find something I did not, I will contact you.                Follow-ups after your visit        Additional Services     DERMATOLOGY REFERRAL       [place] [facility] [facility] [place] [phone]  [place]: [facility] [place] [phone]   [email or web address]  [facility] [place] [phone]  [email or web address]    Please be aware that coverage of these services is subject to the terms and limitations of your health insurance plan.  Call member services at your health plan with any benefit or coverage questions.      Please bring the following with you to your appointment:    (1) Any X-Rays, CTs or MRIs which have been performed.  Contact the facility where they were done to arrange for  prior to your scheduled appointment.    (2) List of current medications  (3) This referral request   (4) Any documents/labs given to you for this referral            NUTRITION REFERRAL       Your provider [place] [facility] [facility], [phone]  [email or web address]     Please be aware that coverage of these services is subject to the terms and  limitations of your health insurance plan.  Call member services at your health plan with any benefit or coverage questions.      Please bring the following with you to your appointment:    (1) This referral request  (2) Any documents given to you regarding this referral  (3) Any specific questions you have about diet and/or food choices                  Your next 10 appointments already scheduled     Aug 15, 2018  8:30 PM CDT   PSG Titration w/TCM with BED 3 SH SLEEP   [facility]    [address]  [place] [address] [phone]            Aug 23, 2018  2:00 PM CDT   Return Sleep Patient with Jim Reina MD   [facility] [place]    [address]  [place] [address] [phone]              Who to contact     If you have questions or need follow up information about today's clinic visit or your schedule please contact [facility] directly at [phone].  Normal or non-critical lab and imaging results will be communicated to you by Extended Systemshart, letter or phone within 4 business days after the clinic has received the results. If you do not hear from us within 7 days, please contact the clinic through Extended Systemshart or phone. If you have a critical or abnormal lab result, we will notify you by phone as soon as possible.  Submit refill requests through Mytonomy or call your pharmacy and they will forward the refill request to us. Please allow 3 business days for your refill to be completed.          Additional Information About Your Visit        Mytonomy Information     Mytonomy gives you secure access to your electronic health record. If you see a primary care provider, you can also send messages to your care team and make appointments. If you have questions, please call your primary care clinic.  If you do not have a primary care provider, please call  [phone] and they will assist you.        Care EveryWhere ID     This is your Care EveryWhere ID. This could be used by other organizations to access your [facility] medical records  [id number]        Your Vitals Were     Pulse Temperature Respirations Pulse Oximetry          68 98.3  F (36.8  C) (Oral) 18 95%         Blood Pressure from Last 3 Encounters:   07/06/18 125/79   06/27/18 110/50   06/18/18 115/72    Weight from Last 3 Encounters:   06/27/18 301 lb (136.5 kg)   06/18/18 302 lb (137 kg)   12/05/17 (!) 301 lb (136.5 kg)              We Performed the Following     DERMATOLOGY REFERRAL     NUTRITION REFERRAL        Primary Care Provider Office Phone # Fax #    Angelica Rodriguez BROOK Pate [phone] [address] [address]        Equal Access to Services     CATHERINE OCHOA : Hadii aad ku hadasho Sokadeemali, waaxda luqadaha, qaybta kaalmada adeegyada, waxay nemesioin haychristinn carlos a chaney . So [facility] [phone].    ATENCIÓN: Si habla español, tiene a harkins disposición servicios gratuitos de asistencia lingüística. Rita al [phone].    We comply with applicable federal civil rights laws and [place] laws. We do not discriminate on the basis of race, color, national origin, age, disability, sex, sexual orientation, or gender identity.            Thank you!     Thank you for choosing [facility]  for your care. Our goal is always to provide you with excellent care. Hearing back from our patients is one way we can continue to improve our services. Please take a few minutes to complete the written survey that you may receive in the mail after your visit with us. Thank you!             Your Updated Medication List - Protect others around you: Learn how to safely use, store and throw away your medicines at www.disposemymeds.org.          This list is accurate as of 7/6/18 12:47 PM.  Always use your most recent med list.                   Brand Name  Dispense Instructions for use Diagnosis    * albuterol (2.5 MG/3ML) 0.083% neb solution     30 vial    Take 1 vial (2.5 mg) by nebulization every 4 hours as needed for shortness of breath / dyspnea    Mild persistent asthma not dependent on systemic steroids       * albuterol 108 (90 Base) MCG/ACT Inhaler    PROAIR HFA    8.5 g    INHALE 2 PUFFS BY MOUTH EVERY 6 HOURS AS NEEDED    Mild persistent asthma without complication       alclomethasone 0.05 % cream    ACLOVATE    15 g    Apply to affected areas on face once per day but only for 3 consecutive days    Seborrheic dermatitis       ammonium lactate 12 % cream    LAC-HYDRIN    385 g    Apply topically nightly as needed for dry skin    Pitted keratolysis       Azelaic Acid 15 % gel     50 g    Apply 0.5 inches topically 2 times daily Massage thin film gently into afected areas morning and evening.    Rosacea       blood glucose monitoring lancets     1 Box    Use to test blood sugars once daily as directed.    Prediabetes       blood glucose monitoring test strip    ANTONIO CONTOUR NEXT    100 each    Use to test blood sugar one x  daily or as directed.    Prediabetes       CENTROVITE Tabs     100 tablet    TAKE 1 TABLET BY MOUTH EVERY DAY    Vitamin deficiency       cyanocolbalamin 500 MCG tablet    vitamin  B-12     Take 500 mcg by mouth daily        cyclobenzaprine 10 MG tablet    FLEXERIL    30 tablet    Take 0.5-1 tablets (5-10 mg) by mouth 3 times daily as needed for muscle spasms    Acute right-sided low back pain without sciatica       desonide 0.05 % cream    DESOWEN    15 g    Apply sparingly to affected area two times per day for only 3 consecutive days    Eczema, unspecified type       doxycycline monohydrate 50 MG capsule     90 capsule    1 tab per day    Rosacea       fluticasone 50 MCG/ACT spray    FLONASE    1 Bottle    Spray 2 sprays into both nostrils daily    Chronic allergic rhinitis due to other allergic trigger, unspecified seasonality        fluticasone-salmeterol 250-50 MCG/DOSE diskus inhaler    ADVAIR DISKUS    60 Inhaler    Inhale 1 puff into the lungs 2 times daily Taking prn    Mild persistent asthma without complication       ibuprofen 600 MG tablet    ADVIL/MOTRIN    120 tablet    TAKE 1 TABLET BY MOUTH EVERY 6 HOURS AS NEEDED FOR MODERATE PAIN    Pain       ivermectin 1 % cream    SOOLANTRA    30 g    Apply to the affected areas of the face once daily. Use a pea-size amount for each area of the face  that is affected. THIN LAYER    Rosacea       metroNIDAZOLE 0.75 % topical gel    METROGEL    45 g    Apply topically 2 times daily    Rosacea       montelukast 10 MG tablet    SINGULAIR    90 tablet    Take 1 tablet (10 mg) by mouth At Bedtime    Mild persistent asthma without complication       oxyCODONE-acetaminophen 5-325 MG per tablet    PERCOCET    10 tablet    Take 1-2 tablets by mouth every 6 hours as needed for pain    Chronic midline low back pain with right-sided sciatica       propylene glycol 0.6 % Soln ophthalmic solution    SYSTANE BALANCE    1 Bottle    Place 1 drop into both eyes 4 times daily    Dry eyes       terbinafine 1 % cream    lamISIL AT    42 g    Apply topically 2 times daily For fungal infection not resolved with other antifungals (e.g. Clotrimazole)    Tinea pedis of both feet       tobramycin-dexamethasone 0.3-0.1 % ophthalmic susp    TOBRADEX    1 Bottle    Place 1 drop into the right eye 4 times daily    Corneal erosion, right       triamcinolone 0.1 % ointment    KENALOG    80 g    Apply topically 2 times daily Apply to affected areas on hands bid for 14 days and then when needed.Not for face or groin    Eczema, unspecified type       vitamin D 2000 units Caps      Take 4,000 Units by mouth daily        zolpidem 10 MG tablet    AMBIEN    1 tablet    Take tablet by mouth 15 minutes prior to sleep, for Sleep Study    ARNULFO (obstructive sleep apnea)       * Notice:  This list has 2 medication(s) that are the same as  other medications prescribed for you. Read the directions carefully, and ask your doctor or other care provider to review them with you.

## 2018-08-15 ENCOUNTER — THERAPY VISIT (OUTPATIENT)
Dept: SLEEP MEDICINE | Facility: CLINIC | Age: 53
End: 2018-08-15
Payer: MEDICARE

## 2018-08-15 DIAGNOSIS — G47.33 OSA (OBSTRUCTIVE SLEEP APNEA): Primary | ICD-10-CM

## 2018-08-15 PROCEDURE — 95811 POLYSOM 6/>YRS CPAP 4/> PARM: CPT | Performed by: FAMILY MEDICINE

## 2018-08-15 NOTE — MR AVS SNAPSHOT
After Visit Summary   8/15/2018    Oliver Agarwal    MRN: [id number]           Patient Information     Date Of Birth          1965        Visit Information        Provider Department      8/15/2018 8:30 PM Natty Dobson; BED 3  SLEEP  Services Department        Today's Diagnoses     ARNULFO (obstructive sleep apnea)           Follow-ups after your visit        Your next 10 appointments already scheduled     Aug 23, 2018  2:00 PM CDT   Return Sleep Patient with Jim Reina MD   [facility] [place]    [address]  [place] [facility] [address] [phone]              Who to contact     If you have questions or need follow up information about today's clinic visit or your schedule please contact [facility] directly at [phone].  Normal or non-critical lab and imaging results will be communicated to you by MyChart, letter or phone within 4 business days after the clinic has received the results. If you do not hear from us within 7 days, please contact the clinic through MyChart or phone. If you have a critical or abnormal lab result, we will notify you by phone as soon as possible.  Submit refill requests through Game Craft or call your pharmacy and they will forward the refill request to us. Please allow 3 business days for your refill to be completed.          Additional Information About Your Visit        MyChart Information     Game Craft gives you secure access to your electronic health record. If you see a primary care provider, you can also send messages to your care team and make appointments. If you have questions, please call your primary care clinic.  If you do not have a primary care provider, please call [phone] and they will assist you.        Care EveryWhere ID     This is your Care EveryWhere ID. This could be used by other organizations to access your [facility]  records  [id number]         Blood Pressure from Last 3 Encounters:   07/06/18 125/79   06/27/18 110/50   06/18/18 115/72    Weight from Last 3 Encounters:   06/27/18 136.5 kg (301 lb)   06/18/18 137 kg (302 lb)   12/05/17 (!) 136.5 kg (301 lb)              We Performed the Following     Comprehensive Sleep Study        Primary Care Provider Office Phone # Fax #    Angelica Pate, BROOK [facility] [phone] [address] [address]        Equal Access to Services     EDGAR OCHOA : Hadii aad ku hadasho Sogwen, waaxda luqadaha, qaybta kaalmada adeegyada, amadeo chaney . So [facility] [phone].    ATENCIÓN: Si habla español, tiene a harkins disposición servicios gratuitos de asistencia lingüística. [facility] [phone].    We comply with applicable federal civil rights laws and [place] laws. We do not discriminate on the basis of race, color, national origin, age, disability, sex, sexual orientation, or gender identity.            Thank you!     Thank you for choosing [facility]  for your care. Our goal is always to provide you with excellent care. Hearing back from our patients is one way we can continue to improve our services. Please take a few minutes to complete the written survey that you may receive in the mail after your visit with us. Thank you!             Your Updated Medication List - Protect others around you: Learn how to safely use, store and throw away your medicines at www.disposemymeds.org.          This list is accurate as of 8/15/18 11:59 PM.  Always use your most recent med list.                   Brand Name Dispense Instructions for use Diagnosis    * albuterol (2.5 MG/3ML) 0.083% neb solution     30 vial    Take 1 vial (2.5 mg) by nebulization every 4 hours as needed for shortness of breath / dyspnea    Mild persistent asthma not dependent on systemic steroids       * albuterol 108 (90 Base) MCG/ACT inhaler    PROAIR  HFA    8.5 g    INHALE 2 PUFFS BY MOUTH EVERY 6 HOURS AS NEEDED    Mild persistent asthma without complication       alclomethasone 0.05 % cream    ACLOVATE    15 g    Apply to affected areas on face once per day but only for 3 consecutive days    Seborrheic dermatitis       ammonium lactate 12 % cream    LAC-HYDRIN    385 g    Apply topically nightly as needed for dry skin    Pitted keratolysis       Azelaic Acid 15 % gel     50 g    Apply 0.5 inches topically 2 times daily Massage thin film gently into afected areas morning and evening.    Rosacea       blood glucose monitoring lancets     1 Box    Use to test blood sugars once daily as directed.    Prediabetes       blood glucose monitoring test strip    ANTONIO CONTOUR NEXT    100 each    Use to test blood sugar one x  daily or as directed.    Prediabetes       CENTROVITE Tabs     100 tablet    TAKE 1 TABLET BY MOUTH EVERY DAY    Vitamin deficiency       cyanocolbalamin 500 MCG tablet    vitamin  B-12     Take 500 mcg by mouth daily        cyclobenzaprine 10 MG tablet    FLEXERIL    30 tablet    Take 0.5-1 tablets (5-10 mg) by mouth 3 times daily as needed for muscle spasms    Acute right-sided low back pain without sciatica       desonide 0.05 % cream    DESOWEN    15 g    Apply sparingly to affected area two times per day for only 3 consecutive days    Eczema, unspecified type       doxycycline monohydrate 50 MG capsule     90 capsule    1 tab per day    Rosacea       fluticasone 50 MCG/ACT spray    FLONASE    1 Bottle    Spray 2 sprays into both nostrils daily    Chronic allergic rhinitis due to other allergic trigger, unspecified seasonality       fluticasone-salmeterol 250-50 MCG/DOSE diskus inhaler    ADVAIR DISKUS    60 Inhaler    Inhale 1 puff into the lungs 2 times daily Taking prn    Mild persistent asthma without complication       ibuprofen 600 MG tablet    ADVIL/MOTRIN    120 tablet    TAKE 1 TABLET BY MOUTH EVERY 6 HOURS AS NEEDED FOR MODERATE PAIN     Pain       ivermectin 1 % cream    SOOLANTRA    30 g    Apply to the affected areas of the face once daily. Use a pea-size amount for each area of the face  that is affected. THIN LAYER    Rosacea       metroNIDAZOLE 0.75 % topical gel    METROGEL    45 g    Apply topically 2 times daily    Rosacea       montelukast 10 MG tablet    SINGULAIR    90 tablet    Take 1 tablet (10 mg) by mouth At Bedtime    Mild persistent asthma without complication       oxyCODONE-acetaminophen 5-325 MG per tablet    PERCOCET    10 tablet    Take 1-2 tablets by mouth every 6 hours as needed for pain    Chronic midline low back pain with right-sided sciatica       propylene glycol 0.6 % Soln ophthalmic solution    SYSTANE BALANCE    1 Bottle    Place 1 drop into both eyes 4 times daily    Dry eyes       terbinafine 1 % cream    lamISIL AT    42 g    Apply topically 2 times daily For fungal infection not resolved with other antifungals (e.g. Clotrimazole)    Tinea pedis of both feet       tobramycin-dexamethasone 0.3-0.1 % ophthalmic susp    TOBRADEX    1 Bottle    Place 1 drop into the right eye 4 times daily    Corneal erosion, right       triamcinolone 0.1 % ointment    KENALOG    80 g    Apply topically 2 times daily Apply to affected areas on hands bid for 14 days and then when needed.Not for face or groin    Eczema, unspecified type       vitamin D 2000 units Caps      Take 4,000 Units by mouth daily        zolpidem 10 MG tablet    AMBIEN    1 tablet    Take tablet by mouth 15 minutes prior to sleep, for Sleep Study    ARNULFO (obstructive sleep apnea)       * Notice:  This list has 2 medication(s) that are the same as other medications prescribed for you. Read the directions carefully, and ask your doctor or other care provider to review them with you.

## 2018-08-16 ENCOUNTER — APPOINTMENT (OUTPATIENT)
Dept: INTERPRETER SERVICES | Facility: CLINIC | Age: 53
End: 2018-08-16

## 2018-08-16 ENCOUNTER — DOCUMENTATION ONLY (OUTPATIENT)
Dept: SLEEP MEDICINE | Facility: CLINIC | Age: 53
End: 2018-08-16

## 2018-08-16 NOTE — PROGRESS NOTES
Completed a split night PSG per provider order.    Preliminary AHI .  A final therapeutic PAP pressure was achieved.    Supine REM was seen on therapeutic pressure.    Patient reports feeling refreshed in AM.

## 2018-08-16 NOTE — PROCEDURES
" SLEEP STUDY INTERPRETATION  SPLIT NIGHT STUDY      Patient: SEBASTIÁN DURÁN  YOB: 1965  Study Date: 8/15/2018  MRN: [id number]  Referring Provider: -  Ordering Provider: Jim Reina MD  Interpreting Provider: Jarad Cavazos MD, MPH    Indications for Polysomnography: The patient is a 53 year old male who is 5' 8\" and weighs 301.0 lbs. His BMI is 46.2 kg/m2, [place] sleepiness scale 5, and neck circumference is 46.0 cm. Relevant medical history includes severe obstructive sleep apnea, asthma, chronic lower back pain, chronic allergic rhinitis, and prediabetes. A diagnostic polysomnogram (PSG) was performed to evaluate for obstructive sleep apnea (ARNULFO), periodic limb movements (PLMs), central sleep apnea (CSA), and/or sleep associated hypoventilation/hypoxemia. After 130.0 minutes of sleep time the patient exhibited sufficient respiratory events qualifying him for a CPAP trial which was then initiated.    Polysomnogram Data: A full night polysomnogram (PSG) recorded the standard physiologic parameters including EEG, EOG, EMG, ECG, nasal and oral airflow. Respiratory parameters of chest and abdominal movements were recorded with respiratory inductance plethysmography. Oxygen saturation was recorded by pulse oximetry. Hypopnea scoring rule used: 1B 4%.    Diagnostic PSG  Sleep Architecture: Abnormal sleep architecture with significant sleep fragmentation and decreased sleep efficiency. No REM sleep and only limited N3 sleep stage captured. No supine sleep obtained.  The total recording time of the polysomnogram was 202.7 minutes. The total sleep time was 130.0 minutes. Sleep latency was normal at 26.7 minutes with the use of a sleep aid (zolpidem 10 mg). No REM sleep was captured during the diagnostic portion of the sleep study. Arousal index was significantly increased at 103.4 arousals per hour. Sleep efficiency was decreased at 64.1%. Wake after sleep onset was 41.0 minutes. The patient " spent 6.9% of total sleep time in Stage N1, 88.8% in Stage N2, 4.2% in Stage N3, and 0.0% in REM. There was no supine sleep during the diagnostic portion of the sleep study.    Respiration: Severe ARNULFO with mild sleep associated hypoxemia noted. No hypoventilation was present. Moderate to loud snoring recorded.    Events ? The polysomnogram revealed a presence of 93 obstructive, 1 central, and 0 mixed apneas resulting in an apnea index of 43.4 events per hour. There were 86 obstructive hypopneas and 0 central hypopneas resulting in an obstructive hypopnea index of 39.7 and central hypopnea index of 0 events per hour. The combined apnea/hypopnea index was 83.1 events per hour (central apnea/hypopnea index was 0.5 events per hour). The RERA index was 11.5 events per hour. The RDI was 94.6 events per hour.    Snoring - was reported as moderate to loud snoring.    Respiratory rate and pattern - was notable for normal respiratory rate and pattern.    Sustained Sleep Associated Hypoventilation - Transcutaneous carbon dioxide monitoring was used, however significant hypoventilation was not suggested by oximetry, and this was not present on TCM monitoring with a maximum change from 38.6 to 49.1 mmHg less than 10 minutes and 0 minutes at or greater than 55 mmHg.    Sleep Associated Hypoxemia - (Greater than 5 minutes O2 sat at or below 88%) was present. Baseline oxygen saturation was 93.8%. Lowest oxygen saturation was 81.7%. Time spent less than or equal to 88% was 5.1 minutes. Time spent less than or equal to 89% was 7.7 minutes.     Treatment PSG  Sleep Architecture: Normalized sleep architecture with sleep consolidation and also normalized sleep efficiency. REM sleep, including REM supine, captured.  At 02:05:54 AM the patient was placed on PAP treatment and was titrated at pressures ranging from CPAP 8 cmH2O up to CPAP 10 cmH2O. The total recording time of the treatment portion of the study was 268.1 minutes. The total  sleep time was 261.5 minutes. During the treatment portion of the study the sleep latency was 2.0 minutes. REM latency was 56.5 minutes. Arousal index was improved and normalized at 15.6 arousals per hour. Sleep efficiency was improved and also normalized at 97.5%. Wake after sleep onset was 4.0 minutes. The patient spent 1.7% of total sleep time in Stage N1, 53.7% in Stage N2, 22.4% in Stage N3, and 22.2% in REM. Time in REM supine was 54.5 minutes.     Respiration: Optimal PAP titration with final CPAP pressure of 10 cmH2O and a residual AHI of 0.6 events per hour. REM supine during this final PAP pressure was captured.    The final pressure was CPAP 10 cmH2O with an AHI of 0.6 events per hour. Time in REM supine on final pressure was 54.5 minutes.     This titration was considered optimal (residual AHI < 5 events per hour and including REM?supine sleep at final pressure).    Movement Activity: Periodic limb movements with cortical arousal association was noted. Periodic limb movements were more pronounced during PAP therapy. Otherwise, no abnormal REM EMG activity, nocturnal behaviors, or bruxism noted.    Periodic Limb Movements  o During the diagnostic portion of the study, there were 21 PLMs recorded. The PLM index was 9.7 movements per hour. The PLM Arousal Index was 3.7 per hour.  o During the treatment portion of the study, there were 293 PLMs recorded. The PLM index was 67.2 movements per hour. The PLM Arousal Index was 9.6 per hour.    REM EMG Activity - Excessive transient/sustained muscle activity was not present.    Nocturnal Behavior - Abnormal sleep related behaviors were not noted during/arising out of NREM / REM sleep.    Bruxism - None apparent.    Cardiac Summary: Normal sinus rhythm noted.  During the diagnostic portion of the study, the average pulse rate was 60.9 bpm. The minimum pulse rate was 44.9 bpm while the maximum pulse rate was 90.0 bpm.    During the treatment portion of the study,  the average pulse rate was 54.9 bpm. The minimum pulse rate was 41.9 bpm while the maximum pulse rate was 89.9 bpm.     Arrhythmias were not noted.    Assessment:     The PSG sleep study demonstrated severe ARNULFO with mild sleep associated hypoxemia noted. No hypoventilation was present. Moderate to loud snoring was recorded during the diagnostic portion of the sleep study. Of note, the lack of supine and REM sleep may have underestimated the true severity of the condition.    The treatment portion of the sleep study showed an optimal PAP titration with final CPAP pressure of 10 cmH2O and a residual AHI of 0.6 events per hour. REM supine during this final PAP pressure was captured.    There was abnormal sleep architecture with significant sleep fragmentation and decreased sleep efficiency noted during the diagnostic portion of the study. No REM sleep and only limited N3 sleep stage were captured. No supine sleep was obtained.    The treatment portion of the sleep study showed normalized sleep architecture with sleep consolidation and also normalized sleep efficiency. REM sleep, including REM supine, was captured.    There were periodic limb movements with cortical arousal association noted during the sleep study. The periodic limb movements were more pronounced during PAP therapy. Otherwise, no abnormal REM EMG activity, nocturnal behaviors, or bruxism was noted.    Lastly, normal sinus rhythm was present throughout the PSG sleep study.    Recommendations:    Treatment of ARNULFO with CPAP at 10 cmH2O. Recommend clinical follow up with sleep management team, for coaching and review of effectiveness and compliance measures.    Patient may be a candidate for dental appliance through referral to Sleep Dentistry for the treatment of obstructive sleep apnea and/or socially disruptive snoring.    If devices are not acceptable or effective, patient may benefit from evaluation of possible surgical options for the treatment of  obstructive sleep apnea and/or socially disruptive snoring. If he is interested, would recommend referral to specialized ENT?Sleep provider.    Advice regarding the risks of drowsy driving.    Suggest optimizing sleep schedule and avoiding sleep deprivation.    Weight management.    Pharmacologic therapy should be used for management of restless legs syndrome only if present and clinically indicated and not based on the presence of periodic limb movements alone.    Diagnostic Codes:     Obstructive Sleep Apnea G47.33    Sleep Hypoxemia G47.36     Unspecified Sleep Disturbance G47.9    Periodic Limb Movement Disorder G47.61    8/15/2018 [facility] Sleep Study (301.0 lbs) - AHI 83.1, RDI 94.6, Supine AHI -, REM AHI -, Low O2% 81.7%, Time Spent ?88% 5.1, Time Spent ?89% 7.7. Treatment was titrated to a pressure of CPAP 10 with an AHI 0.6. Time spent in REM supine at this pressure was 54.5 minutes.       ____________________________________________________________   Electronically Signed By: Jarad Cavazos MD, MPH (08/16/2018)         Range(%) Time in range (min)   0.0 - 88.0 5.1   0.0 - 89.0 7.7       Stage Min(mm Hg) Max(mm Hg)   Wake 38.6 47.2   NREM(1+2+3) 39.7 49.1   REM - -         Range(mmHg) Time in range (min)   55.0 - 100.0 -   Excluded data <20.0 & >65.0 15.0

## 2018-08-16 NOTE — PROGRESS NOTES
I called the patient, but I was unable to reach him. His current therapy will be set at a fixed pressure of 10 cmH2O. The patient will follow up with Dr Reina to discuss the results next week.    PAP order placed and handed to team member Rebecca.    Jarad Cavazos MD, MPH  Clinical Sleep and Occupational / Environmental Medicine

## 2018-08-16 NOTE — PROGRESS NOTES
This nurse faxed CPAP pressure change to 10 cmH20 today to [facility] [place]  [phone]      Guillermina Salvador LPN/MICHAEL

## 2018-08-17 LAB — SLPCOMP: NORMAL

## 2018-08-20 ENCOUNTER — TRANSFERRED RECORDS (OUTPATIENT)
Dept: HEALTH INFORMATION MANAGEMENT | Facility: CLINIC | Age: 53
End: 2018-08-20

## 2018-08-22 DIAGNOSIS — L30.9 ECZEMA, UNSPECIFIED TYPE: ICD-10-CM

## 2018-08-22 RX ORDER — DESONIDE 0.5 MG/G
CREAM TOPICAL
Qty: 15 G | Refills: 0 | Status: SHIPPED | OUTPATIENT
Start: 2018-08-22 | End: 2019-02-06

## 2018-08-22 NOTE — TELEPHONE ENCOUNTER
Last Written Prescription Date:  07/12/17  Last Fill Quantity: 15g,  # refills: 1   Last office visit: 7/6/2018 with prescribing provider:     Future Office Visit:    Requested Prescriptions   Pending Prescriptions Disp Refills     desonide (DESOWEN) 0.05 % cream [Pharmacy Med Name: DESONIDE 0.05% CREAM 15GM] 15 g 0     Sig: APPLY SPARINGLY TO THE AFFECTED AREA TWICE DAILY FOR ONLY 3 CONSECUTIVE DAYS    There is no refill protocol information for this order        Last OV 02/15/18    Lashonda Tilley RN BSN  [facility]  [phone]

## 2018-08-23 ENCOUNTER — OFFICE VISIT (OUTPATIENT)
Dept: SLEEP MEDICINE | Facility: CLINIC | Age: 53
End: 2018-08-23
Payer: MEDICARE

## 2018-08-23 VITALS
RESPIRATION RATE: 16 BRPM | HEIGHT: 68 IN | SYSTOLIC BLOOD PRESSURE: 103 MMHG | DIASTOLIC BLOOD PRESSURE: 64 MMHG | BODY MASS INDEX: 44.71 KG/M2 | HEART RATE: 61 BPM | WEIGHT: 295 LBS | OXYGEN SATURATION: 95 %

## 2018-08-23 DIAGNOSIS — G47.33 OSA (OBSTRUCTIVE SLEEP APNEA): Primary | ICD-10-CM

## 2018-08-23 PROCEDURE — 99214 OFFICE O/P EST MOD 30 MIN: CPT | Performed by: INTERNAL MEDICINE

## 2018-08-23 NOTE — PROGRESS NOTES
Sleep Study Follow-Up Visit:    Date on this visit: 8/23/2018    ASSESSMENT / PLAN:    ARNULFO (obstructive sleep apnea)    Discussed with patient the recent sleep study and treatment options, we recommend to continue CPAP 10 cmH2O. Order a new replacement machine, nasal mask and supplies. in addition to weight loss and avoiding sleeping supine position. Patient is recommended to improve his sleep-wake schedule and good sleep hygiene. Patient was advised to use PAP therapy for at least 7-8 hours and during naps (naps are not recommended).  Instructions given. Follow up 4-6 weeks after PAP use. Used sign language interpretor.  Counseled regarding weight loss through diet modification and increased physical activity. Patient was given instuctions of weight loss and advised to follow up her PCP for further weight loss interventions.      All questions were answered.  The patient indicates understanding of the above issues and agrees with the plan set forth.      Orders Placed This Encounter   Procedures     Comprehensive DME       He will follow up with me in about 2 month(s).       BRIEF SUMMARY:    Oliver Agarwal is a 53 year old male with history of severe obstructive sleep apnea, asthma, chronic lower back pain, chronic allergic rhinitis and prediabetes comes in today for follow-up of his sleep study done on 8/15/18 at the [facility] for result of sleep study. Please see H&P for his presenting sleep symptoms for additional details.  Patient had polysomnography sleep study on 8/16/2006 at [facility] sleep center, the sleep study showed severe obstructive sleep apnea with AHI of 59.8/hr, his lowest oxygen saturation was 81%.  He was prescribed CPAP which she was using but his clinical response was not good also he gained about 44 pounds for last couple of years, repeat split study was obtained.    PREVIOUS IN- LAB or HOME SLEEP STUDIES:  Date: 8/16/2006  TYPE: PSG  AHI: 59.8 /hr  BMI: 39.1  kg/m2  Intervention: CPAP  Sleep Architecture: Sleep latency 4.5 minutes, REM latency 70.5 minutes, sleep efficiency 94.2%, REM sleep 24%.  Lowest O2 saturation: 81 %  PLM's: 0 /hr    PS/15/218  Sleep latency 26.7 minutes with sleep aid.    REM not achieved.    Sleep efficiency 64.1%. Total sleep time 6.9 minutes.  Sleep architecture:  Stage 1, 88.8% (5%), stage 2, 4.2% (45-55%), stage 3, 0% (15-20%), stage REM, 83.1% (20-25%).    AHI was 83.1/hour.   CSAI was 0.5/hour.   RDI 94.6/hour.    REM AHI N/A.    Supine AHI /hour.    Lowest O2 saturation:81.7%  S/He spent 5.1 minutes below 88% SpO2.   Periodic Limb Movement Index 9.7/hour.       CPAP titration:    Sleep latency 2 minutes.    REM achieved. REM latency 56.5 minutes.    Sleep efficiency 97.5%. Total sleep time 268.1 minutes.   Sleep architecture:  Stage 1, 1.7% (5%), stage 2, 53.7% (45-55%), stage 3, 22.4% (15-20%), stage REM, 22.2% (20-25%).    Periodic Limb Movement Index 67.2/hour.   CPAP was titrated to 10 cmH2O with elimination of apneas, hypopneas and desaturations.   Supine REM was noted at this pressure.     These findings were reviewed with the patient and copy of the sleep study result was given.    Past medical/surgical history, family history, social history, medications and allergies were reviewed.      Problem List:  Patient Active Problem List    Diagnosis Date Noted     Morbid obesity (H) 2018     Priority: Medium     Right-sided low back pain with right-sided sciatica 2018     Priority: Medium     Dry eyes 2017     Priority: Medium     Corneal erosion, right 2017     Priority: Medium     Blepharitis of both eyes with rosacea 2017     Priority: Medium     Deaf - reads lips well 2014     Priority: Medium     Mild persistent asthma 2013     Priority: Medium     Hypovitaminosis D 2011     Priority: Medium     Atopic rhinitis 2011     Priority: Medium     (Problem list name updated by  automated process. Provider to review and confirm.)       Prediabetes 12/13/2011     Priority: Medium     Obesity 12/13/2011     Priority: Medium     CARDIOVASCULAR SCREENING; LDL GOAL LESS THAN 160 10/31/2010     Priority: Medium             Medications:     Current Outpatient Prescriptions   Medication Sig     albuterol (2.5 MG/3ML) 0.083% neb solution Take 1 vial (2.5 mg) by nebulization every 4 hours as needed for shortness of breath / dyspnea     albuterol (PROAIR HFA) 108 (90 Base) MCG/ACT Inhaler INHALE 2 PUFFS BY MOUTH EVERY 6 HOURS AS NEEDED     alclomethasone (ACLOVATE) 0.05 % cream Apply to affected areas on face once per day but only for 3 consecutive days     ammonium lactate (LAC-HYDRIN) 12 % cream Apply topically nightly as needed for dry skin     Azelaic Acid 15 % gel Apply 0.5 inches topically 2 times daily Massage thin film gently into afected areas morning and evening.     blood glucose monitoring (ANTONIO CONTOUR NEXT) test strip Use to test blood sugar one x  daily or as directed.     blood glucose monitoring (ANTONIO MICROLET) lancets Use to test blood sugars once daily as directed.     Cholecalciferol (VITAMIN D) 2000 UNITS CAPS Take 4,000 Units by mouth daily     cyanocolbalamin (VITAMIN  B-12) 500 MCG tablet Take 500 mcg by mouth daily     cyclobenzaprine (FLEXERIL) 10 MG tablet Take 0.5-1 tablets (5-10 mg) by mouth 3 times daily as needed for muscle spasms     desonide (DESOWEN) 0.05 % cream APPLY SPARINGLY TO THE AFFECTED AREA TWICE DAILY FOR ONLY 3 CONSECUTIVE DAYS     doxycycline monohydrate 50 MG capsule 1 tab per day     fluticasone (FLONASE) 50 MCG/ACT spray Spray 2 sprays into both nostrils daily     fluticasone-salmeterol (ADVAIR DISKUS) 250-50 MCG/DOSE diskus inhaler Inhale 1 puff into the lungs 2 times daily Taking prn     ibuprofen (ADVIL/MOTRIN) 600 MG tablet TAKE 1 TABLET BY MOUTH EVERY 6 HOURS AS NEEDED FOR MODERATE PAIN     ivermectin (SOOLANTRA) 1 % cream Apply to the affected  "areas of the face once daily. Use a pea-size amount for each area of the face  that is affected. THIN LAYER     metroNIDAZOLE (METROGEL) 0.75 % gel Apply topically 2 times daily     montelukast (SINGULAIR) 10 MG tablet Take 1 tablet (10 mg) by mouth At Bedtime     Multiple Vitamins-Minerals (CENTROVITE) TABS TAKE 1 TABLET BY MOUTH EVERY DAY     oxyCODONE-acetaminophen (PERCOCET) 5-325 MG per tablet Take 1-2 tablets by mouth every 6 hours as needed for pain     propylene glycol (SYSTANE BALANCE) 0.6 % SOLN ophthalmic solution Place 1 drop into both eyes 4 times daily     terbinafine (LAMISIL AT) 1 % cream Apply topically 2 times daily For fungal infection not resolved with other antifungals (e.g. Clotrimazole)     tobramycin-dexamethasone (TOBRADEX) 0.3-0.1 % ophthalmic susp Place 1 drop into the right eye 4 times daily     triamcinolone (KENALOG) 0.1 % ointment Apply topically 2 times daily Apply to affected areas on hands bid for 14 days and then when needed.Not for face or groin     zolpidem (AMBIEN) 10 MG tablet Take tablet by mouth 15 minutes prior to sleep, for Sleep Study     No current facility-administered medications for this visit.           PHYSICAL EXAMINATION:  /64  Pulse 61  Resp 16  Ht 1.727 m (5' 8\")  Wt 133.8 kg (295 lb)  SpO2 95%  BMI 44.85 kg/m2          Data: All pertinent previous laboratory data reviewed     No results found for: PH, PHARTERIAL, PO2, [id number], SAT, PCO2, HCO3, BASEEXCESS, BRIE, BEB  No results found for: TSH  Lab Results   Component Value Date    GLC 85 03/21/2018     (H) 01/30/2017     No results found for: HGB  Lab Results   Component Value Date    BUN 16 03/21/2018    BUN 17 01/30/2017    CR 0.91 03/21/2018    CR 0.92 01/30/2017     No results found for: KENNETH     Twenty-five minutes spent with patient, all of which were spent face-to-face counseling, consulting, coordinating plan of care, going over sleep test results and chart review.          Jim" JANNIE Reina MD 8/23/2018   [facility] [place]  [address], [place] [address]   [phone] Clinic      CC: No ref. provider found  Copy to: Angelica Pate

## 2018-08-23 NOTE — NURSING NOTE
"Chief Complaint   Patient presents with     RECHECK     f/u Psg titration with TCM 8/15       Initial /64  Pulse 61  Resp 16  Ht 1.727 m (5' 8\")  Wt 133.8 kg (295 lb)  SpO2 95%  BMI 44.85 kg/m2 Estimated body mass index is 44.85 kg/(m^2) as calculated from the following:    Height as of this encounter: 1.727 m (5' 8\").    Weight as of this encounter: 133.8 kg (295 lb).    Medication Reconciliation: complete    Guillermina Salvador LPN/MICHAEL    DME: N  "

## 2018-08-23 NOTE — MR AVS SNAPSHOT
After Visit Summary   8/23/2018    Oliver Agarwal    MRN: [id number]           Patient Information     Date Of Birth          1965        Visit Information        Provider Department      8/23/2018 2:00 PM Jim Reina MD; [facility] [place]        Today's Diagnoses     ARNULFO (obstructive sleep apnea)    -  1      Care Instructions    MY TREATMENT INFORMATION FOR SLEEP APNEA-  Oliver Agarwal    MY CONTACT NUMBERS ARE:  [phone]  DOCTOR : Jim Reina MD  SLEEP CENTER :  [place]  CPAP EQUIPMENT     You have severe sleep apnea with low oxygen, auto-CPAP is prescribed for you.   AHI 0-5/hr normal  AHI 5-15 evens of hour is a mild sleep apnea  AHI 15-30/hr is moderate sleep apnea  AHI over 30/hr is severe sleep apnea)    CPAP therapy includes adaptation to a mask interface and a delivery of air pressure.    You will be provided with a CPAP with a pressure range of 10 cmH2O with heated humidity to limit nasal congestion. Adjust the heat level on humidifier to find a setting that prevents dry nose but does not cause condensation in the hose or mask. Use distilled water in the humidifier.    The CPAP has a ramp function that starts the pressure lower than your prescribed pressure and gradually increases it over a number of minutes.  This may make it easier to fall asleep.                  Try to use the CPAP every-night, all night, at least 7-8 hours each night.  Daytime naps are not advised, but use CPAP if taking naps. Many insurances require that we prove you are using the CPAP at least 4 hours on at least 70% of nights over a 30 day period. We have 90 days to meet those criteria.    Objective measure goal  Compliance  Goal >70% (preferrably 100%)  Leak   Goal < 10% (less than 24 L/min)  AHI  Goal < 5 events per hour   Usage  Goal 7-8 hours.         Patient was advised not to drive if drowsy or sleepy.                Discussed weight management and  "the impact of weight gain on sleep apnea.  Let me know if you snore or feel the pressure is too high.    - Try mask desensitization as below and do not remove mask headgear when you go to bathroom at night, but just unplug the hose.    You can get new supplies (mask, hose and filter) for your CPAP every 3-6 months, covered by insurance. You do not need to get supplies that often, but they are available if you would like them.  You may exchange the mask once within the first month if you feel the initial mask does not fit well.  Contact your medical equipment provider for equipment issues.  Please let me know if you have any return of snoring, daytime sleepiness or poor sleep quality. We will want to make sure your CPAP is adequately treating your apnea.  There is a website called CPAP.com that has accessories that may make CPAP use easier. Please visit it at your convenience.    Our phone number is [phone]    Follow-up 4-6 weeks after PAP usage.  Bring your CPAP machine with you to the follow up appointment.    Frequently asked questions:  1. What is Obstructive Sleep Apnea (ARNULFO)? ARNULFO is the most common type of sleep apnea. Apnea literally means, \"without breath.\" It is characterized by repetitive pauses in breathing, despite continued effort to breathe, and is usually associated with a reduction in blood oxygen saturation. Apneas can last 10 to over 60 seconds. It is caused by narrowing or collapse of the upper airway as muscles relax during sleep. Severity of sleep apnea is determined by frequency of breathing events and their effect on your sleep and oxygen levels determined during sleep testing.   2. What are the consequences of ARNULFO? Symptoms include: daytime sleepiness- possibly increasing the risk of falling asleep while driving, unrefreshing/restless sleep, snoring, insomnia, waking frequently to urinate, waking with heartburn or reflux, reduced concentration and memory, and morning headaches. Other " health consequences may include development of high blood pressure and other cardiovascular disease in persons who are susceptible. Untreated ARNULFO  can contribute to heart disease, stroke and diabetes.   3. What are the treatment options? In most situations, sleep apnea is a lifelong disease that must be managed with daily therapy. Medications are not effective for sleep apnea and surgery is generally not performed until other therapies have been tried. Therapy is usually tailored to the individual patient based on many factors including your wishes as well as severity of sleep apnea and severity of obesity. Continuous Positive Airway (CPAP) is the most reliable treatment. An oral device to hold your jaw forward is usually      IF I HAVE SLEEP APNEA.....  WHERE CAN I FIND MORE INFORMATION?    American Academy of Sleep Medicine Patient information on sleep disorders:  http://yoursleep.aasmnet.org    THINGS I SHOULD REMEMBER  In most situations, sleep apnea is a lifelong disease that must be managed with daily therapy. Untreated disease, when severe, may result in an increased risk for an array of problems from heart disease to mood changes, car accidents and shorter lifespan.    CPAP-  WHY AND HOW?                                    Continuous positive airway pressure, or CPAP, is the most effective treatment for obstructive sleep apnea. A decision to use CPAP is a major step forward in the pursuit of a healthier life. The successful use of CPAP will help you breathe easier, sleep better and live healthier. Using CPAP can be a positive experience if you keep these fregoso points in mind:  1. Commitment  CPAP is not a quick fix for your problem. It involves a long-term commitment to improve your sleep and your health.    2. Communication  Stay in close communication with both your sleep doctor and your CPAP supplier. Ask lots of questions and seek help when you need it.    3. Consistency  Use CPAP all night, every night  "and for every nap. You will receive the maximum health benefits from CPAP when you use it every time that you sleep. This will also make it easier for your body to adjust to the treatment.    4. Correction  The first machine and mask that you try may not be the best ones for you. Work with your sleep doctor and your CPAP supplier to make corrections to your equipment selection. Ask about trying a different type of machine or mask if you have ongoing problems. Make sure that your mask is a good fit and learn to use your equipment properly.    5. Challenge  Tell a family member or close friend to ask you each morning if you used your CPAP the previous night. Have someone to challenge you to give it your best effort.    6. Connection   Your adjustment to CPAP will be easier if you are able to connect with others who use the same treatment. Ask your sleep doctor if there is a support group in your area for people who have sleep apnea, or look for one on the Internet.    7. Comfort   Increase your level of comfort by using a saline spray, decongestant or heated humidifier if CPAP irritates your nose, mouth or throat. Use your unit's \"ramp\" setting to slowly get used to the air pressure level. There may be soft pads you can buy that will fit over your mask straps. Look on www.CPAP.com for accessories such as these straps, a pillow contoured for side-sleeping with CPAP, longer hoses, hose covers to reduce condensation, or stands to keep the hose out of your way.                                                              8. Cleaning   Clean your mask, tubing and headgear on a regular basis. Put this time in your schedule so that you don't forget to do it. Check and replace the filters for your CPAP unit and humidifier.    9. Completion   Although you are never finished with CPAP therapy, you should reward yourself by celebrating the completion of your first month of treatment. Expect this first month to be your hardest " period of adjustment. It will involve some trial and error as you find the machine, mask and pressure settings that are right for you.    10. Continuation  After your first month of treatment, continue to make a daily commitment to use your CPAP all night, every night and for every nap.    CPAP-Tips to starting with success:  Begin using your CPAP for short periods of time during the day while you watch TV or read.    Use CPAP every night and for every nap. Using it less often reduces the health benefits and makes it harder for your body to get used to it.    Newer CPAP models are virtually silent; however, if you find the sound of your CPAP machine to be bothersome, place the unit under your bed to dampen the sound.     Make small adjustments to your mask, tubing, straps and headgear until you get the right fit. Tightening the mask may actually worsen the leak.  If it leaves significant marks on your face or irritates the bridge of your nose, it may not be the best mask for you.  Speak with the person who supplied the mask and consider trying other masks.    Use a saline nasal spray to ease mild nasal congestion. Neti-Pot or saline nasal rinses may also help. Nasal gel sprays can help reduce nasal dryness.  Biotene mouthwash can be helpful to protect your teeth if you experience frequent dry mouth.  Dry mouth may be a sign of air escaping out of your mouth or out of the mask in the case of a full face mask.  Speak with your provider if you expect that is the case.     Take a nasal decongestant to relieve more severe nasal or sinus congestion.  Do not use Afrin (oxymetazoline) nasal spray more than 3 days in a row.  Speak with your sleep doctor if your nasal congestion is chronic.    Use a heated humidifier that fits your CPAP model to enhance your breathing comfort. Adjust the heat setting up if you get a dry nose or throat, down if you get condensation in the hose or mask.  Position the CPAP lower than you so that  "any condensation in the hose drains back into the machine rather than towards the mask.    Try a system that uses nasal pillows if traditional masks give you problems.    Clean your mask, tubing and headgear once a week. Make sure the equipment dries fully.    Regularly check and replace the filters for your CPAP unit and humidifier.    Work closely with your sleep doctor and your CPAP supplier to make sure that you have the machine, mask and air pressure setting that works best for you.        MASK DESENSITIZATION:    If you are experiencing some anxiety about trying a PAP mask or breathing with pressurized air try the following steps in sequence to get used to PAP. This is called \"desensitization\".    1.  Wear mask (disconnected from the device) for 60 minutes daily awake and use a distraction: Sit and watch TV, read, or listen to music with the mask on. Take the mask off and put it back on, as needed. This can be in a chair in the living room, for example.    2.  When you can use the mask without taking it off for 60 minutes and without anxiety, then proceed to step 3.    3.  Attach the mask to the PAP device, and switch the unit  on  and practice breathing through the mask for one hour while watching television, reading or performing some other sedentary, distracting activity.     4.  Once you are comfortable wearing PAP for 30-60 minutes without anxiety while distracted with an activity, try to use it in bed. You can continue distraction in bed by watching TV, reading, listening to music or an audio book, etc.  The main goal is to  not think about using PAP  but pay attention to relaxing.    5. Use the PAP during scheduled one-hour naps at home.    6. Use PAP during initial 3-4 hours of nocturnal sleep.    7. Use PAP through an entire night of sleep.        Your BMI is Body mass index is 44.85 kg/(m^2).  Weight management is a personal decision.  If you are interested in exploring weight loss strategies, the " following discussion covers the approaches that may be successful. Body mass index (BMI) is one way to tell whether you are at a healthy weight, overweight, or obese. It measures your weight in relation to your height.  A BMI of 18.5 to 24.9 is in the healthy range. A person with a BMI of 25 to 29.9 is considered overweight, and someone with a BMI of 30 or greater is considered obese. More than two-thirds of American adults are considered overweight or obese.  Being overweight or obese increases the risk for further weight gain. Excess weight may lead to heart disease and diabetes.  Creating and following plans for healthy eating and physical activity may help you improve your health.  Weight control is part of healthy lifestyle and includes exercise, emotional health, and healthy eating habits. Careful eating habits lifelong are the mainstay of weight control. Though there are significant health benefits from weight loss, long-term weight loss with diet alone may be very difficult to achieve- studies show long-term success with dietary management in less than 10% of people. Attaining a healthy weight may be especially difficult to achieve in those with severe obesity. In some cases, medications, devices and surgical management might be considered.  What can you do?  If you are overweight or obese and are interested in methods for weight loss, you should discuss this with your provider.     Consider reducing daily calorie intake by 500 calories.     Keep a food journal.     Avoiding skipping meals, consider cutting portions instead.    Diet combined with exercise helps maintain muscle while optimizing fat loss. Strength training is particularly important for building and maintaining muscle mass. Exercise helps reduce stress, increase energy, and improves fitness. Increasing exercise without diet control, however, may not burn enough calories to loose weight.       Start walking three days a week 10-20 minutes at a  time    Work towards walking thirty minutes five days a week     Eventually, increase the speed of your walking for 1-2 minutes at time    In addition, we recommend that you review healthy lifestyles and methods for weight loss available through the National Institutes of Health patient information sites:  http://win.niddk.nih.gov/publications/index.htm    And look into health and wellness programs that may be available through your health insurance provider, employer, local community center, or debra club.    Weight management plan: Patient was referred to their PCP to discuss a diet and exercise plan.     Your blood pressure was checked while you were in clinic today.  Please read the guidelines below about what these numbers mean and what you should do about them.  Your systolic blood pressure is the top number.  This is the pressure when the heart is pumping.  Your diastolic blood pressure is the bottom number.  This is the pressure in between beats.  If your systolic blood pressure is less than 120 and your diastolic blood pressure is less than 80, then your blood pressure is normal. There is nothing more that you need to do about it  If your systolic blood pressure is 120-139 or your diastolic blood pressure is 80-89, your blood pressure may be higher than it should be.  You should have your blood pressure re-checked within a year by a primary care provider.  If your systolic blood pressure is 140 or greater or your diastolic blood pressure is 90 or greater, you may have high blood pressure.  High blood pressure is treatable, but if left untreated over time it can put you at risk for heart attack, stroke, or kidney failure.  You should have your blood pressure re-checked by a primary care provider within the next four weeks.              Follow-ups after your visit        Who to contact     If you have questions or need follow up information about today's clinic visit or your schedule please contact [facility] directly at [phone].  Normal or non-critical lab and imaging results will be communicated to you by MyChart, letter or phone within 4 business days after the clinic has received the results. If you do not hear from us within 7 days, please contact the clinic through [employer] or phone. If you have a critical or abnormal lab result, we will notify you by phone as soon as possible.  Submit refill requests through [employer] or call your pharmacy and they will forward the refill request to us. Please allow 3 business days for your refill to be completed.          Additional Information About Your Visit        [employer] Information     [employer] gives you secure access to your electronic health record. If you see a primary care provider, you can also send messages to your care team and make appointments. If you have questions, please call your primary care clinic.  If you do not have a primary care provider, please call [phone] and they will assist you.        Care EveryWhere ID     This is your Care EveryWhere ID. This could be used by other organizations to access your [facility] medical records  [id number]        Your Vitals Were     Pulse Respirations Height Pulse Oximetry BMI (Body Mass Index)       61 16 1.727 m (5' 8\") 95% 44.85 kg/m2        Blood Pressure from Last 3 Encounters:   08/23/18 103/64   07/06/18 125/79   06/27/18 110/50    Weight from Last 3 Encounters:   08/23/18 133.8 kg (295 lb)   06/27/18 136.5 kg (301 lb)   06/18/18 137 kg (302 lb)              We Performed the Following     Comprehensive DME        Primary Care Provider Office Phone # Fax #    Angelica BROOK [facility] [phone]        Equal Access to Services     CATHERINE OCHOA AH: Daniel Gamez, shlomo mcgowan, pearl calloway, amadeo gao. So [facility] [phone].    ATENCIÓN: Si casimiro sanchez, " tiene a harkins disposición servicios gratuitos de asistencia lingüística. Rita weeks [phone].    We comply with applicable federal civil rights laws and [place] laws. We do not discriminate on the basis of race, color, national origin, age, disability, sex, sexual orientation, or gender identity.            Thank you!     Thank you for choosing [facility]  for your care. Our goal is always to provide you with excellent care. Hearing back from our patients is one way we can continue to improve our services. Please take a few minutes to complete the written survey that you may receive in the mail after your visit with us. Thank you!             Your Updated Medication List - Protect others around you: Learn how to safely use, store and throw away your medicines at www.disposemymeds.org.          This list is accurate as of 8/23/18  2:23 PM.  Always use your most recent med list.                   Brand Name Dispense Instructions for use Diagnosis    * albuterol (2.5 MG/3ML) 0.083% neb solution     30 vial    Take 1 vial (2.5 mg) by nebulization every 4 hours as needed for shortness of breath / dyspnea    Mild persistent asthma not dependent on systemic steroids       * albuterol 108 (90 Base) MCG/ACT inhaler    PROAIR HFA    8.5 g    INHALE 2 PUFFS BY MOUTH EVERY 6 HOURS AS NEEDED    Mild persistent asthma without complication       alclomethasone 0.05 % cream    ACLOVATE    15 g    Apply to affected areas on face once per day but only for 3 consecutive days    Seborrheic dermatitis       ammonium lactate 12 % cream    LAC-HYDRIN    385 g    Apply topically nightly as needed for dry skin    Pitted keratolysis       Azelaic Acid 15 % gel     50 g    Apply 0.5 inches topically 2 times daily Massage thin film gently into afected areas morning and evening.    Rosacea       blood glucose monitoring lancets     1 Box    Use to test blood sugars once daily as directed.    Prediabetes       blood  glucose monitoring test strip    ANTONIO CONTOUR NEXT    100 each    Use to test blood sugar one x  daily or as directed.    Prediabetes       CENTROVITE Tabs     100 tablet    TAKE 1 TABLET BY MOUTH EVERY DAY    Vitamin deficiency       cyanocolbalamin 500 MCG tablet    vitamin  B-12     Take 500 mcg by mouth daily        cyclobenzaprine 10 MG tablet    FLEXERIL    30 tablet    Take 0.5-1 tablets (5-10 mg) by mouth 3 times daily as needed for muscle spasms    Acute right-sided low back pain without sciatica       desonide 0.05 % cream    DESOWEN    15 g    APPLY SPARINGLY TO THE AFFECTED AREA TWICE DAILY FOR ONLY 3 CONSECUTIVE DAYS    Eczema, unspecified type       doxycycline monohydrate 50 MG capsule     90 capsule    1 tab per day    Rosacea       fluticasone 50 MCG/ACT spray    FLONASE    1 Bottle    Spray 2 sprays into both nostrils daily    Chronic allergic rhinitis due to other allergic trigger, unspecified seasonality       fluticasone-salmeterol 250-50 MCG/DOSE diskus inhaler    ADVAIR DISKUS    60 Inhaler    Inhale 1 puff into the lungs 2 times daily Taking prn    Mild persistent asthma without complication       ibuprofen 600 MG tablet    ADVIL/MOTRIN    120 tablet    TAKE 1 TABLET BY MOUTH EVERY 6 HOURS AS NEEDED FOR MODERATE PAIN    Pain       ivermectin 1 % cream    SOOLANTRA    30 g    Apply to the affected areas of the face once daily. Use a pea-size amount for each area of the face  that is affected. THIN LAYER    Rosacea       metroNIDAZOLE 0.75 % topical gel    METROGEL    45 g    Apply topically 2 times daily    Rosacea       montelukast 10 MG tablet    SINGULAIR    90 tablet    Take 1 tablet (10 mg) by mouth At Bedtime    Mild persistent asthma without complication       oxyCODONE-acetaminophen 5-325 MG per tablet    PERCOCET    10 tablet    Take 1-2 tablets by mouth every 6 hours as needed for pain    Chronic midline low back pain with right-sided sciatica       propylene glycol 0.6 % Soln  ophthalmic solution    SYSTANE BALANCE    1 Bottle    Place 1 drop into both eyes 4 times daily    Dry eyes       terbinafine 1 % cream    lamISIL AT    42 g    Apply topically 2 times daily For fungal infection not resolved with other antifungals (e.g. Clotrimazole)    Tinea pedis of both feet       tobramycin-dexamethasone 0.3-0.1 % ophthalmic susp    TOBRADEX    1 Bottle    Place 1 drop into the right eye 4 times daily    Corneal erosion, right       triamcinolone 0.1 % ointment    KENALOG    80 g    Apply topically 2 times daily Apply to affected areas on hands bid for 14 days and then when needed.Not for face or groin    Eczema, unspecified type       vitamin D 2000 units Caps      Take 4,000 Units by mouth daily        zolpidem 10 MG tablet    AMBIEN    1 tablet    Take tablet by mouth 15 minutes prior to sleep, for Sleep Study    ARNULFO (obstructive sleep apnea)       * Notice:  This list has 2 medication(s) that are the same as other medications prescribed for you. Read the directions carefully, and ask your doctor or other care provider to review them with you.

## 2018-08-23 NOTE — PATIENT INSTRUCTIONS
MY TREATMENT INFORMATION FOR SLEEP APNEA-  Oliver PONCE Stefano    MY CONTACT NUMBERS ARE:  [phone]  DOCTOR : Jim Reina MD  SLEEP CENTER :  [facility]  CPAP EQUIPMENT     You have severe sleep apnea with low oxygen, auto-CPAP is prescribed for you.   AHI 0-5/hr normal  AHI 5-15 evens of hour is a mild sleep apnea  AHI 15-30/hr is moderate sleep apnea  AHI over 30/hr is severe sleep apnea)    CPAP therapy includes adaptation to a mask interface and a delivery of air pressure.    You will be provided with a CPAP with a pressure range of 10 cmH2O with heated humidity to limit nasal congestion. Adjust the heat level on humidifier to find a setting that prevents dry nose but does not cause condensation in the hose or mask. Use distilled water in the humidifier.    The CPAP has a ramp function that starts the pressure lower than your prescribed pressure and gradually increases it over a number of minutes.  This may make it easier to fall asleep.                  Try to use the CPAP every-night, all night, at least 7-8 hours each night.  Daytime naps are not advised, but use CPAP if taking naps. Many insurances require that we prove you are using the CPAP at least 4 hours on at least 70% of nights over a 30 day period. We have 90 days to meet those criteria.    Objective measure goal  Compliance  Goal >70% (preferrably 100%)  Leak   Goal < 10% (less than 24 L/min)  AHI  Goal < 5 events per hour   Usage  Goal 7-8 hours.         Patient was advised not to drive if drowsy or sleepy.                Discussed weight management and the impact of weight gain on sleep apnea.  Let me know if you snore or feel the pressure is too high.    - Try mask desensitization as below and do not remove mask headgear when you go to bathroom at night, but just unplug the hose.    You can get new supplies (mask, hose and filter) for your CPAP every 3-6 months, covered by insurance. You do not need to get supplies that often, but  "they are available if you would like them.  You may exchange the mask once within the first month if you feel the initial mask does not fit well.  Contact your medical equipment provider for equipment issues.  Please let me know if you have any return of snoring, daytime sleepiness or poor sleep quality. We will want to make sure your CPAP is adequately treating your apnea.  There is a website called CPAP.com that has accessories that may make CPAP use easier. Please visit it at your convenience.    Our phone number is [phone]    Follow-up 4-6 weeks after PAP usage.  Bring your CPAP machine with you to the follow up appointment.    Frequently asked questions:  1. What is Obstructive Sleep Apnea (ARNULFO)? ARNULFO is the most common type of sleep apnea. Apnea literally means, \"without breath.\" It is characterized by repetitive pauses in breathing, despite continued effort to breathe, and is usually associated with a reduction in blood oxygen saturation. Apneas can last 10 to over 60 seconds. It is caused by narrowing or collapse of the upper airway as muscles relax during sleep. Severity of sleep apnea is determined by frequency of breathing events and their effect on your sleep and oxygen levels determined during sleep testing.   2. What are the consequences of ARNULFO? Symptoms include: daytime sleepiness- possibly increasing the risk of falling asleep while driving, unrefreshing/restless sleep, snoring, insomnia, waking frequently to urinate, waking with heartburn or reflux, reduced concentration and memory, and morning headaches. Other health consequences may include development of high blood pressure and other cardiovascular disease in persons who are susceptible. Untreated ARNULFO  can contribute to heart disease, stroke and diabetes.   3. What are the treatment options? In most situations, sleep apnea is a lifelong disease that must be managed with daily therapy. Medications are not effective for sleep apnea and surgery " is generally not performed until other therapies have been tried. Therapy is usually tailored to the individual patient based on many factors including your wishes as well as severity of sleep apnea and severity of obesity. Continuous Positive Airway (CPAP) is the most reliable treatment. An oral device to hold your jaw forward is usually      IF I HAVE SLEEP APNEA.....  WHERE CAN I FIND MORE INFORMATION?    American Academy of Sleep Medicine Patient information on sleep disorders:  http://yoursleep.aasmnet.org    THINGS I SHOULD REMEMBER  In most situations, sleep apnea is a lifelong disease that must be managed with daily therapy. Untreated disease, when severe, may result in an increased risk for an array of problems from heart disease to mood changes, car accidents and shorter lifespan.    CPAP-  WHY AND HOW?                                    Continuous positive airway pressure, or CPAP, is the most effective treatment for obstructive sleep apnea. A decision to use CPAP is a major step forward in the pursuit of a healthier life. The successful use of CPAP will help you breathe easier, sleep better and live healthier. Using CPAP can be a positive experience if you keep these fregoso points in mind:  1. Commitment  CPAP is not a quick fix for your problem. It involves a long-term commitment to improve your sleep and your health.    2. Communication  Stay in close communication with both your sleep doctor and your CPAP supplier. Ask lots of questions and seek help when you need it.    3. Consistency  Use CPAP all night, every night and for every nap. You will receive the maximum health benefits from CPAP when you use it every time that you sleep. This will also make it easier for your body to adjust to the treatment.    4. Correction  The first machine and mask that you try may not be the best ones for you. Work with your sleep doctor and your CPAP supplier to make corrections to your equipment selection. Ask about  "trying a different type of machine or mask if you have ongoing problems. Make sure that your mask is a good fit and learn to use your equipment properly.    5. Challenge  Tell a family member or close friend to ask you each morning if you used your CPAP the previous night. Have someone to challenge you to give it your best effort.    6. Connection   Your adjustment to CPAP will be easier if you are able to connect with others who use the same treatment. Ask your sleep doctor if there is a support group in your area for people who have sleep apnea, or look for one on the Internet.    7. Comfort   Increase your level of comfort by using a saline spray, decongestant or heated humidifier if CPAP irritates your nose, mouth or throat. Use your unit's \"ramp\" setting to slowly get used to the air pressure level. There may be soft pads you can buy that will fit over your mask straps. Look on www.CPAP.com for accessories such as these straps, a pillow contoured for side-sleeping with CPAP, longer hoses, hose covers to reduce condensation, or stands to keep the hose out of your way.                                                              8. Cleaning   Clean your mask, tubing and headgear on a regular basis. Put this time in your schedule so that you don't forget to do it. Check and replace the filters for your CPAP unit and humidifier.    9. Completion   Although you are never finished with CPAP therapy, you should reward yourself by celebrating the completion of your first month of treatment. Expect this first month to be your hardest period of adjustment. It will involve some trial and error as you find the machine, mask and pressure settings that are right for you.    10. Continuation  After your first month of treatment, continue to make a daily commitment to use your CPAP all night, every night and for every nap.    CPAP-Tips to starting with success:  Begin using your CPAP for short periods of time during the day " while you watch TV or read.    Use CPAP every night and for every nap. Using it less often reduces the health benefits and makes it harder for your body to get used to it.    Newer CPAP models are virtually silent; however, if you find the sound of your CPAP machine to be bothersome, place the unit under your bed to dampen the sound.     Make small adjustments to your mask, tubing, straps and headgear until you get the right fit. Tightening the mask may actually worsen the leak.  If it leaves significant marks on your face or irritates the bridge of your nose, it may not be the best mask for you.  Speak with the person who supplied the mask and consider trying other masks.    Use a saline nasal spray to ease mild nasal congestion. Neti-Pot or saline nasal rinses may also help. Nasal gel sprays can help reduce nasal dryness.  Biotene mouthwash can be helpful to protect your teeth if you experience frequent dry mouth.  Dry mouth may be a sign of air escaping out of your mouth or out of the mask in the case of a full face mask.  Speak with your provider if you expect that is the case.     Take a nasal decongestant to relieve more severe nasal or sinus congestion.  Do not use Afrin (oxymetazoline) nasal spray more than 3 days in a row.  Speak with your sleep doctor if your nasal congestion is chronic.    Use a heated humidifier that fits your CPAP model to enhance your breathing comfort. Adjust the heat setting up if you get a dry nose or throat, down if you get condensation in the hose or mask.  Position the CPAP lower than you so that any condensation in the hose drains back into the machine rather than towards the mask.    Try a system that uses nasal pillows if traditional masks give you problems.    Clean your mask, tubing and headgear once a week. Make sure the equipment dries fully.    Regularly check and replace the filters for your CPAP unit and humidifier.    Work closely with your sleep doctor and your CPAP  "supplier to make sure that you have the machine, mask and air pressure setting that works best for you.        MASK DESENSITIZATION:    If you are experiencing some anxiety about trying a PAP mask or breathing with pressurized air try the following steps in sequence to get used to PAP. This is called \"desensitization\".    1.  Wear mask (disconnected from the device) for 60 minutes daily awake and use a distraction: Sit and watch TV, read, or listen to music with the mask on. Take the mask off and put it back on, as needed. This can be in a chair in the living room, for example.    2.  When you can use the mask without taking it off for 60 minutes and without anxiety, then proceed to step 3.    3.  Attach the mask to the PAP device, and switch the unit  on  and practice breathing through the mask for one hour while watching television, reading or performing some other sedentary, distracting activity.     4.  Once you are comfortable wearing PAP for 30-60 minutes without anxiety while distracted with an activity, try to use it in bed. You can continue distraction in bed by watching TV, reading, listening to music or an audio book, etc.  The main goal is to  not think about using PAP  but pay attention to relaxing.    5. Use the PAP during scheduled one-hour naps at home.    6. Use PAP during initial 3-4 hours of nocturnal sleep.    7. Use PAP through an entire night of sleep.        Your BMI is Body mass index is 44.85 kg/(m^2).  Weight management is a personal decision.  If you are interested in exploring weight loss strategies, the following discussion covers the approaches that may be successful. Body mass index (BMI) is one way to tell whether you are at a healthy weight, overweight, or obese. It measures your weight in relation to your height.  A BMI of 18.5 to 24.9 is in the healthy range. A person with a BMI of 25 to 29.9 is considered overweight, and someone with a BMI of 30 or greater is considered obese. " More than two-thirds of American adults are considered overweight or obese.  Being overweight or obese increases the risk for further weight gain. Excess weight may lead to heart disease and diabetes.  Creating and following plans for healthy eating and physical activity may help you improve your health.  Weight control is part of healthy lifestyle and includes exercise, emotional health, and healthy eating habits. Careful eating habits lifelong are the mainstay of weight control. Though there are significant health benefits from weight loss, long-term weight loss with diet alone may be very difficult to achieve- studies show long-term success with dietary management in less than 10% of people. Attaining a healthy weight may be especially difficult to achieve in those with severe obesity. In some cases, medications, devices and surgical management might be considered.  What can you do?  If you are overweight or obese and are interested in methods for weight loss, you should discuss this with your provider.     Consider reducing daily calorie intake by 500 calories.     Keep a food journal.     Avoiding skipping meals, consider cutting portions instead.    Diet combined with exercise helps maintain muscle while optimizing fat loss. Strength training is particularly important for building and maintaining muscle mass. Exercise helps reduce stress, increase energy, and improves fitness. Increasing exercise without diet control, however, may not burn enough calories to loose weight.       Start walking three days a week 10-20 minutes at a time    Work towards walking thirty minutes five days a week     Eventually, increase the speed of your walking for 1-2 minutes at time    In addition, we recommend that you review healthy lifestyles and methods for weight loss available through the National Institutes of Health patient information sites:  http://win.niddk.nih.gov/publications/index.htm    And look into health and  wellness programs that may be available through your health insurance provider, employer, local community center, or debra club.    Weight management plan: Patient was referred to their PCP to discuss a diet and exercise plan.     Your blood pressure was checked while you were in clinic today.  Please read the guidelines below about what these numbers mean and what you should do about them.  Your systolic blood pressure is the top number.  This is the pressure when the heart is pumping.  Your diastolic blood pressure is the bottom number.  This is the pressure in between beats.  If your systolic blood pressure is less than 120 and your diastolic blood pressure is less than 80, then your blood pressure is normal. There is nothing more that you need to do about it  If your systolic blood pressure is 120-139 or your diastolic blood pressure is 80-89, your blood pressure may be higher than it should be.  You should have your blood pressure re-checked within a year by a primary care provider.  If your systolic blood pressure is 140 or greater or your diastolic blood pressure is 90 or greater, you may have high blood pressure.  High blood pressure is treatable, but if left untreated over time it can put you at risk for heart attack, stroke, or kidney failure.  You should have your blood pressure re-checked by a primary care provider within the next four weeks.

## 2018-08-27 ENCOUNTER — MYC REFILL (OUTPATIENT)
Dept: FAMILY MEDICINE | Facility: CLINIC | Age: 53
End: 2018-08-27

## 2018-08-27 DIAGNOSIS — R73.03 PREDIABETES: ICD-10-CM

## 2018-08-28 NOTE — TELEPHONE ENCOUNTER
Message from MyChart:  Original authorizing provider: BROOK Rosa CNP would like a refill of the following medications:  blood glucose monitoring (ANTONIO CONTOUR NEXT) test strip [BROOK Rosa CNP]  blood glucose monitoring (ANTONIO MICROLET) lancets [BROOK Rosa CNP]    Preferred pharmacy: [id number] - [place], [place] - [address] AT [facility]    Comment:

## 2018-08-31 ENCOUNTER — OFFICE VISIT (OUTPATIENT)
Dept: OPTOMETRY | Facility: CLINIC | Age: 53
End: 2018-08-31
Payer: MEDICARE

## 2018-08-31 DIAGNOSIS — H04.123 DRY EYES: ICD-10-CM

## 2018-08-31 DIAGNOSIS — H10.829 ROSACEA BLEPHAROCONJUNCTIVITIS: Primary | ICD-10-CM

## 2018-08-31 PROCEDURE — 92012 INTRM OPH EXAM EST PATIENT: CPT | Performed by: OPTOMETRIST

## 2018-08-31 ASSESSMENT — VISUAL ACUITY
METHOD: SNELLEN - LINEAR
OS_CC: 20/20
CORRECTION_TYPE: GLASSES
OD_CC: 20/20
OS_CC+: -1

## 2018-08-31 NOTE — PROGRESS NOTES
Chief Complaint   Patient presents with     Eye Problem Right Eye     Stopped using lid hygiene per rheumatologist  Warm washcloth to clean lids, no warm compresses or tears lately  Awoke with irritated right eye a couple days ago, getting better   Had been using systane balance as needed , systane gel at bedtime   Derm increased the doxy to 100 mg two times daily about a week and a half ago    Do you wear contact lenses? No    HPI    Affected eye(s):  Right   Location:  Medial, Lateral   Symptoms:     Foreign body sensation   Dryness      Duration:  2 days   Frequency:  Constant       Do you have eye pain now?:  No      Comments:  Patient has used systane balance eye drops and systane gel the past few day.. Drops 3 times a day and uses the gel at night. Patient has discomfort in the eye.                Atiya Kenny, OD     See Review Of Systems     HPI performed by Optometrist  scribed by Shira Ahn, Optometric Tech    Medical, surgical and family histories reviewed and updated 8/31/2018.         OBJECTIVE: See Ophthalmology exam    ASSESSMENT:    ICD-10-CM    1. Rosacea blepharoconjunctivitis L71.9    2. Dry eyes H04.123       PLAN:    Increase artificial tear to four times daily and then use at a minimum once to twice daily, use gel for 6 weeks at bedtime then discontinue, if increase in dryness in am, restart every night or every other      Atiya Kenny OD

## 2018-08-31 NOTE — MR AVS SNAPSHOT
After Visit Summary   8/31/2018    Oliver Agarwal    MRN: [id number]           Patient Information     Date Of Birth          1965        Visit Information        Provider Department      8/31/2018 12:20 PM Atiya Kenny, JOSH; [facility]        Today's Diagnoses     Rosacea blepharoconjunctivitis    -  1    Dry eyes          Care Instructions      Increase artificial tear to four times daily and then use at a minimum once to twice daily, use gel for 6 weeks at bedtime then discontinue, if increase in dryness in am, restart every night or every other              Follow-ups after your visit        Follow-up notes from your care team     Return if symptoms worsen or fail to improve.      Your next 10 appointments already scheduled     Oct 30, 2018 11:00 AM CDT   Return Sleep Patient with Jim Reina MD   [facility] ([facility])    [address]  [place] [facility] [place] [address] [phone]              Who to contact     If you have questions or need follow up information about today's clinic visit or your schedule please contact [facility] directly at [phone].  Normal or non-critical lab and imaging results will be communicated to you by MyChart, letter or phone within 4 business days after the clinic has received the results. If you do not hear from us within 7 days, please contact the clinic through MyChart or phone. If you have a critical or abnormal lab result, we will notify you by phone as soon as possible.  Submit refill requests through [employer] or call your pharmacy and they will forward the refill request to us. Please allow 3 business days for your refill to be completed.          Additional Information About Your Visit        MyChart Information     [employer] gives you secure access to your electronic health record. If you see a primary care provider, you can also send  messages to your care team and make appointments. If you have questions, please call your primary care clinic.  If you do not have a primary care provider, please call [phone] and they will assist you.        Care EveryWhere ID     This is your Care EveryWhere ID. This could be used by other organizations to access your [facility] medical records  [id number]         Blood Pressure from Last 3 Encounters:   08/23/18 103/64   07/06/18 125/79   06/27/18 110/50    Weight from Last 3 Encounters:   08/23/18 133.8 kg (295 lb)   06/27/18 136.5 kg (301 lb)   06/18/18 137 kg (302 lb)              Today, you had the following     No orders found for display       Primary Care Provider Office Phone # Fax #    Angelica Rodriguez BROOK Pate [facility] [phone] [address] [address]        Equal Access to Services     CATHERINE OCHOA : Hadii juaquin ku hadasho Sogwen, waaxda luqadaha, qaybta kaalmada adeegyada, amadeo chaney . So [facility] [phone].    ATENCIÓN: Si habla español, tiene a harkins disposición servicios gratuitos de asistencia lingüística. Llame al [phone].    We comply with applicable federal civil rights laws and [place] laws. We do not discriminate on the basis of race, color, national origin, age, disability, sex, sexual orientation, or gender identity.            Thank you!     Thank you for choosing [facility]  for your care. Our goal is always to provide you with excellent care. Hearing back from our patients is one way we can continue to improve our services. Please take a few minutes to complete the written survey that you may receive in the mail after your visit with us. Thank you!             Your Updated Medication List - Protect others around you: Learn how to safely use, store and throw away your medicines at www.disposemymeds.org.          This list is accurate as of 8/31/18  1:03 PM.  Always use your most recent med list.                    Brand Name Dispense Instructions for use Diagnosis    * albuterol (2.5 MG/3ML) 0.083% neb solution     30 vial    Take 1 vial (2.5 mg) by nebulization every 4 hours as needed for shortness of breath / dyspnea    Mild persistent asthma not dependent on systemic steroids       * albuterol 108 (90 Base) MCG/ACT inhaler    PROAIR HFA    8.5 g    INHALE 2 PUFFS BY MOUTH EVERY 6 HOURS AS NEEDED    Mild persistent asthma without complication       alclomethasone 0.05 % cream    ACLOVATE    15 g    Apply to affected areas on face once per day but only for 3 consecutive days    Seborrheic dermatitis       ammonium lactate 12 % cream    LAC-HYDRIN    385 g    Apply topically nightly as needed for dry skin    Pitted keratolysis       Azelaic Acid 15 % gel     50 g    Apply 0.5 inches topically 2 times daily Massage thin film gently into afected areas morning and evening.    Rosacea       blood glucose monitoring lancets     100 each    Use to test blood sugars once daily as directed.    Prediabetes       blood glucose monitoring test strip    ANTONIO CONTOUR NEXT    100 each    Use to test blood sugar one x  daily or as directed.    Prediabetes       CENTROVITE Tabs     100 tablet    TAKE 1 TABLET BY MOUTH EVERY DAY    Vitamin deficiency       cyanocolbalamin 500 MCG tablet    vitamin  B-12     Take 500 mcg by mouth daily        cyclobenzaprine 10 MG tablet    FLEXERIL    30 tablet    Take 0.5-1 tablets (5-10 mg) by mouth 3 times daily as needed for muscle spasms    Acute right-sided low back pain without sciatica       desonide 0.05 % cream    DESOWEN    15 g    APPLY SPARINGLY TO THE AFFECTED AREA TWICE DAILY FOR ONLY 3 CONSECUTIVE DAYS    Eczema, unspecified type       doxycycline monohydrate 50 MG capsule     90 capsule    1 tab per day    Rosacea       fluticasone 50 MCG/ACT spray    FLONASE    1 Bottle    Spray 2 sprays into both nostrils daily    Chronic allergic rhinitis due to other allergic trigger,  unspecified seasonality       fluticasone-salmeterol 250-50 MCG/DOSE diskus inhaler    ADVAIR DISKUS    60 Inhaler    Inhale 1 puff into the lungs 2 times daily Taking prn    Mild persistent asthma without complication       ibuprofen 600 MG tablet    ADVIL/MOTRIN    120 tablet    TAKE 1 TABLET BY MOUTH EVERY 6 HOURS AS NEEDED FOR MODERATE PAIN    Pain       ivermectin 1 % cream    SOOLANTRA    30 g    Apply to the affected areas of the face once daily. Use a pea-size amount for each area of the face  that is affected. THIN LAYER    Rosacea       metroNIDAZOLE 0.75 % topical gel    METROGEL    45 g    Apply topically 2 times daily    Rosacea       montelukast 10 MG tablet    SINGULAIR    90 tablet    Take 1 tablet (10 mg) by mouth At Bedtime    Mild persistent asthma without complication       oxyCODONE-acetaminophen 5-325 MG per tablet    PERCOCET    10 tablet    Take 1-2 tablets by mouth every 6 hours as needed for pain    Chronic midline low back pain with right-sided sciatica       propylene glycol 0.6 % Soln ophthalmic solution    SYSTANE BALANCE    1 Bottle    Place 1 drop into both eyes 4 times daily    Dry eyes       terbinafine 1 % cream    lamISIL AT    42 g    Apply topically 2 times daily For fungal infection not resolved with other antifungals (e.g. Clotrimazole)    Tinea pedis of both feet       tobramycin-dexamethasone 0.3-0.1 % ophthalmic susp    TOBRADEX    1 Bottle    Place 1 drop into the right eye 4 times daily    Corneal erosion, right       triamcinolone 0.1 % ointment    KENALOG    80 g    Apply topically 2 times daily Apply to affected areas on hands bid for 14 days and then when needed.Not for face or groin    Eczema, unspecified type       vitamin D 2000 units Caps      Take 4,000 Units by mouth daily        zolpidem 10 MG tablet    AMBIEN    1 tablet    Take tablet by mouth 15 minutes prior to sleep, for Sleep Study    ARNULFO (obstructive sleep apnea)       * Notice:  This list has 2  medication(s) that are the same as other medications prescribed for you. Read the directions carefully, and ask your doctor or other care provider to review them with you.

## 2018-08-31 NOTE — PATIENT INSTRUCTIONS
Increase artificial tear to four times daily and then use at a minimum once to twice daily, use gel for 6 weeks at bedtime then discontinue, if increase in dryness in am, restart every night or every other

## 2018-08-31 NOTE — LETTER
8/31/2018         RE: Oliver Agarwal  [address]  [place] [address]        Dear Colleague,    Thank you [facility] referring your patient, [facility]wal[facility]. Please see a copy of my visit note below.    Chief Complaint   Patient presents with     Eye Problem Right Eye     Stopped using lid hygiene per rheumatologist  Warm washcloth to clean lids, no warm compresses or tears lately  Awoke with irritated right eye a couple days ago, getting better   Had been using systane balance as needed , systane gel at bedtime   Derm increased the doxy to 100 mg two times daily about a week and a half ago    Do you wear contact lenses? No    HPI    Affected eye(s):  Right   Location:  Medial, Lateral   Symptoms:     Foreign body sensation   Dryness      Duration:  2 days   Frequency:  Constant       Do you have eye pain now?:  No      Comments:  Patient has used systane balance eye drops and systane gel the past few day.. Drops 3 times a day and uses the gel at night. Patient has discomfort in the eye.                Atiya Kenny OD     See Review Of Systems     HPI performed by Optometrist  scribed by Shira Ahn, Optometric Tech    Medical, surgical and family histories reviewed and updated 8/31/2018.         OBJECTIVE: See Ophthalmology exam    ASSESSMENT:    ICD-10-CM    1. Rosacea blepharoconjunctivitis L71.9    2. Dry eyes H04.123       PLAN:    Increase artificial tear to four times daily and then use at a minimum once to twice daily, use gel for 6 weeks at bedtime then discontinue, if increase in dryness in am, restart every night or every other      Atiya Kenny OD     Again, thank you for allowing me to participate in the care of your patient.        Sincerely,        Atiya Kenny OD

## 2018-09-03 DIAGNOSIS — J30.89 CHRONIC ALLERGIC RHINITIS DUE TO OTHER ALLERGIC TRIGGER, UNSPECIFIED SEASONALITY: ICD-10-CM

## 2018-09-06 RX ORDER — FLUTICASONE PROPIONATE 50 MCG
SPRAY, SUSPENSION (ML) NASAL
Qty: 16 ML | Refills: 0 | Status: SHIPPED | OUTPATIENT
Start: 2018-09-06 | End: 2018-12-18

## 2018-09-18 ENCOUNTER — DOCUMENTATION ONLY (OUTPATIENT)
Dept: FAMILY MEDICINE | Facility: CLINIC | Age: 53
End: 2018-09-18

## 2018-10-07 DIAGNOSIS — J45.30 MILD PERSISTENT ASTHMA WITHOUT COMPLICATION: ICD-10-CM

## 2018-10-08 NOTE — TELEPHONE ENCOUNTER
"Requested Prescriptions   Pending Prescriptions Disp Refills     PROAIR  (90 Base) MCG/ACT inhaler [Pharmacy Med Name: PROAIR HFA ORAL INH (200  PFS) 8.5G]  Last Written Prescription Date:  6-18-18  Last Fill Quantity: 8.5 mg,  # refills: 2   Last office visit: 7/6/2018 with prescribing provider:  Angelica Pate    Future Office Visit:    8.5 g 0     Sig: INHALE 2 PUFFS BY MOUTH EVERY 6 HOURS AS NEEDED    Asthma Maintenance Inhalers - Anticholinergics Passed    10/7/2018 10:05 PM       Passed - Patient is age 12 years or older       Passed - Asthma control assessment score within normal limits in last 6 months    Please review ACT score.   ACT Total Scores 5/24/2016 6/13/2017 5/3/2018   ACT TOTAL SCORE - - -   ASTHMA ER VISITS - - -   ASTHMA HOSPITALIZATIONS - - -   ACT TOTAL SCORE (Goal Greater than or Equal to 20) 17 20 20   In the past 12 months, how many times did you visit the emergency room for your asthma without being admitted to the hospital? 0 0 0   In the past 12 months, how many times were you hospitalized overnight because of your asthma? 0 0 0           Passed - Recent (6 mo) or future (30 days) visit within the authorizing provider's specialty    Patient had office visit in the last 6 months or has a visit in the next 30 days with authorizing provider or within the authorizing provider's specialty.  See \"Patient Info\" tab in inbasket, or \"Choose Columns\" in Meds & Orders section of the refill encounter.              "

## 2018-10-10 ENCOUNTER — DOCUMENTATION ONLY (OUTPATIENT)
Dept: FAMILY MEDICINE | Facility: CLINIC | Age: 53
End: 2018-10-10

## 2018-10-10 DIAGNOSIS — J45.30 MILD PERSISTENT ASTHMA WITHOUT COMPLICATION: ICD-10-CM

## 2018-10-10 RX ORDER — ALBUTEROL SULFATE 90 UG/1
AEROSOL, METERED RESPIRATORY (INHALATION)
Qty: 8.5 G | Refills: 0 | OUTPATIENT
Start: 2018-10-10

## 2018-10-10 RX ORDER — ALBUTEROL SULFATE 90 UG/1
AEROSOL, METERED RESPIRATORY (INHALATION)
Qty: 8.5 G | Refills: 1 | Status: SHIPPED | OUTPATIENT
Start: 2018-10-10 | End: 2018-11-25

## 2018-10-10 NOTE — PROGRESS NOTES
I do not see any reason he needs an A1c.  He is barely prediabetic and had this checked less than 6 months ago.

## 2018-10-10 NOTE — TELEPHONE ENCOUNTER
Duplicate - already filled by RN today     Annetta Tello Registered Nurse   [facility]

## 2018-10-10 NOTE — PROGRESS NOTES
Please review, diagnose, and sign pending future order for GLYHB.  Patient is coming for lab appointment tomorrow and states that he also needs his A1C checked and there is no order in the chart.  You can close this encounter when you are finished.  Thank you.

## 2018-10-10 NOTE — TELEPHONE ENCOUNTER
Reason for Call:  Medication or medication refill:    Do you use a [facility]?  Name of the pharmacy and phone number for the current request: [id number] - [place], [place] - [address] AT [facility] [address]     Name of the medication requested: PROAIR  (90 Base) MCG/ACT inhaler    Other request: Pt called and is requesting the inhaler. He claims hes out. He had also contacted his pharmacy.  Please Advise thank you    Can we leave a detailed message on this number? YES    Phone number patient can be reached at: Home number on file [phone] (home)    Best Time: anytime    Call taken on 10/10/2018 at 1:15 PM by Frannie Antunez

## 2018-10-10 NOTE — TELEPHONE ENCOUNTER
"Requested Prescriptions   Pending Prescriptions Disp Refills     albuterol (PROAIR HFA) 108 (90 Base) MCG/ACT inhaler 8.5 g 2     Sig: INHALE 2 PUFFS BY MOUTH EVERY 6 HOURS AS NEEDED    Asthma Maintenance Inhalers - Anticholinergics Passed    10/10/2018  1:25 PM       Passed - Patient is age 12 years or older       Passed - Asthma control assessment score within normal limits in last 6 months    Please review ACT score.          Passed - Recent (6 mo) or future (30 days) visit within the authorizing provider's specialty    Patient had office visit in the last 6 months or has a visit in the next 30 days with authorizing provider or within the authorizing provider's specialty.  See \"Patient Info\" tab in inbasket, or \"Choose Columns\" in Meds & Orders section of the refill encounter.            ACT Total Scores 5/24/2016 6/13/2017 5/3/2018   ACT TOTAL SCORE - - -   ASTHMA ER VISITS - - -   ASTHMA HOSPITALIZATIONS - - -   ACT TOTAL SCORE (Goal Greater than or Equal to 20) 17 20 20   In the past 12 months, how many times did you visit the emergency room for your asthma without being admitted to the hospital? 0 0 0   In the past 12 months, how many times were you hospitalized overnight because of your asthma? 0 0 0       Prescription approved per [facility] Protocol.    Signed Prescriptions:                        Disp   Refills    albuterol (PROAIR HFA) 108 (90 Base) MCG/A*8.5 g  1        Sig: INHALE 2 PUFFS BY MOUTH EVERY 6 HOURS AS NEEDED  Authorizing Provider: KRYSTIN ROLDAN  Ordering User: SHAD TAYLOR      Called patient left message advising refill approved - via     Closing encounter - no further actions needed at this time    Shad Taylor RN      "

## 2018-10-12 DIAGNOSIS — R52 PAIN: ICD-10-CM

## 2018-10-12 LAB
ALT SERPL W P-5'-P-CCNC: 46 U/L (ref 0–70)
AST SERPL W P-5'-P-CCNC: 28 U/L (ref 0–45)

## 2018-10-12 PROCEDURE — 84450 TRANSFERASE (AST) (SGOT): CPT | Performed by: NURSE PRACTITIONER

## 2018-10-12 PROCEDURE — 36415 COLL VENOUS BLD VENIPUNCTURE: CPT | Performed by: NURSE PRACTITIONER

## 2018-10-12 PROCEDURE — 84460 ALANINE AMINO (ALT) (SGPT): CPT | Performed by: NURSE PRACTITIONER

## 2018-10-15 ENCOUNTER — TRANSFERRED RECORDS (OUTPATIENT)
Dept: HEALTH INFORMATION MANAGEMENT | Facility: CLINIC | Age: 53
End: 2018-10-15

## 2018-10-23 ENCOUNTER — CARE COORDINATION (OUTPATIENT)
Dept: SLEEP MEDICINE | Facility: CLINIC | Age: 53
End: 2018-10-23

## 2018-10-23 NOTE — PROGRESS NOTES
Faxed requested order for replacement cpap at 10.0 cm to [facility] at [phone]  .  Sending and email to Oliver to inform him and also ask about his appointment on 10/30/18 for return with the new machine.  He is medicare patient and if he is just receiving the machine the appointment on 10/30/18 needs to be moved.

## 2018-10-25 ENCOUNTER — DOCUMENTATION ONLY (OUTPATIENT)
Dept: SLEEP MEDICINE | Facility: CLINIC | Age: 53
End: 2018-10-25

## 2018-10-25 DIAGNOSIS — G47.33 OSA (OBSTRUCTIVE SLEEP APNEA): Primary | ICD-10-CM

## 2018-10-25 NOTE — PROGRESS NOTES
Patient was offered choice of vendor and chose [facility].  Patient Oliver Agarwal was set up at [place] on October 25, 2018. Patient received a Resmed AirSense 10 CPAP. Pressures were set at 10 cm H2O.   Patient s ramp is 7 cm H2O for 5 min and FLEX/EPR is EPR, 2.  Patient received a Resmed Mask name: AIRFIT P10  Pillow mask Size Medium, heated tubing and heated humidifier.  Patient is not enrolled in the STM Program and does need to meet compliance. Patient has a follow up on TBD with Dr. Reina.    Sushma Segura

## 2018-10-30 ENCOUNTER — OFFICE VISIT (OUTPATIENT)
Dept: NUTRITION | Facility: CLINIC | Age: 53
End: 2018-10-30
Attending: NURSE PRACTITIONER
Payer: MEDICARE

## 2018-10-30 DIAGNOSIS — E66.01 MORBID OBESITY (H): ICD-10-CM

## 2018-10-30 DIAGNOSIS — R73.03 PRE-DIABETES: Primary | ICD-10-CM

## 2018-10-30 PROCEDURE — 97802 MEDICAL NUTRITION INDIV IN: CPT

## 2018-10-30 PROCEDURE — 99207 ZZC NO CHARGE LOS: CPT

## 2018-10-30 NOTE — PROGRESS NOTES
"Medical Nutrition Therapy  Visit Type:Initial assessment and intervention    Oliver Agarwal presents today for MNT and education related to prediabetes and weight management.   He is accompanied by  (ASL)    ASSESSMENT:   Patient comments/concerns relating to nutrition: Wants to get education on eating well and preventing diabetes. Wants to lose weight     Has been able to lose weight in the past, but gains it back. Likes Mountain Dew, has stopped, but now has started again. Really wants to know about eating better. Has friends with diabetes, and doesn't want that.     NUTRITION HISTORY:    Breakfast 9-10am: 2 egg scrambled with veggies, ham (1 slice) and cheese, Monster Drink (0 calorie)  Lunch 1pm: 2 slices ham as a lettuce wrap with mustard, cheese OR White Castle 2 conley cheese burger, 1 hamburger, hashbrowns  Dinner: Subway 12\" meatball, double cheese + Dorito chips + orange soda OR [employer] meatball, 4 small bread slices, butter  Snacks: sometimes some dill pickles, potato chips (organic)  Beverages: Water with lemon, Monster (0 tiffanie), Orange Soda, Mountain Dew    Misses meals? Sometimes 1 meal missed  Eats out:  3-4 meals/per week     Previous diet education:  No     Food allergies/intolerances: no    Diet is high in: calories, carbs and fat (saturated)  Diet is low in: fiber, fruits and vegetables    EXERCISE: not assessed    SOCIO/ECONOMIC:   Lives with: significant other    MEDICATIONS:  Current Outpatient Prescriptions   Medication     albuterol (2.5 MG/3ML) 0.083% neb solution     albuterol (PROAIR HFA) 108 (90 Base) MCG/ACT inhaler     alclomethasone (ACLOVATE) 0.05 % cream     ammonium lactate (LAC-HYDRIN) 12 % cream     Azelaic Acid 15 % gel     blood glucose monitoring (ANTONIO CONTOUR NEXT) test strip     blood glucose monitoring (ANTONIO MICROLET) lancets     Cholecalciferol (VITAMIN D) 2000 UNITS CAPS     cyanocolbalamin (VITAMIN  B-12) 500 MCG tablet     cyclobenzaprine (FLEXERIL) " 10 MG tablet     desonide (DESOWEN) 0.05 % cream     doxycycline monohydrate 50 MG capsule     fluticasone (FLONASE) 50 MCG/ACT spray     fluticasone (FLONASE) 50 MCG/ACT spray     fluticasone-salmeterol (ADVAIR DISKUS) 250-50 MCG/DOSE diskus inhaler     ibuprofen (ADVIL/MOTRIN) 600 MG tablet     ivermectin (SOOLANTRA) 1 % cream     metroNIDAZOLE (METROGEL) 0.75 % gel     montelukast (SINGULAIR) 10 MG tablet     Multiple Vitamins-Minerals (CENTROVITE) TABS     oxyCODONE-acetaminophen (PERCOCET) 5-325 MG per tablet     propylene glycol (SYSTANE BALANCE) 0.6 % SOLN ophthalmic solution     terbinafine (LAMISIL AT) 1 % cream     tobramycin-dexamethasone (TOBRADEX) 0.3-0.1 % ophthalmic susp     triamcinolone (KENALOG) 0.1 % ointment     zolpidem (AMBIEN) 10 MG tablet     No current facility-administered medications for this visit.        LABS:  Last Basic Metabolic Panel:  Lab Results   Component Value Date     03/21/2018      Lab Results   Component Value Date    POTASSIUM 4.1 03/21/2018     Lab Results   Component Value Date    CHLORIDE 104 03/21/2018     Lab Results   Component Value Date    JUSTINA 9.4 03/21/2018     Lab Results   Component Value Date    CO2 30 03/21/2018     Lab Results   Component Value Date    BUN 16 03/21/2018     Lab Results   Component Value Date    CR 0.91 03/21/2018     Lab Results   Component Value Date    GLC 85 03/21/2018       ANTHROPOMETRICS:  Vitals: There were no vitals taken for this visit.  There is no height or weight on file to calculate BMI.      Wt Readings from Last 5 Encounters:   08/23/18 133.8 kg (295 lb)   06/27/18 136.5 kg (301 lb)   06/18/18 137 kg (302 lb)   12/05/17 (!) 136.5 kg (301 lb)   09/19/17 (!) 136.5 kg (301 lb)       Weight Change: down 6#    ESTIMATED KCAL REQUIREMENTS:  8087-3629 kcal per day    NUTRITION DIAGNOSIS: Overweight/Obesity related to large meal portions as evidenced by BMI >30    NUTRITION INTERVENTION:  Education given to support: general  nutrition guidelines, weight reduction, consistent meals, fat modification, behavior modification and portion control  Education Materials Provided: My Plate Planner/Choose My Plate  Motivational Interviewing    PATIENT'S BEHAVIOR CHANGE GOALS:   See Patient Instructions for patient stated behavior change goals. AVS was printed and given to patient at today's appointment.    MONITOR / EVALUATE:  RD will monitor/evaluate:  Blood Glucose / A1c  Progress toward meeting stated nutrition-related goals  Weight change    FOLLOW-UP:  Follow-up appointment scheduled on 11/27.     Sandra Louise RD LD CDE  Time spent in minutes: 60  Encounter: Individual

## 2018-10-30 NOTE — PATIENT INSTRUCTIONS
"The most important things to focus on with prediabetes is weight loss (10% = 30 pounds) and cutting back on total calories and fat intake in your day.     Endocrinologists typically prescribe weight loss medications    Think of the plate planner for meals:  1/2 plate veggies, 1/4 plate starch (1/2 cup) , 1/4 plate protein (3 ounces = deck of cards)    Protein requirements: 60-70 grams protein per day = 9-10 ounces of protein per day OR 3 ounces per meal    Ideas:  Only 1 hamburger when out to eat (and try to avoid the \"extras\" like conley, bree cheese, etc that only add calories that you do not need)  Only 6\" Subway sandwich (not 12\") with more vegetables, apple slices as a side  Drink Sparkling ICE or Zevia instead of regular soda like Mountain Dew or Orange Soda    Breakfast:  Try to measure out no more than 1/4 cup of cheese in your eggs and only 1/4 cup of diced ham - keep the vegetables! Add in more if you want!    Snack ideas (instead of potato chips):  Fruit (1 banana, 1 cup fruit, 1 apple) OR 1 slice cheese OR 1/4 cup nuts      Work on having Monster drink maybe only a few times per week     Instead of coconut oil, instead please use canola oil or olive oil (better for your heart)    Weekly Menu Ideas  Use this tool to help plan healthier meals for your family. Below are ideas for healthier breakfasts, lunches, and dinners. Try to plan one dinner that uses leftovers from the night before and one that is meatless. Once you have the meals planned, write out your grocery list.  Ideas for Healthy Breakfasts    1 cup whole-grain cold or   cup whole-grain hot cereal,   cup fat-free or low-fat milk, and 1 cup fresh or frozen fruit, such as blueberries or sliced strawberries, or 1 small banana.    2 slices whole-grain toast with 2 tablespoons peanut butter, 1 cup low-fat or fat-free yogurt, and   cup 100% juice.    2 scrambled eggs, 1 slice whole-grain toast, 1 cup fat-free or low-fat milk, and 1 cup sliced " strawberries.  Ideas for Healthy Lunches    2 cups garden salad with 1 tablespoon regular or low-fat dressing and turkey sandwich on whole-wheat bread with lettuce, tomato, and mustard.    1 cup broth or tomato-based soup and   lean roast beef sandwich on whole-wheat bread with lettuce, tomato, and mustard.    1 slice cheese or vegetable pizza made with low-fat cheese and small garden salad with 1 tablespoon regular or low-fat dressing.  Ideas for Healthy Dinners    3 ounces grilled honey mustard chicken, 2 cups green beans, and   cup wild rice.    3 ounces baked fish with lemon dill dressing, 1 cup herbed whole wheat pasta, and 2 cups steamed frozen vegetables (such as mixed vegetables).      cup brown rice with   cup black beans, 2 cups stir-fried vegetables (frozen carrots, peapods, peppers, water chestnuts), 1 cup non-fat plain Greek yogurt with   cup pineapple slices.

## 2018-10-30 NOTE — MR AVS SNAPSHOT
"              After Visit Summary   10/30/2018    Oliver Agarwal    MRN: [id number]           Patient Information     Date Of Birth          1965        Visit Information        Provider Department      10/30/2018 1:30 PM ASL IS; [facility] Instructions    The most important things to focus on with prediabetes is weight loss (10% = 30 pounds) and cutting back on total calories and fat intake in your day.     Endocrinologists typically prescribe weight loss medications    Think of the plate planner for meals:  1/2 plate veggies, 1/4 plate starch (1/2 cup) , 1/4 plate protein (3 ounces = deck of cards)    Protein requirements: 60-70 grams protein per day = 9-10 ounces of protein per day OR 3 ounces per meal    Ideas:  Only 1 hamburger when out to eat (and try to avoid the \"extras\" like conley, bree cheese, etc that only add calories that you do not need)  Only 6\" Subway sandwich (not 12\") with more vegetables, apple slices as a side  Drink Sparkling ICE or Zevia instead of regular soda like Mountain Dew or Orange Soda    Breakfast:  Try to measure out no more than 1/4 cup of cheese in your eggs and only 1/4 cup of diced ham - keep the vegetables! Add in more if you want!    Snack ideas (instead of potato chips):  Fruit (1 banana, 1 cup fruit, 1 apple) OR 1 slice cheese OR 1/4 cup nuts      Work on having Monster drink maybe only a few times per week     Instead of coconut oil, instead please use canola oil or olive oil (better for your heart)    Weekly Menu Ideas  Use this tool to help plan healthier meals for your family. Below are ideas for healthier breakfasts, lunches, and dinners. Try to plan one dinner that uses leftovers from the night before and one that is meatless. Once you have the meals planned, write out your grocery list.  Ideas for Healthy Breakfasts    1 cup whole-grain cold or   cup whole-grain hot cereal,   cup fat-free or low-fat milk, and " 1 cup fresh or frozen fruit, such as blueberries or sliced strawberries, or 1 small banana.    2 slices whole-grain toast with 2 tablespoons peanut butter, 1 cup low-fat or fat-free yogurt, and   cup 100% juice.    2 scrambled eggs, 1 slice whole-grain toast, 1 cup fat-free or low-fat milk, and 1 cup sliced strawberries.  Ideas for Healthy Lunches    2 cups garden salad with 1 tablespoon regular or low-fat dressing and turkey sandwich on whole-wheat bread with lettuce, tomato, and mustard.    1 cup broth or tomato-based soup and   lean roast beef sandwich on whole-wheat bread with lettuce, tomato, and mustard.    1 slice cheese or vegetable pizza made with low-fat cheese and small garden salad with 1 tablespoon regular or low-fat dressing.  Ideas for Healthy Dinners    3 ounces grilled honey mustard chicken, 2 cups green beans, and   cup wild rice.    3 ounces baked fish with lemon dill dressing, 1 cup herbed whole wheat pasta, and 2 cups steamed frozen vegetables (such as mixed vegetables).      cup brown rice with   cup black beans, 2 cups stir-fried vegetables (frozen carrots, peapods, peppers, water chestnuts), 1 cup non-fat plain Greek yogurt with   cup pineapple slices.                    Follow-ups after your visit        Your next 10 appointments already scheduled     Nov 27, 2018  1:30 PM CST   [facility] with [facility]    [address] [facility] [address]  [facility] [address] [phone]           Bring a current list of meds and any records pertaining to this visit. For Physicals, please bring immunization records and any forms needing to be filled out. Please arrive 10 minutes early to complete paperwork.              Who to contact     If you have questions or need follow up information about today's clinic visit or your schedule please contact [facility] directly at [phone].  Normal or non-critical lab and  imaging results will be communicated to you by MyChart, letter or phone within 4 business days after the clinic has received the results. If you do not hear from us within 7 days, please contact the clinic through [employer] or phone. If you have a critical or abnormal lab result, we will notify you by phone as soon as possible.  Submit refill requests through [employer] or call your pharmacy and they will forward the refill request to us. Please allow 3 business days for your refill to be completed.          Additional Information About Your Visit        [employer] Information     [employer] gives you secure access to your electronic health record. If you see a primary care provider, you can also send messages to your care team and make appointments. If you have questions, please call your primary care clinic.  If you do not have a primary care provider, please call [phone] and they will assist you.        Care EveryWhere ID     This is your Care EveryWhere ID. This could be used by other organizations to access your [place] medical records  [id number]         Blood Pressure from Last 3 Encounters:   08/23/18 103/64   07/06/18 125/79   06/27/18 110/50    Weight from Last 3 Encounters:   08/23/18 133.8 kg (295 lb)   06/27/18 136.5 kg (301 lb)   06/18/18 137 kg (302 lb)              Today, you had the following     No orders found for display       Primary Care Provider Office Phone # Fax #    Angelica PateBROOK [facility] [phone] [address] [facility] [address]        Equal Access to Services     CATHERINE OCHOA : Hadii aad ku hadasho Soomaali, waaxda luqadaha, qaybta kaalmada adeegyada, amadeo chaney . So [facility] [phone].    ATENCIÓN: Si habla español, tiene a harkins disposición servicios gratuitos de asistencia lingüística. Llame al [phone].    We comply with applicable federal civil rights laws and [place] laws. We do not discriminate on the basis of race,  color, national origin, age, disability, sex, sexual orientation, or gender identity.            Thank you!     Thank you for choosing [facility]  for your care. Our goal is always to provide you with excellent care. Hearing back from our patients is one way we can continue to improve our services. Please take a few minutes to complete the written survey that you may receive in the mail after your visit with us. Thank you!             Your Updated Medication List - Protect others around you: Learn how to safely use, store and throw away your medicines at www.disposemymeds.org.          This list is accurate as of 10/30/18  2:20 PM.  Always use your most recent med list.                   Brand Name Dispense Instructions for use Diagnosis    * albuterol (2.5 MG/3ML) 0.083% neb solution     30 vial    Take 1 vial (2.5 mg) by nebulization every 4 hours as needed for shortness of breath / dyspnea    Mild persistent asthma not dependent on systemic steroids       * albuterol 108 (90 Base) MCG/ACT inhaler    PROAIR HFA    8.5 g    INHALE 2 PUFFS BY MOUTH EVERY 6 HOURS AS NEEDED    Mild persistent asthma without complication       alclomethasone 0.05 % cream    ACLOVATE    15 g    Apply to affected areas on face once per day but only for 3 consecutive days    Seborrheic dermatitis       ammonium lactate 12 % cream    LAC-HYDRIN    385 g    Apply topically nightly as needed for dry skin    Pitted keratolysis       Azelaic Acid 15 % gel     50 g    Apply 0.5 inches topically 2 times daily Massage thin film gently into afected areas morning and evening.    Rosacea       blood glucose monitoring lancets     100 each    Use to test blood sugars once daily as directed.    Prediabetes       blood glucose monitoring test strip    ANTONIO CONTOUR NEXT    100 each    Use to test blood sugar one x  daily or as directed.    Prediabetes       CENTROVITE Tabs     100 tablet    TAKE 1 TABLET BY MOUTH EVERY DAY    Vitamin  deficiency       cyanocolbalamin 500 MCG tablet    vitamin  B-12     Take 500 mcg by mouth daily        cyclobenzaprine 10 MG tablet    FLEXERIL    30 tablet    Take 0.5-1 tablets (5-10 mg) by mouth 3 times daily as needed for muscle spasms    Acute right-sided low back pain without sciatica       desonide 0.05 % cream    DESOWEN    15 g    APPLY SPARINGLY TO THE AFFECTED AREA TWICE DAILY FOR ONLY 3 CONSECUTIVE DAYS    Eczema, unspecified type       doxycycline monohydrate 50 MG capsule     90 capsule    1 tab per day    Rosacea       * fluticasone 50 MCG/ACT spray    FLONASE    1 Bottle    Spray 2 sprays into both nostrils daily    Chronic allergic rhinitis due to other allergic trigger, unspecified seasonality       * fluticasone 50 MCG/ACT spray    FLONASE    16 mL    SHAKE LIQUID AND USE 2 SPRAYS IN EACH NOSTRIL DAILY    Chronic allergic rhinitis due to other allergic trigger, unspecified seasonality       fluticasone-salmeterol 250-50 MCG/DOSE diskus inhaler    ADVAIR DISKUS    60 Inhaler    Inhale 1 puff into the lungs 2 times daily Taking prn    Mild persistent asthma without complication       ibuprofen 600 MG tablet    ADVIL/MOTRIN    120 tablet    TAKE 1 TABLET BY MOUTH EVERY 6 HOURS AS NEEDED FOR MODERATE PAIN    Pain       ivermectin 1 % cream    SOOLANTRA    30 g    Apply to the affected areas of the face once daily. Use a pea-size amount for each area of the face  that is affected. THIN LAYER    Rosacea       metroNIDAZOLE 0.75 % topical gel    METROGEL    45 g    Apply topically 2 times daily    Rosacea       montelukast 10 MG tablet    SINGULAIR    90 tablet    Take 1 tablet (10 mg) by mouth At Bedtime    Mild persistent asthma without complication       oxyCODONE-acetaminophen 5-325 MG per tablet    PERCOCET    10 tablet    Take 1-2 tablets by mouth every 6 hours as needed for pain    Chronic midline low back pain with right-sided sciatica       propylene glycol 0.6 % Soln ophthalmic solution     SYSTANE BALANCE    1 Bottle    Place 1 drop into both eyes 4 times daily    Dry eyes       terbinafine 1 % cream    lamISIL AT    42 g    Apply topically 2 times daily For fungal infection not resolved with other antifungals (e.g. Clotrimazole)    Tinea pedis of both feet       tobramycin-dexamethasone 0.3-0.1 % ophthalmic susp    TOBRADEX    1 Bottle    Place 1 drop into the right eye 4 times daily    Corneal erosion, right       triamcinolone 0.1 % ointment    KENALOG    80 g    Apply topically 2 times daily Apply to affected areas on hands bid for 14 days and then when needed.Not for face or groin    Eczema, unspecified type       vitamin D 2000 units Caps      Take 4,000 Units by mouth daily        zolpidem 10 MG tablet    AMBIEN    1 tablet    Take tablet by mouth 15 minutes prior to sleep, for Sleep Study    ARNULFO (obstructive sleep apnea)       * Notice:  This list has 4 medication(s) that are the same as other medications prescribed for you. Read the directions carefully, and ask your doctor or other care provider to review them with you.

## 2018-11-06 ENCOUNTER — MYC MEDICAL ADVICE (OUTPATIENT)
Dept: PHARMACY | Facility: CLINIC | Age: 53
End: 2018-11-06

## 2018-11-06 NOTE — TELEPHONE ENCOUNTER
11-5-18    Catrachito Shannon,    not see you since long time and  i would like to come see you discuss about my health and pre diabetes   recently  A1C   5.8 %   I concern care my health and  want review meds. and discuss  food and health.   I need more know about  what can lead harm health.   I  like sereach and learn    health better     I am not sure   is my chol. high or not.   I looking forward learn  and watch for chol and food carb...   let me know  when best time come see you at [facility] checked with feliberto in billing --ok to see [facility] for services --bill under [facility] this year.    Bernarda Mooney, [facility].  Medication Therapy Management Provider  [phone]

## 2018-11-06 NOTE — TELEPHONE ENCOUNTER
11-6-18      Catrachito Shannon,    i forget add  want discuss about  contour next ex     i need to know  what good range    80 to 110 for gloucs...  test blood strip      recently  first time  test  blood strip gloucs... not sure spelling    results   number is  118.  is that pretty high number  over 110  ?    last time not sure over two year i think     let me know [facility] what good  number?     thank you     Oliver hoffmann will remind patient --fasting bs range of  in am is good , 2 hours after meals < 150 is good.    Bernarda Mooney [facility].  Medication Therapy Management Provider  [phone]

## 2018-11-23 ENCOUNTER — MYC MEDICAL ADVICE (OUTPATIENT)
Dept: PHARMACY | Facility: CLINIC | Age: 53
End: 2018-11-23

## 2018-11-25 DIAGNOSIS — J45.30 MILD PERSISTENT ASTHMA WITHOUT COMPLICATION: ICD-10-CM

## 2018-11-25 NOTE — TELEPHONE ENCOUNTER
11-24-18    Catrachito Shannon,    information  my blood sugar test  result     pic       dec 4 or 5  plan see you  i will call for appt ok  Oliver soto logs look wnl.    Recheck a1c December -2017.    Bernarda Mooney [facility].  Medication Therapy Management Provider  [phone]

## 2018-11-25 NOTE — LETTER
[address] [address]  Phone: [phone]    11/30/18    Oliver Agarwal  [address]  [place] [address]      To whom it may concern:       We received a refill request from you pharmacy for PROAIR  (90 Base) MCG/ACT inhaler.   But in order to refill this medication you will need to schedule an appointment for asthma follow up either through My Chart or please call us at [phone]. Thanks and have a great day!       Your Care Team at [facility]

## 2018-11-26 NOTE — TELEPHONE ENCOUNTER
"Requested Prescriptions   Pending Prescriptions Disp Refills     PROAIR  (90 Base) MCG/ACT inhaler [Pharmacy Med Name: PROAIR HFA ORAL INH (200  PFS) 8.5G]  Last Written Prescription Date:  10-10-18  Last Fill Quantity: 8.5 g,  # refills: 1   Last office visit: 7/6/2018 with prescribing provider:  Angelica Pate    Future Office Visit:    8.5 g 0     Sig: INHALE 2 PUFFS BY MOUTH EVERY 6 HOURS AS NEEDED    Asthma Maintenance Inhalers - Anticholinergics Failed    11/25/2018  3:49 AM       Failed - Asthma control assessment score within normal limits in last 6 months    Please review ACT score.   ACT Total Scores 5/24/2016 6/13/2017 5/3/2018   ACT TOTAL SCORE - - -   ASTHMA ER VISITS - - -   ASTHMA HOSPITALIZATIONS - - -   ACT TOTAL SCORE (Goal Greater than or Equal to 20) 17 20 20   In the past 12 months, how many times did you visit the emergency room for your asthma without being admitted to the hospital? 0 0 0   In the past 12 months, how many times were you hospitalized overnight because of your asthma? 0 0 0          Passed - Patient is age 12 years or older       Passed - Recent (6 mo) or future (30 days) visit within the authorizing provider's specialty    Patient had office visit in the last 6 months or has a visit in the next 30 days with authorizing provider or within the authorizing provider's specialty.  See \"Patient Info\" tab in inbasket, or \"Choose Columns\" in Meds & Orders section of the refill encounter.              "

## 2018-11-27 NOTE — TELEPHONE ENCOUNTER
Routing refill request to provider for review/approval because:  --ACT overdue.  --Per protocol if patient request more than 6 short acting inhalers in the last year we are to route to provider.  --I see orders for ~ 13 inhalers this last year.     ,  --Please call patient  and ask him to schedule an asthma visit with Angelica Pate.  A refill reqeust for below medication  was sent to the provider.     Thank you,  Violeta Singh RN

## 2018-11-30 RX ORDER — ALBUTEROL SULFATE 90 UG/1
AEROSOL, METERED RESPIRATORY (INHALATION)
Qty: 8.5 G | Refills: 0 | Status: SHIPPED | OUTPATIENT
Start: 2018-11-30 | End: 2018-12-24

## 2018-12-03 PROBLEM — M54.41 RIGHT-SIDED LOW BACK PAIN WITH RIGHT-SIDED SCIATICA: Status: RESOLVED | Noted: 2018-06-08 | Resolved: 2018-12-03

## 2018-12-03 NOTE — PROGRESS NOTES
HPI    System    Physical Exam    General     ROS    Assessment/Plan:    DISCHARGE REPORT    Progress reporting period is from 6/8/18 to 6/25/18.       SUBJECTIVE  Subjective: Pt's pain is better since last session although has not performed exercises as prescribed. Continues to have pain with prolonged sitting and upon waking up in the morning. Mostly has pain on the right low back.     Current Pain level: 4/10.      Initial Pain level: 8/10.   Changes in function:  Yes (See Goal flowsheet attached for changes in current functional level)  Adverse reaction to treatment or activity: None    OBJECTIVE  Objective: Fair TA activation with core progression      ASSESSMENT/PLAN  Updated problem list and treatment plan: Diagnosis 1:  LBP    STG/LTGs have been met or progress has been made towards goals:  Yes (See Goal flow sheet completed today.)  Assessment of Progress: The patient has not returned to therapy since 6/25/18. Current status is unknown.  Self Management Plans:  Patient has been instructed in a home treatment program.  Oliver continues to require the following intervention to meet STG and LTG's:  PT intervention is no longer required to meet STG/LTG.    Recommendations:  This patient is ready to be discharged from therapy and continue their home treatment program.    Please refer to the daily flowsheet for treatment today, total treatment time and time spent performing 1:1 timed codes.

## 2018-12-12 NOTE — MR AVS SNAPSHOT
After Visit Summary   12/5/2017    Oliver Agarwal    MRN: [id number]           Patient Information     Date Of Birth          1965        Visit Information        Provider Department      12/5/2017 7:50 AM Cy Chappell MD; [facility]        Today's Diagnoses     Abrasion of right cornea, initial encounter    -  1    Chronic allergic rhinitis due to other allergic trigger, unspecified seasonality           Follow-ups after your visit        Your next 10 appointments already scheduled     Dec 13, 2017  2:00 PM CST   Office Visit with Melisa Rose MD   [facility] ([facility] )    [address] [place] [address]  [address]  [facility] [address] [phone]           Bring a current list of meds and any records pertaining to this visit. For Physicals, please bring immunization records and any forms needing to be filled out. Please arrive 10 minutes early to complete paperwork.              Who to contact     If you have questions or need follow up information about today's clinic visit or your schedule please contact [facility] directly at [phone].  Normal or non-critical lab and imaging results will be communicated to you by MyChart, letter or phone within 4 business days after the clinic has received the results. If you do not hear from us within 7 days, please contact the clinic through HBCShart or phone. If you have a critical or abnormal lab result, we will notify you by phone as soon as possible.  Submit refill requests through Sketchfab or call your pharmacy and they will forward the refill request to us. Please allow 3 business days for your refill to be completed.          Additional Information About Your Visit        MyChart Information     Sketchfab gives you secure access to your electronic health record. If you see a primary care provider, you can also send messages to your care  team and make appointments. If you have questions, please call your primary care clinic.  If you do not have a primary care provider, please call [phone] and they will assist you.        Care EveryWhere ID     This is your Care EveryWhere ID. This could be used by other organizations to access your [facility] medical records  [id number]        Your Vitals Were     Pulse Temperature Pulse Oximetry BMI (Body Mass Index)          72 98  F (36.7  C) (Oral) 98% 45.1 kg/m2         Blood Pressure from Last 3 Encounters:   12/05/17 112/68   09/19/17 121/69   07/10/17 132/75    Weight from Last 3 Encounters:   12/05/17 (!) 301 lb (136.5 kg)   09/19/17 (!) 301 lb (136.5 kg)   07/10/17 296 lb (134.3 kg)              Today, you had the following     No orders found for display         Today's Medication Changes          These changes are accurate as of: 12/5/17 10:22 AM.  If you have any questions, ask your nurse or doctor.               Start taking these medicines.        Dose/Directions    erythromycin ophthalmic ointment   Commonly known as:  ROMYCIN   Used for:  Corneal erosion, right   Started by:  Atiya Kenny, OD        Dose:  0.25 inch   Apply 0.25 inches to eye At Bedtime for 7 days   Quantity:  1 Tube   Refills:  0       tobramycin-dexamethasone 0.3-0.1 % ophthalmic susp   Commonly known as:  TOBRADEX   Used for:  Corneal erosion, right   Started by:  Atiya Kenny, OD        Dose:  1 drop   Place 1 drop into the right eye 4 times daily   Quantity:  1 Bottle   Refills:  0         These medicines have changed or have updated prescriptions.        Dose/Directions    fluticasone 50 MCG/ACT spray   Commonly known as:  FLONASE   This may have changed:    - how much to take  - how to take this  - when to take this   Used for:  Chronic allergic rhinitis due to other allergic trigger, unspecified seasonality   Changed by:  Cy Chappell MD        Dose:  2 spray   Spray 2 sprays into both  nostrils daily   Quantity:  1 Bottle   Refills:  5            Where to get your medicines      These medications were sent to [id number] - [place], [place] - [address] AT [facility]  [address], [place] [address]    Hours:  24-hours Phone:  [phone]     erythromycin ophthalmic ointment    fluticasone 50 MCG/ACT spray    tobramycin-dexamethasone 0.3-0.1 % ophthalmic susp                Primary Care Provider Office Phone # Fax #    Angelica Pate, APRN [phone] [address] [facility] [address]        Equal Access to Services     CATHERINE OCHOA AH: Daniel stockton Sogwen, waaxda luqadaha, qaybta kaalmada adeadayada, amadeo gao. So [facility] [phone].    ATENCIÓN: Si habla español, tiene a harkins disposición servicios gratuitos de asistencia lingüística. [facility] [phone].    We comply with applicable federal civil rights laws and [place] laws. We do not discriminate on the basis of race, color, national origin, age, disability, sex, sexual orientation, or gender identity.            Thank you!     Thank you for choosing [facility]  for your care. Our goal is always to provide you with excellent care. Hearing back from our patients is one way we can continue to improve our services. Please take a few minutes to complete the written survey that you may receive in the mail after your visit with us. Thank you!             Your Updated Medication List - Protect others around you: Learn how to safely use, store and throw away your medicines at www.disposemymeds.org.          This list is accurate as of: 12/5/17 10:22 AM.  Always use your most recent med list.                   Brand Name Dispense Instructions for use Diagnosis    ADVAIR DISKUS 250-50 MCG/DOSE diskus inhaler   Generic drug:  fluticasone-salmeterol      Inhale 1 puff into the lungs 2 times daily Taking prn        *  albuterol (2.5 MG/3ML) 0.083% neb solution     90 vial    Take 1 vial (2.5 mg) by nebulization every 4 hours as needed for shortness of breath / dyspnea    Mild persistent asthma       * PROAIR  (90 BASE) MCG/ACT Inhaler   Generic drug:  albuterol     8.5 g    INHALE 2 PUFFS BY MOUTH EVERY 6 HOURS AS NEEDED    Mild persistent asthma without complication       * PROAIR  (90 BASE) MCG/ACT Inhaler   Generic drug:  albuterol     8.5 g    INHALE 2 PUFFS BY MOUTH EVERY 6 HOURS AS NEEDED    Mild persistent asthma without complication       * PROAIR  (90 BASE) MCG/ACT Inhaler   Generic drug:  albuterol     8.5 g    INHALE 2 PUFFS BY MOUTH EVERY 6 HOURS AS NEEDED    Mild persistent asthma without complication       * PROAIR  (90 BASE) MCG/ACT Inhaler   Generic drug:  albuterol     8.5 g    INHALE 2 PUFFS BY MOUTH EVERY 6 HOURS AS NEEDED    Mild persistent asthma without complication       alclomethasone 0.05 % cream    ACLOVATE    15 g    Apply to affected areas on face once per day but only for 3 consecutive days    Seborrheic dermatitis       Azelaic Acid 15 % gel     50 g    Apply 0.5 inches topically 2 times daily Massage thin film gently into afected areas morning and evening.    Rosacea       blood glucose monitoring lancets     1 Box    Use to test blood sugars once daily as directed.    Prediabetes       blood glucose monitoring test strip    ANTONIO CONTOUR NEXT    100 each    Use to test blood sugar one x  daily or as directed.    Prediabetes       CENTRUM Tabs     100 tablet    Take 1 tablet by mouth daily    Vitamin deficiency       cyanocolbalamin 500 MCG tablet    vitamin  B-12     Take 500 mcg by mouth daily        cyclobenzaprine 10 MG tablet    FLEXERIL    30 tablet    Take 0.5-1 tablets (5-10 mg) by mouth 3 times daily as needed for muscle spasms    Acute right-sided low back pain without sciatica       desonide 0.05 % cream    DESOWEN    15 g    Apply sparingly to affected area  two times per day for only 3 consecutive days    Eczema, unspecified type       * doxycycline monohydrate 50 MG capsule     90 capsule    1 tab per day    Rosacea       * doxycycline monohydrate 50 MG capsule     60 capsule    Take 1 capsule (50 mg) by mouth 2 times daily    Rosacea       erythromycin ophthalmic ointment    ROMYCIN    1 Tube    Apply 0.25 inches to eye At Bedtime for 7 days    Corneal erosion, right       fluticasone 50 MCG/ACT spray    FLONASE    1 Bottle    Spray 2 sprays into both nostrils daily    Chronic allergic rhinitis due to other allergic trigger, unspecified seasonality       ibuprofen 600 MG tablet    ADVIL/MOTRIN    120 tablet    TAKE 1 TABLET BY MOUTH EVERY 6 HOURS AS NEEDED FOR MODERATE PAIN    Pain       ivermectin 1 % cream    SOOLANTRA    30 g    Apply to the affected areas of the face once daily. Use a pea-size amount for each area of the face  that is affected. THIN LAYER    Rosacea       metroNIDAZOLE 0.75 % topical gel    METROGEL    45 g    Apply topically 2 times daily    Rosacea       montelukast 10 MG tablet    SINGULAIR    90 tablet    Take 1 tablet (10 mg) by mouth At Bedtime    Mild persistent asthma without complication       oxyCODONE-acetaminophen 5-325 MG per tablet    PERCOCET    18 tablet    Take 1-2 tablets by mouth every 6 hours as needed for pain    Chronic midline low back pain with right-sided sciatica       terbinafine 1 % cream    lamISIL AT    42 g    Apply topically 2 times daily For fungal infection not resolved with other antifungals (e.g. Clotrimazole)    Tinea pedis of both feet       tobramycin-dexamethasone 0.3-0.1 % ophthalmic susp    TOBRADEX    1 Bottle    Place 1 drop into the right eye 4 times daily    Corneal erosion, right       triamcinolone 0.1 % ointment    KENALOG    80 g    Apply topically 2 times daily Apply to affected areas on hands bid for 14 days and then when needed.Not for face or groin    Eczema, unspecified type       vitamin D  2000 UNITS Caps      Take 4,000 Units by mouth daily        * Notice:  This list has 7 medication(s) that are the same as other medications prescribed for you. Read the directions carefully, and ask your doctor or other care provider to review them with you.       No

## 2018-12-18 ENCOUNTER — OFFICE VISIT (OUTPATIENT)
Dept: PHARMACY | Facility: CLINIC | Age: 53
End: 2018-12-18
Payer: COMMERCIAL

## 2018-12-18 VITALS
OXYGEN SATURATION: 95 % | DIASTOLIC BLOOD PRESSURE: 70 MMHG | BODY MASS INDEX: 44.12 KG/M2 | SYSTOLIC BLOOD PRESSURE: 106 MMHG | WEIGHT: 290.2 LBS | HEART RATE: 63 BPM

## 2018-12-18 DIAGNOSIS — R73.03 PREDIABETES: ICD-10-CM

## 2018-12-18 DIAGNOSIS — Z13.6 CARDIOVASCULAR SCREENING; LDL GOAL LESS THAN 160: ICD-10-CM

## 2018-12-18 DIAGNOSIS — F32.1 MODERATE MAJOR DEPRESSION (H): Primary | ICD-10-CM

## 2018-12-18 DIAGNOSIS — J45.30 MILD PERSISTENT ASTHMA WITHOUT COMPLICATION: ICD-10-CM

## 2018-12-18 DIAGNOSIS — F51.01 PRIMARY INSOMNIA: ICD-10-CM

## 2018-12-18 LAB
CHOLEST SERPL-MCNC: 160 MG/DL
HBA1C MFR BLD: 5.6 % (ref 0–5.6)
HDLC SERPL-MCNC: 50 MG/DL
LDLC SERPL CALC-MCNC: 92 MG/DL
NONHDLC SERPL-MCNC: 110 MG/DL
TRIGL SERPL-MCNC: 88 MG/DL

## 2018-12-18 PROCEDURE — 36415 COLL VENOUS BLD VENIPUNCTURE: CPT | Performed by: NURSE PRACTITIONER

## 2018-12-18 PROCEDURE — 80061 LIPID PANEL: CPT | Performed by: NURSE PRACTITIONER

## 2018-12-18 PROCEDURE — 99607 MTMS BY PHARM ADDL 15 MIN: CPT | Performed by: PHARMACIST

## 2018-12-18 PROCEDURE — 83036 HEMOGLOBIN GLYCOSYLATED A1C: CPT | Performed by: NURSE PRACTITIONER

## 2018-12-18 PROCEDURE — 99605 MTMS BY PHARM NP 15 MIN: CPT | Performed by: PHARMACIST

## 2018-12-18 RX ORDER — BUPROPION HYDROCHLORIDE 150 MG/1
150 TABLET ORAL EVERY MORNING
Qty: 30 TABLET | Refills: 3 | Status: SHIPPED | OUTPATIENT
Start: 2018-12-18 | End: 2019-02-06

## 2018-12-18 RX ORDER — TRAZODONE HYDROCHLORIDE 50 MG/1
50-100 TABLET, FILM COATED ORAL AT BEDTIME
Qty: 60 TABLET | Refills: 3 | Status: SHIPPED | OUTPATIENT
Start: 2018-12-18 | End: 2019-02-06

## 2018-12-18 ASSESSMENT — PATIENT HEALTH QUESTIONNAIRE - PHQ9: SUM OF ALL RESPONSES TO PHQ QUESTIONS 1-9: 11

## 2018-12-18 NOTE — Clinical Note
Paige --ghislaine--forgot my in dial check at other clinic --sorry --that is too difficult for me to take it everyday to other clinic --I will choose one and keep it there. Blanka

## 2018-12-18 NOTE — Clinical Note
Genia--ghislaine--had nice visit with archie --added antidepressant , some trazodone , tweaked up his advair dose --lets see how he does.Lyssa almanza

## 2018-12-18 NOTE — PATIENT INSTRUCTIONS
Recommendations from today's MTM visit:                                                    MTM (medication therapy management) is a service provided by a clinical pharmacist designed to help you get the most of out of your medicines.   Today we reviewed what your medicines are for, how to know if they are working, that your medicines are safe and how to make your medicine regimen as easy as possible.     1. A1c today = 5.6% --non-diabetic .     You donot need to check blood sugars . If you want to -just once/week is plenty.     2. FYI--you still have fatigue and moderate depression --lets Try a new medication called Bupropion ER 150mg. Tab once daily with breakfast.     3. FYI--For your Asthma --Please increase and take your Advair diskus --1 puff in AM and Bedtime daily.     4. FYI--for your sleep--ok to try Trazodone 50mg tab --take 1 at bedtime and if not sleeping thru night after 1 week -then try 2 tablets at bedtime.            Next MTM visit: 4 weeks for f/up on sleep and depression. Tuesday January 15th -2019 at 1;30pm.     To schedule another MTM appointment, please call the clinic directly or you may call the MTM scheduling line at [phone] or toll-free at [phone].     My Clinical Pharmacist's contact information:                                                      It was a pleasure talking with you today!  Please feel free to contact me with any questions or concerns you have.      Bernarda Mooney [facility].  Medication Therapy Management Provider  [phone]      You may receive a survey about the MTM services you received.  I would appreciate your feedback to help me serve you better in the future. Please fill it out and return it when you can. Your comments will be anonymous.

## 2018-12-18 NOTE — PROGRESS NOTES
SUBJECTIVE/OBJECTIVE:                           Oliver Agarwal is a 53 year old male coming in for an initial visit for Medication Therapy Management.  He was referred to me from Angelica .    Chief Complaint: here for a1c, med review(pt is deaf --has fv interpretor Mana).   He asks why fatigued all the time ? Admits some depression /sleep issues --can we discuss?       Personal Healthcare Goals: a1c at goal, more energy     Allergies/ADRs: Reviewed in Epic  Tobacco: No tobacco use  Alcohol: not currently using  Caffeine: 1 cups/day of coffee, 2-3 energy drinks/weeks  Activity: planning on joining [facility] after 1-1-19 for exercise.   PMH: Reviewed in Epic    Medication Adherence/Access:  no issues reported    Pre--Diabetes:  Pt currently taking : no meds . Pt is not experiencing side effects.  SMBG: forgot meter today --checks a few times /week. .   Ranges (patient reported): .   Patient is not experiencing hypoglycemia  Recent symptoms of high blood sugar? None  He has lost 12 lbs over past 6 months by eating healthier.       Insomnia: Current medications include: none --but he had 1 ambien for a sleep study and liked it --would like rx for that . Pt reports trouble staying asleep, trouble falling asleep and depression. He does wear nightly c-pap though .   Uses c-pap , sleep 2am -8am .    Depression:  Current medications include: None Pt reports that depression symptoms are worsened. Current depression symptoms include: fatigue , lacks motivation. Patient reports the following stressors: financial difficulties and chronic health condition Therapies tried and response: None  PHQ-9 SCORE 12/18/2018   PHQ-9 Total Score 11       Asthma:  Current asthma medications: Albuterol MDI and (Pt reports using albuterol 1-2 times per week) and Fluticasone+Salmeterol (Advair) once daily. Pt does not rinse their mouth after using steroid inhaler. Asthma triggers include: smoke, pollens, exercise or sports and emotions.  Pt  "reports the following symptoms: none.  AAP on file: YES  PIF was completed today: No  ACT Total Scores 6/13/2017 5/3/2018 12/18/2018   ACT TOTAL SCORE - - -   ASTHMA ER VISITS - - -   ASTHMA HOSPITALIZATIONS - - -   ACT TOTAL SCORE (Goal Greater than or Equal to 20) 20 20 19   In the past 12 months, how many times did you visit the emergency room for your asthma without being admitted to the hospital? 0 0 0   In the past 12 months, how many times were you hospitalized overnight because of your asthma? 0 0 0     Up todate on immunizations.            BP Readings from Last 1 Encounters:   12/18/18 106/70     Pulse Readings from Last 1 Encounters:   12/18/18 63     Wt Readings from Last 1 Encounters:   12/18/18 290 lb 3.2 oz (131.6 kg)     Ht Readings from Last 1 Encounters:   08/23/18 5' 8\" (1.727 m)     Estimated body mass index is 44.12 kg/m  as calculated from the following:    Height as of 8/23/18: 5' 8\" (1.727 m).    Weight as of this encounter: 290 lb 3.2 oz (131.6 kg).    Temp Readings from Last 1 Encounters:   07/06/18 98.3  F (36.8  C) (Oral)         ASSESSMENT:                             Current medications were reviewed today.     Medication Adherence: excellent, no issues identified    Pre-Diabetes: stable a1c is 5.6 % --does not need to test blood sugars anymore.    Insomnia: Needs Improvement. Pt may benefit from trazodone 50mg--see plan .  relaxation techniques  stress reduction.    Depression: Needs Improvement. Patient would benefit from starting : Bupropion 150mg er tabs qam --see plan for details.     Asthma: Needs Improvement. Not at goal; ACT < 20. Pt would benefit from Anti-inflammatory inhaler: Advair increase dose to BID and updated ACT=19.         PLAN:                              1. A1c today = 5.6% --non-diabetic .     You donot need to check blood sugars . If you want to -just once/week is plenty.     2. FYI--you still have fatigue and moderate depression --lets Try a new medication " called Bupropion ER 150mg. Tab once daily with breakfast.     3. FYI--For your Asthma --Please increase and take your Advair diskus --1 puff in AM and Bedtime daily.     4. FYI--for your sleep--ok to try Trazodone 50mg tab --take 1 at bedtime and if not sleeping thru night after 1 week -then try 2 tablets at bedtime.            Next MTM visit: 4 weeks for f/up on sleep and depression. Tuesday January 15th -2019 at 1;30pm.     I spent 60 minutes with this patient today. All changes were made via collaborative practice agreement with Angelica Pate. A copy of the visit note was provided to the patient's primary care provider.        The patient was given a summary of these recommendations as an after visit summary.         Bernarda Mooney [facility].  Medication Therapy Management Provider  [phone]

## 2018-12-19 ASSESSMENT — ASTHMA QUESTIONNAIRES: ACT_TOTALSCORE: 19

## 2018-12-21 ENCOUNTER — MYC MEDICAL ADVICE (OUTPATIENT)
Dept: FAMILY MEDICINE | Facility: CLINIC | Age: 53
End: 2018-12-21

## 2018-12-24 DIAGNOSIS — J45.30 MILD PERSISTENT ASTHMA WITHOUT COMPLICATION: ICD-10-CM

## 2018-12-26 NOTE — TELEPHONE ENCOUNTER
"Requested Prescriptions   Pending Prescriptions Disp Refills     PROAIR  (90 Base) MCG/ACT inhaler [Pharmacy Med Name: PROAIR HFA ORAL INH (200  PFS) 8.5G]      Last Written Prescription Date:  11/30/2018  Last Fill Quantity: 8.5 g       ,  # refills: 0   Last Office Visit: 7/6/2018   Future Office Visit:    Next 5 appointments (look out 90 days)    Valentin 15, 2019  1:30 PM CST  SHORT with Bernarda Mooney [facility] ([facility]) [address] [place] [id number] [phone]        8.5 g 0     Sig: INHALE 2 PUFFS BY MOUTH EVERY 6 HOURS AS NEEDED    Asthma Maintenance Inhalers - Anticholinergics Failed - 12/24/2018  3:48 AM       Failed - Asthma control assessment score within normal limits in last 6 months    Please review ACT score.          Passed - Patient is age 12 years or older       Passed - Recent (6 mo) or future (30 days) visit within the authorizing provider's specialty    Patient had office visit in the last 6 months or has a visit in the next 30 days with authorizing provider or within the authorizing provider's specialty.  See \"Patient Info\" tab in inbasket, or \"Choose Columns\" in Meds & Orders section of the refill encounter.            ACT Total Scores 6/13/2017 5/3/2018 12/18/2018   ACT TOTAL SCORE - - -   ASTHMA ER VISITS - - -   ASTHMA HOSPITALIZATIONS - - -   ACT TOTAL SCORE (Goal Greater than or Equal to 20) 20 20 19   In the past 12 months, how many times did you visit the emergency room for your asthma without being admitted to the hospital? 0 0 0   In the past 12 months, how many times were you hospitalized overnight because of your asthma? 0 0 0       "

## 2018-12-27 RX ORDER — ALBUTEROL SULFATE 90 UG/1
AEROSOL, METERED RESPIRATORY (INHALATION)
Qty: 8.5 G | Refills: 0 | Status: SHIPPED | OUTPATIENT
Start: 2018-12-27 | End: 2019-02-06

## 2018-12-27 NOTE — TELEPHONE ENCOUNTER
Medication is being filled for 1 time refill only due to:  Patient needs to be seen because Pt has upcoming appt scheduled.. Guillermina Turcios RN

## 2018-12-29 DIAGNOSIS — R52 PAIN: ICD-10-CM

## 2018-12-29 NOTE — TELEPHONE ENCOUNTER
"Routing refill request to provider for review/approval because:  No CBC on file  -Medication not active on med list.  Discontinued on 12/18/18 with reason \"stopped by patient\"     Team coordinators-Please clarify if patient requested Ibuprofen refill.    Thank you!  GEORGES FragaN, RN          Requested Prescriptions   Pending Prescriptions Disp Refills     ibuprofen (ADVIL/MOTRIN) 600 MG tablet [Pharmacy Med Name: IBUPROFEN 600MG TABLETS] 120 tablet 0     Sig: TAKE 1 TABLET BY MOUTH EVERY 6 HOURS AS NEEDED FOR PAIN    NSAID Medications Failed - 12/29/2018  3:47 AM       Failed - Normal CBC on file in past 12 months    No lab results found.             Passed - Blood pressure under 140/90 in past 12 months    BP Readings from Last 3 Encounters:   12/18/18 106/70   08/23/18 103/64   07/06/18 125/79                Passed - Normal ALT on file in past 12 months    Recent Labs   Lab Test 10/12/18  1125   ALT 46            Passed - Normal AST on file in past 12 months    Recent Labs   Lab Test 10/12/18  1125   AST 28            Passed - Recent (12 mo) or future (30 days) visit within the authorizing provider's specialty    Patient had office visit in the last 12 months or has a visit in the next 30 days with authorizing provider or within the authorizing provider's specialty.  See \"Patient Info\" tab in inbasket, or \"Choose Columns\" in Meds & Orders section of the refill encounter.             Passed - Patient is age 6-64 years       Passed - Normal serum creatinine on file in past 12 months    Recent Labs   Lab Test 03/21/18  1649   CR 0.91               "

## 2018-12-31 ENCOUNTER — MYC MEDICAL ADVICE (OUTPATIENT)
Dept: FAMILY MEDICINE | Facility: CLINIC | Age: 53
End: 2018-12-31

## 2019-01-04 RX ORDER — IBUPROFEN 600 MG/1
TABLET, FILM COATED ORAL
Qty: 120 TABLET | Refills: 0 | OUTPATIENT
Start: 2019-01-04

## 2019-01-04 NOTE — TELEPHONE ENCOUNTER
"Per patient:     \"No,  no need now  i have enough ibuprofen  maybe wait until refill in march.  or april   thank you  i already them as t [facility]       thank you   Oliver \"    Please remove pending meds.     Rika Gomez       "

## 2019-01-11 ENCOUNTER — DOCUMENTATION ONLY (OUTPATIENT)
Dept: SLEEP MEDICINE | Facility: CLINIC | Age: 54
End: 2019-01-11

## 2019-01-11 NOTE — Clinical Note
Catrachito Reynolds, Do you have any good resources to help me get this patient scheduled for a follow up? 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 Let me know if you have any ideas! Thanks!

## 2019-01-11 NOTE — PROGRESS NOTES
I AM FOLLOWING THIS PATIENT FOR COMPLIANCE FOR HIS CPAP MACHINE THAT HE RECEIVED ON 10/25/2018. THE PATIENT % COMPLIANT, HOWEVER MEDICARE ALSO REQUIRES THAT THE PATIENT GOES IN FOR A FOLLOW UP WITH HIS PHYSICIAN TO DISCUSS THE USAGE AND THAT HE IS BENEFITS FROM THE CPAP MACHINE. I CALLED HIM ON 12/11/18 USING AN OVER THE PHONE  SERVICE TO LET HIM KNOW THAT HE NEEDS TO GO IN FOR THIS APPT. HE DID NOT SCHEDULE ONE UP TO THIS POINT. I EMAILED THE PATIENT TODAY TO LET HIM KNOW HE NEEDS TO GO IN FOR THIS APPOINTMENT. HE HAS UNTIL 01/25/19 TO GET THIS APPT DONE.

## 2019-01-22 ENCOUNTER — OFFICE VISIT (OUTPATIENT)
Dept: SLEEP MEDICINE | Facility: CLINIC | Age: 54
End: 2019-01-22
Payer: COMMERCIAL

## 2019-01-22 VITALS
OXYGEN SATURATION: 95 % | BODY MASS INDEX: 45.01 KG/M2 | HEIGHT: 68 IN | HEART RATE: 60 BPM | DIASTOLIC BLOOD PRESSURE: 66 MMHG | WEIGHT: 297 LBS | SYSTOLIC BLOOD PRESSURE: 115 MMHG | RESPIRATION RATE: 18 BRPM

## 2019-01-22 DIAGNOSIS — J45.30 MILD PERSISTENT ASTHMA WITHOUT COMPLICATION: ICD-10-CM

## 2019-01-22 DIAGNOSIS — G47.21 DELAYED SLEEP PHASE SYNDROME: ICD-10-CM

## 2019-01-22 DIAGNOSIS — G47.33 OSA (OBSTRUCTIVE SLEEP APNEA): Primary | ICD-10-CM

## 2019-01-22 PROCEDURE — 99213 OFFICE O/P EST LOW 20 MIN: CPT | Performed by: PSYCHIATRY & NEUROLOGY

## 2019-01-22 ASSESSMENT — MIFFLIN-ST. JEOR: SCORE: 2171.68

## 2019-01-22 NOTE — PATIENT INSTRUCTIONS
"Delayed Circadian rhythm (night owl).     Each of us has a built in tendency to find it easier to stay up late at night or get up early in the morning.  Being a  night owl  or  early bird  is a function of our internal body clock.  When you are forced to keep a sleep schedule that goes against your typical habits (because a job or other responsibilities) insomnia can be the result.  Try the following changes to see if you can improve your sleep patterns:    Pick a sleep and wake schedule with about 7-8 hours in bed that is consistent.  That means keep the same bedtime and rise time every day of the week including the weekends.     Try to get outside for about 30 minutes after you get up in the morning.    Sunlight is the best way to  set  your internal body clock  You can also use a light box instead of sunlight.     Move the time you get up each morning a half hour earlier every two days until you reach your desired wake up time.         Bright Light Therapy Options:  Bright light therapy is a common treatment for seasonal affective disorders [SAD] and circadian rhythm disorders such as delayed sleep phase syndrome [DSPS].       You should use the light box at the time your provider feels it will be of the greatest benefit.  For information on how to use your light box refer to the instructions provided by the .  Cost for bright light therapy varies significantly.  The most ideal form of bright light therapy is a light box with 10,000 lux that ensures effectiveness even when more than a meter away.  There are many light box options available for purchase online and these devices range in cost from $50 to $300. [Type in \"light box therapy\" in a search engine]. If you purchase a light box online, purchase a box that has no less than 2500 LUX for use next to you.  Light boxes are also available for purchase through [employer] for $200-300. There are a few local retailers that carry " these boxes in as well.   Local large discount stores carry boxes for as little as $50.     If the cost of any light box is too much, you can also purchase a compact fluorescent all spectrum light bulb at a local hardware store for about $8.  The most commonly available bulb is 1400 lumen.  You would need two of these positioned within 1 meter of yourself to be equivalent to 2,500 lux.  The bulbs can be placed in a standard light fixture.  Additionally, they can be placed in a mountable fixture that is used in greenhouses.  Mountable fixtures are also available at hardware stores for about $9.  Do not look directly at the light.  If you have any concerns regarding the safety of bright light therapy for you, it was recommended that you consult an ophthalmologist before using a light box.  If you have a condition that makes your eyes very sensitive to light, macular degeneration, a family history of such problems, or diabetic changes to your eyes, it was recommended that you consult an ophthalmologist before using a light box.   If you have anxiety disorder and have an increase in anxiety discontinue use.      Your BMI is Body mass index is 45.16 kg/m .  Weight management is a personal decision.  If you are interested in exploring weight loss strategies, the following discussion covers the approaches that may be successful. Body mass index (BMI) is one way to tell whether you are at a healthy weight, overweight, or obese. It measures your weight in relation to your height.  A BMI of 18.5 to 24.9 is in the healthy range. A person with a BMI of 25 to 29.9 is considered overweight, and someone with a BMI of 30 or greater is considered obese. More than two-thirds of American adults are considered overweight or obese.  Being overweight or obese increases the risk for further weight gain. Excess weight may lead to heart disease and diabetes.  Creating and following plans for healthy eating and physical activity may help you  improve your health.  Weight control is part of healthy lifestyle and includes exercise, emotional health, and healthy eating habits. Careful eating habits lifelong are the mainstay of weight control. Though there are significant health benefits from weight loss, long-term weight loss with diet alone may be very difficult to achieve- studies show long-term success with dietary management in less than 10% of people. Attaining a healthy weight may be especially difficult to achieve in those with severe obesity. In some cases, medications, devices and surgical management might be considered.  What can you do?  If you are overweight or obese and are interested in methods for weight loss, you should discuss this with your provider.     Consider reducing daily calorie intake by 500 calories.     Keep a food journal.     Avoiding skipping meals, consider cutting portions instead.    Diet combined with exercise helps maintain muscle while optimizing fat loss. Strength training is particularly important for building and maintaining muscle mass. Exercise helps reduce stress, increase energy, and improves fitness. Increasing exercise without diet control, however, may not burn enough calories to loose weight.       Start walking three days a week 10-20 minutes at a time    Work towards walking thirty minutes five days a week     Eventually, increase the speed of your walking for 1-2 minutes at time    In addition, we recommend that you review healthy lifestyles and methods for weight loss available through the National Institutes of Health patient information sites:  http://win.niddk.nih.gov/publications/index.htm    And look into health and wellness programs that may be available through your health insurance provider, employer, local community center, or debra club.    Weight management plan: Patient was referred to their PCP to discuss a diet and exercise plan.

## 2019-01-22 NOTE — PROGRESS NOTES
Sleep Follow-Up Visit:    Date of this visit: 1/22/2019    Oliver Agarwal comes in today for follow-up. PSG was performed on 8/15/18 with an AHI of 83/hr with an O2 isatu of 81.7%. He was titrated to 10 cm H2O. He was last seen in clinic on 8/23/18 to discuss sleep study results.    On exam today, Oliver says he was told to schedule an appointment before he was able to obtain new supplies.  He expresses frustration at this.  He denies having problems with the using the new CPAP machine and that without using it he sleeps very poorly and feels tired during the day.  He does note that he believes the machine stays on much longer after he takes it off in the morning than it should.  He says it is supposed to turn off automatically after he takes the mask off.  We recommend that he discuss this with the DME.  ESS 1/24.     PAP download was reviewed:  Used the device 30/30days  More than 4 hours of use: 90%  Average daily use on the days used was 6 hours 52 min  Pressure 11 cm H2O  Residual AHI 1.7  Leak 8.9 L/min median and 30.9 95th%tile     He also asks about medication options to help him fall asleep earlier.  He says he does not feel tired between 10 and 11 PM and has difficulty falling asleep.  He feels naturally sleepy closer to 1 AM and sleeps until 10 or 11 AM, waking without an alarm.  He would like to work a morning shift job so that he can spend more time with his partner.  He currently does not have any obligations that require him to wake up early in the morning outside of doctor's appointments, which she usually tries to schedule in the afternoon.      Past medical/surgical history, family history, social history, medications and allergies were reviewed.      Problem List:  Patient Active Problem List    Diagnosis Date Noted     Morbid obesity (H) 06/18/2018     Priority: Medium     Dry eyes 12/19/2017     Priority: Medium     Corneal erosion, right 12/05/2017     Priority: Medium      "Blepharitis of both eyes with rosacea 12/05/2017     Priority: Medium     Deaf - reads lips well 02/07/2014     Priority: Medium     Mild persistent asthma 04/12/2013     Priority: Medium     Hypovitaminosis D 12/27/2011     Priority: Medium     Atopic rhinitis 12/13/2011     Priority: Medium     (Problem list name updated by automated process. Provider to review and confirm.)       Prediabetes 12/13/2011     Priority: Medium     Obesity 12/13/2011     Priority: Medium     CARDIOVASCULAR SCREENING; LDL GOAL LESS THAN 160 10/31/2010     Priority: Medium        Physical Exam:  /66   Pulse 60   Resp 18   Ht 1.727 m (5' 8\")   Wt 134.7 kg (297 lb)   SpO2 95%   BMI 45.16 kg/m      General: No apparent distress, appropriately groomed  Head: Normocephalic, atraumatic  Eyes: no icterus  Neck: Supple  Chest: no increased work of breathing   Musculoskeletal: no edema   Skin: Warm, dry, intact  Mood: \"alright\"  Affect: somewhat irritable/gruff  Motor: no tremor or involuntary movements       Impression/Plan:    Obstructive Sleep Apnea  Delayed sleep phase syndrome     Machine data shows 90% compliance. ARNULFO is well controlled with a residual AHI 1.7 events per hour. No need to make any pressures adjustments at this time. Recommend to continue good compliance. Continue to get the equipment replaced on a regular basis and maintain machine as recommended. Encouraged to contact the [facility] team for any questions and to discuss his concern about the auto shut down feature.     He initially requests a medication to help him fall asleep earlier in the evening.  He describes delayed circadian rhythm and since he is hoping to pursue a job starting with a morning shift he would like to advance his sleep phase.  We discussed the circadian rhythm and the impact of light exposure and melatonin.  He was provided with written information around using sunlight or light box to advance his sleep phase.  He was offered to follow-up in " "clinic in 1 month to discuss his progress with this after filling out sleep logs, but he declined this appointment.  He stated he would like to try this on his own.        Encourage weight loss, healthy diet, and exercise.    Patient was strongly advised to avoid driving, operating any heavy machinery or other hazardous situations while drowsy or sleepy.  Patient was counseled on the importance of driving while alert, to pull over if drowsy, or nap before getting into the vehicle if sleepy.        Plan for follow-up in sleep clinic in 1 year with data download. He was recommended to contact the clinic if he has any questions or wanted to be seen sooner.      Seen and examined with Dr. Junior.   ?    Dax Guajardo, DO  Sleep Medicine Fellow    Attending: As the attending physician I was present with  Dr. Guajardo,  during this clinic visit. I personally reviewed the key aspects of the history and physical exam and I agree with the above documentation.  The above note was dictated using voice recognition software. Although reviewed after completion, some word and grammatical error may remain . Please contact the author for any clarifications.    \"I spent a total of 15 minutes face to face with Oliver Agarwal during today's office visit. Over 50% of this time was spent counseling the patient and  coordinating care regarding sleep apnea, CPAP treatment, advancement of sleep phase.\"       Shiv Restrepo MD   of Medicine,  Division of Pulmonary/Sleep Medicine  [facility].    "

## 2019-01-22 NOTE — TELEPHONE ENCOUNTER
"Requested Prescriptions   Pending Prescriptions Disp Refills     PROAIR  (90 Base) MCG/ACT inhaler [Pharmacy Med Name: PROAIR HFA ORAL INH (200  PFS) 8.5G]  PATIENT NEEDS AN OFFICE VISIT.  Last Written Prescription Date:  12/27/18  Last Fill Quantity: 8.5 G,  # refills: 0   Last office visit: 7/6/2018 with prescribing provider:  YULI   Future Office Visit:     8.5 g 0     Sig: INHALE 2 PUFFS BY MOUTH EVERY 6 HOURS AS NEEDED    Asthma Maintenance Inhalers - Anticholinergics Failed - 1/22/2019  3:48 AM       Failed - Asthma control assessment score within normal limits in last 6 months    Please review ACT score.   ACT Total Scores 6/13/2017 5/3/2018 12/18/2018   ACT TOTAL SCORE - - -   ASTHMA ER VISITS - - -   ASTHMA HOSPITALIZATIONS - - -   ACT TOTAL SCORE (Goal Greater than or Equal to 20) 20 20 19   In the past 12 months, how many times did you visit the emergency room for your asthma without being admitted to the hospital? 0 0 0   In the past 12 months, how many times were you hospitalized overnight because of your asthma? 0 0 0              Failed - Recent (6 mo) or future (30 days) visit within the authorizing provider's specialty    Patient had office visit in the last 6 months or has a visit in the next 30 days with authorizing provider or within the authorizing provider's specialty.  See \"Patient Info\" tab in inbasket, or \"Choose Columns\" in Meds & Orders section of the refill encounter.           Passed - Patient is age 12 years or older       Passed - Medication is active on med list          "

## 2019-01-25 DIAGNOSIS — G47.33 OSA (OBSTRUCTIVE SLEEP APNEA): Primary | ICD-10-CM

## 2019-02-04 DIAGNOSIS — J45.30 MILD PERSISTENT ASTHMA WITHOUT COMPLICATION: ICD-10-CM

## 2019-02-05 DIAGNOSIS — J45.30 MILD PERSISTENT ASTHMA NOT DEPENDENT ON SYSTEMIC STEROIDS: ICD-10-CM

## 2019-02-06 ENCOUNTER — OFFICE VISIT (OUTPATIENT)
Dept: FAMILY MEDICINE | Facility: CLINIC | Age: 54
End: 2019-02-06
Payer: COMMERCIAL

## 2019-02-06 VITALS
SYSTOLIC BLOOD PRESSURE: 114 MMHG | HEART RATE: 75 BPM | RESPIRATION RATE: 16 BRPM | OXYGEN SATURATION: 98 % | DIASTOLIC BLOOD PRESSURE: 72 MMHG | TEMPERATURE: 98.1 F

## 2019-02-06 DIAGNOSIS — J45.30 MILD PERSISTENT ASTHMA NOT DEPENDENT ON SYSTEMIC STEROIDS: ICD-10-CM

## 2019-02-06 DIAGNOSIS — L73.9 FOLLICULITIS: Primary | ICD-10-CM

## 2019-02-06 DIAGNOSIS — J45.30 MILD PERSISTENT ASTHMA WITHOUT COMPLICATION: ICD-10-CM

## 2019-02-06 PROCEDURE — T1013 SIGN LANG/ORAL INTERPRETER: HCPCS | Mod: U3 | Performed by: NURSE PRACTITIONER

## 2019-02-06 PROCEDURE — 99214 OFFICE O/P EST MOD 30 MIN: CPT | Performed by: NURSE PRACTITIONER

## 2019-02-06 RX ORDER — ALBUTEROL SULFATE 90 UG/1
AEROSOL, METERED RESPIRATORY (INHALATION)
Qty: 8.5 G | Refills: 11 | Status: SHIPPED | OUTPATIENT
Start: 2019-02-06 | End: 2020-01-17

## 2019-02-06 RX ORDER — ALBUTEROL SULFATE 0.83 MG/ML
2.5 SOLUTION RESPIRATORY (INHALATION) EVERY 4 HOURS PRN
Qty: 30 VIAL | Refills: 11 | Status: SHIPPED | OUTPATIENT
Start: 2019-02-06 | End: 2020-01-17

## 2019-02-06 RX ORDER — CEPHALEXIN 500 MG/1
500 CAPSULE ORAL 2 TIMES DAILY
Qty: 20 CAPSULE | Refills: 0 | Status: SHIPPED | OUTPATIENT
Start: 2019-02-06 | End: 2019-07-24

## 2019-02-06 NOTE — PROGRESS NOTES
SUBJECTIVE:   Oliver Agarwal is a 54 year old male who presents to clinic today for the following health issues:    Rash      Duration: 3 to 5 days     Description  Location: right leg, abdomen and face   Itching: no    Intensity:  moderate    Accompanying signs and symptoms: None    History (similar episodes/previous evaluation): None    Precipitating or alleviating factors:  New exposures:  None  Recent travel: no      Therapies tried and outcome: none    Has some hard cyst like pimples under the skin with surrounding redness in his groin and abdomen.    Tried to pop some and nothing came out.    nontender until he squeezes it.    No fevers.      Pt would also like a refill on asthma medication.   Uses proair inhaler daily and albuterol nebs when he is at home.      Problem list and histories reviewed & adjusted, as indicated.  Additional history: as documented    Reviewed and updated as needed this visit by clinical staff  Tobacco  Allergies  Meds  Problems  Med Hx  Surg Hx  Fam Hx  Soc Hx        Reviewed and updated as needed this visit by Provider  Tobacco  Allergies  Meds  Problems  Med Hx  Surg Hx  Fam Hx         ROS:  Constitutional, HEENT, cardiovascular, pulmonary, GI, , musculoskeletal, neuro, skin, endocrine and psych systems are negative, except as otherwise noted.    OBJECTIVE:     /72   Pulse 75   Temp 98.1  F (36.7  C) (Oral)   Resp 16   SpO2 98%   There is no height or weight on file to calculate BMI.  GENERAL: healthy, alert and no distress  MS: no gross musculoskeletal defects noted, no edema  SKIN: large patches of erythema - abdomen, face and groin with small pustules and 2 subcutaneous nodules in right groin.        ASSESSMENT/PLAN:       ICD-10-CM    1. Folliculitis L73.9 cephALEXin (KEFLEX) 500 MG capsule   2. Mild persistent asthma without complication J45.30 albuterol (PROAIR HFA) 108 (90 Base) MCG/ACT inhaler   3. Mild persistent asthma not dependent on  systemic steroids J45.30 albuterol (PROVENTIL) (2.5 MG/3ML) 0.083% neb solution     Erythema and pustule/cyst consistent with folliculitis.  Will treat with oral antibiotics x 10 days    Refill proair and albuterol for PRN use.  F/u in 1 year or sooner if worsening.    See Patient Instructions    BROOK Rosa CNP  [facility]

## 2019-02-06 NOTE — TELEPHONE ENCOUNTER
"Requested Prescriptions   Pending Prescriptions Disp Refills     albuterol (PROVENTIL) (2.5 MG/3ML) 0.083% neb solution [Pharmacy Med Name: ALBUTEROL 0.083%(2.5MG/3ML) 60X3ML]  Last Written Prescription Date:  12/27/17  Last Fill Quantity: 30,  # refills: 0   Last office visit: 7/6/2018 with prescribing provider:     Future Office Visit:   Next 5 appointments (look out 90 days)    Feb 06, 2019  2:00 PM CST  Office Visit with Angelica Pate, BROOK FLYNN, Zhane Danielson  [facility] [address] [place] [id number] [phone]          180 mL 0     Sig: NEBULIZE ONE VIAL VIA NEBLULIZER EVERY 4 HOURS AS NEEDED FOR SHORTNESS OF BREATH AND DSYPNEA    Asthma Maintenance Inhalers - Anticholinergics Failed - 2/5/2019  7:09 PM       Failed - Asthma control assessment score within normal limits in last 6 months    Please review ACT score.          Passed - Patient is age 12 years or older       Passed - Medication is active on med list       Passed - Recent (6 mo) or future (30 days) visit within the authorizing provider's specialty    Patient had office visit in the last 6 months or has a visit in the next 30 days with authorizing provider or within the authorizing provider's specialty.  See \"Patient Info\" tab in inbasket, or \"Choose Columns\" in Meds & Orders section of the refill encounter.              "

## 2019-02-08 RX ORDER — ALBUTEROL SULFATE 0.83 MG/ML
SOLUTION RESPIRATORY (INHALATION)
Qty: 180 ML | Refills: 0 | OUTPATIENT
Start: 2019-02-08

## 2019-02-08 ASSESSMENT — ASTHMA QUESTIONNAIRES: ACT_TOTALSCORE: 22

## 2019-02-12 RX ORDER — ALBUTEROL SULFATE 90 UG/1
AEROSOL, METERED RESPIRATORY (INHALATION)
Qty: 8.5 G | Refills: 0 | OUTPATIENT
Start: 2019-02-12

## 2019-03-15 DIAGNOSIS — J45.30 MILD PERSISTENT ASTHMA WITHOUT COMPLICATION: ICD-10-CM

## 2019-03-15 NOTE — TELEPHONE ENCOUNTER
A drug that you prescribed either is not covered or is subject to the limits listed in the utilization management edit section. However, our transition policy ensured that you [employer] covered pt received a temporary supply of this drug.   To find alternative drug covered by your pt plan, you can use [employer]/druglistsearch.   To request a coverage determination or an exception:   [employer]/epa/humana

## 2019-03-16 DIAGNOSIS — J31.0 CHRONIC RHINITIS: Primary | ICD-10-CM

## 2019-03-16 NOTE — TELEPHONE ENCOUNTER
"Requested Prescriptions   Pending Prescriptions Disp Refills     fluticasone (FLONASE) 50 MCG/ACT nasal spray [Pharmacy Med Name: FLUTICASONE 50MCG NASAL SP (120) RX]  Medication is historical  Last Written Prescription Date:  12/11/2015  Last Fill Quantity: ,  # refills: 6   Last office visit: 12/5/2017 with prescribing provider:  Rose   Future Office Visit:     16 mL 0     Sig: INSTILL 2 SPRAYS IN EACH NOSTRIL DAILY AS DIRECTED    Inhaled Steroids Protocol Failed - 3/16/2019  3:47 AM       Failed - Medication is active on med list       Passed - Patient is age 12 or older       Passed - Asthma control assessment score within normal limits in last 6 months    Please review ACT score.          Passed - Recent (6 mo) or future (30 days) visit within the authorizing provider's specialty    Patient had office visit in the last 6 months or has a visit in the next 30 days with authorizing provider or within the authorizing provider's specialty.  See \"Patient Info\" tab in inbasket, or \"Choose Columns\" in Meds & Orders section of the refill encounter.              "

## 2019-03-19 RX ORDER — FLUTICASONE PROPIONATE 50 MCG
SPRAY, SUSPENSION (ML) NASAL
Qty: 16 ML | Refills: 0 | Status: SHIPPED | OUTPATIENT
Start: 2019-03-19 | End: 2019-04-19

## 2019-04-15 ENCOUNTER — MYC MEDICAL ADVICE (OUTPATIENT)
Dept: PHARMACY | Facility: CLINIC | Age: 54
End: 2019-04-15

## 2019-04-16 NOTE — TELEPHONE ENCOUNTER
4-16-19      Milvia Menchaca ,  when  next check my AC1 blood labs ? Soon ?     First take blood labs appt. Then  next appt. See you. Discuss. Results from Ac1.  and. Also. Talk with u about OTC meds.  Etc.       Aaron gunderson      [facility] notes labs due in June but he has [employer] now . Private pay ? Will let paytient know.    -dontrell

## 2019-04-19 DIAGNOSIS — J31.0 CHRONIC RHINITIS: ICD-10-CM

## 2019-04-19 RX ORDER — FLUTICASONE PROPIONATE 50 MCG
SPRAY, SUSPENSION (ML) NASAL
Qty: 48 G | Refills: 3 | Status: SHIPPED | OUTPATIENT
Start: 2019-04-19 | End: 2020-03-25

## 2019-04-19 NOTE — TELEPHONE ENCOUNTER
Office visit 2/6/19    Prescription approved per [facility] [place] [facility].    Signed Prescriptions:                        Disp   Refills    fluticasone (FLONASE) 50 MCG/ACT nasal spr*48 g   3        Sig: INSTILL 2 SPRAYS IN EACH NOSTRIL DAILY AS DIRECTED  Authorizing Provider: KRYSTIN ROLDAN  Ordering User: SHAD TAYLOR      Closing encounter - no further actions needed at this time    Shad Taylor RN

## 2019-04-19 NOTE — TELEPHONE ENCOUNTER
"Requested Prescriptions   Pending Prescriptions Disp Refills     fluticasone (FLONASE) 50 MCG/ACT nasal spray [Pharmacy Med Name: FLUTICASONE 50MCG NASAL SP (120) RX]  Last Written Prescription Date:  3/19/2019  Last Fill Quantity: 16ml,  # refills: 0   Last Office Visit: 12/5/2017 gonzalez    Future Office Visit:      16 mL 0     Sig: INSTILL 2 SPRAYS IN EACH NOSTRIL DAILY AS DIRECTED       Inhaled Steroids Protocol Passed - 4/19/2019  9:59 AM        Passed - Patient is age 12 or older        Passed - Asthma control assessment score within normal limits in last 6 months     Please review ACT score.           Passed - Medication is active on med list        Passed - Recent (6 mo) or future (30 days) visit within the authorizing provider's specialty     Patient had office visit in the last 6 months or has a visit in the next 30 days with authorizing provider or within the authorizing provider's specialty.  See \"Patient Info\" tab in inbasket, or \"Choose Columns\" in Meds & Orders section of the refill encounter.            "

## 2019-04-24 RX ORDER — ALBUTEROL SULFATE 90 UG/1
AEROSOL, METERED RESPIRATORY (INHALATION)
Qty: 8.5 G | Refills: 11 | OUTPATIENT
Start: 2019-04-24

## 2019-05-20 ENCOUNTER — MEDICAL CORRESPONDENCE (OUTPATIENT)
Dept: HEALTH INFORMATION MANAGEMENT | Facility: CLINIC | Age: 54
End: 2019-05-20

## 2019-05-22 RX ORDER — ALBUTEROL SULFATE 90 UG/1
AEROSOL, METERED RESPIRATORY (INHALATION)
Qty: 0.1 G | Refills: 0 | OUTPATIENT
Start: 2019-05-22

## 2019-05-23 NOTE — TELEPHONE ENCOUNTER
"Requested Prescriptions   Pending Prescriptions Disp Refills     PROAIR  (90 Base) MCG/ACT inhaler [Pharmacy Med Name: PROAIR HFA ORAL INH (200  PFS) 8.5G] 8.5 g 0     Sig: INHALE 2 PUFFS BY MOUTH EVERY 6 HOURS AS NEEDED       Asthma Maintenance Inhalers - Anticholinergics Passed - 5/7/2019 10:38 AM        Passed - Patient is age 12 years or older        Passed - Asthma control assessment score within normal limits in last 6 months     Please review ACT score.     ACT Total Scores 5/3/2018 12/18/2018 2/6/2019   ACT TOTAL SCORE - - -   ASTHMA ER VISITS - - -   ASTHMA HOSPITALIZATIONS - - -   ACT TOTAL SCORE (Goal Greater than or Equal to 20) 20 19 22   In the past 12 months, how many times did you visit the emergency room for your asthma without being admitted to the hospital? 0 0 0   In the past 12 months, how many times were you hospitalized overnight because of your asthma? 0 0 0               Passed - Medication is active on med list        Passed - Recent (6 mo) or future (30 days) visit within the authorizing provider's specialty     Patient had office visit in the last 6 months or has a visit in the next 30 days with authorizing provider or within the authorizing provider's specialty.  See \"Patient Info\" tab in inbasket, or \"Choose Columns\" in Meds & Orders section of the refill encounter.            Refused Prescriptions:                       Disp   Refills    PROAIR  (90 Base) MCG/ACT inhaler [*0.1 g  0        Sig: INHALE 2 PUFFS BY MOUTH EVERY 6 HOURS AS NEEDED  Refused By: SHAD OAKES  Reason for Refusal: Duplicate      "

## 2019-06-20 DIAGNOSIS — J45.30 MILD PERSISTENT ASTHMA WITHOUT COMPLICATION: ICD-10-CM

## 2019-06-21 NOTE — TELEPHONE ENCOUNTER
"Requested Prescriptions   Pending Prescriptions Disp Refills     WIXELA INHUB 250-50 MCG/DOSE inhaler [Pharmacy Med Name: WIXELA INHUB DISKUS 250/50MCG 60S]  0     Sig: INHALE 1 PUFF INTO THE LUNGS TWICE DAILY AS NEEDED      Last Written Prescription Date:    NOT ON CURRENT MED LIST  Last Fill Quantity: ???,  # refills: ????   Last Office Visit: 2/6/2019   Future Office Visit:            Inhaled Steroids Protocol Failed - 6/20/2019  3:46 AM        Failed - Medication is active on med list        Passed - Patient is age 12 or older        Passed - Asthma control assessment score within normal limits in last 6 months     Please review ACT score.         Passed - Recent (6 mo) or future (30 days) visit within the authorizing provider's specialty     Patient had office visit in the last 6 months or has a visit in the next 30 days with authorizing provider or within the authorizing provider's specialty.  See \"Patient Info\" tab in inbasket, or \"Choose Columns\" in Meds & Orders section of the refill encounter.          ACT Total Scores 5/3/2018 12/18/2018 2/6/2019   ACT TOTAL SCORE - - -   ASTHMA ER VISITS - - -   ASTHMA HOSPITALIZATIONS - - -   ACT TOTAL SCORE (Goal Greater than or Equal to 20) 20 19 22   In the past 12 months, how many times did you visit the emergency room for your asthma without being admitted to the hospital? 0 0 0   In the past 12 months, how many times were you hospitalized overnight because of your asthma? 0 0 0       "

## 2019-06-26 ENCOUNTER — TELEPHONE (OUTPATIENT)
Dept: FAMILY MEDICINE | Facility: CLINIC | Age: 54
End: 2019-06-26

## 2019-06-26 NOTE — TELEPHONE ENCOUNTER
[employer] [employer]--ghislaine--archie really only needs fasting lipids and a1c yearly --he could come and see aliyah in the fall post labor day for labs and clinic appt.     His insurance chnaged and he would have to private pay to see me and I dont want to charge him --please encourage him to see aliyah.     He is deaf so youll have to send him a naye message to this effect .     Arnaud., Bernarda Mooney, [facility].   Medication Therapy Management Provider   [phone]

## 2019-06-26 NOTE — TELEPHONE ENCOUNTER
Routing to provider - Herlinda/BLADIMIR - please review and advise as appropriate    Order request:  A1c    Last MTM visit 12/18/18    Lab Results   Component Value Date    A1C 5.6 12/18/2018    A1C 5.8 05/03/2018    A1C 5.7 06/13/2017    A1C 5.7 01/30/2017    A1C 5.9 02/11/2016

## 2019-06-26 NOTE — TELEPHONE ENCOUNTER
Patient returned call. States he does not use WIXELA Inhaler, he does however need a refill of albuterol (PROAIR HFA) 108 (90 Base) MCG/ACT inhaler (states it has been changed a few times since 2/6/2019). Please assist. Thanks!

## 2019-06-26 NOTE — TELEPHONE ENCOUNTER
Reason for Call: Request for an order or referral:    Order or referral being requested: LAB ORDERS    Date needed: as soon as possible    Has the patient been seen by the PCP for this problem? YES    Additional comments: Pt is requesting A1C orders to schedule a lab only appt.     Phone number Patient can be reached at:  Home number on file [phone] (home)    Best Time:  anytime    Can we leave a detailed message on this number?  YES    Call taken on 6/26/2019 at 1:11 PM by Dilcia Luz

## 2019-06-26 NOTE — TELEPHONE ENCOUNTER
LM with  to have pt call back about inhaler request.   Did he or pharmacy request?        Dilcia FONTAINE     [facility]         yes

## 2019-06-28 NOTE — TELEPHONE ENCOUNTER
There  Should be enough refills of Albuterol at pharmacy.pharmacy said an RX is ready for him.  Theresa Dunn RN

## 2019-07-24 ENCOUNTER — OFFICE VISIT (OUTPATIENT)
Dept: FAMILY MEDICINE | Facility: CLINIC | Age: 54
End: 2019-07-24
Payer: COMMERCIAL

## 2019-07-24 VITALS
DIASTOLIC BLOOD PRESSURE: 64 MMHG | SYSTOLIC BLOOD PRESSURE: 106 MMHG | RESPIRATION RATE: 22 BRPM | BODY MASS INDEX: 43.33 KG/M2 | HEART RATE: 62 BPM | TEMPERATURE: 98.7 F | WEIGHT: 285 LBS | OXYGEN SATURATION: 95 %

## 2019-07-24 DIAGNOSIS — G47.00 INSOMNIA, UNSPECIFIED TYPE: ICD-10-CM

## 2019-07-24 DIAGNOSIS — R73.03 PREDIABETES: Primary | ICD-10-CM

## 2019-07-24 DIAGNOSIS — J45.30 MILD PERSISTENT ASTHMA WITHOUT COMPLICATION: ICD-10-CM

## 2019-07-24 LAB — HBA1C MFR BLD: 5.4 % (ref 0–5.6)

## 2019-07-24 PROCEDURE — 36415 COLL VENOUS BLD VENIPUNCTURE: CPT | Performed by: FAMILY MEDICINE

## 2019-07-24 PROCEDURE — 83036 HEMOGLOBIN GLYCOSYLATED A1C: CPT | Performed by: FAMILY MEDICINE

## 2019-07-24 PROCEDURE — 99214 OFFICE O/P EST MOD 30 MIN: CPT | Performed by: FAMILY MEDICINE

## 2019-07-24 RX ORDER — RAMELTEON 8 MG/1
8 TABLET ORAL AT BEDTIME
Qty: 14 TABLET | Refills: 0 | Status: SHIPPED | OUTPATIENT
Start: 2019-07-24 | End: 2019-08-23

## 2019-07-24 NOTE — PROGRESS NOTES
Subjective:   Oliver Agarwal is a 54 year old male who presents for   Chief Complaint   Patient presents with     Diabetes     A1C     Formulary Issue     Advair inhaler   1)   Last a1c check was half a year ago - he is prediabetic and would like to have this checked again. On no medications at this time    Lab Results   Component Value Date    A1C 5.6 12/18/2018    A1C 5.8 05/03/2018    A1C 5.7 06/13/2017    A1C 5.7 01/30/2017    A1C 5.9 02/11/2016     2)   Also looking for a refill for his advair - reportedly his pharmacy substituted for Wixela. Has not seen any decrease in efficacy.       3) Patient having inconsistency with sleep and says he will wake up around 9-11am. Says his hour of sleep changes and is hoping to go to bed at a regular time hopefully around 10pm. Patient reports he occasionally takes naps but not often. Naps will on average be about 20 minutes at a time, sometimes longer. He has had sleeping difficulties previously in the past last time about 8 years ago. No televisions in the bedroom.   He lives with his wife. Uses a CPAP machine for his snoring. DIdn't like melatonin previously in the past.       Patient Active Problem List    Diagnosis Date Noted     Morbid obesity (H) 06/18/2018     Priority: Medium     Dry eyes 12/19/2017     Priority: Medium     Corneal erosion, right 12/05/2017     Priority: Medium     Blepharitis of both eyes with rosacea 12/05/2017     Priority: Medium     Deaf - reads lips well 02/07/2014     Priority: Medium     Mild persistent asthma 04/12/2013     Priority: Medium     Hypovitaminosis D 12/27/2011     Priority: Medium     Atopic rhinitis 12/13/2011     Priority: Medium     (Problem list name updated by automated process. Provider to review and confirm.)       Prediabetes 12/13/2011     Priority: Medium     Obesity 12/13/2011     Priority: Medium     CARDIOVASCULAR SCREENING; LDL GOAL LESS THAN 160 10/31/2010     Priority: Medium       Current Outpatient  Medications   Medication     albuterol (PROAIR HFA) 108 (90 Base) MCG/ACT inhaler     albuterol (PROVENTIL) (2.5 MG/3ML) 0.083% neb solution     fluticasone (FLONASE) 50 MCG/ACT nasal spray     fluticasone-salmeterol (ADVAIR) 250-50 MCG/DOSE inhaler     fluticasone-salmeterol (WIXELA INHUB) 250-50 MCG/DOSE inhaler     ramelteon (ROZEREM) 8 MG tablet     No current facility-administered medications for this visit.        ROS:  As above per HPI    Objective:   /64   Pulse 62   Temp 98.7  F (37.1  C) (Oral)   Resp 22   Wt 129.3 kg (285 lb)   SpO2 95%   BMI 43.33 kg/m  , Body mass index is 43.33 kg/m .  Gen:  NAD, well-nourished, sitting in chair comfortably  HEENT: EOMI, sclera anicteric, Head normocephalic, ; nares patent; moist mucous membranes  Neck: trachea midline, no thyromegaly  CV:  Hemodynamically stable  Pulm:  no increased work of breathing   Extrem: no cyanosis, edema or clubbing  Skin: no obvious rashes or abnormalities  Psych: Euthymic, linear thoughts, normal rate of speech        Assessment & Plan:   Oliver Agarwal, 54 year old male who presents with:  Prediabetes  Stable and no weight gain, patient insistent on re-checking. Discussed his levels are not consistent with causes of fatigue. Discussed not necessary to check blood sugars as he is not on hypoglycemic agents, or any meds for that matter.   - Hemoglobin A1c    Mild persistent asthma without complication  Substitute for advair was provided today. Use one puff BID as prescribed. Appears to be stable with no recent exacerbations  - fluticasone-salmeterol (WIXELA INHUB) 250-50 MCG/DOSE inhaler  Dispense: 14 Inhaler; Refill: 1    Insomnia, unspecified type  May need to do prior auth for this in the past did not tolerate trazodone and reports that melatonin by itself made him groggy. Has interest in ambien but felt this should be reserved for difficulty with remaining asleep rather than problems with initiating sleep which seems to  be an issue. We reviewed sleep hygiene today as above.   - ramelteon (ROZEREM) 8 MG tablet  Dispense: 14 tablet; Refill: 0      Ovidio Carlos MD   [facility]    The use of Dragon/TweetPhotoation services may have been used to construct the content in this note; any grammatical or spelling errors are non-intentional. Please contact the author of this note directly if you are in need of any clarification.

## 2019-07-29 ENCOUNTER — TELEPHONE (OUTPATIENT)
Dept: FAMILY MEDICINE | Facility: CLINIC | Age: 54
End: 2019-07-29

## 2019-07-29 NOTE — TELEPHONE ENCOUNTER
Prior Authorization Retail Medication Request    Medication/Dose: ramelteon (ROZEREM) 8 MG tablet  ICD code (if different than what is on RX):  Previously Tried and Failed:  Rationale:    Insurance Name:    Insurance ID: [id number]    Pharmacy Information (if different than what is on RX)  Name:  Phone:    Please include previous medications tried and failed.  Please ask insurance for medications on formulary.

## 2019-08-07 NOTE — TELEPHONE ENCOUNTER
Central Prior Authorization Team   Phone: [phone]      PA Initiation    Medication: ramelteon (ROZEREM) 8 MG tablet, Initiated  Insurance Company: [employer] - Phone [phone] Fax [phone]  Pharmacy Filling the Rx: [id number] - [place], [place] - [address] AT [facility] [address]  Filling Pharmacy Phone: [phone]  Filling Pharmacy Fax:    Start Date: 8/7/2019

## 2019-08-07 NOTE — TELEPHONE ENCOUNTER
Patient called stating that this needs to be completed ASAP, he spoke with Lauren at [facility] [phone], Ref # [id number].

## 2019-08-08 NOTE — TELEPHONE ENCOUNTER
Prior Authorization Approval    Authorization Effective Date: 8/7/2019  Authorization Expiration Date: 12/31/2019  Medication: ramelteon (ROZEREM) 8 MG tablet, APPROVED  Approved Dose/Quantity:   Reference #:     Insurance Company: [employer] - Phone [phone] Fax [phone]  Expected CoPay:       CoPay Card Available:      Foundation Assistance Needed:    Which Pharmacy is filling the prescription (Not needed for infusion/clinic administered): [id number] - [place], [place] - [address] AT [facility] [address]  Pharmacy Notified: Yes  Patient Notified: No      Pharmacy will notify patient when medication is ready.

## 2019-08-13 ENCOUNTER — TELEPHONE (OUTPATIENT)
Dept: SLEEP MEDICINE | Facility: CLINIC | Age: 54
End: 2019-08-13

## 2019-08-13 NOTE — TELEPHONE ENCOUNTER
Orders received signed, faxed and sent to scanning    Alisha Chavira [facility]

## 2019-08-22 DIAGNOSIS — G47.00 INSOMNIA, UNSPECIFIED TYPE: ICD-10-CM

## 2019-08-22 NOTE — TELEPHONE ENCOUNTER
ramelteon (ROZEREM) 8 MG tablet      Last Written Prescription Date:  7-24-19  Last Fill Quantity: 14 tab,   # refills: 0  Last Office Visit: 7-24-19  Future Office visit:       Routing refill request to provider for review/approval because:  Drug not on the [facility], [facility] or [facility] refill protocol or controlled substance

## 2019-08-23 RX ORDER — RAMELTEON 8 MG/1
8 TABLET ORAL AT BEDTIME
Qty: 30 TABLET | Refills: 3 | Status: SHIPPED | OUTPATIENT
Start: 2019-08-23 | End: 2020-07-20

## 2019-08-23 NOTE — TELEPHONE ENCOUNTER
Rozerem not no RN refill protocol  Last written on 7/24/19 for #14 by Dr Carlos    Refill request to MD/primary care provider.  Atiya Macdonald RN

## 2019-09-19 DIAGNOSIS — J45.30 MILD PERSISTENT ASTHMA WITHOUT COMPLICATION: ICD-10-CM

## 2019-09-19 NOTE — TELEPHONE ENCOUNTER
"Requested Prescriptions   Pending Prescriptions Disp Refills     fluticasone-salmeterol (WIXELA INHUB) 250-50 MCG/DOSE inhaler  Last Written Prescription Date:  7-24-19  Last Fill Quantity: 14 inh,  # refills: 1   Last office visit: 2/6/2019 with prescribing provider:  Angelica Pate    Future Office Visit:    14 Inhaler 1     Sig: Inhale 1 puff into the lungs every 12 hours       Inhaled Steroids Protocol Failed - 9/19/2019 10:37 AM        Failed - Asthma control assessment score within normal limits in last 6 months     Please review ACT score.   PHQ-9 SCORE 12/18/2018   PHQ-9 Total Score 11     No flowsheet data found.        Passed - Patient is age 12 or older        Passed - Medication is active on med list        Passed - Recent (6 mo) or future (30 days) visit within the authorizing provider's specialty     Patient had office visit in the last 6 months or has a visit in the next 30 days with authorizing provider or within the authorizing provider's specialty.  See \"Patient Info\" tab in inbasket, or \"Choose Columns\" in Meds & Orders section of the refill encounter.             "

## 2019-09-20 NOTE — TELEPHONE ENCOUNTER
Prescription approved per [facility] Refill Protocol.  Guillermina Turcios RN

## 2019-11-09 DIAGNOSIS — J45.30 MILD PERSISTENT ASTHMA WITHOUT COMPLICATION: ICD-10-CM

## 2019-11-09 NOTE — TELEPHONE ENCOUNTER
"Requested Prescriptions   Pending Prescriptions Disp Refills     WIXELA INHUB 250-50 MCG/DOSE inhaler [Pharmacy Med Name: WIXELA INHUB DISKUS 250/50MCG 60S]  Last Written Prescription Date:  9/20/2019  Last Fill Quantity: 14 inhaler,  # refills: 1   Last office visit: 7/24/2019 with prescribing provider:  Herlinda   Future Office Visit:    0     Sig: INHALE 1 PUFF BY MOUTH INTO THE LUNGS EVERY 12 HOURS       Inhaled Steroids Protocol Failed - 11/9/2019  3:46 AM        Failed - Asthma control assessment score within normal limits in last 6 months     Please review ACT score.           Passed - Patient is age 12 or older        Passed - Medication is active on med list        Passed - Recent (6 mo) or future (30 days) visit within the authorizing provider's specialty     Patient had office visit in the last 6 months or has a visit in the next 30 days with authorizing provider or within the authorizing provider's specialty.  See \"Patient Info\" tab in inbasket, or \"Choose Columns\" in Meds & Orders section of the refill encounter.             "

## 2019-11-11 ENCOUNTER — MYC MEDICAL ADVICE (OUTPATIENT)
Dept: NURSING | Facility: CLINIC | Age: 54
End: 2019-11-11

## 2019-11-12 NOTE — TELEPHONE ENCOUNTER
Medication is being filled for 1 time refill only due to:  Patient needs to be seen because need recheck.

## 2019-11-20 ENCOUNTER — OFFICE VISIT (OUTPATIENT)
Dept: FAMILY MEDICINE | Facility: CLINIC | Age: 54
End: 2019-11-20
Payer: COMMERCIAL

## 2019-11-20 VITALS
HEART RATE: 68 BPM | OXYGEN SATURATION: 95 % | RESPIRATION RATE: 16 BRPM | WEIGHT: 290 LBS | SYSTOLIC BLOOD PRESSURE: 110 MMHG | TEMPERATURE: 98.5 F | DIASTOLIC BLOOD PRESSURE: 68 MMHG | BODY MASS INDEX: 44.09 KG/M2

## 2019-11-20 DIAGNOSIS — J45.901 EXACERBATION OF ASTHMA, UNSPECIFIED ASTHMA SEVERITY, UNSPECIFIED WHETHER PERSISTENT: Primary | ICD-10-CM

## 2019-11-20 DIAGNOSIS — R73.03 PREDIABETES: ICD-10-CM

## 2019-11-20 LAB — HBA1C MFR BLD: 5.4 % (ref 0–5.6)

## 2019-11-20 PROCEDURE — 36415 COLL VENOUS BLD VENIPUNCTURE: CPT | Performed by: FAMILY MEDICINE

## 2019-11-20 PROCEDURE — 83036 HEMOGLOBIN GLYCOSYLATED A1C: CPT | Performed by: FAMILY MEDICINE

## 2019-11-20 PROCEDURE — 99214 OFFICE O/P EST MOD 30 MIN: CPT | Performed by: FAMILY MEDICINE

## 2019-11-20 RX ORDER — PREDNISONE 20 MG/1
40 TABLET ORAL DAILY
Qty: 10 TABLET | Refills: 0 | Status: SHIPPED | OUTPATIENT
Start: 2019-11-20 | End: 2019-11-25

## 2019-11-20 ASSESSMENT — ANXIETY QUESTIONNAIRES
GAD7 TOTAL SCORE: 0
GAD7 TOTAL SCORE: 0
7. FEELING AFRAID AS IF SOMETHING AWFUL MIGHT HAPPEN: NOT AT ALL
1. FEELING NERVOUS, ANXIOUS, OR ON EDGE: NOT AT ALL
2. NOT BEING ABLE TO STOP OR CONTROL WORRYING: NOT AT ALL
5. BEING SO RESTLESS THAT IT IS HARD TO SIT STILL: NOT AT ALL
7. FEELING AFRAID AS IF SOMETHING AWFUL MIGHT HAPPEN: NOT AT ALL
4. TROUBLE RELAXING: NOT AT ALL
3. WORRYING TOO MUCH ABOUT DIFFERENT THINGS: NOT AT ALL
6. BECOMING EASILY ANNOYED OR IRRITABLE: NOT AT ALL
GAD7 TOTAL SCORE: 0

## 2019-11-20 ASSESSMENT — PATIENT HEALTH QUESTIONNAIRE - PHQ9
10. IF YOU CHECKED OFF ANY PROBLEMS, HOW DIFFICULT HAVE THESE PROBLEMS MADE IT FOR YOU TO DO YOUR WORK, TAKE CARE OF THINGS AT HOME, OR GET ALONG WITH OTHER PEOPLE: NOT DIFFICULT AT ALL
SUM OF ALL RESPONSES TO PHQ QUESTIONS 1-9: 0
SUM OF ALL RESPONSES TO PHQ QUESTIONS 1-9: 0

## 2019-11-20 NOTE — PROGRESS NOTES
Subjective     Oliver Agarwal is a 54 year old male who presents to clinic today for the following health issues:    HPI   RESPIRATORY SYMPTOMS      Duration: Few days     Description  cough, wheezing and yellow-green to clear phlegm     Severity: mild    Accompanying signs and symptoms: None    History (predisposing factors):  none    Precipitating or alleviating factors: None    Therapies tried and outcome:  Inhaler and cough drops    Pt also reported cold hands and feet last week. Request to check A1c.    Symptoms started a few days ago.   He has nasal discharge, intermittent sinus pressure, productive cough (clear) and dyspnea at rest.  Today cold seems to be better.  Unsure if he had fever, chills, eye drainage, ear pain, sore throat, wheezing, chest tightness or pain.   He has been taking cough drop. He has been using albuterol inhaler six to eight times/day.   He had blurry vision - which resolved.   He also wants to talk about why hands and feet are cold.    Reviewed and updated as needed this visit by Provider         Review of Systems   ROS COMP: Constitutional, HEENT, cardiovascular, pulmonary, gi and gu systems are negative, except as otherwise noted.      Objective    /68 (BP Location: Right arm, Patient Position: Sitting, Cuff Size: Adult Large)   Pulse 68   Temp 98.5  F (36.9  C) (Oral)   Resp 16   Wt 131.5 kg (290 lb)   SpO2 95%   BMI 44.09 kg/m    Body mass index is 44.09 kg/m .  Physical Exam   GENERAL: healthy, alert and no distress  EYES: Eyes grossly normal to inspection  HENT: ear canals and TM's normal, nose and mouth without ulcers or lesions  NECK: no adenopathy  RESP: lungs clear to auscultation - no rales, rhonchi or wheezes  CV: regular rate and rhythm, normal S1 S2    Diagnostic Test Results:  none         Assessment & Plan     1. Exacerbation of asthma, unspecified asthma severity, unspecified whether persistent  - albuterol every 4 to 6 hours as needed for wheezing or  shortness of breath   - predniSONE (DELTASONE) 20 MG tablet; Take 2 tablets (40 mg) by mouth daily for 5 days  Dispense: 10 tablet; Refill: 0  - Follow if symptoms worsen or fail to improve.    2. Prediabetes  - Hemoglobin A1c      Return in about 1 week (around 11/27/2019) for sympotms are not improving.    Greg Mukherjee MD  [facility]      Answers for HPI/ROS submitted by the patient on 11/20/2019   If you checked off any problems, how difficult have these problems made it for you to do your work, take care of things at home, or get along with other people?: Not difficult at all  PHQ9 TOTAL SCORE: 0  ALTA 7 TOTAL SCORE: 0

## 2019-11-20 NOTE — PATIENT INSTRUCTIONS
- predniSONE (DELTASONE) 20 MG tablet; Take 2 tablets (40 mg) by mouth daily for 5 days  - albuterol every 4 to 6 hours as needed for wheezing or shortness of breath   - Follow if symptoms worsen or fail to improve.

## 2019-11-21 ASSESSMENT — ANXIETY QUESTIONNAIRES: GAD7 TOTAL SCORE: 0

## 2019-11-21 ASSESSMENT — PATIENT HEALTH QUESTIONNAIRE - PHQ9: SUM OF ALL RESPONSES TO PHQ QUESTIONS 1-9: 0

## 2019-12-19 ENCOUNTER — TELEPHONE (OUTPATIENT)
Dept: EDUCATION SERVICES | Facility: CLINIC | Age: 54
End: 2019-12-19

## 2019-12-19 DIAGNOSIS — E66.9 OBESITY, UNSPECIFIED CLASSIFICATION, UNSPECIFIED OBESITY TYPE, UNSPECIFIED WHETHER SERIOUS COMORBIDITY PRESENT: Primary | ICD-10-CM

## 2019-12-19 NOTE — TELEPHONE ENCOUNTER
Pt is asking for a nutrition education appointment. Our program requires a referral.     Please place the nutrition referral and [facility] elsa'ing will call the pt schedule.

## 2019-12-23 ENCOUNTER — TELEPHONE (OUTPATIENT)
Dept: FAMILY MEDICINE | Facility: CLINIC | Age: 54
End: 2019-12-23

## 2019-12-23 NOTE — TELEPHONE ENCOUNTER
Diabetes Education Scheduling Outreach #1:    Call to patient to schedule. Left message with phone number to call to schedule.    Plan for 2nd outreach attempt within 1 week.    Natty Rowland  [facility]  Diabetes and Nutrition Scheduling

## 2020-01-13 ENCOUNTER — OFFICE VISIT (OUTPATIENT)
Dept: FAMILY MEDICINE | Facility: CLINIC | Age: 55
End: 2020-01-13
Payer: COMMERCIAL

## 2020-01-13 VITALS — SYSTOLIC BLOOD PRESSURE: 116 MMHG | DIASTOLIC BLOOD PRESSURE: 66 MMHG

## 2020-01-13 DIAGNOSIS — L71.9 ROSACEA: Primary | ICD-10-CM

## 2020-01-13 DIAGNOSIS — H01.003 BLEPHARITIS OF BOTH EYES WITH ROSACEA: ICD-10-CM

## 2020-01-13 DIAGNOSIS — H01.9 DERMATITIS OF EYELIDS OF BOTH EYES, UNSPECIFIED TYPE: ICD-10-CM

## 2020-01-13 DIAGNOSIS — H01.006 BLEPHARITIS OF BOTH EYES WITH ROSACEA: ICD-10-CM

## 2020-01-13 DIAGNOSIS — L71.9 BLEPHARITIS OF BOTH EYES WITH ROSACEA: ICD-10-CM

## 2020-01-13 PROCEDURE — 99214 OFFICE O/P EST MOD 30 MIN: CPT | Performed by: FAMILY MEDICINE

## 2020-01-13 RX ORDER — DOXYCYCLINE 50 MG/1
50 CAPSULE ORAL DAILY
Qty: 90 CAPSULE | Refills: 3 | Status: SHIPPED | OUTPATIENT
Start: 2020-01-13 | End: 2020-04-12

## 2020-01-13 RX ORDER — TACROLIMUS 1 MG/G
OINTMENT TOPICAL DAILY PRN
Qty: 30 G | Refills: 3 | Status: SHIPPED | OUTPATIENT
Start: 2020-01-13 | End: 2020-07-20

## 2020-01-13 RX ORDER — IVERMECTIN 10 MG/G
CREAM TOPICAL
Refills: 5 | COMMUNITY
Start: 2019-12-12 | End: 2021-06-07

## 2020-01-13 NOTE — PATIENT INSTRUCTIONS
FUTURE APPOINTMENTS  Follow up in 6 month(s) or earlier as needed.    Make sure to apply a facial moisturizer regularly.    ORAL ANTIBIOTIC - for rosacea  Take by mouth doxycycline 50 mg one time(s) a day.    TETRACYCLINE ANTIBIOTIC INFORMATION  Minocycline and doxycycline are tetracycline antibiotics and can increase your sun sensitivity, so make sure to be vigilant in regularly applying sunscreen. Also, avoid taking these with dairy products, as calcium can bind the antibiotic, making it unavailable to your body.    Consider using CeraVe facial moisturizer with SPF.    Avoid excessive alcohol, spicy food consumption or sun exposure.    TOPICAL MEDICATION INSTRUCTIONS - for rosacea  Ivermectin 1% cream    Apply a layer to the affected area(s) of rosacea on face two times per day, everyday regardless if symptoms are present or not.    Keep in mind to also regularly use moisturizer, as this preventative measure can help maintain your skin's natural moisture barrier.    Apply moisturizer after application of medication.    TOPICAL MEDICATION INSTRUCTIONS - for eyelid eczema  Tacrolimus (Protopic) 0.1% ointment.    Apply a thin layer to the affected area(s) on the upper eyelids once per day for 7-10 days. Then use when needed    Discontinue when symptoms have resolved, but you may use as needed for scaling and/or itchiness.    This is not a steroid, and it can be used on a daily basis or on the face.    Keep in mind to also regularly use moisturizer, as this preventative measure can help maintain your skin's natural moisture barrier.    Apply moisturizer after application of medication.    Your health insurance may need a prior authorization for coverage of this medication.    Although there is a black box warning for cancer risk from this medication, the study involved oral consumption by monkeys of this medication at 30 times the maximum recommended dosage.

## 2020-01-13 NOTE — LETTER
1/13/2020         RE: Oliver Agarwal  [address]  [place] [address]        Dear Colleague,    Thank you for referring your patient, Oliver Agarwal, [facility]. Please see a copy of my visit note below.    [facility] - PRIMARY CARE SKIN    CC: rosacea, rash  SUBJECTIVE:   Oliver Agarwal is a(n) 55 year old male who presents to clinic today for follow-up of chronic issues with rosacea that started years ago. He is accompanied today by his .    Therapies tried for rosacea since initial onset have included oral doxycycline, topical metronidazole, topical azelaic acid, ivermectin 1% cream, sulfacetamide wash 10%. He stopped topical medications for a while, but then he realized that redness and facial bumps began to intensify. He has gotten a refill of a topical medication by an outside physician. A KOH scraping test was done by that clinic, but no fungal elements were seen. He was instructed to avoid doxycycline. Most recently, he has used Soolantra cream once. Upon re-assessment, he was told that he has mites, and he was told to restart doxycycline. Thus, currently he is on doxycycline once daily and cleansing his face only. Facial bumps have resolved since taking doxycycline. An old digital photo shows scattered erythematous papules on the upper nose and lip. He has been exercising more frequently.    In addition to the rosacea, he also has a chronic history of eyelid dermatitis with previous treatments including topical steroids. Today, he reports chronic dry skin, redness, itchiness, and skin peeling of the eyelids occurring almost every day. Itchiness is occasional.    He has also noticed some itchiness of the ears.    There is a bump on his foot.    Personal Medical History  Skin cancer: NO  Eczema Psoriasis Lupus   YES NO NO   Other: rosacea, seborrheic dermatitis.    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    Social History     Tobacco Use     Smoking status: Former Smoker     Last attempt to quit: 1999     Years since quittin.6     Smokeless tobacco: Never Used   Substance Use Topics     Alcohol use: No     Drug use: No       Refer to electronic medical record (EMR) for past medical history and medications.    INTEGUMENTARY/SKIN: POSITIVE for redness, rash, itchiness   ROS: 14 point review of systems was negative except the symptoms listed above in the HPI.    This document serves as a record of the services and decisions personally performed and made by Melisa Rose MD and was created by Alvaro Calvo, a trained medical scribe, based on personal observations and provider statements to the medical scribe.  2020 2:43 PM   Alvaro Calvo    OBJECTIVE:   Estimated body mass index is 44.09 kg/m  as calculated from the following:    Height as of 19: 5' 8\" (1.727 m).    Weight as of 19: 290 lb (131.5 kg).  GENERAL: healthy and no distress.  HEENT: PERRL. Conjunctiva, sclera clear.  SKIN: Stone Skin Type - II.  Face, Ears examined. The dermatoscope was used to help evaluate pigmented lesions.  Skin Pertinent Findings:  Face: Baseline erythema on the distal nose and a few scattered papules.    Eyelids: Light scaling and erythema on the upper eyelids, more prominent on the left upper eyelid.    Ears, neck: normal skin    Diagnostic Test Results:  none     ASSESSMENT:     Encounter Diagnoses   Name Primary?     Rosacea Yes     Blepharitis      Dermatitis of eyelids of both eyes, unspecified type      Blepharitis of both eyes with rosacea      MDM:  Risk, benefits and alternative treatments were discussed with the patient. Cleansing program was discussed with the patient. Gentle cleansers were recommended. I discussed the importance of treating the skin gently and discussed triggers that could cause a flare. I discussed that this is usually a chronic condition that can be " controlled, but not cured.    PLAN:   Patient Instructions   FUTURE APPOINTMENTS  Follow up in 6 month(s) or earlier as needed.    Make sure to apply a facial moisturizer regularly.    ORAL ANTIBIOTIC - for rosacea  Take by mouth doxycycline 50 mg one time(s) a day.    TETRACYCLINE ANTIBIOTIC INFORMATION  Minocycline and doxycycline are tetracycline antibiotics and can increase your sun sensitivity, so make sure to be vigilant in regularly applying sunscreen. Also, avoid taking these with dairy products, as calcium can bind the antibiotic, making it unavailable to your body.    Consider using CeraVe facial moisturizer with SPF.    Avoid excessive alcohol, spicy food consumption or sun exposure.    TOPICAL MEDICATION INSTRUCTIONS - for rosacea  Ivermectin 1% cream    Apply a layer to the affected area(s) of rosacea on face two times per day, everyday regardless if symptoms are present or not.    Keep in mind to also regularly use moisturizer, as this preventative measure can help maintain your skin's natural moisture barrier.    Apply moisturizer after application of medication.    TOPICAL MEDICATION INSTRUCTIONS - for eyelid eczema  Tacrolimus (Protopic) 0.1% ointment.    Apply a thin layer to the affected area(s) on the upper eyelids once per day for 7-10 days. Then use when needed    Discontinue when symptoms have resolved, but you may use as needed for scaling and/or itchiness.    This is not a steroid, and it can be used on a daily basis or on the face.    Keep in mind to also regularly use moisturizer, as this preventative measure can help maintain your skin's natural moisture barrier.    Apply moisturizer after application of medication.    Your health insurance may need a prior authorization for coverage of this medication.    Although there is a black box warning for cancer risk from this medication, the study involved oral consumption by monkeys of this medication at 30 times the maximum recommended  dosage.        TT: 25 minutes.  CT: 15 minutes.  > 50 % of time spent discussing issues listed in the MDM with ASL interpretor present.  The information in this document, created by the medical scribe for me, accurately reflects the services I personally performed and the decisions made by me. I have reviewed and approved this document for accuracy prior to leaving the patient care area.  January 13, 2020 2:43 PM  Melisa Rose MD  [facility] [place] [place]      Again, thank you for allowing me to participate in the care of your patient.        Sincerely,        Melisa Rose MD

## 2020-01-13 NOTE — PROGRESS NOTES
[facility] - PRIMARY CARE SKIN    CC: rosacea, rash  SUBJECTIVE:   Oliver Agarwal is a(n) 55 year old male who presents to clinic today for follow-up of chronic issues with rosacea that started years ago. He is accompanied today by his .    Therapies tried for rosacea since initial onset have included oral doxycycline, topical metronidazole, topical azelaic acid, ivermectin 1% cream, sulfacetamide wash 10%. He stopped topical medications for a while, but then he realized that redness and facial bumps began to intensify. He has gotten a refill of a topical medication by an outside physician. A KOH scraping test was done by that clinic, but no fungal elements were seen. He was instructed to avoid doxycycline. Most recently, he has used Soolantra cream once. Upon re-assessment, he was told that he has mites, and he was told to restart doxycycline. Thus, currently he is on doxycycline once daily and cleansing his face only. Facial bumps have resolved since taking doxycycline. An old digital photo shows scattered erythematous papules on the upper nose and lip. He has been exercising more frequently.    In addition to the rosacea, he also has a chronic history of eyelid dermatitis with previous treatments including topical steroids. Today, he reports chronic dry skin, redness, itchiness, and skin peeling of the eyelids occurring almost every day. Itchiness is occasional.    He has also noticed some itchiness of the ears.    There is a bump on his foot.    Personal Medical History  Skin cancer: NO  Eczema Psoriasis Lupus   YES NO NO   Other: rosacea, seborrheic dermatitis.    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    Social History     Tobacco Use     Smoking status: Former Smoker     Last attempt to quit: 1999     Years since quittin.6     Smokeless tobacco: Never Used   Substance Use Topics     Alcohol use: No     Drug use: No       Refer to electronic medical  "record (EMR) for past medical history and medications.    INTEGUMENTARY/SKIN: POSITIVE for redness, rash, itchiness   ROS: 14 point review of systems was negative except the symptoms listed above in the HPI.    This document serves as a record of the services and decisions personally performed and made by Melisa Rose MD and was created by Alvaro Calvo, a trained medical scribe, based on personal observations and provider statements to the medical scribe.  January 13, 2020 2:43 PM   Alvaro Calvo    OBJECTIVE:   Estimated body mass index is 44.09 kg/m  as calculated from the following:    Height as of 1/22/19: 5' 8\" (1.727 m).    Weight as of 11/20/19: 290 lb (131.5 kg).  GENERAL: healthy and no distress.  HEENT: PERRL. Conjunctiva, sclera clear.  SKIN: Stone Skin Type - II.  Face, Ears examined. The dermatoscope was used to help evaluate pigmented lesions.  Skin Pertinent Findings:  Face: Baseline erythema on the distal nose and a few scattered papules.    Eyelids: Light scaling and erythema on the upper eyelids, more prominent on the left upper eyelid.    Ears, neck: normal skin    Diagnostic Test Results:  none     ASSESSMENT:     Encounter Diagnoses   Name Primary?     Rosacea Yes     Blepharitis      Dermatitis of eyelids of both eyes, unspecified type      Blepharitis of both eyes with rosacea      MDM:  Risk, benefits and alternative treatments were discussed with the patient. Cleansing program was discussed with the patient. Gentle cleansers were recommended. I discussed the importance of treating the skin gently and discussed triggers that could cause a flare. I discussed that this is usually a chronic condition that can be controlled, but not cured.    PLAN:   Patient Instructions   FUTURE APPOINTMENTS  Follow up in 6 month(s) or earlier as needed.    Make sure to apply a facial moisturizer regularly.    ORAL ANTIBIOTIC - for rosacea  Take by mouth doxycycline 50 mg one time(s) a " day.    TETRACYCLINE ANTIBIOTIC INFORMATION  Minocycline and doxycycline are tetracycline antibiotics and can increase your sun sensitivity, so make sure to be vigilant in regularly applying sunscreen. Also, avoid taking these with dairy products, as calcium can bind the antibiotic, making it unavailable to your body.    Consider using CeraVe facial moisturizer with SPF.    Avoid excessive alcohol, spicy food consumption or sun exposure.    TOPICAL MEDICATION INSTRUCTIONS - for rosacea  Ivermectin 1% cream    Apply a layer to the affected area(s) of rosacea on face two times per day, everyday regardless if symptoms are present or not.    Keep in mind to also regularly use moisturizer, as this preventative measure can help maintain your skin's natural moisture barrier.    Apply moisturizer after application of medication.    TOPICAL MEDICATION INSTRUCTIONS - for eyelid eczema  Tacrolimus (Protopic) 0.1% ointment.    Apply a thin layer to the affected area(s) on the upper eyelids once per day for 7-10 days. Then use when needed    Discontinue when symptoms have resolved, but you may use as needed for scaling and/or itchiness.    This is not a steroid, and it can be used on a daily basis or on the face.    Keep in mind to also regularly use moisturizer, as this preventative measure can help maintain your skin's natural moisture barrier.    Apply moisturizer after application of medication.    Your health insurance may need a prior authorization for coverage of this medication.    Although there is a black box warning for cancer risk from this medication, the study involved oral consumption by monkeys of this medication at 30 times the maximum recommended dosage.        TT: 25 minutes.  CT: 15 minutes.  > 50 % of time spent discussing issues listed in the MDM with ASL interpretor present.  The information in this document, created by the medical scribe for me, accurately reflects the services I personally performed and  the decisions made by me. I have reviewed and approved this document for accuracy prior to leaving the patient care area.  January 13, 2020 2:43 PM  Melisa Rose MD  [facility]

## 2020-01-15 DIAGNOSIS — J45.30 MILD PERSISTENT ASTHMA WITHOUT COMPLICATION: ICD-10-CM

## 2020-01-15 NOTE — TELEPHONE ENCOUNTER
"Requested Prescriptions   Pending Prescriptions Disp Refills     WIXELA INHUB 250-50 MCG/DOSE inhaler [Pharmacy Med Name: WIXELA INHUB DISKUS 250/50MCG 60S]  Last Written Prescription Date:  12-17-19  Last Fill Quantity: 1 inh,  # refills: 0   Last office visit: 1/13/2020 with prescribing provider:  PETER Mukherjee   Future Office Visit:         Sig: INHALE ONE PUFF BY MOUTH INTO THE LUNGS EVERY 12 HOURS       Inhaled Steroids Protocol Failed - 1/15/2020  3:47 AM        Failed - Asthma control assessment score within normal limits in last 6 months     Please review ACT score.   ACT Total Scores 5/3/2018 12/18/2018 2/6/2019   ACT TOTAL SCORE - - -   ASTHMA ER VISITS - - -   ASTHMA HOSPITALIZATIONS - - -   ACT TOTAL SCORE (Goal Greater than or Equal to 20) 20 19 22   In the past 12 months, how many times did you visit the emergency room for your asthma without being admitted to the hospital? 0 0 0   In the past 12 months, how many times were you hospitalized overnight because of your asthma? 0 0 0           Passed - Patient is age 12 or older        Passed - Medication is active on med list        Passed - Recent (6 mo) or future (30 days) visit within the authorizing provider's specialty     Patient had office visit in the last 6 months or has a visit in the next 30 days with authorizing provider or within the authorizing provider's specialty.  See \"Patient Info\" tab in inbasket, or \"Choose Columns\" in Meds & Orders section of the refill encounter.             "

## 2020-01-17 ENCOUNTER — ANCILLARY PROCEDURE (OUTPATIENT)
Dept: GENERAL RADIOLOGY | Facility: CLINIC | Age: 55
End: 2020-01-17
Attending: PREVENTIVE MEDICINE
Payer: COMMERCIAL

## 2020-01-17 ENCOUNTER — OFFICE VISIT (OUTPATIENT)
Dept: FAMILY MEDICINE | Facility: CLINIC | Age: 55
End: 2020-01-17
Payer: COMMERCIAL

## 2020-01-17 VITALS
HEART RATE: 108 BPM | DIASTOLIC BLOOD PRESSURE: 72 MMHG | SYSTOLIC BLOOD PRESSURE: 138 MMHG | OXYGEN SATURATION: 93 % | RESPIRATION RATE: 20 BRPM | TEMPERATURE: 102.8 F

## 2020-01-17 DIAGNOSIS — J45.30 MILD PERSISTENT ASTHMA WITHOUT COMPLICATION: ICD-10-CM

## 2020-01-17 DIAGNOSIS — R05.9 COUGH: Primary | ICD-10-CM

## 2020-01-17 DIAGNOSIS — J45.30 MILD PERSISTENT ASTHMA NOT DEPENDENT ON SYSTEMIC STEROIDS: ICD-10-CM

## 2020-01-17 DIAGNOSIS — R05.9 COUGH: ICD-10-CM

## 2020-01-17 PROCEDURE — 71046 X-RAY EXAM CHEST 2 VIEWS: CPT

## 2020-01-17 PROCEDURE — 99214 OFFICE O/P EST MOD 30 MIN: CPT | Performed by: PREVENTIVE MEDICINE

## 2020-01-17 RX ORDER — PREDNISONE 50 MG/1
50 TABLET ORAL DAILY
Qty: 5 TABLET | Refills: 0 | Status: SHIPPED | OUTPATIENT
Start: 2020-01-17 | End: 2020-01-22

## 2020-01-17 RX ORDER — PREDNISONE 50 MG/1
50 TABLET ORAL DAILY
Qty: 5 TABLET | Refills: 0 | Status: SHIPPED | OUTPATIENT
Start: 2020-01-17 | End: 2020-01-17

## 2020-01-17 RX ORDER — OSELTAMIVIR PHOSPHATE 75 MG/1
75 CAPSULE ORAL 2 TIMES DAILY
Qty: 10 CAPSULE | Refills: 0 | Status: SHIPPED | OUTPATIENT
Start: 2020-01-17 | End: 2020-07-20

## 2020-01-17 RX ORDER — ALBUTEROL SULFATE 0.83 MG/ML
2.5 SOLUTION RESPIRATORY (INHALATION) EVERY 4 HOURS PRN
Qty: 30 VIAL | Refills: 11 | Status: SHIPPED | OUTPATIENT
Start: 2020-01-17 | End: 2020-07-20

## 2020-01-17 RX ORDER — DOXYCYCLINE 100 MG/1
100 CAPSULE ORAL 2 TIMES DAILY
Qty: 14 CAPSULE | Refills: 0 | Status: SHIPPED | OUTPATIENT
Start: 2020-01-17 | End: 2020-01-17

## 2020-01-17 RX ORDER — ALBUTEROL SULFATE 90 UG/1
AEROSOL, METERED RESPIRATORY (INHALATION)
Qty: 8.5 G | Refills: 11 | Status: SHIPPED | OUTPATIENT
Start: 2020-01-17 | End: 2021-02-18

## 2020-01-17 RX ORDER — DOXYCYCLINE 100 MG/1
100 CAPSULE ORAL 2 TIMES DAILY
Qty: 14 CAPSULE | Refills: 0 | Status: SHIPPED | OUTPATIENT
Start: 2020-01-17 | End: 2020-07-20

## 2020-01-17 RX ORDER — ACETAMINOPHEN 500 MG
500-1000 TABLET ORAL EVERY 8 HOURS PRN
Qty: 100 TABLET | Refills: 0 | Status: SHIPPED | OUTPATIENT
Start: 2020-01-17 | End: 2020-07-20

## 2020-01-17 NOTE — PATIENT INSTRUCTIONS
ASSESSMENT:  Pneumonia, Asthma Exacerbation  Prednisone for 5 days  Doxycycline for 7 days  Tylenol, fluids, rest  Albuterol as needed  Follow up in 3-4 days for recheck

## 2020-01-17 NOTE — PROGRESS NOTES
SUBJECTIVE:  Oliver Agarwal is a 55 year old male who presents to the clinic today with a chief complaint of cough  for 3 day(s).  His cough is described as persistent and productive of yellow sputum.    The patient's symptoms are moderate and not changing over the course of time.  Associated symptoms include fever, nasal congestion, rhinorrhea and myalgias. The patient's symptoms are exacerbated by cold air  Patient has been using halls and benzonatate to improve symptoms.    History reviewed. No pertinent past medical history.    Current Outpatient Medications   Medication Sig Dispense Refill     albuterol (PROAIR HFA) 108 (90 Base) MCG/ACT inhaler INHALE 2 PUFFS BY MOUTH EVERY 6 HOURS AS NEEDED 8.5 g 11     albuterol (PROVENTIL) (2.5 MG/3ML) 0.083% neb solution Take 1 vial (2.5 mg) by nebulization every 4 hours as needed for shortness of breath / dyspnea 30 vial 11     doxycycline monohydrate (MONODOX) 50 MG capsule Take 1 capsule (50 mg) by mouth daily 90 capsule 3     fluticasone (FLONASE) 50 MCG/ACT nasal spray INSTILL 2 SPRAYS IN EACH NOSTRIL DAILY AS DIRECTED 48 g 3     fluticasone-salmeterol (ADVAIR) 250-50 MCG/DOSE inhaler Inhale 1 puff into the lungs every 12 hours       ivermectin (SOOLANTRA) 1 % cream APPLY THIN LAYER TO FACE QAM  5     ramelteon (ROZEREM) 8 MG tablet Take 1 tablet (8 mg) by mouth At Bedtime 30 tablet 3     tacrolimus (PROTOPIC) 0.1 % external ointment Apply topically daily as needed (itchiness, rash) on the eyelid(s). 30 g 3     WIXELA INHUB 250-50 MCG/DOSE inhaler INHALE 1 PUFFS BY MOUTH INTO THE LUNGS EVERY 12 HOURS 1 Inhaler 0       Social History     Tobacco Use     Smoking status: Former Smoker     Last attempt to quit: 1999     Years since quittin.7     Smokeless tobacco: Never Used   Substance Use Topics     Alcohol use: No       ROS  INTEGUMENTARY/SKIN: NEGATIVE for worrisome rashes, moles or lesions  EYES: NEGATIVE for vision changes or irritation  CV: NEGATIVE  for chest pain, palpitations or peripheral edema  GI: NEGATIVE for nausea, abdominal pain, heartburn, or change in bowel habits  MUSCULOSKELETAL: NEGATIVE for significant arthralgias or myalgia  NEURO: NEGATIVE for weakness, dizziness or paresthesias  ENDOCRINE: NEGATIVE for temperature intolerance, skin/hair changes  HEME/ALLERGY/IMMUNE: NEGATIVE for bleeding problems    OBJECTIVE:  /72 (BP Location: Right arm, Patient Position: Sitting, Cuff Size: Adult Large)   Pulse 108   Temp 102.8  F (39.3  C) (Oral)   Resp 20   SpO2 90%   GENERAL APPEARANCE: healthy, alert and no distress  EYES: EOMI,  PERRL, conjunctiva clear  HENT: ear canals and TM's normal.  Nose and mouth without ulcers, erythema or lesions  NECK: supple, nontender, no lymphadenopathy  RESP: crackles at left base, occasional expiratory wheezing, good air movement in lungs  CV: regular rates and rhythm, normal S1 S2, no murmur noted  ABDOMEN:  soft, nontender, no HSM or masses and bowel sounds normal  NEURO: Normal strength and tone, sensory exam grossly normal,  normal speech and mentation  SKIN: no suspicious lesions or rashes    CXR - no infiltrate, no edema, no effusion, nl cardiac silhouette    ASSESSMENT:  Pneumonia (cough, fever, crackles auscultated), Asthma Exacerbation, Influenza like illness  Tamiflu 75 mg two times per day for 5 days  Prednisone for 5 days  Doxycycline for 7 days  Tylenol, fluids, rest  Albuterol as needed  Follow up in 3-4 days for recheck      PLAN:  See orders in Epic  Symptomatic measures encouraged, humidified air, plenty of fluids.

## 2020-01-17 NOTE — LETTER
[facility] [address] [place] [address]  Phone: [phone]    January 17, 2020        Oliver Agarwal  [address]  [place] [address]          To whom it may concern:    RE: Oliver Agarwal    Patient was seen and treated today at our clinic.  He is ill and may be off work through 1/20/20.  He may return sooner if able.    Please contact me for questions or concerns.      Sincerely,        [place] Kenny MD

## 2020-01-23 ENCOUNTER — OFFICE VISIT (OUTPATIENT)
Dept: FAMILY MEDICINE | Facility: CLINIC | Age: 55
End: 2020-01-23
Payer: COMMERCIAL

## 2020-01-23 VITALS
SYSTOLIC BLOOD PRESSURE: 142 MMHG | RESPIRATION RATE: 18 BRPM | OXYGEN SATURATION: 99 % | HEART RATE: 88 BPM | DIASTOLIC BLOOD PRESSURE: 76 MMHG

## 2020-01-23 DIAGNOSIS — E55.9 VITAMIN D DEFICIENCY: ICD-10-CM

## 2020-01-23 DIAGNOSIS — J45.41 MODERATE PERSISTENT ASTHMA WITH EXACERBATION: ICD-10-CM

## 2020-01-23 DIAGNOSIS — Z00.00 ROUTINE GENERAL MEDICAL EXAMINATION AT A HEALTH CARE FACILITY: ICD-10-CM

## 2020-01-23 DIAGNOSIS — Z12.5 SCREENING FOR PROSTATE CANCER: Primary | ICD-10-CM

## 2020-01-23 LAB
ALBUMIN SERPL-MCNC: 3.7 G/DL (ref 3.4–5)
ALP SERPL-CCNC: 57 U/L (ref 40–150)
ALT SERPL W P-5'-P-CCNC: 39 U/L (ref 0–70)
ANION GAP SERPL CALCULATED.3IONS-SCNC: 6 MMOL/L (ref 3–14)
AST SERPL W P-5'-P-CCNC: 17 U/L (ref 0–45)
BASOPHILS # BLD AUTO: 0 10E9/L (ref 0–0.2)
BASOPHILS NFR BLD AUTO: 0.2 %
BILIRUB SERPL-MCNC: 0.7 MG/DL (ref 0.2–1.3)
BUN SERPL-MCNC: 21 MG/DL (ref 7–30)
CALCIUM SERPL-MCNC: 9.5 MG/DL (ref 8.5–10.1)
CHLORIDE SERPL-SCNC: 106 MMOL/L (ref 94–109)
CHOLEST SERPL-MCNC: 138 MG/DL
CO2 SERPL-SCNC: 28 MMOL/L (ref 20–32)
CREAT SERPL-MCNC: 1.05 MG/DL (ref 0.66–1.25)
DEPRECATED CALCIDIOL+CALCIFEROL SERPL-MC: 26 UG/L (ref 20–75)
DIFFERENTIAL METHOD BLD: ABNORMAL
EOSINOPHIL # BLD AUTO: 0.2 10E9/L (ref 0–0.7)
EOSINOPHIL NFR BLD AUTO: 2.8 %
ERYTHROCYTE [DISTWIDTH] IN BLOOD BY AUTOMATED COUNT: 12.2 % (ref 10–15)
GFR SERPL CREATININE-BSD FRML MDRD: 81 ML/MIN/{1.73_M2}
GLUCOSE SERPL-MCNC: 85 MG/DL (ref 70–99)
HBA1C MFR BLD: 5.6 % (ref 0–5.6)
HCT VFR BLD AUTO: 48 % (ref 40–53)
HDLC SERPL-MCNC: 49 MG/DL
HGB BLD-MCNC: 16.4 G/DL (ref 13.3–17.7)
LDLC SERPL CALC-MCNC: 73 MG/DL
LYMPHOCYTES # BLD AUTO: 2.1 10E9/L (ref 0.8–5.3)
LYMPHOCYTES NFR BLD AUTO: 33.1 %
MCH RBC QN AUTO: 30.8 PG (ref 26.5–33)
MCHC RBC AUTO-ENTMCNC: 34.2 G/DL (ref 31.5–36.5)
MCV RBC AUTO: 90 FL (ref 78–100)
MONOCYTES # BLD AUTO: 0.6 10E9/L (ref 0–1.3)
MONOCYTES NFR BLD AUTO: 9.4 %
NEUTROPHILS # BLD AUTO: 3.5 10E9/L (ref 1.6–8.3)
NEUTROPHILS NFR BLD AUTO: 54.5 %
NONHDLC SERPL-MCNC: 89 MG/DL
PLATELET # BLD AUTO: 145 10E9/L (ref 150–450)
POTASSIUM SERPL-SCNC: 3.7 MMOL/L (ref 3.4–5.3)
PROT SERPL-MCNC: 7.5 G/DL (ref 6.8–8.8)
PSA SERPL-ACNC: 0.7 UG/L (ref 0–4)
RBC # BLD AUTO: 5.33 10E12/L (ref 4.4–5.9)
SODIUM SERPL-SCNC: 140 MMOL/L (ref 133–144)
TRIGL SERPL-MCNC: 81 MG/DL
WBC # BLD AUTO: 6.5 10E9/L (ref 4–11)

## 2020-01-23 PROCEDURE — 99214 OFFICE O/P EST MOD 30 MIN: CPT | Performed by: PREVENTIVE MEDICINE

## 2020-01-23 PROCEDURE — 83036 HEMOGLOBIN GLYCOSYLATED A1C: CPT | Performed by: PREVENTIVE MEDICINE

## 2020-01-23 PROCEDURE — 80061 LIPID PANEL: CPT | Performed by: PREVENTIVE MEDICINE

## 2020-01-23 PROCEDURE — 85025 COMPLETE CBC W/AUTO DIFF WBC: CPT | Performed by: PREVENTIVE MEDICINE

## 2020-01-23 PROCEDURE — 82306 VITAMIN D 25 HYDROXY: CPT | Performed by: PREVENTIVE MEDICINE

## 2020-01-23 PROCEDURE — 36415 COLL VENOUS BLD VENIPUNCTURE: CPT | Performed by: PREVENTIVE MEDICINE

## 2020-01-23 PROCEDURE — 80053 COMPREHEN METABOLIC PANEL: CPT | Performed by: PREVENTIVE MEDICINE

## 2020-01-23 PROCEDURE — G0103 PSA SCREENING: HCPCS | Performed by: PREVENTIVE MEDICINE

## 2020-01-23 RX ORDER — MONTELUKAST SODIUM 10 MG/1
10 TABLET ORAL AT BEDTIME
Qty: 30 TABLET | Refills: 1 | Status: SHIPPED | OUTPATIENT
Start: 2020-01-23 | End: 2020-07-20

## 2020-01-23 RX ORDER — PREDNISONE 20 MG/1
TABLET ORAL
Qty: 20 TABLET | Refills: 0 | Status: SHIPPED | OUTPATIENT
Start: 2020-01-23 | End: 2020-07-20

## 2020-01-23 NOTE — PROGRESS NOTES
SUBJECTIVE:  Oliver Agarwal, a 55 year old male scheduled an appointment to discuss the following issues:     Screening for prostate cancer  Routine general medical examination at a health care facility  Moderate persistent asthma with exacerbation  Pt feels better than last week when I treated him for influenza and an asthma exacerbation  He still has some wheezing.  He is not longer febrile, has a dry cough.  Taking advair, and albuterol  Medical, social, surgical, and family histories reviewed.    ROS:  CONSTITUTIONAL: NEGATIVE for fever, chills  EYES: NEGATIVE for vision changes   CV: NEGATIVE for chest pain, palpitations   GI: NEGATIVE for nausea, abdominal pain, heartburn, or change in bowel habits  : NEGATIVE for frequency, dysuria, or hematuria  MUSCULOSKELETAL: NEGATIVE for significant arthralgias or myalgia  NEURO: NEGATIVE for weakness, dizziness or paresthesias or headache    OBJECTIVE:  BP (!) 142/76   Pulse 88   Resp 18   SpO2 99%   EXAM:  GENERAL APPEARANCE: healthy, alert and no distress  EYES: EOMI,  PERRL  HENT: ear canals and TM's normal and nose and mouth without ulcers or lesions  RESP: lungs with good air movement in all lung fields, wheezing bilaterally  CV: regular rates and rhythm, normal S1 S2, no S3 or S4 and no murmur, click or rub -  ABDOMEN:  soft, nontender, no HSM or masses and bowel sounds normal  EXTREMITIES:  Warm, well perfused, no edema  Neck - no LAD, TM, JVD    ASSESSMENT/PLAN:  (Z12.5) Screening for prostate cancer  (primary encounter diagnosis)  Plan: PSA, screen    (Z00.00) Routine general medical examination at a health care facility  Plan: CBC with platelets and differential,         Comprehensive metabolic panel, Lipid panel         reflex to direct LDL Fasting    (J45.41) Moderate persistent asthma with exacerbation  Plan: predniSONE (DELTASONE) 20 MG tablet,         montelukast (SINGULAIR) 10 MG tablet  Will prescribed prednisone 12 day taper, add singulair  10 mg daily  Continue advair daily  Follow up in one month for recheck    25 minutes spent with patient, over 50% time counseling, coordinating care and explaining about nature of the patient's conditions.  All risks, benefits of treatment and further evaluation was reviewed with patient.  Pt expressed understanding.  Pt was in agreement with this plan.  [place] Kenny MD

## 2020-01-23 NOTE — PATIENT INSTRUCTIONS
(Z12.5) Screening for prostate cancer  (primary encounter diagnosis)  Plan: PSA, screen    (Z00.00) Routine general medical examination at a health care facility  Plan: CBC with platelets and differential,         Comprehensive metabolic panel, Lipid panel         reflex to direct LDL Fasting    (J45.41) Moderate persistent asthma with exacerbation  Plan: predniSONE (DELTASONE) 20 MG tablet,         montelukast (SINGULAIR) 10 MG tablet  Will prescribed prednisone 12 day taper, add singulair 10 mg daily  Continue advair daily  Follow up in one month for recheck

## 2020-01-24 DIAGNOSIS — E55.9 VITAMIN D DEFICIENCY: Primary | ICD-10-CM

## 2020-01-24 RX ORDER — ERGOCALCIFEROL 1.25 MG/1
50000 CAPSULE, LIQUID FILLED ORAL WEEKLY
Qty: 12 CAPSULE | Refills: 0 | Status: SHIPPED | OUTPATIENT
Start: 2020-01-24 | End: 2020-01-28

## 2020-01-25 DIAGNOSIS — E55.9 VITAMIN D DEFICIENCY: ICD-10-CM

## 2020-01-27 DIAGNOSIS — G47.33 OSA ON CPAP: Primary | ICD-10-CM

## 2020-01-27 NOTE — TELEPHONE ENCOUNTER
Requested Prescriptions   Pending Prescriptions Disp Refills     vitamin D2 (ERGOCALCIFEROL) 41566 units (1250 mcg) capsule 12 capsule 0     Sig: Take 1 capsule (50,000 Units) by mouth once a week       There is no refill protocol information for this order

## 2020-01-28 RX ORDER — ERGOCALCIFEROL 1.25 MG/1
50000 CAPSULE, LIQUID FILLED ORAL WEEKLY
Qty: 12 CAPSULE | Refills: 0 | Status: SHIPPED | OUTPATIENT
Start: 2020-01-28 | End: 2020-04-02

## 2020-01-31 ENCOUNTER — HOSPITAL ENCOUNTER (OUTPATIENT)
Dept: NUTRITION | Facility: CLINIC | Age: 55
Discharge: HOME | End: 2020-01-31
Attending: FAMILY MEDICINE | Admitting: FAMILY MEDICINE
Payer: COMMERCIAL

## 2020-01-31 PROCEDURE — 97802 MEDICAL NUTRITION INDIV IN: CPT | Mod: GA | Performed by: DIETITIAN, REGISTERED

## 2020-01-31 NOTE — PROGRESS NOTES
"OUTPATIENT NUTRITION ASSESSMENT  REASON FOR ASSESSMENT  Oliver Agarwal referred by Dr. Mukherjee for MNT related to Obesity, unspecified classification, unspecified obesity type, unspecified whether serious comorbidity present [E66.9]  - Primary.    Patient accompanied by , Patricia.    ASSESSMENT   Nutrition History:  - Information obtained from patient.  - Patient is on a low carbohydrate diet at home as patient is trying to lose weight.  Patient will use convenience items of occasion.  Patient lives with wife who prepares meals.  Patient requesting website information for additional information of weight loss.  Patient states he has met with dietitian in the past but wanted more information.      Diet Recall:  Breakfast: 2 eggs with green pepper and onions or tofu, 2 slices conley, 1 banana + 1 toast   Lunch: 4 slices pepperoni pizza   Dinner: BLT wt 5 slices thick conley  Snack: 3-4 slices ham with lettuce and cheese   Beverages: Mt Dew, Monster Zero, Zevia   Dining out: varies         Exercise: Patient does not have an exercise regimen.    NUTRITION FOCUSED PHYSICAL ASSESSMENT (NFPA)  Yes         Observed:   No nutrition-related physical findings observed    Obtained from Chart/Interdisciplinary Team:  None noted    LABS  Labs reviewed    MEDICATIONS  Medications reviewed    ANTHROPOMETRICS   Height: 5'8\"  Weight: 290 lbs  BMI (kg/m2): 44 kg/m2  Weight Status:  Obesity Grade III BMI >40  %IBW: 188%  Weight History:   Wt Readings from Last 10 Encounters:   11/20/19 131.5 kg (290 lb)   07/24/19 129.3 kg (285 lb)   01/22/19 134.7 kg (297 lb)   12/18/18 131.6 kg (290 lb 3.2 oz)   08/23/18 133.8 kg (295 lb)   06/27/18 136.5 kg (301 lb)   06/18/18 137 kg (302 lb)   12/05/17 (!) 136.5 kg (301 lb)   09/19/17 (!) 136.5 kg (301 lb)   07/10/17 134.3 kg (296 lb)     ASSESSED NUTRITION NEEDS  Estimated Energy Needs: 4123-9743 kcals/day (11-14 Kcal/Kg)  Justification:  (obese)  Estimated Protein Needs: 70-84 " grams protein/day (1-1.2 g pro/Kg)  Justification:  (maintenance)  Estimated Fluid Needs: 5700-7258 mL/day (30-35 mL/kg)    ASSESSED MALNUTRITION STATUS  % Weight Loss:  None noted  % Intake:  No decreased intake noted  Subcutaneous Fat Loss:  None observed  Loss of Muscle Mass:  None observed  Fluid Retention:  None noted    Malnutrition Diagnosis:  Patient does not meet two of the above criteria necessary for diagnosing malnutrition    DIAGNOSIS   Nutrition Diagnosis:  Obese, class III related to excess energy intake and self monitoring deficit as evidenced by current po intake and BMI of 44 kg/m2      INTERVENTIONS   Nutrition Prescription   Recommend balanced, modified carbohydrate diet for weight loss      IMPLEMENTATION   Assessed learning needs and learning preference  Teaching Method(s) used: Booklet / Handout  Explanation    Diet Education:  Provided education on weight loss, carbohydrate counting  Nutrition Education (Content):   a)  Discussed portion sizes, label reading, carbohydrate counting and behavior modification.  Encouraged patient to make gradual changes for long term weight loss success.  Explained importance of including all foods groups. Supported patient with his struggle with food and weight loss.   b)  Provided Academy of [employer] Dietetics Count Your Carbs: Getting Started booklet  Nutrition Education (Application):   a)  Discussed current eating plans and recommended ways to modify carbohydrate intake from current diet.  Discussed apps and website for tracking and valid nutrition information.    b)  Patient verbalizes understanding of diet by stating will reduce portions.    Anticipate fair compliance     GOALS  <180 grams carbohydrate per day     FOLLOW UP/MONITORING   Progress towards goals will be monitored and evaluated per protocol and Practice Guidelines  Patient to call if desires further follow up appointment  RD name and number provided     Time Spent with  Patient  60 minutes    Christiano Gregory, RD, LD  [facility] Outpatient Dietitian  [phone] (office phone)

## 2020-02-09 ENCOUNTER — MYC REFILL (OUTPATIENT)
Dept: FAMILY MEDICINE | Facility: CLINIC | Age: 55
End: 2020-02-09

## 2020-02-09 DIAGNOSIS — J45.30 MILD PERSISTENT ASTHMA WITHOUT COMPLICATION: ICD-10-CM

## 2020-02-09 RX ORDER — ALBUTEROL SULFATE 90 UG/1
AEROSOL, METERED RESPIRATORY (INHALATION)
Qty: 8.5 G | Refills: 11 | Status: CANCELLED | OUTPATIENT
Start: 2020-02-09

## 2020-02-10 DIAGNOSIS — J45.30 MILD PERSISTENT ASTHMA WITHOUT COMPLICATION: ICD-10-CM

## 2020-02-11 NOTE — TELEPHONE ENCOUNTER
"Requested Prescriptions   Pending Prescriptions Disp Refills     albuterol (VENTOLIN HFA) 108 (90 Base) MCG/ACT inhaler [Pharmacy Med Name: VENTOLIN HFA INH W/DOS CTR 200PUFFS]  Last Written Prescription Date:  1-17-20  Last Fill Quantity: 8.5 g,  # refills: 11   Last office visit: 1/23/2020 with prescribing provider:  PAVEL Kenny   Future Office Visit:   Next 5 appointments (look out 90 days)    Feb 20, 2020 11:40 AM CST  [facility] [place] [facility] [address] [place] [id number] [phone]       18 g      Sig: INHALE 2 PUFFS BY MOUTH EVERY 6 HOURS AS NEEDED       Asthma Maintenance Inhalers - Anticholinergics Failed - 2/10/2020  3:48 AM        Failed - Asthma control assessment score within normal limits in last 6 months     Please review ACT score.   ACT Total Scores 12/18/2018 2/6/2019 1/23/2020   ACT TOTAL SCORE - - -   ASTHMA ER VISITS - - -   ASTHMA HOSPITALIZATIONS - - -   ACT TOTAL SCORE (Goal Greater than or Equal to 20) 19 22 16   In the past 12 months, how many times did you visit the emergency room for your asthma without being admitted to the hospital? 0 0 -   In the past 12 months, how many times were you hospitalized overnight because of your asthma? 0 0 -           Passed - Patient is age 12 years or older        Passed - Medication is active on med list        Passed - Recent (6 mo) or future (30 days) visit within the authorizing provider's specialty     Patient had office visit in the last 6 months or has a visit in the next 30 days with authorizing provider or within the authorizing provider's specialty.  See \"Patient Info\" tab in inbasket, or \"Choose Columns\" in Meds & Orders section of the refill encounter.             "

## 2020-02-11 NOTE — TELEPHONE ENCOUNTER
"Requested Prescriptions   Pending Prescriptions Disp Refills     albuterol (PROAIR HFA) 108 (90 Base) MCG/ACT inhaler 8.5 g 11     Sig: INHALE 2 PUFFS BY MOUTH EVERY 6 HOURS AS NEEDED       Asthma Maintenance Inhalers - Anticholinergics Failed - 2/9/2020 12:24 AM        Failed - Asthma control assessment score within normal limits in last 6 months     Please review ACT score.     ACT Total Scores 12/18/2018 2/6/2019 1/23/2020   ACT TOTAL SCORE - - -   ASTHMA ER VISITS - - -   ASTHMA HOSPITALIZATIONS - - -   ACT TOTAL SCORE (Goal Greater than or Equal to 20) 19 22 16   In the past 12 months, how many times did you visit the emergency room for your asthma without being admitted to the hospital? 0 0 -   In the past 12 months, how many times were you hospitalized overnight because of your asthma? 0 0 -               Passed - Patient is age 12 years or older        Passed - Medication is active on med list        Passed - Recent (6 mo) or future (30 days) visit within the authorizing provider's specialty     Patient had office visit in the last 6 months or has a visit in the next 30 days with authorizing provider or within the authorizing provider's specialty.  See \"Patient Info\" tab in inbasket, or \"Choose Columns\" in Meds & Orders section of the refill encounter.            Duplicate sent mychart update  "

## 2020-02-13 RX ORDER — ALBUTEROL SULFATE 90 UG/1
AEROSOL, METERED RESPIRATORY (INHALATION)
Qty: 18 G | OUTPATIENT
Start: 2020-02-13

## 2020-02-13 NOTE — TELEPHONE ENCOUNTER
Refusal reason: OTHER (ORDERS AT [facility] [phone] 1/17. PT CAN MOVE.)    Sushma MORE

## 2020-03-25 DIAGNOSIS — J31.0 CHRONIC RHINITIS: ICD-10-CM

## 2020-03-25 RX ORDER — FLUTICASONE PROPIONATE 50 MCG
SPRAY, SUSPENSION (ML) NASAL
Qty: 48 G | Refills: 3 | Status: SHIPPED | OUTPATIENT
Start: 2020-03-25 | End: 2020-10-29

## 2020-03-25 NOTE — TELEPHONE ENCOUNTER
Prescription approved per [facility] according to the [facility] Refill Protocol.    Angelica Phan, PharmD  PGY1 Medication Therapy Management Resident  Voicemail: [phone]

## 2020-03-25 NOTE — TELEPHONE ENCOUNTER
fluticasone (FLONASE) 50 MCG/ACT nasal spray       Last Written Prescription Date:  4-19-19  Last Fill Quantity: 48 g,   # refills: 3  Last Office Visit: 1-23-20  Future Office visit:    Next 5 appointments (look out 90 days)    Jun 02, 2020  3:00 PM CDT  Return Visit with Melisa Rose MD  [facility] [place] ([address] [id number] [phone]           Routing refill request to provider for review/approval because:  Drug not on the [facility], [facility] or  Health refill protocol or controlled substance

## 2020-04-04 ENCOUNTER — NURSE TRIAGE (OUTPATIENT)
Dept: NURSING | Facility: CLINIC | Age: 55
End: 2020-04-04

## 2020-04-04 NOTE — TELEPHONE ENCOUNTER
"\"I live in a building in a basement apartment. The  and wife live upstairs and does a lot of traveling. They spoke with their doctor and they are positive for covid 19, but weren't actually tested. I have SOB but really mild, but I have asthma. I also use a Cpap machine and I also have sore throat. My chest felt \"tension\" not pain. 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 My sx are really mild but though. I thought a few days ago I had a fever, bit it's gone. I also have a dry cough.\" denies other sx. Triaged and gave home care advice and also advised to go to [email or web address] to your asthma dx.Call back if sx change or worsen. Caller is using a sign language interpretor and agrees and understands plan of care at this time.  Maria Del Carmen Montano RN [facility]    COVID 19 Nurse Triage Plan/Patient Instructions    Please be aware that novel coronavirus (COVID-19) may be circulating in the community. If you develop symptoms such as fever, cough, or SOB or if you have concerns about the presence of another infection including coronavirus (COVID-19), please contact your health care provider or visit [email or web address].     Disposition/Instructions    Patient to have an [facility] Visit with a provider (Preferred option). Follow System Ambulatory Workflow for COVID 19.     To do this follow these instructions:    1. Go to the website [email or web address]  2. Create an account (you will need your insurance information)  3. Start a new visit  4. Choose your diagnosis (e.g. COVID19)  5. Fill out the information about your symptoms  6. A provider will reach out to you by text, phone call or video visit based on your request    Call Back If: Your symptoms worsen before you are able to complete your [facility] Visit with a provider.    Thank you for limiting contact with others, wearing a simple mask to cover your cough, practice good hand hygiene habits and accessing our virtual services where " possible to limit the spread of this virus.    For more information about COVID19 and options for caring for yourself at home, please visit the CDC website at https://www.cdc.gov/coronavirus/2019-ncov/about/steps-when-sick.html  For more options for care at [facility], please visit our website at [email or web address]    For more information, please use the [place] Department of Health ([facility]) COVID-19 Hotlines (Interpreters available):     Health questions: Phone Number: [phone] or [phone] and Hours: 7 a.m. to 7 p.m.    Schools and  questions: Phone Number: [phone] or [phone] and Hours 7 a.m. to 7 p.m.                  Reason for Disposition    HIGH RISK patient (e.g., age > 64 years, diabetes, heart or lung disease, weak immune system)    Additional Information    Negative: [1] COVID-19 suspected (e.g., cough, fever, shortness of breath) AND [2] public health department recommends testing    Negative: [1] COVID-19 exposure AND [2] no symptoms    Negative: COVID-19 and Breastfeeding, questions about    Negative: SEVERE or constant chest pain (Exception: mild central chest pain, present only when coughing)    Negative: MODERATE difficulty breathing (e.g., speaks in phrases, SOB even at rest, pulse 100-120)    Negative: Patient sounds very sick or weak to the triager    Negative: MILD difficulty breathing (e.g., minimal/no SOB at rest, SOB with walking, pulse <100)    Negative: Chest pain    Negative: Fever > 103 F (39.4 C)    Negative: [1] Fever > 100.0 F (37.8 C) AND [2] bedridden (e.g., nursing home patient, CVA, chronic illness, recovering from surgery)    Protocols used: CORONAVIRUS (COVID-19) DIAGNOSED OR [id number] 3.30.20

## 2020-04-07 ENCOUNTER — NURSE TRIAGE (OUTPATIENT)
Dept: NURSING | Facility: CLINIC | Age: 55
End: 2020-04-07

## 2020-04-07 NOTE — TELEPHONE ENCOUNTER
Call from pt       Questions about mask wearing and types of masks         A/P:   > Directed to [facility][note 3] or [place] Dept of Health website for up to date information on this topic         Barney Dutta RN            Reason for Disposition    COVID-19, questions about    Protocols used: CORONAVIRUS (COVID-19) DIAGNOSED OR [id number] 3.30.20

## 2020-04-20 ENCOUNTER — MYC MEDICAL ADVICE (OUTPATIENT)
Dept: FAMILY MEDICINE | Facility: CLINIC | Age: 55
End: 2020-04-20

## 2020-05-05 ENCOUNTER — VIRTUAL VISIT (OUTPATIENT)
Dept: FAMILY MEDICINE | Facility: CLINIC | Age: 55
End: 2020-05-05
Payer: COMMERCIAL

## 2020-05-05 VITALS — HEIGHT: 68 IN | BODY MASS INDEX: 44.09 KG/M2

## 2020-05-05 DIAGNOSIS — M54.41 CHRONIC MIDLINE LOW BACK PAIN WITH RIGHT-SIDED SCIATICA: ICD-10-CM

## 2020-05-05 DIAGNOSIS — G89.29 CHRONIC MIDLINE LOW BACK PAIN WITH RIGHT-SIDED SCIATICA: ICD-10-CM

## 2020-05-05 PROCEDURE — 99213 OFFICE O/P EST LOW 20 MIN: CPT | Mod: 95 | Performed by: NURSE PRACTITIONER

## 2020-05-05 RX ORDER — IBUPROFEN 600 MG/1
600 TABLET, FILM COATED ORAL EVERY 6 HOURS PRN
Qty: 60 TABLET | Refills: 0 | Status: SHIPPED | OUTPATIENT
Start: 2020-05-05 | End: 2020-05-20

## 2020-05-05 NOTE — PATIENT INSTRUCTIONS
1) Ice to back 2-3 times per day  2)Lifting mechanics discussed  3)Analgesics such as Tylenol and/or ibuprofen. Short term courses of narcotic medications may be helpful in some cases  but long term use often leads to tolerance and a lack of benefit over time   4)Back exercises, instruction sheet given.  Get moving as early as possible.    5)Aerobic exercise program, this was strongly encouraged as one of the best ways to manage chronic back ache  6) PT if not improving with the above therapies  7) Plain xrays may or may not be helpful in some circumstances, depending on the mechanism of injury.

## 2020-05-11 ENCOUNTER — TELEPHONE (OUTPATIENT)
Dept: FAMILY MEDICINE | Facility: CLINIC | Age: 55
End: 2020-05-11

## 2020-05-11 DIAGNOSIS — R73.03 PREDIABETES: Primary | ICD-10-CM

## 2020-05-11 NOTE — TELEPHONE ENCOUNTER
Patient states he is just trying to make an appointment for a hgb A1C.  Last done in January.  Was good at 5.6.  Order pended.  I do not see Diabetes on problem list, just prediabetes.  Please sign if you agree.  Tatiana Chappell RN

## 2020-05-11 NOTE — TELEPHONE ENCOUNTER
"Reason for call:  Patient reporting a symptom    Symptom or request: Diabetes symptoms, tingling slightly, pt feels \"off\"    Duration (how long have symptoms been present): ongoing    Have you been treated for this before? No    Additional comments: patient is on hold, red flag    TOOK MSG PER SALLIE OG RN WILL CALL BACK    Phone Number patient can be reached at:  Home number on file [phone] (home)    Best Time:  asap    Can we leave a detailed message on this number:  Not Applicable    Call taken on 5/11/2020 at 1:19 PM by Melisa Moreno  "

## 2020-05-15 NOTE — TELEPHONE ENCOUNTER
He does NOT need another A1c.  I would be more worried having him come into the clinic at this time.    He has been stable at under 6

## 2020-06-17 DIAGNOSIS — R73.03 PREDIABETES: Primary | ICD-10-CM

## 2020-06-17 DIAGNOSIS — E55.9 VITAMIN D DEFICIENCY: ICD-10-CM

## 2020-06-17 RX ORDER — ERGOCALCIFEROL 1.25 MG/1
50000 CAPSULE, LIQUID FILLED ORAL WEEKLY
Qty: 12 CAPSULE | Refills: 0 | Status: CANCELLED | OUTPATIENT
Start: 2020-06-17

## 2020-06-17 NOTE — TELEPHONE ENCOUNTER
Lets recheck the vit D before I refill this to see if repletion is still indicated.  In the meantime pt can take over the counter dose of 800-1,000 units daily.    No need to check A1C, normal in rachel Travis, CNP

## 2020-06-17 NOTE — TELEPHONE ENCOUNTER
Patient called requesting a refill on Vitamin D and also requesting labs to have vitamin D & A1C checked?    Please advise and ok to leave a message

## 2020-06-17 NOTE — TELEPHONE ENCOUNTER
HP Provider:  1. Please review and sign if agree. High dose Vitamin D not on refill protocol.  2. Plan per 1/23/20 lab result note was to recheck Vitamin D level in three months.  Lab pended  3. Any need to recheck A1C?  Lab pended.  Lab Results   Component Value Date    A1C 5.6 01/23/2020    A1C 5.4 11/20/2019    A1C 5.4 07/24/2019    A1C 5.6 12/18/2018    A1C 5.8 05/03/2018     Thank you!  EDUIN Tran, GEORGESN, RN

## 2020-07-07 ENCOUNTER — ANCILLARY PROCEDURE (OUTPATIENT)
Dept: GENERAL RADIOLOGY | Facility: CLINIC | Age: 55
End: 2020-07-07
Attending: PREVENTIVE MEDICINE
Payer: COMMERCIAL

## 2020-07-07 ENCOUNTER — OFFICE VISIT (OUTPATIENT)
Dept: URGENT CARE | Facility: URGENT CARE | Age: 55
End: 2020-07-07
Payer: COMMERCIAL

## 2020-07-07 VITALS
HEART RATE: 93 BPM | SYSTOLIC BLOOD PRESSURE: 123 MMHG | DIASTOLIC BLOOD PRESSURE: 72 MMHG | OXYGEN SATURATION: 97 % | TEMPERATURE: 98.7 F

## 2020-07-07 DIAGNOSIS — R09.1 PLEURISY: ICD-10-CM

## 2020-07-07 DIAGNOSIS — R73.03 PREDIABETES: ICD-10-CM

## 2020-07-07 DIAGNOSIS — R09.1 PLEURISY: Primary | ICD-10-CM

## 2020-07-07 DIAGNOSIS — J45.901 MILD ASTHMA WITH EXACERBATION, UNSPECIFIED WHETHER PERSISTENT: ICD-10-CM

## 2020-07-07 LAB
D DIMER PPP FEU-MCNC: 1.1 UG/ML FEU (ref 0–0.5)
HBA1C MFR BLD: 5.7 % (ref 0–5.6)

## 2020-07-07 PROCEDURE — 85379 FIBRIN DEGRADATION QUANT: CPT | Performed by: PREVENTIVE MEDICINE

## 2020-07-07 PROCEDURE — 36415 COLL VENOUS BLD VENIPUNCTURE: CPT | Performed by: PREVENTIVE MEDICINE

## 2020-07-07 PROCEDURE — 99215 OFFICE O/P EST HI 40 MIN: CPT | Performed by: PREVENTIVE MEDICINE

## 2020-07-07 PROCEDURE — 83036 HEMOGLOBIN GLYCOSYLATED A1C: CPT | Performed by: PREVENTIVE MEDICINE

## 2020-07-07 PROCEDURE — 71101 X-RAY EXAM UNILAT RIBS/CHEST: CPT | Mod: RT

## 2020-07-07 RX ORDER — PREDNISONE 50 MG/1
50 TABLET ORAL DAILY
Qty: 5 TABLET | Refills: 0 | Status: SHIPPED | OUTPATIENT
Start: 2020-07-07 | End: 2020-07-12

## 2020-07-08 NOTE — PROGRESS NOTES
SUBJECTIVE:  Oliver Agarwal, a 55 year old male scheduled an appointment to discuss the following issues:  Pleurisy  Started last night  Hurts when he takes a deep breath.   No sob, cough, occasional wheezing.  Hurts when he twists and moves  Took 2 tylenol which helped slightly.  No fevers or chills.    No rash  Not worse with exertion  No trauma    PMH - ARNULFO, Obesity, Prediabetes, Asthma    Social History     Socioeconomic History     Marital status: Single     Spouse name: None     Number of children: None     Years of education: None     Highest education level: None   Occupational History     None   Social Needs     Financial resource strain: None     Food insecurity     Worry: None     Inability: None     Transportation needs     Medical: None     Non-medical: None   Tobacco Use     Smoking status: Former Smoker     Last attempt to quit: 1999     Years since quittin.1     Smokeless tobacco: Never Used   Substance and Sexual Activity     Alcohol use: No     Drug use: No     Sexual activity: Never   Lifestyle     Physical activity     Days per week: None     Minutes per session: None     Stress: None   Relationships     Social connections     Talks on phone: None     Gets together: None     Attends Rastafarian service: None     Active member of club or organization: None     Attends meetings of clubs or organizations: None     Relationship status: None     Intimate partner violence     Fear of current or ex partner: None     Emotionally abused: None     Physically abused: None     Forced sexual activity: None   Other Topics Concern     Parent/sibling w/ CABG, MI or angioplasty before 65F 55M? No   Social History Narrative     None     Family History   Problem Relation Age of Onset     Heart Disease Other      Glaucoma No family hx of      Macular Degeneration No family hx of        Medical, social, surgical, and family histories reviewed.    ROS:  CONSTITUTIONAL: NEGATIVE for fever, chills  EYES:  NEGATIVE for vision changes   RESP: NEGATIVE for significant cough or SOB  CV: NEGATIVE for palpitations   GI: NEGATIVE for nausea, abdominal pain, heartburn, or change in bowel habits  : NEGATIVE for frequency, dysuria, or hematuria  MUSCULOSKELETAL: NEGATIVE for significant arthralgias or myalgia  NEURO: NEGATIVE for weakness, dizziness or paresthesias or headache    OBJECTIVE:  /72   Pulse 93   Temp 98.7  F (37.1  C) (Oral)   SpO2 97%   EXAM:  GENERAL APPEARANCE: healthy, alert and no distress  EYES: EOMI,  PERRL  HENT: ear canals and TM's normal and nose and mouth without ulcers or lesions  RESP: lungs with good air movement in all lung fields, no rales, rhonchi, occasional expiratory wheezes bilaterally  CV: regular rates and rhythm, normal S1 S2, no S3 or S4 and no murmur, click or rub -  ABDOMEN:  soft, nontender, no HSM or masses and bowel sounds normal  No rash on chest  ttp right posterior chest at t5    CXR/r rib xray  no infiltrate, no edema, no effusion, normal cardiac silhouette  No rib fracture    ASSESSMENT/PLAN:  (R09.1) Pleurisy  (primary encounter diagnosis)  Plan: XR Ribs & Chest Right G/E 3 Views  Unclear cause, may be viral or musculoskeletal  voltaren gel for 7 days  Ice, heat  Offered covid test, declined by pt  ddimer pending as well  Asthma exacerbation - prednisone for 5 days    Follow up with pcp in 5-7 days, sooner as needed    40 minutes spent face to face with patient, over 50% time counseling, coordinating care and explaining about nature of the patient's conditions.

## 2020-07-08 NOTE — PATIENT INSTRUCTIONS
(R09.1) Pleurisy  (primary encounter diagnosis)  Plan: XR Ribs & Chest Right G/E 3 Views  Unclear cause, may be viral or musculoskeletal  voltaren gel for 7 days  Ice, heat  Offered covid test, declined by pt  ddimer pending as well  Asthma exacerbation - prednisone for 5 days    Follow up with pcp in 5-7 days, sooner as needed

## 2020-07-09 ENCOUNTER — APPOINTMENT (OUTPATIENT)
Dept: CT IMAGING | Facility: CLINIC | Age: 55
End: 2020-07-09
Attending: EMERGENCY MEDICINE
Payer: COMMERCIAL

## 2020-07-09 ENCOUNTER — HOSPITAL ENCOUNTER (EMERGENCY)
Facility: CLINIC | Age: 55
Discharge: HOME | End: 2020-07-09
Attending: EMERGENCY MEDICINE | Admitting: EMERGENCY MEDICINE
Payer: COMMERCIAL

## 2020-07-09 VITALS
SYSTOLIC BLOOD PRESSURE: 141 MMHG | RESPIRATION RATE: 20 BRPM | TEMPERATURE: 96.7 F | BODY MASS INDEX: 43.95 KG/M2 | OXYGEN SATURATION: 94 % | WEIGHT: 290 LBS | HEIGHT: 68 IN | HEART RATE: 74 BPM | DIASTOLIC BLOOD PRESSURE: 79 MMHG

## 2020-07-09 DIAGNOSIS — I26.99 ACUTE PULMONARY EMBOLISM WITHOUT ACUTE COR PULMONALE, UNSPECIFIED PULMONARY EMBOLISM TYPE (H): ICD-10-CM

## 2020-07-09 LAB
ANION GAP SERPL CALCULATED.3IONS-SCNC: 6 MMOL/L (ref 3–14)
BASOPHILS # BLD AUTO: 0 10E9/L (ref 0–0.2)
BASOPHILS NFR BLD AUTO: 0.1 %
BUN SERPL-MCNC: 29 MG/DL (ref 7–30)
CALCIUM SERPL-MCNC: 9.7 MG/DL (ref 8.5–10.1)
CHLORIDE SERPL-SCNC: 106 MMOL/L (ref 94–109)
CO2 SERPL-SCNC: 26 MMOL/L (ref 20–32)
CREAT SERPL-MCNC: 0.86 MG/DL (ref 0.66–1.25)
DIFFERENTIAL METHOD BLD: ABNORMAL
EOSINOPHIL # BLD AUTO: 0 10E9/L (ref 0–0.7)
EOSINOPHIL NFR BLD AUTO: 0.1 %
ERYTHROCYTE [DISTWIDTH] IN BLOOD BY AUTOMATED COUNT: 13.1 % (ref 10–15)
GFR SERPL CREATININE-BSD FRML MDRD: >90 ML/MIN/{1.73_M2}
GLUCOSE SERPL-MCNC: 111 MG/DL (ref 70–99)
HCT VFR BLD AUTO: 45.6 % (ref 40–53)
HGB BLD-MCNC: 15.6 G/DL (ref 13.3–17.7)
IMM GRANULOCYTES # BLD: 0 10E9/L (ref 0–0.4)
IMM GRANULOCYTES NFR BLD: 0.2 %
LYMPHOCYTES # BLD AUTO: 0.7 10E9/L (ref 0.8–5.3)
LYMPHOCYTES NFR BLD AUTO: 3.8 %
MCH RBC QN AUTO: 30.6 PG (ref 26.5–33)
MCHC RBC AUTO-ENTMCNC: 34.2 G/DL (ref 31.5–36.5)
MCV RBC AUTO: 90 FL (ref 78–100)
MONOCYTES # BLD AUTO: 0.5 10E9/L (ref 0–1.3)
MONOCYTES NFR BLD AUTO: 2.9 %
NEUTROPHILS # BLD AUTO: 15.9 10E9/L (ref 1.6–8.3)
NEUTROPHILS NFR BLD AUTO: 92.9 %
PLATELET # BLD AUTO: 170 10E9/L (ref 150–450)
POTASSIUM SERPL-SCNC: 4.3 MMOL/L (ref 3.4–5.3)
RBC # BLD AUTO: 5.09 10E12/L (ref 4.4–5.9)
SODIUM SERPL-SCNC: 138 MMOL/L (ref 133–144)
WBC # BLD AUTO: 17.2 10E9/L (ref 4–11)

## 2020-07-09 PROCEDURE — 25000132 ZZH RX MED GY IP 250 OP 250 PS 637: Performed by: EMERGENCY MEDICINE

## 2020-07-09 PROCEDURE — 80048 BASIC METABOLIC PNL TOTAL CA: CPT | Performed by: EMERGENCY MEDICINE

## 2020-07-09 PROCEDURE — 25000125 ZZHC RX 250: Performed by: EMERGENCY MEDICINE

## 2020-07-09 PROCEDURE — 71275 CT ANGIOGRAPHY CHEST: CPT

## 2020-07-09 PROCEDURE — 25000128 H RX IP 250 OP 636: Performed by: EMERGENCY MEDICINE

## 2020-07-09 PROCEDURE — 85025 COMPLETE CBC W/AUTO DIFF WBC: CPT | Performed by: EMERGENCY MEDICINE

## 2020-07-09 PROCEDURE — 99285 EMERGENCY DEPT VISIT HI MDM: CPT | Mod: 25

## 2020-07-09 RX ORDER — IOPAMIDOL 755 MG/ML
83 INJECTION, SOLUTION INTRAVASCULAR ONCE
Status: COMPLETED | OUTPATIENT
Start: 2020-07-09 | End: 2020-07-09

## 2020-07-09 RX ADMIN — IOPAMIDOL 83 ML: 755 INJECTION, SOLUTION INTRAVENOUS at 02:45

## 2020-07-09 RX ADMIN — SODIUM CHLORIDE 100 ML: 9 INJECTION, SOLUTION INTRAVENOUS at 02:45

## 2020-07-09 RX ADMIN — RIVAROXABAN 15 MG: 15 TABLET, FILM COATED ORAL at 03:47

## 2020-07-09 ASSESSMENT — ENCOUNTER SYMPTOMS
FEVER: 0
COUGH: 0

## 2020-07-09 ASSESSMENT — MIFFLIN-ST. JEOR: SCORE: 2134.93

## 2020-07-09 NOTE — ED NOTES
Patient called me into room and with the help of the sign language interpretor asked me if anything that was going on could be related to a mold allergy as he was taking out his carpeting over the last 5 days.

## 2020-07-09 NOTE — ED TRIAGE NOTES
Patient was seen at urgent care to night with complaints of pleurisy and was referred here for a CT scan due to and elevated D-dimer of 1.1 at the clinic. . Patient is deaf and will require ASL interpretor.

## 2020-07-09 NOTE — ED PROVIDER NOTES
History     Chief Complaint:  Pleuritic chest pain    A  was used.      Oliver Agarwal is a 55 year old male who presents for evaluation of pleuritic chest pain.  Patient is deaf and  was used throughout the encounter.  The patient was seen at urgent care this evening and referred here for a CT scan due to a elevated D-dimer of 1.1.  Rib x-ray without significant findings.  The patient reports that the chest pain is in his right side and is exacerbated by taking a deep breath. It started yesterday and the pain has gotten worse over the course of today. He has been taking prednisone with little relief. He denies experiencing any falls, cough, fever, or leg swelling.  No previous history of DVT/PE.    Allergies:  No known allergies    Medications:    albuterol   diclofenac   doxycycline monohydrate  fluticasone  fluticasone-salmeterol   Ivermectin  montelukast   predniSONE   ramelteon    Past Medical History:    Obesity  Corneal erosion  Deaf  Mild persistent asthma  Dysthymia  Hypovitaminosis D  Atopic rhinitis  Prediabetes  Hyperlipidemia  Sleep apnea  Asthmatic bronchitis    Past Surgical History:    No past surgical history on file.    Family History:    No past pertinent family history.    Social History:  Smoking status: former smoker  Negative for alcohol use.  Negative for drug use.  Marital Status:  Single [1]    Review of Systems   Constitutional: Negative for fever.   Respiratory: Negative for cough.    Cardiovascular: Positive for chest pain (right side). Negative for leg swelling.   All other systems reviewed and are negative.    Physical Exam     Patient Vitals for the past 24 hrs:   BP Temp Temp src Pulse Heart Rate Resp SpO2 Height Weight   07/09/20 0330 (!) 141/79 -- -- 74 -- -- -- -- --   07/09/20 0300 (!) 140/77 -- -- 75 -- -- -- -- --   07/09/20 0200 122/72 -- -- 73 -- -- 94 % -- --   07/09/20 0130 123/67 -- -- 83 -- -- 94 % -- --   07/09/20 0100 113/62 --  "-- 71 -- -- 94 % -- --   07/09/20 0050 118/83 -- -- 77 -- -- 92 % -- --   07/09/20 0026 134/73 96.7  F (35.9  C) Temporal 76 76 20 93 % 1.727 m (5' 8\") 131.5 kg (290 lb)       Physical Exam    General: Alert and cooperative with exam. Patient in minimal distress. Normal mentation. Patient is deaf  Head:  Scalp is NC/AT  Eyes:  No scleral icterus, PERRL  ENT:  The external nose and ears are normal.   Neck:  Normal range of motion without rigidity.  CV:  Regular rate and rhythm    No pathologic murmur   Resp:  Breath sounds are clear bilaterally    Non-labored, no retractions or accessory muscle use  GI:  Abdomen is soft, no distension, no tenderness. No peritoneal signs.  Overweight  MS:  No lower extremity edema. Mild TPP to right posterior inferior thoracic back w/o overlying skin change.    Skin:  Warm and dry, No rash or lesions noted.  Neuro: Oriented x 3. No gross motor deficits.    Emergency Department Course     Imaging:  Radiology findings were communicated with the patient who voiced understanding of the findings.    CT Chest Pulmonary Embolism w/ contrast:   1.  Pulmonary emboli right lower lobe posteriorly.   2.  Small clot burden.   3.  Groundglass opacity right base which may represent area of infarction however inflammatory or infectious process could have a similar appearance. Covid-19 pneumonia can have this appearance.   4.  Hepatic steatosis.  As per radiology.     Laboratory:  Laboratory findings were communicated with the patient who voiced understanding of the findings.    CBC: WBC: 17.2 (H), HGB: 15.6, PLT: 170  BMP: Glucose 111 (H), o/w WNL (Creatinine: 0.86)    Interventions:  0347 Xarelto 15 mg oral    Emergency Department Course:  Past medical records, nursing notes, and vitals reviewed.    0115 I performed an exam of the patient as documented above.   .    IV was inserted and blood was drawn for laboratory testing, results above.     The patient was sent for a CT scan while in the " emergency department, results above.     0300 I consulted with Dr. Diaz, on call radiologist, regarding the patient's history and presentation here in the emergency department.    0320 I rechecked the patient and discussed the results of his workup thus far.     Findings and plan explained to the Patient. Patient discharged home with instructions regarding supportive care, medications, and reasons to return. The importance of close follow-up was reviewed. The patient was prescribed Xarelto.    I personally reviewed the laboratory and imaging results with the Patient and answered all related questions prior to discharge.     Impression & Plan     Medical Decision Making:  Oliver Agarwal is a 55 year old male who presents for evaluation of pleuritic chest pain.  The workup in the Emergency Department indicates this is due to pulmonary embolism.  This was discussed with the patient.  Xarelto was initiated after discussion with the patient after clarification of any contraindications to this therapy; first dose provided in the ED.  Provided prescription for first 30 days.  Patient's chest CT did demonstrate a small clot burden, I feel he is safe and appropriate for outpatient management.  Work of breathing and oxygen saturations were normal (PESI score = 63 (very low risk)).  CT did demonstrate groundglass opacity in the right base which is likely secondary to infarct as patient does not have any infectious symptoms.  Labs notable for elevated white count (17.2), likely secondary to recent prednisone use.  Recommended close follow-up with PCP for continued management/work-up.  Return precautions were discussed.  Patient discharged home.      Diagnosis:    ICD-10-CM    1. Acute pulmonary embolism without acute cor pulmonale, unspecified pulmonary embolism type (H)  I26.99        Disposition:  Discharged to home.    Discharge Medications:  Discharge Medication List as of 7/9/2020  3:43 AM      START taking these  medications    Details   !! rivaroxaban ANTICOAGULANT (XARELTO ANTICOAGULANT) 15 MG TABS tablet Take 1 tablet (15 mg) by mouth 2 times daily (with meals) for 21 days, Disp-42 tablet,R-0, Local Print      !! rivaroxaban ANTICOAGULANT (XARELTO ANTICOAGULANT) 20 MG TABS tablet Take 1 tablet (20 mg) by mouth daily (with dinner) for 9 days, Disp-9 tablet,R-0, Local Print       !! - Potential duplicate medications found. Please discuss with provider.          Scribe Disclosure:  I, Frdey Kim, am serving as a scribe at 1:25 AM on 7/9/2020 to document services personally performed by Oliver Rodriges DO, based on my observations and the provider's statements to me.      Oliver Rodriges DO  07/09/20 0723

## 2020-07-09 NOTE — ED AVS SNAPSHOT
Emergency Department  [address] [facility] [address]  Phone:  [phone]  Fax:  [phone]                                    Oliver Agarwal   MRN: [id number]    Department:   Emergency Department   Date of Visit:  7/9/2020           After Visit Summary Signature Page    I have received my discharge instructions, and my questions have been answered. I have discussed any challenges I see with this plan with the nurse or doctor.    ..........................................................................................................................................  Patient/Patient Representative Signature      ..........................................................................................................................................  Patient Representative Print Name and Relationship to Patient    ..................................................               ................................................  Date                                   Time    ..........................................................................................................................................  Reviewed by Signature/Title    ...................................................              ..............................................  Date                                               Time          22EPIC Rev 08/18

## 2020-07-09 NOTE — DISCHARGE INSTRUCTIONS
Take Xarelto, 15 mg twice daily for 3 weeks and then 20 mg daily there after.    Do not take any NSAIDs (aspirin, naproxen, Aleve, ibuprofen, Motrin, Advil, etc.)    Discontinue prednisone

## 2020-07-12 ENCOUNTER — NURSE TRIAGE (OUTPATIENT)
Dept: NURSING | Facility: CLINIC | Age: 55
End: 2020-07-12

## 2020-07-12 NOTE — TELEPHONE ENCOUNTER
Sign language interpretor.  Seen in ER on Wednesday for pain in lungs.  ER checked lungs and did scan. They found something in lungs, lobe,  information in chart. It was possible to have covid19 but haven't had symptoms. Friday chills, fever yesterday and today. Feeling okay, breathing fine. Question is:  Concerning his wife, coming home soon. Do I separate with her? As far as fever goes, what do I do? As far as  from his wife, he needs to isolate, avoid each other, wear a mask, wash hands frequently, wash surfaces frequently, eat separately to avoid interface without a mask. As for fever, if it lasts longer than 3 days, if it's >103 then he needs to call back for a triage of symptoms. I explained, when he asked, that the clinic may not want him to come in but rather may do a telephone visit over the phone instead. He has no further questions.  Marleny Ragsdale RN  [facility]    Additional Information    Negative: Nursing judgment    Negative: Nursing judgment    Negative: Nursing judgment    Negative: Nursing judgment    Information only question and nurse able to answer    Protocols used: NO PROTOCOL AVAILABLE - INFORMATION ONLY-A-[place]

## 2020-07-13 ENCOUNTER — VIRTUAL VISIT (OUTPATIENT)
Dept: FAMILY MEDICINE | Facility: OTHER | Age: 55
End: 2020-07-13

## 2020-07-13 NOTE — PROGRESS NOTES
"Date: 2020 09:30:12  Clinician: Abhishek Manzo  Clinician NPI: [id number]  Patient: lane gunderson  Patient : 1965  Patient Address: [address] [place] [address]  Patient Phone: [phone]  Visit Protocol: URI  Patient Summary:  lane is a 55 year old ( : 1965 ) male who initiated a Visit for COVID-19 (Coronavirus) evaluation and screening. When asked the question \"Please sign me up to receive news, health information and promotions. \", lane responded \"No\".    lane states his symptoms started today.   His symptoms consist of ageusia, rhinitis, malaise, a headache, chills, a sore throat, myalgia, anosmia, a cough, and nasal congestion. He is experiencing mild difficulty breathing with activities but can speak normally in full sentences. lane also feels feverish but was unable to measure his temperature.   Symptom details     Nasal secretions: The color of his mucus is clear.    Cough: lane coughs a few times an hour and his cough is not more bothersome at night. Phlegm comes into his throat when he coughs. He believes his cough is caused by post-nasal drip. The color of the phlegm is clear.     Sore throat: lane reports having mild throat pain (1-3 on a 10 point pain scale), does not have exudate on his tonsils, and can swallow liquids. The lymph nodes in his neck are not enlarged. A rash has not appeared on the skin since the sore throat started.     Headache: He states the headache is mild (1-3 on a 10 point pain scale).      lane denies having wheezing, nausea, teeth pain, diarrhea, vomiting, ear pain, enlarged lymph nodes, and facial pain or pressure. He also denies having recent facial or sinus surgery in the past 60 days, taking antibiotic medication in the past month, and having a sinus infection within the past year.   Precipitating events  Within the past week, lane has not been exposed to someone with strep " throat. He has not recently been exposed to someone with influenza. lane has been in close contact with the following high risk individuals: people with asthma, heart disease or diabetes.   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   He either works or volunteers as a healthcare worker or a , or works or volunteers in a healthcare facility. He does not provide direct patient care. Additional job details as reported by the patient (free text): ..   lane also has lived in a congregate living setting in the past 14 days. He does not live with a healthcare worker.   lane has not had a close contact with a laboratory-confirmed COVID-19 patient within 14 days of symptom onset.   Pertinent medical history  lane needs a return to work/school note.   Weight: 292 lbs   lane does not smoke or use smokeless tobacco.   Weight: 292 lbs    MEDICATIONS: Xarelto oral, ALLERGIES: egg  Clinician Response:  Dear lane,   Your symptoms show that you may have coronavirus (COVID-19). This illness can cause fever, cough and trouble breathing. Many people get a mild case and get better on their own. Some people can get very sick.  What should I do?  We would like to test you for this virus.   1. Please call [phone] to schedule your visit. Explain that you were referred by [facility] to have a COVID-19 test. Be ready to share your [facility] visit ID number.  The following will serve as your written order for this COVID Test, ordered by me, for the indication of suspected COVID [Z20.828]: The test will be ordered in [employer], our electronic health record, after you are scheduled. It will show as ordered and authorized by Tristan Rowland MD.  Order: COVID-19 (Coronavirus) PCR for SYMPTOMATIC testing from [facility].      2. When it's time for your COVID test:  Stay at least 6 feet away from others. (If someone will drive  "you to your test, stay in the backseat, as far away from the  as you can.)   Cover your mouth and nose with a mask, tissue or washcloth.  Go straight to the testing site. Don't make any stops on the way there or back.      3.Starting now: Stay home and away from others (self-isolate) until:   You've had no fever---and no medicine that reduces fever---for 3 full days (72 hours). And...   Your other symptoms have gotten better. For example, your cough or breathing has improved. And...   At least 10 days have passed since your symptoms started.       During this time, don't leave the house except for testing or medical care.   Stay in your own room, even for meals. Use your own bathroom if you can.   Stay away from others in your home. No hugging, kissing or shaking hands. No visitors.  Don't go to work, school or anywhere else.    Clean \"high touch\" surfaces often (doorknobs, counters, handles, etc.). Use a household cleaning spray or wipes. You'll find a full list of  on the EPA website: www.epa.gov/pesticide-registration/list-n-disinfectants-use-against-sars-cov-2.   Cover your mouth and nose with a mask, tissue or washcloth to avoid spreading germs.  Wash your hands and face often. Use soap and water.  Caregivers in these groups are at risk for severe illness due to COVID-19:  o People 65 years and older  o People who live in a nursing home or long-term care facility  o People with chronic disease (lung, heart, cancer, diabetes, kidney, liver, immunologic)  o People who have a weakened immune system, including those who:   Are in cancer treatment  Take medicine that weakens the immune system, such as corticosteroids  Had a bone marrow or organ transplant  Have an immune deficiency  Have poorly controlled HIV or AIDS  Are obese (body mass index of 40 or higher)  Smoke regularly   o Caregivers should wear gloves while washing dishes, handling laundry and cleaning bedrooms and bathrooms.  o Use caution " when washing and drying laundry: Don't shake dirty laundry, and use the warmest water setting that you can.  o For more tips, go to www.cdc.gov/coronavirus/2019-ncov/downloads/10Things.pdf.    4.Sign up for [facility]. We know it's scary to hear that you might have COVID-19. We want to track your symptoms to make sure you're okay over the next 2 weeks. Please look for an email from Matthew Douglass---this is a free, online program that we'll use to keep in touch. To sign up, follow the link in the email. Learn more at [email or web address]  How can I take care of myself?   Get lots of rest. Drink extra fluids (unless a doctor has told you not to).   Take Tylenol (acetaminophen) for fever or pain. If you have liver or kidney problems, ask your family doctor if it's okay to take Tylenol.   Adults can take either:    650 mg (two 325 mg pills) every 4 to 6 hours, or...   1,000 mg (two 500 mg pills) every 8 hours as needed.    Note: Don't take more than 3,000 mg in one day. Acetaminophen is found in many medicines (both prescribed and over-the-counter medicines). Read all labels to be sure you don't take too much.   For children, check the Tylenol bottle for the right dose. The dose is based on the child's age or weight.    If you have other health problems (like cancer, heart failure, an organ transplant or severe kidney disease): Call your specialty clinic if you don't feel better in the next 2 days.       Know when to call 911. Emergency warning signs include:    Trouble breathing or shortness of breath Pain or pressure in the chest that doesn't go away Feeling confused like you haven't felt before, or not being able to wake up Bluish-colored lips or face.  Where can I get more information?    [facility] -- About COVID-19: [email or web address]   CDC -- What to Do If You're Sick: www.cdc.gov/coronavirus/2019-ncov/about/steps-when-sick.html   CDC -- Ending Home Isolation:  www.cdc.gov/coronavirus/2019-ncov/hcp/disposition-in-home-patients.html   [facility] -- Caring for Someone: www.cdc.gov/coronavirus/2019-ncov/if-you-are-sick/care-for-someone.html   [facility] -- Interim Guidance for Hospital Discharge to Home: www.[facility].[place]/diseases/coronavirus/hcp/hospdischarge.pdf   [facility] clinical trials (COVID-19 research studies): clinicalaffairs.[email or web address]-clinical-trials    Below are the COVID-19 hotlines at the [place] Department of Health ([facility]). Interpreters are available.    For health questions: Call 827-879-7125 or 1-737.731.1760 (7 a.m. to 7 p.m.) For questions about schools and childcare: Call 002-010-7807 or 1-905.998.6138 (7 a.m. to 7 p.m.)    Diagnosis: Cough  Diagnosis ICD: R05

## 2020-07-14 ENCOUNTER — TELEPHONE (OUTPATIENT)
Dept: FAMILY MEDICINE | Facility: CLINIC | Age: 55
End: 2020-07-14

## 2020-07-14 DIAGNOSIS — R50.9 FEVER: Primary | ICD-10-CM

## 2020-07-14 NOTE — TELEPHONE ENCOUNTER
[facility] so [employer] will pay for it.  Was in ED on 7-9-20 with pulmonary issues and fever.  Needs to monitor his temperature.    Patient uses [facility] at [address].    Please call patient when done or if you have any questions.  Patient uses .

## 2020-07-18 ENCOUNTER — MYC MEDICAL ADVICE (OUTPATIENT)
Dept: FAMILY MEDICINE | Facility: CLINIC | Age: 55
End: 2020-07-18

## 2020-07-18 ENCOUNTER — MYC REFILL (OUTPATIENT)
Dept: FAMILY MEDICINE | Facility: CLINIC | Age: 55
End: 2020-07-18

## 2020-07-18 DIAGNOSIS — R05.9 COUGH: ICD-10-CM

## 2020-07-18 RX ORDER — DOXYCYCLINE 100 MG/1
100 CAPSULE ORAL 2 TIMES DAILY
Qty: 14 CAPSULE | Refills: 0 | Status: CANCELLED | OUTPATIENT
Start: 2020-07-18

## 2020-07-20 ENCOUNTER — OFFICE VISIT (OUTPATIENT)
Dept: FAMILY MEDICINE | Facility: CLINIC | Age: 55
End: 2020-07-20
Payer: COMMERCIAL

## 2020-07-20 ENCOUNTER — MYC MEDICAL ADVICE (OUTPATIENT)
Dept: FAMILY MEDICINE | Facility: CLINIC | Age: 55
End: 2020-07-20

## 2020-07-20 VITALS
DIASTOLIC BLOOD PRESSURE: 72 MMHG | OXYGEN SATURATION: 95 % | HEART RATE: 77 BPM | RESPIRATION RATE: 18 BRPM | WEIGHT: 290 LBS | TEMPERATURE: 98.9 F | BODY MASS INDEX: 44.09 KG/M2 | SYSTOLIC BLOOD PRESSURE: 102 MMHG

## 2020-07-20 DIAGNOSIS — Z86.711 HISTORY OF PULMONARY EMBOLISM: Primary | ICD-10-CM

## 2020-07-20 DIAGNOSIS — L71.9 ROSACEA: ICD-10-CM

## 2020-07-20 DIAGNOSIS — E66.01 MORBID OBESITY (H): ICD-10-CM

## 2020-07-20 DIAGNOSIS — J45.41 MODERATE PERSISTENT ASTHMA WITH EXACERBATION: ICD-10-CM

## 2020-07-20 LAB
BASOPHILS # BLD AUTO: 0 10E9/L (ref 0–0.2)
BASOPHILS NFR BLD AUTO: 0.4 %
DIFFERENTIAL METHOD BLD: NORMAL
EOSINOPHIL # BLD AUTO: 0.3 10E9/L (ref 0–0.7)
EOSINOPHIL NFR BLD AUTO: 3.8 %
ERYTHROCYTE [DISTWIDTH] IN BLOOD BY AUTOMATED COUNT: 12.1 % (ref 10–15)
HCT VFR BLD AUTO: 45.9 % (ref 40–53)
HGB BLD-MCNC: 15.6 G/DL (ref 13.3–17.7)
LYMPHOCYTES # BLD AUTO: 1.7 10E9/L (ref 0.8–5.3)
LYMPHOCYTES NFR BLD AUTO: 21.8 %
MCH RBC QN AUTO: 31 PG (ref 26.5–33)
MCHC RBC AUTO-ENTMCNC: 34 G/DL (ref 31.5–36.5)
MCV RBC AUTO: 91 FL (ref 78–100)
MONOCYTES # BLD AUTO: 0.8 10E9/L (ref 0–1.3)
MONOCYTES NFR BLD AUTO: 9.7 %
NEUTROPHILS # BLD AUTO: 5 10E9/L (ref 1.6–8.3)
NEUTROPHILS NFR BLD AUTO: 64.3 %
PLATELET # BLD AUTO: 206 10E9/L (ref 150–450)
RBC # BLD AUTO: 5.04 10E12/L (ref 4.4–5.9)
WBC # BLD AUTO: 7.7 10E9/L (ref 4–11)

## 2020-07-20 PROCEDURE — 85025 COMPLETE CBC W/AUTO DIFF WBC: CPT | Performed by: PHYSICIAN ASSISTANT

## 2020-07-20 PROCEDURE — 80053 COMPREHEN METABOLIC PANEL: CPT | Performed by: PHYSICIAN ASSISTANT

## 2020-07-20 PROCEDURE — 36415 COLL VENOUS BLD VENIPUNCTURE: CPT | Performed by: PHYSICIAN ASSISTANT

## 2020-07-20 PROCEDURE — 99214 OFFICE O/P EST MOD 30 MIN: CPT | Performed by: PHYSICIAN ASSISTANT

## 2020-07-20 RX ORDER — MONTELUKAST SODIUM 10 MG/1
10 TABLET ORAL AT BEDTIME
Qty: 90 TABLET | Refills: 3 | Status: SHIPPED | OUTPATIENT
Start: 2020-07-20 | End: 2021-06-07

## 2020-07-20 RX ORDER — METRONIDAZOLE 10 MG/G
GEL TOPICAL DAILY
Qty: 60 G | Refills: 0 | Status: SHIPPED | OUTPATIENT
Start: 2020-07-20 | End: 2021-04-09

## 2020-07-20 RX ORDER — DOXYCYCLINE 100 MG/1
100 CAPSULE ORAL 2 TIMES DAILY
Qty: 60 CAPSULE | Refills: 0 | Status: SHIPPED | OUTPATIENT
Start: 2020-07-20 | End: 2021-06-07

## 2020-07-20 NOTE — Clinical Note
Catrachito Menchaca,  New pt to me yesterday. Unprovoked PE. Started on Xarelto in the ED. Has 1 month supply (started 15 mg BID for 21 days then will switch to 20 mg daily x 9 days). Can you confirm that he should be indefinitely anticoagulated? If there is another pool or person I should send this question to please let me know. Thanks for all your help.  Kwabena Patel

## 2020-07-20 NOTE — PROGRESS NOTES
Subjective     Oliver Agarwal is a 55 year old male who presents to clinic today for the following health issues:    HPI     55 year old male new patient to me today with a history of asthma, seasonal allergies, obesity, vitamin d deficiency, dysthymia, rosacea, and deafness who presents for multiple concerns today. History obtained with assistance of .     ED follow up:  Oliver was seen in the ED 7/9/2020 for pleuritic chest pain. Pain was right sided and exacerbated by taking a deep breath. His d-dimer was elevated at 1.1 and a CT scan showed pulmonary emboli right lower lobe posteriorly. He was started on Xarelto and given small clot burden on CT he was discharged home.     Since that time he is feeling improved. He still notices some right side rib/chest wall discomfort. He reports he has been taking Xarelto 15 mg BID and will soon transition to Xarelto 20 mg daily. He is tolerating the medication without issue.     He denies recent long distance travel preceding blood clot. Non smoker. No cancer history. No period of immobilization or surgery. No personal or family history of blood clots. Leading up to the recent PE he thinks his left calf did hurt a little bit but is unsure. No direct trauma to leg.       Skin concern:  Principal concern of visit for patient was his skin condition. He has history of rosacea and typically follows with dermatology. He is quite distraught with the way his skin looks on his face. He would like refills of some of his dermatologist prescribed medications. Has follow up with dermatology planned for sometime in August.       Review of Systems   Constitutional, HEENT, cardiovascular, pulmonary, gi and gu systems are negative, except as otherwise noted.      Objective    /72 (BP Location: Left arm, Patient Position: Sitting, Cuff Size: Adult Large)   Pulse 77   Temp 98.9  F (37.2  C) (Oral)   Resp 18   Wt 131.5 kg (290 lb)   SpO2 95%   BMI 44.09  kg/m    Body mass index is 44.09 kg/m .  Physical Exam  Vitals signs and nursing note reviewed.   Constitutional:       General: He is not in acute distress.     Appearance: He is obese. He is not ill-appearing or diaphoretic.   HENT:      Head: Normocephalic and atraumatic.   Eyes:      Conjunctiva/sclera: Conjunctivae normal.   Cardiovascular:      Rate and Rhythm: Normal rate and regular rhythm.      Heart sounds: Normal heart sounds.   Pulmonary:      Effort: Pulmonary effort is normal.      Breath sounds: Normal breath sounds.   Chest:      Chest wall: No tenderness.   Musculoskeletal:      Right ankle: He exhibits no swelling.      Left ankle: He exhibits no swelling.      Right lower leg: He exhibits no tenderness and no swelling. No edema.      Left lower leg: He exhibits no tenderness and no swelling. No edema.   Skin:     General: Skin is warm and dry.      Capillary Refill: Capillary refill takes less than 2 seconds.   Neurological:      General: No focal deficit present.      Mental Status: He is alert and oriented to person, place, and time.   Psychiatric:         Mood and Affect: Affect is blunt.         Thought Content: Thought content normal.          Diagnostic Test Results:  Labs reviewed in Epic  Results for orders placed or performed in visit on 07/20/20 (from the past 24 hour(s))   CBC with platelets and differential   Result Value Ref Range    WBC 7.7 4.0 - 11.0 10e9/L    RBC Count 5.04 4.4 - 5.9 10e12/L    Hemoglobin 15.6 13.3 - 17.7 g/dL    Hematocrit 45.9 40.0 - 53.0 %    MCV 91 78 - 100 fl    MCH 31.0 26.5 - 33.0 pg    MCHC 34.0 31.5 - 36.5 g/dL    RDW 12.1 10.0 - 15.0 %    Platelet Count 206 150 - 450 10e9/L    Diff Method Automated Method     % Neutrophils 64.3 %    % Lymphocytes 21.8 %    % Monocytes 9.7 %    % Eosinophils 3.8 %    % Basophils 0.4 %    Absolute Neutrophil 5.0 1.6 - 8.3 10e9/L    Absolute Lymphocytes 1.7 0.8 - 5.3 10e9/L    Absolute Monocytes 0.8 0.0 - 1.3 10e9/L     Absolute Eosinophils 0.3 0.0 - 0.7 10e9/L    Absolute Basophils 0.0 0.0 - 0.2 10e9/L   Comprehensive metabolic panel (BMP + Alb, Alk Phos, ALT, AST, Total. Bili, TP)   Result Value Ref Range    Sodium 139 133 - 144 mmol/L    Potassium 4.1 3.4 - 5.3 mmol/L    Chloride 106 94 - 109 mmol/L    Carbon Dioxide PENDING 20 - 32 mmol/L    Anion Gap PENDING 3 - 14 mmol/L    Glucose PENDING 70 - 99 mg/dL    Urea Nitrogen PENDING 7 - 30 mg/dL    Creatinine PENDING 0.66 - 1.25 mg/dL    GFR Estimate PENDING >60 mL/min/[1.73_m2]    GFR Estimate If Black PENDING >60 mL/min/[1.73_m2]    Calcium PENDING 8.5 - 10.1 mg/dL    Bilirubin Total PENDING 0.2 - 1.3 mg/dL    Albumin PENDING 3.4 - 5.0 g/dL    Protein Total PENDING 6.8 - 8.8 g/dL    Alkaline Phosphatase PENDING 40 - 150 U/L    ALT PENDING 0 - 70 U/L    AST PENDING 0 - 45 U/L           Assessment & Plan     (Z86.711) History of pulmonary embolism  (primary encounter diagnosis)  Comment: ED note reviewed. Patient currently taking Xarelto 15 mg BID. Will soon transition to Xarelto 20 mg qday. Has total of 30 day supply.  Plan: CBC with platelets and differential,         Comprehensive metabolic panel (BMP + Alb, Alk         Phos, ALT, AST, Total. Bili, TP)        Per my history PE was not provoked by surgery, lower limb injury, immobilization, air travel, acute infectious disease (had been treated with prednisone on 7/7/2020 for suspected asthma exacerbation but when d-dimer returned elevated plan changed and he was sent to the ED). Per guidelines I believe this would require indefinite anticoagulation. I advised patient I would consult our [facility] pharmacy team for confirmation of this before sending in additional refills of Xarelto. He should follow up with his PCP Herlinda for further care.     (L71.9) Rosacea  Comment: Follows with derm, out of medications. Has upcoming appointment in August.   Plan: doxycycline hyclate (VIBRAMYCIN) 100 MG         capsule, metroNIDAZOLE (METROGEL)  "1 % external         gel        Refilled doxycyline and metrogel. He should obtain further refills and care for this chronic condition from his dermatologist.     (J45.41) Moderate persistent asthma with exacerbation  Comment: Slight increase in asthma symptoms, agreed to restart Singulair.   Plan: montelukast (SINGULAIR) 10 MG tablet      Return for dermatology.    Kwabena Del Valle PA-C  [facility]      Time statement:   Patient comes in today in follow-up of his recent pulmonary embolism, chronic rosacea, and asthma. I spent 50 minutes with him, of which 40 minutes was spent in counseling and coordinating care. I discussed the importance continuing anticoagulation therapy and that I will follow up with him regarding duration of anticoagulation moving forward. We also discussed his chronic skin condition, asthma, and all of the medications on his medication list. History was obtained with assistance of .       Addendum: Called patient with assistance of . He was upset because he felt like we did not discuss all of the things he wanted to discuss during his office visit yesterday. I explained that we went well over a normal office visit timing to address his concerns and that his  also had to leave the site for an appointment elsewhere. He relayed that \"this is not right\" and that he will be filing a complaint. I let him know that is fine and that oftentimes with as many questions and concerns he had yesterday you have to schedule multiple appointments as both provider and  have other patients and work obligations in the course of the day. We then further discussed for the next 20 minutes over the phone that he will be on Xarelto likely for lifetime given unprovoked PE. A refill was sent to the  pharmacy. He will follow up with PCP in approximately 3 months. He also then preceded to ask questions about his CT scan and hepatic steatosis. I " advised him to work on diet and exercise. Ultimately I placed a referral to nutrition on his behalf. He then inquired about his recent lab work which we discussed. White blood cell count has resolved. CMP normal save for slightly elevated glucose which is consistent for him. He began inquiring about diet medications to help him lose weight faster. I encouraged him to focus on eating better and exercising more regularly.

## 2020-07-21 LAB
ALBUMIN SERPL-MCNC: 3.6 G/DL (ref 3.4–5)
ALP SERPL-CCNC: 62 U/L (ref 40–150)
ALT SERPL W P-5'-P-CCNC: 32 U/L (ref 0–70)
ANION GAP SERPL CALCULATED.3IONS-SCNC: 3 MMOL/L (ref 3–14)
AST SERPL W P-5'-P-CCNC: 21 U/L (ref 0–45)
BILIRUB SERPL-MCNC: 0.4 MG/DL (ref 0.2–1.3)
BUN SERPL-MCNC: 21 MG/DL (ref 7–30)
CALCIUM SERPL-MCNC: 9.4 MG/DL (ref 8.5–10.1)
CHLORIDE SERPL-SCNC: 106 MMOL/L (ref 94–109)
CO2 SERPL-SCNC: 30 MMOL/L (ref 20–32)
CREAT SERPL-MCNC: 1 MG/DL (ref 0.66–1.25)
GFR SERPL CREATININE-BSD FRML MDRD: 85 ML/MIN/{1.73_M2}
GLUCOSE SERPL-MCNC: 111 MG/DL (ref 70–99)
POTASSIUM SERPL-SCNC: 4.1 MMOL/L (ref 3.4–5.3)
PROT SERPL-MCNC: 8.3 G/DL (ref 6.8–8.8)
SODIUM SERPL-SCNC: 139 MMOL/L (ref 133–144)

## 2020-07-21 NOTE — TELEPHONE ENCOUNTER
No worries. Likely form the lab draw.   Monitor for any change or worsening.  F/u with EW if needed.

## 2020-09-03 NOTE — PATIENT INSTRUCTIONS
ramelteon when insurance approves this, take it 30 minutes before desired time of sleep  -- use for up to 2-3 weeks to help with initiation of sleep  -- do not take with other sleep aids, sedatives, alcohol, narcotics  -- caution for ongoing drowsiness/sleepiness in the mornings      Use the inhaler daily twice a day as prescribed to maintain your asthma   Addended by: JOSE KIRK on: 9/3/2020 08:05 AM     Modules accepted: Orders

## 2020-09-28 ENCOUNTER — OFFICE VISIT (OUTPATIENT)
Dept: FAMILY MEDICINE | Facility: CLINIC | Age: 55
End: 2020-09-28
Payer: COMMERCIAL

## 2020-09-28 VITALS
BODY MASS INDEX: 45.46 KG/M2 | OXYGEN SATURATION: 98 % | TEMPERATURE: 98 F | WEIGHT: 299 LBS | RESPIRATION RATE: 18 BRPM | SYSTOLIC BLOOD PRESSURE: 131 MMHG | HEART RATE: 55 BPM | DIASTOLIC BLOOD PRESSURE: 77 MMHG

## 2020-09-28 DIAGNOSIS — K76.0 FATTY LIVER: ICD-10-CM

## 2020-09-28 DIAGNOSIS — E66.01 MORBID OBESITY (H): ICD-10-CM

## 2020-09-28 DIAGNOSIS — I26.99 ACUTE PULMONARY EMBOLISM, UNSPECIFIED PULMONARY EMBOLISM TYPE, UNSPECIFIED WHETHER ACUTE COR PULMONALE PRESENT (H): Primary | ICD-10-CM

## 2020-09-28 DIAGNOSIS — Z23 NEED FOR PROPHYLACTIC VACCINATION AND INOCULATION AGAINST INFLUENZA: ICD-10-CM

## 2020-09-28 PROCEDURE — G0008 ADMIN INFLUENZA VIRUS VAC: HCPCS | Performed by: FAMILY MEDICINE

## 2020-09-28 PROCEDURE — 90682 RIV4 VACC RECOMBINANT DNA IM: CPT | Performed by: FAMILY MEDICINE

## 2020-09-28 PROCEDURE — 99214 OFFICE O/P EST MOD 30 MIN: CPT | Mod: 25 | Performed by: FAMILY MEDICINE

## 2020-09-28 NOTE — PATIENT INSTRUCTIONS
Patient Education     Nonalcoholic Fatty Liver Disease (NAFLD)  Nonalcoholic fatty liver disease (NAFLD) is a common disease of the liver. It occurs when you have too much fat in the liver. If NAFLD is severe, it can cause liver damage that seems like the damage caused by drinking too much alcohol. But NAFLD is not caused by drinking alcohol. This sheet tells you more about NAFLD and how it can be managed.    How the liver works   The liver is an organ in the upper right side of the belly (abdomen). It has many important jobs. These include:    Breaking down (metabolizing) proteins, carbohydrates, and fats    Making a substance called bile that helps break down fats    Storing and releasing sugar (glucose) into the blood to give the body energy    Removing toxins from the blood    Helping with blood clotting  Understanding NAFLD  A healthy liver may contain some fat. But if too much fat builds up in the liver, this causes NAFLD. NAFLD can be mild, causing fatty liver. Or it can be more severe and show inflammation, as well as the fat. This can cause non-alcoholic steatohepatitis (MARIA).    Fatty liver. With fatty liver, the liver simply has more fat than normal. This extra fat usually does not harm the liver.    MARIA. With MARIA, the fatty liver becomes inflamed over time. MARIA is serious because it can lead to scarring of the liver (fibrosis). Over time, the scarring may lead to cirrhosis of the liver. This can eventually cause liver failure or liver cancer.  Causes and risk factors of NAFLD  Doctors don't know what causes NAFLD. But certain things make the problem more likely to happen. These include:    Obesity    Prediabetes or diabetes    High levels of fat found in the blood (cholesterol and triglycerides)    Being exposed to certain medicines   Symptoms of NAFLD  Most people with NAFLD have no symptoms. If symptoms do occur, they can include:    Tiredness    Weakness    Weight loss    Loss of  appetite    Nausea and vomiting    Belly pain and cramping    Yellowing of the skin and eyes (jaundice), as well as dark urine, or light-colored stools    Swelling in the belly or legs  Diagnosing NAFLD  Your healthcare provider may think you have NAFLD if routine blood tests show high levels of liver enzymes. This may mean you have a liver problem. You may need one or more imaging tests, such as an ultrasound, CT, or MRI. You may need more blood tests to look for other causes of liver disease. You may also need a liver biopsy. During this test, a hollow needle is used to remove a tiny tissue sample from your liver. This tissue is then checked in a lab. This test can find signs of damage to liver tissue. It can also help figure out the cause of the damage and tell the difference between fatty liver and MARIA.  Treating NAFLD  Treatment for NAFLD varies for each person. The best early treatment is to treat any underlying conditions causing metabolic syndrome. This is the name for a group of conditions that includes:    High blood pressure    High levels of cholesterol and triglycerides    Being overweight or obese    Diabetes  Your healthcare provider will monitor your health and treat any symptoms or underlying health problems you have. Your provider will also work with you to control your risk factors. This will make liver damage less likely. In fact, treating those underlying conditions can often improve liver disease. You may need to take certain medicines, but no medicine will cure NAFLD. This is why treating the underlying conditions is most important. Your plan may include:    Losing extra weight    Getting regular exercise    Controlling diabetes and high cholesterol or triglyceride levels    Taking medicines and vitamins as prescribed by your provider    Quitting smoking    Not drinking alcohol    Eating a healthy and balanced diet  Living with NAFLD  If NAFLD is caught early, it can be managed with  treatment. Your healthcare provider will discuss further treatment choices with you as needed.  Be sure to ask your provider about recommended vaccines. These include vaccines for viruses that can cause liver disease.  Date Last Reviewed: 12/1/2016 2000-2019 [employer]. [address] [place] [address], [place] [address]. All rights reserved. This information is not intended as a substitute for professional medical care. Always follow your healthcare professional's instructions.

## 2020-09-28 NOTE — LETTER
[facility] [address] [place] [address]  [phone]          September 28, 2020    Oliver Agarwal                                                                                                                     [address]  [place] [address]                  Influenza Quad, Recombinant, p-ross*   09/28/2020

## 2020-09-28 NOTE — PROGRESS NOTES
Subjective     Oliver Agarwal is a 55 year old male who presents to clinic today for the following health issues:    HPI       Patient is here for follow up for liver/blood clot.     Here with .     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    Would like to discuss liver results. Was told to have abnormal liver function.     Does not drink alcohol.     Wondering if he can take something that can be used every other day or third day. Does not use xarelto every day.   Paid around 60 dollars for 3 months supply. So cost is not the major issue. Just wondering if other options are easier to take (less often). Also wondering if d-dimer test needs to be repeated as it was high.            Social History     Occupational History     Not on file   Tobacco Use     Smoking status: Former Smoker     Last attempt to quit: 1999     Years since quittin.4     Smokeless tobacco: Never Used   Substance and Sexual Activity     Alcohol use: No     Drug use: No     Sexual activity: Never     Allergies   Allergen Reactions     Albumin, Egg Difficulty breathing     Patient Active Problem List   Diagnosis     CARDIOVASCULAR SCREENING; LDL GOAL LESS THAN 160     Atopic rhinitis     Prediabetes     Hypovitaminosis D     Mild persistent asthma     Deaf - reads lips well     Corneal erosion, right     Blepharitis of both eyes with rosacea     Dry eyes     Morbid obesity (H)     Asthmatic bronchitis     Dysthymia     Hearing difficulty     Sleep apnea     Acute pulmonary embolism, unspecified pulmonary embolism type, unspecified whether acute cor pulmonale present (H)     Reviewed medications, social history and  past medical and surgical history.    Review of system: for general, respiratory, CVS, GI and psychiatry negative except for noted above.     EXAM:  /77 (BP Location: Right arm, Patient Position: Sitting, Cuff Size: Adult Large)   Pulse 55   Temp 98  F (36.7  C)  (Tympanic)   Resp 18   Wt 135.6 kg (299 lb)   SpO2 98%   BMI 45.46 kg/m    Constitutional: healthy, alert and no distress   Psychiatric: mentation appears normal and affect normal/bright  Cardiovascular: RRR. No murmurs,  Respiratory: negative, Lungs clear. No crackles or wheezing. No tachypnea.   Abdomen: Abdomen soft, morbidly obese,   Hearing aid in left ear canal.     ASSESSMENT / PLAN:  (I26.99) Acute pulmonary embolism, unspecified pulmonary embolism type, unspecified whether acute cor pulmonale present (H)  (primary encounter diagnosis)  Comment:    Plan:  Unprovoked episode as per ER notes and previous note.  We discussed with patient using Xarelto every day is very important.  He understood.  We also discussed many people with PE needs lifelong blood thinner but can be readdressed in 6 months about duration of rx as per PCP.    (E66.01) Morbid obesity (H)  Comment:  Body mass index is 45.46 kg/m . can contribute to fatty liver.  He is interested in seeing medical weight loss specialist when I suggested.  Referral given.  Plan: COMPREHENSIVE WEIGHT MANAGEMENT             (K76.0) Fatty liver  Comment: Most likely nonalcoholic fatty liver disease.  Most likely from his weight.  Plan: We discussed significant weight loss is necessary.  Reassured his liver functions are fine.    (Z23) Need for prophylactic vaccination and inoculation against influenza  Comment:    Plan: INFLUENZA QUAD, RECOMBINANT, P-FREE (RIV4)         (FLUBLOCK) [75448], Vaccine Administration,         Initial [58187]             Answered all of his questions via .    The above note was dictated using voice recognition. Although reviewed after completion, some word and grammatical error may remain .

## 2020-10-29 DIAGNOSIS — J31.0 CHRONIC RHINITIS: ICD-10-CM

## 2020-10-29 RX ORDER — FLUTICASONE PROPIONATE 50 MCG
SPRAY, SUSPENSION (ML) NASAL
Qty: 48 G | Refills: 3 | Status: SHIPPED | OUTPATIENT
Start: 2020-10-29 | End: 2021-09-07

## 2020-12-14 ENCOUNTER — OFFICE VISIT (OUTPATIENT)
Dept: FAMILY MEDICINE | Facility: CLINIC | Age: 55
End: 2020-12-14
Payer: COMMERCIAL

## 2020-12-14 VITALS
DIASTOLIC BLOOD PRESSURE: 68 MMHG | HEIGHT: 68 IN | TEMPERATURE: 96.6 F | RESPIRATION RATE: 16 BRPM | HEART RATE: 53 BPM | WEIGHT: 302.8 LBS | SYSTOLIC BLOOD PRESSURE: 122 MMHG | BODY MASS INDEX: 45.89 KG/M2 | OXYGEN SATURATION: 94 %

## 2020-12-14 DIAGNOSIS — I26.99 ACUTE PULMONARY EMBOLISM, UNSPECIFIED PULMONARY EMBOLISM TYPE, UNSPECIFIED WHETHER ACUTE COR PULMONALE PRESENT (H): Primary | ICD-10-CM

## 2020-12-14 DIAGNOSIS — K76.0 FATTY LIVER: ICD-10-CM

## 2020-12-14 DIAGNOSIS — Z86.711 HISTORY OF PULMONARY EMBOLISM: ICD-10-CM

## 2020-12-14 LAB
ALBUMIN SERPL-MCNC: 4 G/DL (ref 3.4–5)
ALP SERPL-CCNC: 73 U/L (ref 40–150)
ALT SERPL W P-5'-P-CCNC: 45 U/L (ref 0–70)
ANION GAP SERPL CALCULATED.3IONS-SCNC: 4 MMOL/L (ref 3–14)
AST SERPL W P-5'-P-CCNC: 18 U/L (ref 0–45)
BILIRUB SERPL-MCNC: 0.6 MG/DL (ref 0.2–1.3)
BUN SERPL-MCNC: 23 MG/DL (ref 7–30)
CALCIUM SERPL-MCNC: 9.3 MG/DL (ref 8.5–10.1)
CHLORIDE SERPL-SCNC: 107 MMOL/L (ref 94–109)
CO2 SERPL-SCNC: 30 MMOL/L (ref 20–32)
CREAT SERPL-MCNC: 0.98 MG/DL (ref 0.66–1.25)
D DIMER PPP FEU-MCNC: <0.3 UG/ML FEU (ref 0–0.5)
GFR SERPL CREATININE-BSD FRML MDRD: 87 ML/MIN/{1.73_M2}
GLUCOSE SERPL-MCNC: 103 MG/DL (ref 70–99)
POTASSIUM SERPL-SCNC: 4.3 MMOL/L (ref 3.4–5.3)
PROT SERPL-MCNC: 7.9 G/DL (ref 6.8–8.8)
SODIUM SERPL-SCNC: 141 MMOL/L (ref 133–144)

## 2020-12-14 PROCEDURE — 80053 COMPREHEN METABOLIC PANEL: CPT | Performed by: NURSE PRACTITIONER

## 2020-12-14 PROCEDURE — 99214 OFFICE O/P EST MOD 30 MIN: CPT | Performed by: NURSE PRACTITIONER

## 2020-12-14 PROCEDURE — 36415 COLL VENOUS BLD VENIPUNCTURE: CPT | Performed by: NURSE PRACTITIONER

## 2020-12-14 PROCEDURE — 85379 FIBRIN DEGRADATION QUANT: CPT | Performed by: NURSE PRACTITIONER

## 2020-12-14 ASSESSMENT — MIFFLIN-ST. JEOR: SCORE: 2196.96

## 2020-12-14 NOTE — PROGRESS NOTES
"Subjective     Oliver Agarwal is a 55 year old male who presents to clinic today for the following health issues:    HPI         Pt is here to follow up on labs work D dimer labs?      ED/UC Followup:    Facility:  [facility] Emergency Dept  Date of visit: 7/3/2020  Reason for visit: Acute pulmonary embolism without acute cor pulmonale, unspecified pulmonary embolism type   Current Status:  Pt state feeling okay.     Needs to get f/u labs.    Medication was really expensive so he stopped it 3 weeks ago when his Rx ran out.    Not sure if he needs to take this long term or if he can stop.  Due for refills.      Review of Systems   Constitutional, HEENT, cardiovascular, pulmonary, GI, , musculoskeletal, neuro, skin, endocrine and psych systems are negative, except as otherwise noted.      Objective    /68 (BP Location: Left arm, Patient Position: Chair, Cuff Size: Adult Regular)   Pulse 53   Temp 96.6  F (35.9  C) (Tympanic)   Resp 16   Ht 1.734 m (5' 8.25\")   Wt 137.3 kg (302 lb 12.8 oz)   SpO2 94%   BMI 45.70 kg/m    Body mass index is 45.7 kg/m .  Physical Exam   GENERAL: healthy, alert and no distress  NECK: no adenopathy, no asymmetry, masses, or scars and thyroid normal to palpation  RESP: lungs clear to auscultation - no rales, rhonchi or wheezes  CV: regular rate and rhythm, normal S1 S2, no S3 or S4, no murmur, click or rub, no peripheral edema and peripheral pulses strong  ABDOMEN: soft, nontender, no hepatosplenomegaly, no masses and bowel sounds normal  MS: no gross musculoskeletal defects noted, no edema    Results for orders placed or performed in visit on 12/14/20   D dimer, quantitative     Status: None   Result Value Ref Range    D Dimer <0.3 0.0 - 0.50 ug/ml FEU   Comprehensive metabolic panel (BMP + Alb, Alk Phos, ALT, AST, Total. Bili, TP)     Status: Abnormal   Result Value Ref Range    Sodium 141 133 - 144 mmol/L    Potassium 4.3 3.4 - 5.3 mmol/L    Chloride " "107 94 - 109 mmol/L    Carbon Dioxide 30 20 - 32 mmol/L    Anion Gap 4 3 - 14 mmol/L    Glucose 103 (H) 70 - 99 mg/dL    Urea Nitrogen 23 7 - 30 mg/dL    Creatinine 0.98 0.66 - 1.25 mg/dL    GFR Estimate 87 >60 mL/min/[1.73_m2]    GFR Estimate If Black >90 >60 mL/min/[1.73_m2]    Calcium 9.3 8.5 - 10.1 mg/dL    Bilirubin Total 0.6 0.2 - 1.3 mg/dL    Albumin 4.0 3.4 - 5.0 g/dL    Protein Total 7.9 6.8 - 8.8 g/dL    Alkaline Phosphatase 73 40 - 150 U/L    ALT 45 0 - 70 U/L    AST 18 0 - 45 U/L           Assessment & Plan     Acute pulmonary embolism, unspecified pulmonary embolism type, unspecified whether acute cor pulmonale present (H)  Per chart review unprovoked DVT/PE recommend xarelto for life.    Discussed other options with pt. Including lovenox and coumadin.  He declined.  Will continue xarelto for now.    - D dimer, quantitative    Fatty liver  - Comprehensive metabolic panel (BMP + Alb, Alk Phos, ALT, AST, Total. Bili, TP)    History of pulmonary embolism  - rivaroxaban ANTICOAGULANT (XARELTO ANTICOAGULANT) 20 MG TABS tablet  Dispense: 90 tablet; Refill: 1       BMI:   Estimated body mass index is 45.7 kg/m  as calculated from the following:    Height as of this encounter: 1.734 m (5' 8.25\").    Weight as of this encounter: 137.3 kg (302 lb 12.8 oz).   Weight management plan: Discussed healthy diet and exercise guidelines         See Patient Instructions    No follow-ups on file.    Angelica Pate, BROOK CNP  [facility]    "

## 2021-02-18 ENCOUNTER — TELEPHONE (OUTPATIENT)
Dept: FAMILY MEDICINE | Facility: CLINIC | Age: 56
End: 2021-02-18

## 2021-02-18 DIAGNOSIS — J45.30 MILD PERSISTENT ASTHMA WITHOUT COMPLICATION: ICD-10-CM

## 2021-02-18 RX ORDER — ALBUTEROL SULFATE 90 UG/1
AEROSOL, METERED RESPIRATORY (INHALATION)
Qty: 3 INHALER | Refills: 1 | Status: SHIPPED | OUTPATIENT
Start: 2021-02-18 | End: 2021-06-02

## 2021-03-24 ENCOUNTER — OFFICE VISIT (OUTPATIENT)
Dept: FAMILY MEDICINE | Facility: CLINIC | Age: 56
End: 2021-03-24
Payer: COMMERCIAL

## 2021-03-24 ENCOUNTER — ANCILLARY PROCEDURE (OUTPATIENT)
Dept: GENERAL RADIOLOGY | Facility: CLINIC | Age: 56
End: 2021-03-24
Attending: FAMILY MEDICINE
Payer: COMMERCIAL

## 2021-03-24 VITALS
OXYGEN SATURATION: 94 % | HEART RATE: 55 BPM | WEIGHT: 309 LBS | BODY MASS INDEX: 46.64 KG/M2 | TEMPERATURE: 97.7 F | RESPIRATION RATE: 18 BRPM | SYSTOLIC BLOOD PRESSURE: 125 MMHG | DIASTOLIC BLOOD PRESSURE: 74 MMHG

## 2021-03-24 DIAGNOSIS — G89.29 CHRONIC RIGHT SHOULDER PAIN: ICD-10-CM

## 2021-03-24 DIAGNOSIS — M79.644 PAIN OF FINGER OF RIGHT HAND: Primary | ICD-10-CM

## 2021-03-24 DIAGNOSIS — M25.511 CHRONIC RIGHT SHOULDER PAIN: ICD-10-CM

## 2021-03-24 DIAGNOSIS — L08.9 SKIN PUSTULE: ICD-10-CM

## 2021-03-24 PROCEDURE — 36415 COLL VENOUS BLD VENIPUNCTURE: CPT | Performed by: FAMILY MEDICINE

## 2021-03-24 PROCEDURE — 99214 OFFICE O/P EST MOD 30 MIN: CPT | Performed by: FAMILY MEDICINE

## 2021-03-24 PROCEDURE — 73140 X-RAY EXAM OF FINGER(S): CPT | Mod: RT | Performed by: RADIOLOGY

## 2021-03-24 PROCEDURE — 84550 ASSAY OF BLOOD/URIC ACID: CPT | Performed by: FAMILY MEDICINE

## 2021-03-24 RX ORDER — MUPIROCIN 20 MG/G
OINTMENT TOPICAL 3 TIMES DAILY
Qty: 15 G | Refills: 0 | Status: SHIPPED | OUTPATIENT
Start: 2021-03-24 | End: 2021-03-29

## 2021-03-24 NOTE — PROGRESS NOTES
Assessment & Plan     Pain of finger of right hand  Likely arthritis causing some joint swelling. Does not appear like gout.   -Monitor symptoms, should improve in the next few weeks. If persistent follow-up.  -If worsening symptoms, redness, fever, numbness, weakness, tingling advised to go to ED for eval.   - Uric acid  - XR Finger Right G/E 2 Views    Skin pustule -healing  May use Bactroban 2-3 times a day for next five days. Avoid picking at lesions   - mupirocin (BACTROBAN) 2 % external ointment  Dispense: 15 g; Refill: 0    Chronic right shoulder pain  -Continue to monitor.  -Follow-up if more persistent symptoms    37 minutes spent on the date of the encounter doing chart review, history and exam, documentation and further activities as noted above    Srinivas Zapata DO  [facility]    Lisa Boyd is a 56 year old who presents for the following health issues  accompanied by his :    HPI   Patient here for several issues:    Has been having pain in right index finger, hurts when he wakes up and is stiff. Improves during the day. Symptoms for the last 2-3 weeks. Some swelling in the finger but not numbness or tingling. Right-hand dominant. No prior history of joint swelling or trauma. Would like xray.     Lesion on left cheek and neck. Have been there about 1.5 weeks. Cheek lesion started small and became larger. Tried popping the one on his neck and was bleeding. Was given oral doxycycline in the past for similar skin lesions. No fever or chills.    Right shoulder pain. Worse when lying on the right side of shoulder. Worse with heavy lifting. Last had issues a few weeks ago after sleeping on his right shoulder. Was slight pain, achy pain resolves when he changes position. Hx of right shoulder dislocation years ago. Does not think he needs imaging as it does not bother him much.      Review of Systems   CONSTITUTIONAL: NEGATIVE for fever, chills, change  in weight  INTEGUMENTARY/SKIN: NEGATIVE for worrisome rashes, moles or lesions  RESP: NEGATIVE for significant cough or SOB  CV: NEGATIVE for chest pain, palpitations or peripheral edema  NEURO: NEGATIVE for weakness, dizziness or paresthesias      Objective    /74 (BP Location: Right arm, Patient Position: Sitting, Cuff Size: Adult Large)   Pulse 55   Temp 97.7  F (36.5  C) (Tympanic)   Resp 18   Wt 140.2 kg (309 lb)   SpO2 94%   BMI 46.64 kg/m    Body mass index is 46.64 kg/m .  Physical Exam   GENERAL: healthy, alert and no distress  RESP: lungs clear to auscultation - no rales, rhonchi or wheezes  MS: FROM of right shoulder without difficulty. Right index finger with mild swelling over middle of finger, no tenderness to palpation over joints of right finger, no erythema of skin, flexion and extension normal.   SKIN: healing pustules on left check and two over left anterior neck.

## 2021-03-25 LAB — URATE SERPL-MCNC: 6.9 MG/DL (ref 3.5–7.2)

## 2021-04-05 ENCOUNTER — MYC MEDICAL ADVICE (OUTPATIENT)
Dept: FAMILY MEDICINE | Facility: CLINIC | Age: 56
End: 2021-04-05

## 2021-05-31 DIAGNOSIS — J45.30 MILD PERSISTENT ASTHMA WITHOUT COMPLICATION: ICD-10-CM

## 2021-06-02 RX ORDER — ALBUTEROL SULFATE 90 UG/1
AEROSOL, METERED RESPIRATORY (INHALATION)
Qty: 8.5 G | Refills: 0 | Status: SHIPPED | OUTPATIENT
Start: 2021-06-02 | End: 2021-06-07

## 2021-06-07 ENCOUNTER — OFFICE VISIT (OUTPATIENT)
Dept: FAMILY MEDICINE | Facility: CLINIC | Age: 56
End: 2021-06-07
Payer: COMMERCIAL

## 2021-06-07 VITALS
WEIGHT: 292 LBS | DIASTOLIC BLOOD PRESSURE: 72 MMHG | BODY MASS INDEX: 44.07 KG/M2 | RESPIRATION RATE: 16 BRPM | OXYGEN SATURATION: 95 % | TEMPERATURE: 97.9 F | HEART RATE: 59 BPM | SYSTOLIC BLOOD PRESSURE: 113 MMHG

## 2021-06-07 DIAGNOSIS — Z13.220 LIPID SCREENING: ICD-10-CM

## 2021-06-07 DIAGNOSIS — J45.30 MILD PERSISTENT ASTHMA WITHOUT COMPLICATION: Primary | ICD-10-CM

## 2021-06-07 DIAGNOSIS — Z86.711 HISTORY OF PULMONARY EMBOLISM: ICD-10-CM

## 2021-06-07 DIAGNOSIS — Z13.1 SCREENING FOR DIABETES MELLITUS: ICD-10-CM

## 2021-06-07 DIAGNOSIS — Z78.9 PATIENT ON KETOGENIC DIET: ICD-10-CM

## 2021-06-07 PROCEDURE — 99214 OFFICE O/P EST MOD 30 MIN: CPT | Performed by: FAMILY MEDICINE

## 2021-06-07 RX ORDER — ALBUTEROL SULFATE 90 UG/1
AEROSOL, METERED RESPIRATORY (INHALATION)
Qty: 8.5 G | Refills: 5 | Status: SHIPPED | OUTPATIENT
Start: 2021-06-07 | End: 2021-11-02

## 2021-06-07 NOTE — PROGRESS NOTES
"    Assessment & Plan     Mild persistent asthma without complication -stable  - albuterol (PROAIR HFA) 108 (90 Base) MCG/ACT inhaler  Dispense: 8.5 g; Refill: 5  - fluticasone-salmeterol (ADVAIR) 250-50 MCG/DOSE inhaler  Dispense: 3 each; Refill: 4  -Follow-up in one year, sooner as needed    Patient on ketogenic diet  -Discussed risk and benefits of diet  -Encourage developing healthy eating habits and lifestyle changes  -Encouraged 30 minutes of physical activity daily  -Monitor cholesterol while on this diet    Lipid screening  - Lipid panel reflex to direct LDL Fasting    Screening for diabetes mellitus  - Glucose    History of pulmonary embolism -per chart unprovoked  -Asymptomatic, does not wish to be on anticoagulation life long  -No indication for d-dimer today    Ordering of each unique test  Prescription drug management       BMI:   Estimated body mass index is 44.07 kg/m  as calculated from the following:    Height as of 12/14/20: 1.734 m (5' 8.25\").    Weight as of this encounter: 132.5 kg (292 lb).   Weight management plan: Discussed healthy diet and exercise guidelines      DO MAURICE Juarez [facility]    Lisa Boyd is a 56 year old who presents for the following health issues  accompanied by his :    HPI     Patient here for follow-up on asthma and refills on inhalers. Takes Advair twice a day as controller medication. Uses albuterol inhaler intermittently. Has not had exacerbation within the last year.     Doing keto diet, has loss some weight. Wonders if the keto diet is safe and if there are any concerns regarding his health with it. Requesting check of his cholesterol and diabetes screen.    History of unprovoked PE. Has been off xarelto for several months. Asymptomatic. Wonders about d-dimer check. Does not want to be on anticoagulation.     Asthma Follow-Up    Was ACT completed today?    Yes    ACT Total Scores 6/7/2021   ACT TOTAL SCORE - "   ASTHMA ER VISITS -   ASTHMA HOSPITALIZATIONS -   ACT TOTAL SCORE (Goal Greater than or Equal to 20) 20   In the past 12 months, how many times did you visit the emergency room for your asthma without being admitted to the hospital? 0   In the past 12 months, how many times were you hospitalized overnight because of your asthma? 0          How many days per week do you miss taking your asthma controller medication?  0    Please describe any recent triggers for your asthma: cold air, changes in weather, strong smells    Have you had any Emergency Room Visits, Urgent Care Visits, or Hospital Admissions since your last office visit?  No    Review of Systems   CONSTITUTIONAL: NEGATIVE for fever, chills, change in weight  EYES: NEGATIVE for vision changes or irritation  ENT/MOUTH: NEGATIVE for ear, mouth and throat problems  RESP: NEGATIVE for significant cough or SOB  CV: NEGATIVE for chest pain, palpitations or peripheral edema  NEURO: NEGATIVE for weakness, dizziness or paresthesias      Objective    /72 (BP Location: Right arm, Patient Position: Sitting, Cuff Size: Adult Large)   Pulse 59   Temp 97.9  F (36.6  C) (Tympanic)   Resp 16   Wt 132.5 kg (292 lb)   SpO2 95%   BMI 44.07 kg/m    Body mass index is 44.07 kg/m .  Physical Exam   GENERAL: healthy, alert and no distress  RESP: lungs clear to auscultation - no rales, rhonchi or wheezes  CV: regular rate and rhythm, normal S1 S2, no S3 or S4, no murmur, click or rub, no peripheral edema and peripheral pulses strong  PSYCH: mentation appears normal, affect normal/bright

## 2021-06-08 ASSESSMENT — ASTHMA QUESTIONNAIRES: ACT_TOTALSCORE: 20

## 2021-06-11 DIAGNOSIS — Z13.220 LIPID SCREENING: ICD-10-CM

## 2021-06-11 DIAGNOSIS — E55.9 VITAMIN D DEFICIENCY: ICD-10-CM

## 2021-06-11 DIAGNOSIS — Z13.1 SCREENING FOR DIABETES MELLITUS: ICD-10-CM

## 2021-06-11 LAB — DEPRECATED CALCIDIOL+CALCIFEROL SERPL-MC: 29 UG/L (ref 20–75)

## 2021-06-11 PROCEDURE — 80061 LIPID PANEL: CPT | Performed by: FAMILY MEDICINE

## 2021-06-11 PROCEDURE — 36415 COLL VENOUS BLD VENIPUNCTURE: CPT | Performed by: FAMILY MEDICINE

## 2021-06-11 PROCEDURE — 82947 ASSAY GLUCOSE BLOOD QUANT: CPT | Performed by: FAMILY MEDICINE

## 2021-06-11 PROCEDURE — 82306 VITAMIN D 25 HYDROXY: CPT | Performed by: FAMILY MEDICINE

## 2021-06-12 LAB
CHOLEST SERPL-MCNC: 183 MG/DL
GLUCOSE SERPL-MCNC: 86 MG/DL (ref 70–99)
HDLC SERPL-MCNC: 63 MG/DL
LDLC SERPL CALC-MCNC: 104 MG/DL
NONHDLC SERPL-MCNC: 120 MG/DL
TRIGL SERPL-MCNC: 82 MG/DL

## 2021-06-29 DIAGNOSIS — J45.30 MILD PERSISTENT ASTHMA WITHOUT COMPLICATION: ICD-10-CM

## 2021-07-01 RX ORDER — ALBUTEROL SULFATE 90 UG/1
AEROSOL, METERED RESPIRATORY (INHALATION)
Qty: 8.5 G | Refills: 5 | OUTPATIENT
Start: 2021-07-01

## 2021-08-17 ENCOUNTER — ANCILLARY PROCEDURE (OUTPATIENT)
Dept: GENERAL RADIOLOGY | Facility: CLINIC | Age: 56
End: 2021-08-17
Attending: PODIATRIST
Payer: COMMERCIAL

## 2021-08-17 ENCOUNTER — OFFICE VISIT (OUTPATIENT)
Dept: PODIATRY | Facility: CLINIC | Age: 56
End: 2021-08-17
Payer: COMMERCIAL

## 2021-08-17 VITALS
SYSTOLIC BLOOD PRESSURE: 135 MMHG | WEIGHT: 290 LBS | DIASTOLIC BLOOD PRESSURE: 80 MMHG | HEART RATE: 66 BPM | BODY MASS INDEX: 43.77 KG/M2

## 2021-08-17 DIAGNOSIS — M76.62 ACHILLES TENDINITIS OF LEFT LOWER EXTREMITY: ICD-10-CM

## 2021-08-17 DIAGNOSIS — M76.62 ACHILLES TENDINITIS OF LEFT LOWER EXTREMITY: Primary | ICD-10-CM

## 2021-08-17 PROCEDURE — 73630 X-RAY EXAM OF FOOT: CPT | Mod: LT | Performed by: RADIOLOGY

## 2021-08-17 PROCEDURE — 99203 OFFICE O/P NEW LOW 30 MIN: CPT | Performed by: PODIATRIST

## 2021-08-17 NOTE — LETTER
8/17/2021         RE: Oliver Agarwal  [address]  [place] [address]        Dear Colleague,    Thank you for referring your patient, Oliver Agarwal, to the [facility] [place]. Please see a copy of my visit note below.      Pt is seen today as a new pt referral with the c/c of painful left foot.  This has been symptomatic for the past 1.5 years.  Pt denies any hx of trauma.  Aggravated by activity and relieved by rest.  Describes as burning pain.  Is slowly getting worse.  Going up stairs is painful.  Points to posterior heel.  Works part time.  Socks around house.  Has diabetes.      ROS:  A 10-point review of systems was performed and is positive for that noted in the HPI and as seen above.  All other areas are negative.          Allergies   Allergen Reactions     Albumin, Egg Difficulty breathing       Current Outpatient Medications   Medication Sig Dispense Refill     albuterol (PROAIR HFA) 108 (90 Base) MCG/ACT inhaler INHALE 2 PUFFS BY MOUTH EVERY 6 HOURS AS NEEDED 8.5 g 5     fluticasone (FLONASE) 50 MCG/ACT nasal spray INHALE 2 SPRAYS INTO EACH NOSTRIL DAILY AS DIRECTED 48 g 3     fluticasone-salmeterol (ADVAIR) 250-50 MCG/DOSE inhaler Inhale 1 puff into the lungs every 12 hours 3 each 4     metroNIDAZOLE (METROGEL) 1 % external gel Apply topically daily 60 g 0       Patient Active Problem List   Diagnosis     CARDIOVASCULAR SCREENING; LDL GOAL LESS THAN 160     Atopic rhinitis     Prediabetes     Hypovitaminosis D     Mild persistent asthma     Deaf - reads lips well     Corneal erosion, right     Blepharitis of both eyes with rosacea     Dry eyes     Morbid obesity (H)     Asthmatic bronchitis     Dysthymia     Hearing difficulty     Sleep apnea     Acute pulmonary embolism, unspecified pulmonary embolism type, unspecified whether acute cor pulmonale present (H)       Past Medical History:   Diagnosis Date     Obesity 10/7/2011       No past  surgical history on file.    Family History   Problem Relation Age of Onset     Heart Disease Other      Glaucoma No family hx of      Macular Degeneration No family hx of        Social History     Tobacco Use     Smoking status: Former Smoker     Quit date: 1999     Years since quittin.2     Smokeless tobacco: Never Used   Substance Use Topics     Alcohol use: No         Exam:    Vitals: /80   Pulse 66   Wt 131.5 kg (290 lb)   BMI 43.77 kg/m    BMI: Body mass index is 43.77 kg/m .  Height: Data Unavailable    Constitutional/ general:  Pt is in no apparent distress, appears well-nourished.  Cooperative with history and physical exam.     Psych:  The patient answered questions appropriately.  Normal affect.  Seems to have reasonable expectations, in terms of treatment.     Eyes:  Visual scanning/ tracking without deficit.     Ears:   today      Lymphatic:  Popliteal lymph nodes not enlarged.     Lungs:  Non labored breathing, non labored speech. No cough.  No audible wheezing. Even, quiet breathing.       Vascular:  positive pedal pulses bilaterally for both the DP and PT arteries.  CFT < 3 sec.  negative ankle edema.  positive pedal hair growth.    Neuro:  Alert and oriented x 3. Coordinated gait.  Light touch sensation is intact to the L4, L5, S1 distributions. No obvious deficits.  No evidence of neurological-based weakness, spasticity, or contracture in the lower extremities.      Derm: Normal texture and turgor.  No erythema, ecchymosis, or cyanosis.      Musculoskeletal:    Patient is ambulatory without an assistive device or brace.   Cavus foot with weightbearing bilateral.  No forefoot or rear foot deformities noted.  MS 5/5 all compartments.  Normal ROM all fore foot and rearfoot joints with no pain or crepitus.  No equinus.  Pain on posterior medial calcaneus left foot and this is somewhat prominent.  Pain mostly medial insertion.    Slight edema.  No masses.  No ecchymosis.       Radiographic Exam:  Xrays show small saddle at Achilles insertion.    A:  Insertional Achilles Tendonitis left foot     Plan:  X-rays 3 views taken of foot.    Discussed etiology and treatment options in detail w/ the pt.  Dispense heel lift.  Patient to wear good shoes at all times both inside and outside and made recommendations.  Ice bid.  Discussed physical therapy.  Avoid activities that aggravated this.  Return to clinic in 4-6 weeks if not better, otherwise prn.    Gee Holt DPM, FACFAS             Again, thank you for allowing me to participate in the care of your patient.        Sincerely,        Gee Holt DPM

## 2021-08-17 NOTE — PATIENT INSTRUCTIONS
We wish you continued good healing. If you have any questions or concerns, please do not hesitate to contact us at [phone]    [employer] (secure e-mail communication and access to your chart) to send a message or to make an appointment.    Please remember to call and schedule a follow up appointment if one was recommended at your earliest convenience.     +++OF MARCH 2020+++ LOCATION AND HOURS HAVE CHANGED    PLEASE CALL CLINICS TO VERIFY DAYS AND TIMES  PODIATRY CLINIC HOURS  TELEPHONE NUMBER    Dr. Gee GARCIAPNEPTALI [facility]        Clinics:  [facility]   Tuesday 1PM-6PM  [place]  Wednesday 745AM-330PM  [place]  Thursday/Friday 745AM-230PM  [place] [facility] APPOINTMENTS  [phone]    [place] APPOINTMENTS  [phone]          If you need a medication refill, please contact us you may need lab work and/or a follow up visit prior to your refill (i.e. Antifungal medications).    If MRI needed please call Imaging at [phone] or [phone]    HOW DO I GET MY KNEE SCOOTER? Knee scooters can be picked up at ANY Medical Supply stores with your knee scooter Prescription.  OR    Bring your signed prescription to an [facility] Medical Equipment showroom.

## 2021-08-17 NOTE — PROGRESS NOTES
Pt is seen today as a new pt referral with the c/c of painful left foot.  This has been symptomatic for the past 1.5 years.  Pt denies any hx of trauma.  Aggravated by activity and relieved by rest.  Describes as burning pain.  Is slowly getting worse.  Going up stairs is painful.  Points to posterior heel.  Works part time.  Socks around house.  Has diabetes.      ROS:  A 10-point review of systems was performed and is positive for that noted in the HPI and as seen above.  All other areas are negative.          Allergies   Allergen Reactions     Albumin, Egg Difficulty breathing       Current Outpatient Medications   Medication Sig Dispense Refill     albuterol (PROAIR HFA) 108 (90 Base) MCG/ACT inhaler INHALE 2 PUFFS BY MOUTH EVERY 6 HOURS AS NEEDED 8.5 g 5     fluticasone (FLONASE) 50 MCG/ACT nasal spray INHALE 2 SPRAYS INTO EACH NOSTRIL DAILY AS DIRECTED 48 g 3     fluticasone-salmeterol (ADVAIR) 250-50 MCG/DOSE inhaler Inhale 1 puff into the lungs every 12 hours 3 each 4     metroNIDAZOLE (METROGEL) 1 % external gel Apply topically daily 60 g 0       Patient Active Problem List   Diagnosis     CARDIOVASCULAR SCREENING; LDL GOAL LESS THAN 160     Atopic rhinitis     Prediabetes     Hypovitaminosis D     Mild persistent asthma     Deaf - reads lips well     Corneal erosion, right     Blepharitis of both eyes with rosacea     Dry eyes     Morbid obesity (H)     Asthmatic bronchitis     Dysthymia     Hearing difficulty     Sleep apnea     Acute pulmonary embolism, unspecified pulmonary embolism type, unspecified whether acute cor pulmonale present (H)       Past Medical History:   Diagnosis Date     Obesity 10/7/2011       No past surgical history on file.    Family History   Problem Relation Age of Onset     Heart Disease Other      Glaucoma No family hx of      Macular Degeneration No family hx of        Social History     Tobacco Use     Smoking status: Former Smoker     Quit date: 5/11/1999     Years since  quittin.2     Smokeless tobacco: Never Used   Substance Use Topics     Alcohol use: No         Exam:    Vitals: /80   Pulse 66   Wt 131.5 kg (290 lb)   BMI 43.77 kg/m    BMI: Body mass index is 43.77 kg/m .  Height: Data Unavailable    Constitutional/ general:  Pt is in no apparent distress, appears well-nourished.  Cooperative with history and physical exam.     Psych:  The patient answered questions appropriately.  Normal affect.  Seems to have reasonable expectations, in terms of treatment.     Eyes:  Visual scanning/ tracking without deficit.     Ears:   today      Lymphatic:  Popliteal lymph nodes not enlarged.     Lungs:  Non labored breathing, non labored speech. No cough.  No audible wheezing. Even, quiet breathing.       Vascular:  positive pedal pulses bilaterally for both the DP and PT arteries.  CFT < 3 sec.  negative ankle edema.  positive pedal hair growth.    Neuro:  Alert and oriented x 3. Coordinated gait.  Light touch sensation is intact to the L4, L5, S1 distributions. No obvious deficits.  No evidence of neurological-based weakness, spasticity, or contracture in the lower extremities.      Derm: Normal texture and turgor.  No erythema, ecchymosis, or cyanosis.      Musculoskeletal:    Patient is ambulatory without an assistive device or brace.   Cavus foot with weightbearing bilateral.  No forefoot or rear foot deformities noted.  MS 5/5 all compartments.  Normal ROM all fore foot and rearfoot joints with no pain or crepitus.  No equinus.  Pain on posterior medial calcaneus left foot and this is somewhat prominent.  Pain mostly medial insertion.    Slight edema.  No masses.  No ecchymosis.      Radiographic Exam:  Xrays show small saddle at Achilles insertion.    A:  Insertional Achilles Tendonitis left foot     Plan:  X-rays 3 views taken of foot.    Discussed etiology and treatment options in detail w/ the pt.  Dispense heel lift.  Patient to wear good shoes at all  times both inside and outside and made recommendations.  Ice bid.  Discussed physical therapy.  Avoid activities that aggravated this.  Return to clinic in 4-6 weeks if not better, otherwise prn.    Gee Holt DPM, FACFAS

## 2021-09-01 DIAGNOSIS — G47.33 OBSTRUCTIVE SLEEP APNEA (ADULT) (PEDIATRIC): Primary | ICD-10-CM

## 2021-09-04 DIAGNOSIS — J31.0 CHRONIC RHINITIS: ICD-10-CM

## 2021-09-07 RX ORDER — FLUTICASONE PROPIONATE 50 MCG
SPRAY, SUSPENSION (ML) NASAL
Qty: 48 G | Refills: 1 | Status: SHIPPED | OUTPATIENT
Start: 2021-09-07

## 2021-09-07 NOTE — TELEPHONE ENCOUNTER
Nasal Allergy Protocol Passed09/04/2021 08:04 AM   Patient is age 12 or older Protocol Details    Recent (12 mo) or future (30 days) visit within the authorizing provider's specialty     Medication is active on med list

## 2021-10-29 ENCOUNTER — TELEPHONE (OUTPATIENT)
Dept: FAMILY MEDICINE | Facility: CLINIC | Age: 56
End: 2021-10-29

## 2021-10-29 DIAGNOSIS — E56.9 VITAMIN DEFICIENCY: ICD-10-CM

## 2021-10-29 DIAGNOSIS — R73.03 PREDIABETES: Primary | ICD-10-CM

## 2021-10-29 DIAGNOSIS — I26.99 ACUTE PULMONARY EMBOLISM, UNSPECIFIED PULMONARY EMBOLISM TYPE, UNSPECIFIED WHETHER ACUTE COR PULMONALE PRESENT (H): ICD-10-CM

## 2021-10-29 NOTE — TELEPHONE ENCOUNTER
Reason for Call: Other order    Detailed comments: Patient is scheduled for a lab only visit on 11/5/2021 and is requesting lab orders be placed for A1C, vitamins, and d-dimer. Please assist. Thanks!    Phone Number Patient can be reached at: Cell number on file:    Telephone Information:   Mobile [phone]     Best Time: Any    Can we leave a detailed message on this number? YES    Call taken on 10/29/2021 at 3:35 PM by Berkley Diaz

## 2021-11-02 ENCOUNTER — OFFICE VISIT (OUTPATIENT)
Dept: FAMILY MEDICINE | Facility: CLINIC | Age: 56
End: 2021-11-02
Payer: COMMERCIAL

## 2021-11-02 VITALS
TEMPERATURE: 97.9 F | DIASTOLIC BLOOD PRESSURE: 84 MMHG | OXYGEN SATURATION: 95 % | BODY MASS INDEX: 46.04 KG/M2 | WEIGHT: 305 LBS | HEART RATE: 78 BPM | RESPIRATION RATE: 18 BRPM | SYSTOLIC BLOOD PRESSURE: 137 MMHG

## 2021-11-02 DIAGNOSIS — J45.30 MILD PERSISTENT ASTHMA WITHOUT COMPLICATION: ICD-10-CM

## 2021-11-02 DIAGNOSIS — R05.9 COUGH: Primary | ICD-10-CM

## 2021-11-02 PROCEDURE — 99214 OFFICE O/P EST MOD 30 MIN: CPT | Performed by: NURSE PRACTITIONER

## 2021-11-02 RX ORDER — ALBUTEROL SULFATE 90 UG/1
AEROSOL, METERED RESPIRATORY (INHALATION)
Qty: 8.5 G | Refills: 5 | Status: SHIPPED | OUTPATIENT
Start: 2021-11-02 | End: 2022-06-06

## 2021-11-02 RX ORDER — BENZONATATE 100 MG/1
100 CAPSULE ORAL 3 TIMES DAILY PRN
Qty: 20 CAPSULE | Refills: 0 | Status: SHIPPED | OUTPATIENT
Start: 2021-11-02 | End: 2021-11-08

## 2021-11-02 RX ORDER — PREDNISONE 20 MG/1
20 TABLET ORAL DAILY
Qty: 5 TABLET | Refills: 1 | Status: SHIPPED | OUTPATIENT
Start: 2021-11-02 | End: 2021-11-07

## 2021-11-02 RX ORDER — DEXTROMETHORPHAN HBR. AND GUAIFENESIN 10; 100 MG/5ML; MG/5ML
5 SOLUTION ORAL 4 TIMES DAILY PRN
Qty: 237 ML | Refills: 1 | Status: SHIPPED | OUTPATIENT
Start: 2021-11-02 | End: 2021-11-08

## 2021-11-02 NOTE — PROGRESS NOTES
Assessment & Plan     Cough  I think this is primarily related to a viral illness given the various associated symptoms.  Went over the treatment of viral illnesses, which is primarily supportive.    Recheck if not improving as expected    - benzonatate (TESSALON) 100 MG capsule; Take 1 capsule (100 mg) by mouth 3 times daily as needed for cough  - dextromethorphan-guaiFENesin (TUSSIN DM)  MG/5ML liquid; Take 5 mLs by mouth 4 times daily as needed (COUGH)  - predniSONE (DELTASONE) 20 MG tablet; Take 1 tablet (20 mg) by mouth daily for 5 days      No follow-ups on file.    Angelica Pate, BROOK CNP  [facility]    Lisa Boyd is a 56 year old who presents for the following health issues  NOT AVAILABLE    Cough x 3 days  Wants tessalon, robitussin and prednisone if asthma flares.    Mostly dry cough, was productive when he woke up 2 days ago  No fevers.    Chest feels tight from asthma.    Does NOT think this is covid.  Declined testing today  Fully vaccinated.    Has a history of asthma flares with URI.        HPI     Cold and cough       Review of Systems   Constitutional, HEENT, cardiovascular, pulmonary, gi and gu systems are negative, except as otherwise noted.      Objective    /84 (BP Location: Left arm, Patient Position: Sitting, Cuff Size: Adult Large)   Pulse 78   Temp 97.9  F (36.6  C) (Tympanic)   Resp 18   Wt 138.3 kg (305 lb)   SpO2 95%   BMI 46.04 kg/m    Body mass index is 46.04 kg/m .  Physical Exam   GENERAL: healthy, alert and no distress  NECK: no adenopathy, no asymmetry, masses, or scars and thyroid normal to palpation  RESP: no rales , no rhonchi, no wheezes and decreased breath sounds bibasilar  CV: regular rate and rhythm, normal S1 S2, no S3 or S4, no murmur, click or rub, no peripheral edema and peripheral pulses strong  ABDOMEN: soft, nontender, no hepatosplenomegaly, no masses and bowel sounds normal  MS: no gross  musculoskeletal defects noted, no edema

## 2021-11-03 ENCOUNTER — MYC MEDICAL ADVICE (OUTPATIENT)
Dept: FAMILY MEDICINE | Facility: CLINIC | Age: 56
End: 2021-11-03

## 2021-11-03 ENCOUNTER — TELEPHONE (OUTPATIENT)
Dept: FAMILY MEDICINE | Facility: CLINIC | Age: 56
End: 2021-11-03

## 2021-11-03 DIAGNOSIS — R05.9 COUGH: Primary | ICD-10-CM

## 2021-11-03 RX ORDER — CODEINE PHOSPHATE AND GUAIFENESIN 10; 100 MG/5ML; MG/5ML
1-2 SOLUTION ORAL EVERY 4 HOURS PRN
Qty: 236 ML | Refills: 0 | Status: SHIPPED | OUTPATIENT
Start: 2021-11-03 | End: 2021-11-08

## 2021-11-03 NOTE — TELEPHONE ENCOUNTER
Left message with  to call back, or check MyChart messages.    MyChart message sent to patient.    LEANNE Fraga, RN  [facility]

## 2021-11-03 NOTE — TELEPHONE ENCOUNTER
"PT called with .  This is f/u from visit on 11/2/21 with Marisa Pate.    \"Med is not helping. Worried about note that should not take med with asthma.\"    PT says cough medicine with codeine thru pharmacy seemed to help last time.    PT says he was told to call back if the OTC med was not helping.    Ok to call pt, if no answer, naye pt. OK to respond both ways.    DERIK Chris      "

## 2021-11-08 ENCOUNTER — VIRTUAL VISIT (OUTPATIENT)
Dept: FAMILY MEDICINE | Facility: CLINIC | Age: 56
End: 2021-11-08
Payer: COMMERCIAL

## 2021-11-08 ENCOUNTER — LAB (OUTPATIENT)
Dept: URGENT CARE | Facility: URGENT CARE | Age: 56
End: 2021-11-08
Attending: FAMILY MEDICINE
Payer: COMMERCIAL

## 2021-11-08 DIAGNOSIS — Z11.52 ENCOUNTER FOR SCREENING LABORATORY TESTING FOR COVID-19 VIRUS: Primary | ICD-10-CM

## 2021-11-08 DIAGNOSIS — Z11.52 ENCOUNTER FOR SCREENING LABORATORY TESTING FOR COVID-19 VIRUS: ICD-10-CM

## 2021-11-08 LAB — SARS-COV-2 RNA RESP QL NAA+PROBE: POSITIVE

## 2021-11-08 PROCEDURE — U0005 INFEC AGEN DETEC AMPLI PROBE: HCPCS

## 2021-11-08 PROCEDURE — 99213 OFFICE O/P EST LOW 20 MIN: CPT | Mod: TEL | Performed by: FAMILY MEDICINE

## 2021-11-08 PROCEDURE — U0003 INFECTIOUS AGENT DETECTION BY NUCLEIC ACID (DNA OR RNA); SEVERE ACUTE RESPIRATORY SYNDROME CORONAVIRUS 2 (SARS-COV-2) (CORONAVIRUS DISEASE [COVID-19]), AMPLIFIED PROBE TECHNIQUE, MAKING USE OF HIGH THROUGHPUT TECHNOLOGIES AS DESCRIBED BY CMS-2020-01-R: HCPCS

## 2021-11-08 NOTE — PROGRESS NOTES
Oliver Agarwal is a 56 year old male who is being evaluated via a billable telephone visit.      What phone number would you like to be contacted at? [phone]  How would you like to obtain your AVS? MyChart      Assessment & Plan     Encounter for screening laboratory testing for COVID-19 virus  Cont symptomatic care   - Symptomatic COVID-19 Virus (Coronavirus) by PCR Nasopharyngeal; Future       FUTURE APPOINTMENTS:       - Follow-up for annual visit or as needed    No follow-ups on file.    Sammie Nichols MD  [facility]     Oliver Agarwal is a 56 year old male who presents via phone visit today for the following health issues:      Would like a Covid test.   Has Asthma  Last tuesday seen in clinic   Has not improved in the week.   Feels off.   Stopped the prednisone - last Friday - had difficulty breathing.   Cough has decreased - no longer needing cough medicine   Beth Lezama CMA  He has been sick for a while he's been sick for the last week     As above would like to get tested for covid sx started on Halloween he is doing ok but does work I [employer]     Review of Systems   Constitutional, HEENT, cardiovascular, pulmonary, gi and gu systems are negative, except as otherwise noted.       Objective          Vitals:  No vitals were obtained today due to virtual visit.    healthy, alert and no distress  PSYCH:   RESP: No cough, no audible wheezing, able to talk in full sentences  Remainder of exam unable to be completed due to telephone visits            Phone call duration:  7 minutes        Sammie Nichols M.D.

## 2021-11-08 NOTE — LETTER
November 9, 2021      Oliver Agarwal  [address]  [place] [address]          SARS CoV2 PCR (no units)   Date Value   11/08/2021 Positive (A)       This letter provides a written record that you were tested for COVID-19. Your result was positive. This means you have COVID-19 (coronavirus).    How can I protect others?If you have symptoms, stay home and away from others (self-isolate) until:You ve had no fever--and no medicine that reduces fever--for 1 full day (24 hours). And      Your other symptoms have gotten better. For example, your cough or breathing has improved. And     At least 10 days have passed since your symptoms started. (If you've been told by a doctor that you have a weak immune system, wait 20 days).    If you don t have symptoms: Stay home and away from others (self-isolate) until at least 10 days have passed since your first positive COVID-19 test. If you have a weak immune system, please self-isolate for 20 days.    During this time:    Stay in your own room, including for meals. Use your own bathroom if you can.    Stay away from others in your home. No hugging, kissing or shaking hands. No visitors.     Don t go to work, school or anywhere else.     Clean  high touch  surfaces often (doorknobs, counters, handles, etc.). Use a household cleaning spray or wipes. You ll find a full list on the EPA website at www.epa.gov/pesticide-registration/list-n-disinfectants-use-against-sars-cov-2.     Cover your mouth and nose with a mask or other face covering to avoid spreading germs.    Wash your hands and face often with soap and water.    Make a list of people you have been in close contact with recently, even if either of you wore a face covering.  o Start your list from 2 days before you became ill or had a positive test.  o Include anyone that was within 6 feet of you for a cumulative total of 15 minutes or more in 24 hours. (Example: if you sat next to Patric for 5 minutes in  the morning and 10 minutes in the afternoon, then you were in close contact for 15 minutes total that day. Patric would be added to your list.)        A public health worker will call or text you. It is important that you answer. They will ask you questions about possible exposures to COVID-19, such as people you have been in direct contact with and places you have visited.    Tell the people on your list that you have COVID-19; they should stay away from others for 14 days starting form the last time they were in contact with you (unless you are told something different from a public health worker).    Caregivers in these groups are at risk for severe illness due to COVID-19:  o People 65 years and older  o People who live in a nursing home or long-term care facility  o People with chronic disease (lung, heart, cancer, diabetes, kidney, liver, immunologic)  o People who have a weakened immune system, including those who:  - Are in cancer treatment  - Take medicine that weakens the immune system, such as corticosteroids  - Had a bone marrow or organ transplant  - Have an immune deficiency  - Have poorly controlled HIV or AIDS  - Are obese (body mass index of 40 or higher)  - Smoke regularly    Caregivers should wear gloves while washing dishes, handling laundry and cleaning bedrooms and bathrooms.    Wash and dry laundry with special caution. Don t shake dirty laundry, and use the warmest water setting you can.    If you have a weakened immune system, ask your doctor about other actions you should take.    For more tips, go to www.cdc.gov/coronavirus/2019-ncov/downloads/10Things.pdf.    You should not go back to work until you meet the guidelines above for ending your home isolation. You don't need to be retested for COVID-19 before going back to work- studies show that you won't spread the virus if it's been at least 10 days since your symptoms started (or 20 days, if you have a weak immune system).    Employers:  This document serves as formal notice of your employee s medical guidelines for going back to work. They must meet the above guidelines before going back to work in person.    How can I take care of myself?    1. Get lots of rest. Drink extra fluids (unless a doctor has told you not to).    2. Take Tylenol (acetaminophen) for fever or pain. If you have liver or kidney problems, ask your family doctor if it s okay to take Tylenol.     Take either:     650 mg (two 325 mg pills) every 4 to 6 hours, or     1,000 mg (two 500 mg pills) every 8 hours as needed.     Note: Don t take more than 3,000 mg in one day. Acetaminophen is found in many medicines (both prescribed and over-the-counter medicines). Read all labels to be sure you don t take too much.    For children, check the Tylenol bottle for the right dose (based on their age or weight).    3. If you have other health problems (like cancer, heart failure, an organ transplant or severe kidney disease): Call your specialty clinic if you don t feel better in the next 2 days.    4. Know when to call 911: Emergency warning signs include:    Trouble breathing or shortness of breath    Pain or pressure in the chest that doesn t go away    Feeling confused like you haven t felt before, or not being able to wake up    Bluish-colored lips or face    5. Sign up for [employer]. We know it s scary to hear that you have COVID-19. We want to track your symptoms to make sure you re okay over the next 2 weeks. Please look for an email from [employer]--this is a free, online program that we ll use to keep in touch. To sign up, follow the link in the email. Learn more at [email or web address].      Where can I get more information?     [facility] [place]: [email or web address]    Coronavirus Basics: www.health.[facility].[place]/diseases/coronavirus/basics.html    What to Do If You re Sick: www.cdc.gov/coronavirus/2019-ncov/about/steps-when-sick.html    Ending Home  Isolation: www.cdc.gov/coronavirus/2019-ncov/hcp/disposition-in-home-patients.html     Caring for Someone with COVID-19: www.cdc.gov/coronavirus/2019-ncov/if-you-are-sick/care-for-someone.html     [facility] clinical trials (COVID-19 research studies): clinicalaffairs.[facility]-clinical-trials

## 2021-11-09 ENCOUNTER — TELEPHONE (OUTPATIENT)
Dept: FAMILY MEDICINE | Facility: CLINIC | Age: 56
End: 2021-11-09
Payer: COMMERCIAL

## 2021-11-09 DIAGNOSIS — R05.9 COUGH: ICD-10-CM

## 2021-11-09 RX ORDER — BENZONATATE 100 MG/1
100 CAPSULE ORAL 3 TIMES DAILY PRN
Qty: 20 CAPSULE | Refills: 0 | Status: SHIPPED | OUTPATIENT
Start: 2021-11-09 | End: 2022-06-30

## 2021-11-09 NOTE — TELEPHONE ENCOUNTER
He is a [place] patient. I did a virtual visit for him yesterday but he is not a [facility] patient I sent in the tessalon. He had stopped the prednisone last week because it wasn't helping there is a refill on that. He didn't ask for benzonatate but we were speaking through an  so I think something got missed.  Sammie Nichols M.D.

## 2021-11-09 NOTE — TELEPHONE ENCOUNTER
Message left on patient's answering machine [phone] with help of a sign language interpretorto call the [facility] RN back. Also sent him a MyChart note with Dr. Nichols's instructions as noted below.   Lorne Irizarry RN

## 2021-11-09 NOTE — TELEPHONE ENCOUNTER
Dr. Nichols-Please review and advise/sign if agree .  Please route encounter to appropriate care team pool as call received by [facility].    Call received from patient with :  1. Received communication on MyChart that he should not take cough medicine-why?   A. Requests refill of Benzonatate due to cough from COVID-19; this medication is helpful  2. Tested positive for COVID-19 and received a letter on MyChart with COVID-19 quarantine/care instructions.   A. Patient no longer able to view the information sent to him via [employer] regarding COVID-19 test results and medication information-requests this be resent to him  3. Requesting refill of Prednisone 20 mg tablet    Writer clarified with patient Prednisone 20 mg tablet was ordered by PHANI Pate CNP, on 11/2/21 with 1 refill.  Please follow up with [facility].    Writer unable to locate letter/MyChart communication to patient regarding medication instructions about Benzonatate.    Writer will send a message to Dr. Nichols with medication instruction clarification and refill request.    Patient verbalized understanding and in agreement with plan.    Thank you!  GEORGES FragaN, RN  [facility]

## 2021-11-12 ENCOUNTER — TELEPHONE (OUTPATIENT)
Dept: NURSING | Facility: CLINIC | Age: 56
End: 2021-11-12
Payer: COMMERCIAL

## 2021-11-12 NOTE — TELEPHONE ENCOUNTER
Patient calling with  present as well. Reporting the patient tested positive for COVID 11/8 wanting to retest. Advised for need of a virtual visit for COVID testing with patient agreeable to the plan.     Sushma Perales RN 11/12/21 11:55 AM   [facility] Triage Nurse Advisor      COVID-19 testing at [facility] is by appointment only. You'll need to schedule a time to get tested. If you have symptoms (signs) of COVID, please log in to [employer] to complete an e-visit (virtual visit). This is the first step to getting tested.    If you don't have COVID symptoms and want to get tested, you should also log in to [employer] for an e-visit. This includes people who:    have had close contact with a COVID-positive person    want to be tested before or after travel    have taken part in high-risk activities    have a school testing mandate, or     were told to get tested by their care team or the health department.     A [employer] e-visit is the fastest way for you to be seen by our care team. Please choose  Next available provider  to complete an e-visit. When you choose this option, the average response time is less than one hour.  After the e-visit, you'll be able to self-schedule your test at one of our testing locations. To learn more about our testing locations or for other details, please visit our COVID-19 Resource Hub.    [employer] is also the fastest way to get your test results. You'll get your results in [employer] within 3 days. If you don't use [employer], you'll get your results in the mail in 7 to 10 days. If your test is positive and you don't view your result in [employer] within 1 business day, you'll get a phone call with your result. A positive result means that you have COVID-19.    If you have an upcoming procedure at [facility], you'll need to be tested for COVID. The test needs to happen 2 to 4 days before your procedure. If you have an upcoming procedure, we will contact you to  schedule a COVID test.    If you don't have a [employer] account, please call [phone] to set up a virtual visit. You can also find community testing sites in [place] at [place]/covid19/get-tested/testing-locations. If you live in [place], please visit [email or web address].[place].gov/covid-19/community-testing.htm.

## 2021-11-15 ENCOUNTER — NURSE TRIAGE (OUTPATIENT)
Dept: NURSING | Facility: CLINIC | Age: 56
End: 2021-11-15

## 2021-11-15 NOTE — TELEPHONE ENCOUNTER
Triage call   Patient called to ask questions about home isolation .  He is 15 days past his first symptoms and is much improved.  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  Verbalizes understanding and agrees with plan.  Transferred to scheduling.    Otilia Herman RN   [facility] Nurse Advisor  1:11 PM 11/15/2021        Reason for Disposition    COVID-19 Home Isolation, questions about    Protocols used: CORONAVIRUS (COVID-19) DIAGNOSED OR [id number] 8.25.2021

## 2021-12-14 ENCOUNTER — IMMUNIZATION (OUTPATIENT)
Dept: NURSING | Facility: CLINIC | Age: 56
End: 2021-12-14
Payer: COMMERCIAL

## 2021-12-14 PROCEDURE — 0004A PR COVID VAC PFIZER DIL RECON 30 MCG/0.3 ML IM: CPT

## 2021-12-14 PROCEDURE — 91300 PR COVID VAC PFIZER DIL RECON 30 MCG/0.3 ML IM: CPT

## 2021-12-21 ENCOUNTER — OFFICE VISIT (OUTPATIENT)
Dept: FAMILY MEDICINE | Facility: CLINIC | Age: 56
End: 2021-12-21
Payer: COMMERCIAL

## 2021-12-21 VITALS
BODY MASS INDEX: 45.92 KG/M2 | RESPIRATION RATE: 16 BRPM | DIASTOLIC BLOOD PRESSURE: 70 MMHG | OXYGEN SATURATION: 94 % | WEIGHT: 303 LBS | SYSTOLIC BLOOD PRESSURE: 130 MMHG | HEART RATE: 63 BPM | TEMPERATURE: 98 F | HEIGHT: 68 IN

## 2021-12-21 DIAGNOSIS — R73.03 PREDIABETES: Primary | ICD-10-CM

## 2021-12-21 LAB — HBA1C MFR BLD: 5.6 % (ref 0–5.6)

## 2021-12-21 PROCEDURE — 83036 HEMOGLOBIN GLYCOSYLATED A1C: CPT | Performed by: NURSE PRACTITIONER

## 2021-12-21 PROCEDURE — 99213 OFFICE O/P EST LOW 20 MIN: CPT | Performed by: NURSE PRACTITIONER

## 2021-12-21 PROCEDURE — 36415 COLL VENOUS BLD VENIPUNCTURE: CPT | Performed by: NURSE PRACTITIONER

## 2021-12-21 ASSESSMENT — PATIENT HEALTH QUESTIONNAIRE - PHQ9
10. IF YOU CHECKED OFF ANY PROBLEMS, HOW DIFFICULT HAVE THESE PROBLEMS MADE IT FOR YOU TO DO YOUR WORK, TAKE CARE OF THINGS AT HOME, OR GET ALONG WITH OTHER PEOPLE: NOT DIFFICULT AT ALL
SUM OF ALL RESPONSES TO PHQ QUESTIONS 1-9: 2
SUM OF ALL RESPONSES TO PHQ QUESTIONS 1-9: 2

## 2021-12-21 ASSESSMENT — MIFFLIN-ST. JEOR: SCORE: 2192.87

## 2021-12-21 NOTE — PROGRESS NOTES
"  Assessment & Plan     Prediabetes  Discussed A1c in normal range.  Would recommend he change to annual fasting glucose for screening.    Continue to strive to walk more, encouraged to lose weight.   Reduce carbs, increase fruits and veggies.    F/u PRN.    OK to STOP doing routine A1c testing   Hemoglobin A1c; Future  - Hemoglobin A1c      BMI:   Estimated body mass index is 45.73 kg/m  as calculated from the following:    Height as of this encounter: 1.734 m (5' 8.25\").    Weight as of this encounter: 137.4 kg (303 lb).   Weight management plan: Discussed healthy diet and exercise guidelines    See Patient Instructions    No follow-ups on file.    BROOK Rosa CNP  [facility]    Lisa Boyd is a 56 year old who presents for the following health issues  accompanied by his . ASL    History of Present Illness       Diabetes:   He presents for follow up of diabetes.  He is checking home blood glucose with a continuous glucose monitor.  He checks blood glucose before meals.  Blood glucose is never over 200 and never under 70. He is aware of hypoglycemia symptoms including none, dizziness, weakness and blurred vision. He is concerned about other.  He is having excessive thirst.           He wants to continue checking A1c and thinks he needs periodic D dimer labs to make sure he does not have a clot.    Does not have diabetes, only ever diagnosed with prediabetes.    Still check his glucose daily and he thinks this is necessary.    Eats well  Tries to focus on fruits and veggies but admits to some processed foods.    Not formally exercising but walking some.    Weight creeping up in the pandemic.    Feeling well overall.        Review of Systems   Constitutional, HEENT, cardiovascular, pulmonary, gi and gu systems are negative, except as otherwise noted.      Objective    /70 (BP Location: Right arm, Patient Position: Sitting, Cuff Size: Adult Large)  " " Pulse 63   Temp 98  F (36.7  C) (Temporal)   Resp 16   Ht 1.734 m (5' 8.25\")   Wt 137.4 kg (303 lb)   SpO2 94%   BMI 45.73 kg/m    Body mass index is 45.73 kg/m .  Physical Exam   GENERAL: healthy, alert and no distress  EYES: Eyes grossly normal to inspection, PERRL and conjunctivae and sclerae normal  HENT: ear canals and TM's normal, nose and mouth without ulcers or lesions  NECK: no adenopathy, no asymmetry, masses, or scars and thyroid normal to palpation  RESP: lungs clear to auscultation - no rales, rhonchi or wheezes  CV: regular rate and rhythm, normal S1 S2, no S3 or S4, no murmur, click or rub, no peripheral edema and peripheral pulses strong  ABDOMEN: soft, nontender, no hepatosplenomegaly, no masses and bowel sounds normal  MS: no gross musculoskeletal defects noted, no edema  SKIN: no suspicious lesions or rashes    Results for orders placed or performed in visit on 12/21/21   Hemoglobin A1c     Status: Normal   Result Value Ref Range    Hemoglobin A1C 5.6 0.0 - 5.6 %               Answers for HPI/ROS submitted by the patient on 12/21/2021  If you checked off any problems, how difficult have these problems made it for you to do your work, take care of things at home, or get along with other people?: Not difficult at all  PHQ9 TOTAL SCORE: 2      "

## 2021-12-22 ASSESSMENT — PATIENT HEALTH QUESTIONNAIRE - PHQ9: SUM OF ALL RESPONSES TO PHQ QUESTIONS 1-9: 2

## 2022-01-06 NOTE — TELEPHONE ENCOUNTER
No alternative.    thanks  
This Rx for benzonatate (TESSALON) 100 MG capsule cannot be ordered.  Not covered by ins.  Please put in a new Rx for an alternative.  
Quality 431: Preventive Care And Screening: Unhealthy Alcohol Use - Screening: Patient not identified as an unhealthy alcohol user when screened for unhealthy alcohol use using a systematic screening method
Quality 226: Preventive Care And Screening: Tobacco Use: Screening And Cessation Intervention: Patient screened for tobacco use and is an ex/non-smoker
Detail Level: Detailed
Quality 110: Preventive Care And Screening: Influenza Immunization: Influenza Immunization Administered during Influenza season

## 2022-06-07 DIAGNOSIS — J45.30 MILD PERSISTENT ASTHMA WITHOUT COMPLICATION: ICD-10-CM

## 2022-06-08 DIAGNOSIS — J45.30 MILD PERSISTENT ASTHMA WITHOUT COMPLICATION: ICD-10-CM

## 2022-06-08 RX ORDER — FLUTICASONE PROPIONATE AND SALMETEROL 250; 50 UG/1; UG/1
POWDER RESPIRATORY (INHALATION)
Status: CANCELLED | OUTPATIENT
Start: 2022-06-08

## 2022-06-09 DIAGNOSIS — J45.30 MILD PERSISTENT ASTHMA WITHOUT COMPLICATION: ICD-10-CM

## 2022-06-09 RX ORDER — FLUTICASONE PROPIONATE AND SALMETEROL 250; 50 UG/1; UG/1
POWDER RESPIRATORY (INHALATION)
Qty: 1 EACH | Refills: 0 | Status: SHIPPED | OUTPATIENT
Start: 2022-06-09 | End: 2022-06-13

## 2022-06-09 NOTE — TELEPHONE ENCOUNTER
Routing refill request to provider for review/approval because:  --ACT and asthmas visit out of date.  --Needs to establish with new provider.    ,  --Please contact patient and ask to schedule a visit to establish with a new provider.    --A refill request for medication was sent to the provider.              --Last visit:  12/21/2021 [place].    --Future Visit: NONE with FP.    ACT Total Scores 2/6/2019 1/23/2020 6/7/2021   ACT TOTAL SCORE - - -   ASTHMA ER VISITS - - -   ASTHMA HOSPITALIZATIONS - - -   ACT TOTAL SCORE (Goal Greater than or Equal to 20) 22 16 20   In the past 12 months, how many times did you visit the emergency room for your asthma without being admitted to the hospital? 0 - 0   In the past 12 months, how many times were you hospitalized overnight because of your asthma? 0 - 0

## 2022-06-10 RX ORDER — FLUTICASONE PROPIONATE AND SALMETEROL 250; 50 UG/1; UG/1
POWDER RESPIRATORY (INHALATION)
OUTPATIENT
Start: 2022-06-10

## 2022-06-13 ENCOUNTER — VIRTUAL VISIT (OUTPATIENT)
Dept: PEDIATRICS | Facility: CLINIC | Age: 57
End: 2022-06-13
Payer: COMMERCIAL

## 2022-06-13 DIAGNOSIS — R73.03 PREDIABETES: ICD-10-CM

## 2022-06-13 DIAGNOSIS — R25.2 LEG CRAMPS: ICD-10-CM

## 2022-06-13 DIAGNOSIS — I26.99 ACUTE PULMONARY EMBOLISM, UNSPECIFIED PULMONARY EMBOLISM TYPE, UNSPECIFIED WHETHER ACUTE COR PULMONALE PRESENT (H): ICD-10-CM

## 2022-06-13 DIAGNOSIS — E66.01 MORBID OBESITY (H): ICD-10-CM

## 2022-06-13 DIAGNOSIS — J45.30 MILD PERSISTENT ASTHMA WITHOUT COMPLICATION: Primary | ICD-10-CM

## 2022-06-13 DIAGNOSIS — L70.0 ACNE VULGARIS: ICD-10-CM

## 2022-06-13 PROCEDURE — 99214 OFFICE O/P EST MOD 30 MIN: CPT | Mod: 95 | Performed by: PEDIATRICS

## 2022-06-13 RX ORDER — ALBUTEROL SULFATE 90 UG/1
AEROSOL, METERED RESPIRATORY (INHALATION)
Qty: 8.5 G | Refills: 11 | Status: SHIPPED | OUTPATIENT
Start: 2022-06-13 | End: 2022-06-30

## 2022-06-13 RX ORDER — FLUTICASONE PROPIONATE AND SALMETEROL 250; 50 UG/1; UG/1
POWDER RESPIRATORY (INHALATION)
Qty: 3 EACH | Refills: 3 | Status: SHIPPED | OUTPATIENT
Start: 2022-06-13 | End: 2023-06-22

## 2022-06-13 RX ORDER — FLUTICASONE PROPIONATE AND SALMETEROL 50; 250 UG/1; UG/1
POWDER RESPIRATORY (INHALATION)
OUTPATIENT
Start: 2022-06-13

## 2022-06-13 RX ORDER — DOXYCYCLINE 50 MG/1
CAPSULE ORAL
COMMUNITY
Start: 2022-04-13 | End: 2023-01-20

## 2022-06-13 ASSESSMENT — ASTHMA QUESTIONNAIRES: ACT_TOTALSCORE: 22

## 2022-06-13 NOTE — PROGRESS NOTES
"Oliver is a 57 year old who is being evaluated via a billable video visit.      How would you like to obtain your AVS? MyChart  If the video visit is dropped, the invitation should be resent by: EPIC  Will anyone else be joining your video visit? No    Video Start Time: 8:30am    Assessment & Plan     Mild persistent asthma without complication  Well controlled - prescription written HELGA, follow up in 1y  - PROAIR  (90 Base) MCG/ACT inhaler; INHALE 2 PUFFS BY MOUTH EVERY 6 HOURS AS NEEDED  - fluticasone-salmeterol (ADVAIR) 250-50 MCG/ACT inhaler; INHALE 1 PUFF INTO THE LUNGS EVERY 12 HOURS.    Acne vulgaris  Patient would like to see different derm  - Adult Dermatology Referral; Future    Leg cramps  Check labs - on keto diet, mag could be low  - Magnesium; Future  - Basic metabolic panel  (Ca, Cl, CO2, Creat, Gluc, K, Na, BUN); Future    Acute pulmonary embolism, unspecified pulmonary embolism type, unspecified whether acute cor pulmonale present (H)  Reviewed chart - patient has elected to not take xarelto    Morbid obesity (H)  Encourage healthy lifestyle    Prediabetes  Recheck labs  - Hemoglobin A1c; Future             BMI:   Estimated body mass index is 45.73 kg/m  as calculated from the following:    Height as of 12/21/21: 1.734 m (5' 8.25\").    Weight as of 12/21/21: 137.4 kg (303 lb).       Patient Instructions   Catrachito Boyd,    I've refilled your asthma medications.      Please follow up in 1 year with an in person visit for a physical and med check.     Maru Muniz MD  Internal Medicine/Pediatrics  [facility]        Return in about 1 year (around 6/13/2023) for Routine preventive, with any available provider.    Maru Muniz MD  [facility]    Subjective   Oliver is a 57 year old who presents for the following health issues     HPI     New patient    Seen with professional sign language interpretter.    Patient needs prescription to be brand name. " This has nothing to do with copay, it is actually cheaper for him to get brand name.     ACT completed by provider. Score 23    When patient calls [facility] he gives them the name brand, but they dispense generic.  Upon reviewing past prescription, HELGA has not been specified.    Patient has been eating keto for a while. Wondering why he is still getting high blood sugars.    Review of Systems         Objective           Vitals:  No vitals were obtained today due to virtual visit.    Physical Exam   GENERAL: Healthy, alert and no distress  EYES: Eyes grossly normal to inspection.  No discharge or erythema, or obvious scleral/conjunctival abnormalities.  RESP: No audible wheeze, cough, or visible cyanosis.  No visible retractions or increased work of breathing.    SKIN: Visible skin clear. No significant rash, abnormal pigmentation or lesions.  NEURO: Cranial nerves grossly intact.  Mentation and speech appropriate for age.  PSYCH: Mentation appears normal, affect normal/bright, judgement and insight intact, normal speech and appearance well-groomed.                Video-Visit Details    Type of service:  Video Visit    Video End Time:9:00am    Originating Location (pt. Location): Home    Distant Location (provider location):  [facility] [place]     Platform used for Video Visit: Appifier

## 2022-06-13 NOTE — PROGRESS NOTES
Pt reports dani - Oliver is a 57 year old who is being evaluated via a billable video visit.      How would you like to obtain your AVS? MyChart  If the video visit is dropped, the invitation should be resent by: Send to e-mail at: [email or web address]  Will anyone else be joining your video visit? No

## 2022-06-13 NOTE — PATIENT INSTRUCTIONS
Catrachito Boyd,    I've refilled your asthma medications.      Please follow up in 1 year with an in person visit for a physical and med check.     Maru Muniz MD  Internal Medicine/Pediatrics  [facility]

## 2022-06-28 ENCOUNTER — OFFICE VISIT (OUTPATIENT)
Dept: URGENT CARE | Facility: URGENT CARE | Age: 57
End: 2022-06-28
Payer: COMMERCIAL

## 2022-06-28 VITALS
TEMPERATURE: 98.6 F | DIASTOLIC BLOOD PRESSURE: 63 MMHG | SYSTOLIC BLOOD PRESSURE: 115 MMHG | BODY MASS INDEX: 43.17 KG/M2 | OXYGEN SATURATION: 96 % | HEART RATE: 78 BPM | WEIGHT: 286 LBS

## 2022-06-28 DIAGNOSIS — J06.9 VIRAL UPPER RESPIRATORY TRACT INFECTION WITH COUGH: Primary | ICD-10-CM

## 2022-06-28 PROCEDURE — U0005 INFEC AGEN DETEC AMPLI PROBE: HCPCS | Performed by: PHYSICIAN ASSISTANT

## 2022-06-28 PROCEDURE — 99214 OFFICE O/P EST MOD 30 MIN: CPT | Mod: CS | Performed by: PHYSICIAN ASSISTANT

## 2022-06-28 PROCEDURE — U0003 INFECTIOUS AGENT DETECTION BY NUCLEIC ACID (DNA OR RNA); SEVERE ACUTE RESPIRATORY SYNDROME CORONAVIRUS 2 (SARS-COV-2) (CORONAVIRUS DISEASE [COVID-19]), AMPLIFIED PROBE TECHNIQUE, MAKING USE OF HIGH THROUGHPUT TECHNOLOGIES AS DESCRIBED BY CMS-2020-01-R: HCPCS | Performed by: PHYSICIAN ASSISTANT

## 2022-06-28 RX ORDER — BENZONATATE 100 MG/1
100 CAPSULE ORAL 3 TIMES DAILY PRN
Qty: 30 CAPSULE | Refills: 0 | Status: SHIPPED | OUTPATIENT
Start: 2022-06-28 | End: 2022-07-05

## 2022-06-28 RX ORDER — CODEINE PHOSPHATE AND GUAIFENESIN 10; 100 MG/5ML; MG/5ML
1-2 SOLUTION ORAL
Qty: 50 ML | Refills: 0 | Status: SHIPPED | OUTPATIENT
Start: 2022-06-28 | End: 2022-07-03

## 2022-06-28 RX ORDER — PREDNISONE 20 MG/1
40 TABLET ORAL DAILY
Qty: 10 TABLET | Refills: 0 | Status: SHIPPED | OUTPATIENT
Start: 2022-06-28 | End: 2022-07-03

## 2022-06-29 LAB — SARS-COV-2 RNA RESP QL NAA+PROBE: POSITIVE

## 2022-06-29 NOTE — PATIENT INSTRUCTIONS
Patient was educated on the natural course of condition. COVID PCR is pending. Take medications as directed. Side effects discussed. Conservative measures discussed including rest, increased fluids, humidifier, and albuterol inhaler. See your primary care provider if symptoms do not improve in 7 days. Seek emergency care if you develop shortness of breath or chest pain.

## 2022-06-29 NOTE — PROGRESS NOTES
URGENT CARE VISIT:    SUBJECTIVE:   Oliver Agarwal is a 57 year old male presenting with a chief complaint of stuffy nose, cough - productive and sinus pressure.  Onset was 2 day(s) ago.   He denies the following symptoms: fever, shortness of breath, vomiting and diarrhea  Course of illness is same.    Treatment measures tried include albuterol tried with no relief of symptoms.  Predisposing factors include exposed to COVID.    PMH:   Past Medical History:   Diagnosis Date     Obesity 10/7/2011     Allergies: Albumin, egg   Medications:   Current Outpatient Medications   Medication Sig Dispense Refill     benzonatate (TESSALON) 100 MG capsule Take 1 capsule (100 mg) by mouth 3 times daily as needed for cough 30 capsule 0     doxycycline monohydrate (MONODOX) 50 MG capsule TAKE 1 CAPSULE BY MOUTH TWICE DAILY WITH MEALS. AVOID DAIRY PRODUCTS AND VITAMINS 2 HRS BEFORE AND AFTER TAKING MEDICATION       fluticasone (FLONASE) 50 MCG/ACT nasal spray INHALE 2 SPRAYS INTO EACH NOSTRIL DAILY AS DIRECTED 48 g 1     fluticasone-salmeterol (ADVAIR) 250-50 MCG/ACT inhaler INHALE 1 PUFF INTO THE LUNGS EVERY 12 HOURS. 3 each 3     guaiFENesin-codeine (ROBITUSSIN AC) 100-10 MG/5ML solution Take 5-10 mLs by mouth nightly as needed for cough 50 mL 0     predniSONE (DELTASONE) 20 MG tablet Take 2 tablets (40 mg) by mouth daily for 5 days 10 tablet 0     PROAIR  (90 Base) MCG/ACT inhaler INHALE 2 PUFFS BY MOUTH EVERY 6 HOURS AS NEEDED 8.5 g 11     benzonatate (TESSALON) 100 MG capsule Take 1 capsule (100 mg) by mouth 3 times daily as needed for cough (Patient not taking: Reported on 2022) 20 capsule 0     Social History:   Social History     Tobacco Use     Smoking status: Former Smoker     Quit date: 1999     Years since quittin.1     Smokeless tobacco: Never Used   Substance Use Topics     Alcohol use: No       ROS:  Review of systems negative except as stated above.    OBJECTIVE:  /63   Pulse 78    Temp 98.6  F (37  C) (Temporal)   Wt 129.7 kg (286 lb)   SpO2 96%   BMI 43.17 kg/m    GENERAL APPEARANCE: healthy, alert and no distress  EYES: EOMI,  PERRL, conjunctiva clear  HENT: ear canals and TM's normal.  Nose and mouth without ulcers, erythema or lesions  NECK: supple, nontender, no lymphadenopathy  RESP: lungs clear to auscultation - no rales, rhonchi or wheezes  CV: regular rates and rhythm, normal S1 S2, no murmur noted  SKIN: no suspicious lesions or rashes      ASSESSMENT:    ICD-10-CM    1. Viral upper respiratory tract infection with cough  J06.9 predniSONE (DELTASONE) 20 MG tablet     benzonatate (TESSALON) 100 MG capsule     guaiFENesin-codeine (ROBITUSSIN AC) 100-10 MG/5ML solution     Symptomatic; Yes; 6/26/2022 COVID-19 Virus (Coronavirus) by PCR Nose       PLAN:  30 minutes spent on the date of the encounter doing chart review, review of outside records, patient visit and documentation.   Patient Instructions   Patient was educated on the natural course of condition. COVID PCR is pending. Take medications as directed. Side effects discussed. Conservative measures discussed including rest, increased fluids, humidifier, and albuterol inhaler. See your primary care provider if symptoms do not improve in 7 days. Seek emergency care if you develop shortness of breath or chest pain.    Patient verbalized understanding and is agreeable to plan. The patient was discharged ambulatory and in stable condition.    Asha Chang PA-C ....................  6/28/2022   8:16 PM

## 2022-06-30 ENCOUNTER — VIRTUAL VISIT (OUTPATIENT)
Dept: FAMILY MEDICINE | Facility: CLINIC | Age: 57
End: 2022-06-30
Payer: COMMERCIAL

## 2022-06-30 ENCOUNTER — TELEPHONE (OUTPATIENT)
Dept: PEDIATRICS | Facility: CLINIC | Age: 57
End: 2022-06-30

## 2022-06-30 DIAGNOSIS — J45.30 MILD PERSISTENT ASTHMA WITHOUT COMPLICATION: ICD-10-CM

## 2022-06-30 DIAGNOSIS — U07.1 INFECTION DUE TO 2019 NOVEL CORONAVIRUS: Primary | ICD-10-CM

## 2022-06-30 DIAGNOSIS — E66.01 MORBID OBESITY (H): ICD-10-CM

## 2022-06-30 PROCEDURE — 99441 PR PHYSICIAN TELEPHONE EVALUATION 5-10 MIN: CPT | Mod: CS | Performed by: NURSE PRACTITIONER

## 2022-06-30 RX ORDER — ALBUTEROL SULFATE 90 UG/1
2 AEROSOL, METERED RESPIRATORY (INHALATION) EVERY 4 HOURS PRN
Qty: 8.5 G | Refills: 1 | Status: SHIPPED | OUTPATIENT
Start: 2022-06-30 | End: 2022-07-29

## 2022-06-30 NOTE — NURSING NOTE
Chief Complaint   Patient presents with     Video Visit     C/o cough with Covid.  Yesterday had a continuous cough, today is better.  He was out of town the week prior and was in a lot of building with cold air conditioning on.  Tested positive 6/28.  Pt requesting refill of inhaler, Prednisone (2 pills left) and Guaifensen. Also would like to discuss hx of acute pulmonary embolism (pt no longer on Xarelto)  He has noticed that its uncomfortable to sleep on his right side.       Patient confirms medications and allergies are accurate via patients echeck in completion, and or denies any changes since last reviewed/verified.     Zhane Christensen, Virtual Facilitator/LPN

## 2022-06-30 NOTE — TELEPHONE ENCOUNTER
lmtcb- pt has a zoom visit with Grace at 1:40 called to see if pt would like to start visit earlier and if he would like to do it as a telephone visit instead of a video visit.

## 2022-06-30 NOTE — PROGRESS NOTES
Oliver is a 57 year old who is being evaluated via a billable video visit.      How would you like to obtain your AVS? MyChart  If the video visit is dropped, the invitation should be resent by: Send to e-mail at: [email or web address]  Will anyone else be joining your video visit? No     Zhane Christensen, Virtual Facilitator/JAMES      Oliver is a 57 year old who is being evaluated via a billable video visit.        ICD-10-CM    1. Infection due to 2019 novel coronavirus  U07.1    2. Mild persistent asthma without complication  J45.30 PROAIR  (90 Base) MCG/ACT inhaler   3. Morbid obesity (H)  E66.01      Patient has past in the timeframe of treatment for Paxlovid.  His symptoms are improving.  He plans on picking up his prescriptions including prednisone and guaifenesin with codeine from the pharmacy.  He will continue ProAir as needed. Discussed symptomatic treatment for fever or discomfort with over-the-counter acetaminophen or ibuprofen as needed.  Recommend using over-the-counter nasal saline sprays to assist with sinus congestion and any postnasal drainage.  Discussed the importance of deep breathing exercises.  The patient is to avoid laying flat on the back.  I encouraged the patient to monitor oxygen saturations at home.  If the patient develops shortness of breath, suggest ER evaluation.  Recommend quarantine per CDC and [place] guidelines. The patient is content with the plan.       Subjective   Oliver is a 57 year old presenting for the following health issues:  Video Visit (C/o cough with Covid.  Yesterday had a continuous cough, today is better.  He was out of town the week prior and was in a lot of building with cold air conditioning on.  Tested positive 6/28.  Pt requesting refill of inhaler, Prednisone (2 pills left) and Guaifensen. Also would like to discuss hx of acute pulmonary embolism (pt no longer on Xarelto)  He has noticed that its uncomfortable to sleep on his right  side.)    Patient has multiple comorbidities including mild persistent asthma, prediabetes, morbid obesity, history of PE.  Patient complains of cold symptoms for 6 to 7 days.  He was seen at walk-in clinic on 6/28/2022 where he was diagnosed with a viral upper respiratory infection.  He was treated with prednisone, guaifenesin with codeine.  Patient tested positive for COVID-19.  Patient requests renewal of her inhaler.  He did travel from [place] and experienced a cough.  He had not picked up his medications yet so he used his inhaler frequently.  Patient continues to experience sinus congestion, headache, sore throat, wheezing.  He denies loss of sense of smell or taste.  He has felt chilled, but has not taken his temperature.  He denies shortness of breath and chest tightness.    Objective    Vitals - Patient Reported  Weight (Patient Reported): 129.3 kg (285 lb)        Physical Exam   I was speaking to the patient via telephone with a .          .      Answers for HPI/ROS submitted by the patient on 6/30/2022  If you checked off any problems, how difficult have these problems made it for you to do your work, take care of things at home, or get along with other people?: Not difficult at all  PHQ9 TOTAL SCORE: 0    Phone call duration: 9 minutes

## 2022-07-07 DIAGNOSIS — J45.30 MILD PERSISTENT ASTHMA WITHOUT COMPLICATION: ICD-10-CM

## 2022-07-08 RX ORDER — FLUTICASONE PROPIONATE AND SALMETEROL 250; 50 UG/1; UG/1
POWDER RESPIRATORY (INHALATION)
Qty: 0.1 EACH | Refills: 0 | OUTPATIENT
Start: 2022-07-08

## 2022-07-08 NOTE — TELEPHONE ENCOUNTER
Duplicate script see 6/13/2022 fluticasone-salmeterol (ADVAIR) 250-50 MCG/ACT inhaler # 3 each x 3 refills by Maru Muniz MD

## 2022-07-11 ENCOUNTER — LAB (OUTPATIENT)
Dept: LAB | Facility: CLINIC | Age: 57
End: 2022-07-11
Payer: COMMERCIAL

## 2022-07-11 DIAGNOSIS — Z13.6 CARDIOVASCULAR SCREENING; LDL GOAL LESS THAN 160: Primary | ICD-10-CM

## 2022-07-11 DIAGNOSIS — R25.2 LEG CRAMPS: ICD-10-CM

## 2022-07-11 DIAGNOSIS — R73.03 PREDIABETES: ICD-10-CM

## 2022-07-11 LAB — HBA1C MFR BLD: 5.5 % (ref 0–5.6)

## 2022-07-11 PROCEDURE — 36415 COLL VENOUS BLD VENIPUNCTURE: CPT

## 2022-07-11 PROCEDURE — 80061 LIPID PANEL: CPT

## 2022-07-11 PROCEDURE — 80048 BASIC METABOLIC PNL TOTAL CA: CPT

## 2022-07-11 PROCEDURE — 83735 ASSAY OF MAGNESIUM: CPT

## 2022-07-11 PROCEDURE — 83036 HEMOGLOBIN GLYCOSYLATED A1C: CPT

## 2022-07-12 LAB
ANION GAP SERPL CALCULATED.3IONS-SCNC: 8 MMOL/L (ref 3–14)
BUN SERPL-MCNC: 18 MG/DL (ref 7–30)
CALCIUM SERPL-MCNC: 10 MG/DL (ref 8.5–10.1)
CHLORIDE BLD-SCNC: 108 MMOL/L (ref 94–109)
CHOLEST SERPL-MCNC: 189 MG/DL
CO2 SERPL-SCNC: 24 MMOL/L (ref 20–32)
CREAT SERPL-MCNC: 0.95 MG/DL (ref 0.66–1.25)
FASTING STATUS PATIENT QL REPORTED: YES
GFR SERPL CREATININE-BSD FRML MDRD: >90 ML/MIN/1.73M2
GLUCOSE BLD-MCNC: 95 MG/DL (ref 70–99)
HDLC SERPL-MCNC: 56 MG/DL
LDLC SERPL CALC-MCNC: 116 MG/DL
MAGNESIUM SERPL-MCNC: 1.9 MG/DL (ref 1.6–2.3)
NONHDLC SERPL-MCNC: 133 MG/DL
POTASSIUM BLD-SCNC: 4.3 MMOL/L (ref 3.4–5.3)
SODIUM SERPL-SCNC: 140 MMOL/L (ref 133–144)
TRIGL SERPL-MCNC: 87 MG/DL

## 2022-07-15 ENCOUNTER — OFFICE VISIT (OUTPATIENT)
Dept: DERMATOLOGY | Facility: CLINIC | Age: 57
End: 2022-07-15
Attending: PEDIATRICS
Payer: COMMERCIAL

## 2022-07-15 DIAGNOSIS — L70.0 ACNE VULGARIS: ICD-10-CM

## 2022-07-15 DIAGNOSIS — L71.9 ACNE ROSACEA: Primary | ICD-10-CM

## 2022-07-15 PROCEDURE — 99204 OFFICE O/P NEW MOD 45 MIN: CPT | Performed by: NURSE PRACTITIONER

## 2022-07-15 RX ORDER — DOXYCYCLINE 100 MG/1
100 CAPSULE ORAL 2 TIMES DAILY
Qty: 180 CAPSULE | Refills: 1 | Status: SHIPPED | OUTPATIENT
Start: 2022-07-15 | End: 2024-08-21

## 2022-07-15 RX ORDER — AZELAIC ACID 0.15 G/G
GEL TOPICAL
Qty: 50 G | Refills: 11 | Status: SHIPPED | OUTPATIENT
Start: 2022-07-15

## 2022-07-15 ASSESSMENT — PAIN SCALES - GENERAL: PAINLEVEL: MILD PAIN (2)

## 2022-07-15 NOTE — LETTER
7/15/2022         RE: Oliver Agarwal  [address]  [place] [address]        Dear Colleague,    Thank you for referring your patient, Oliver Agarwal, to the [facility]. Please see a copy of my visit note below.    [facility] Dermatology Note  Encounter Date: Jul 15, 2022  Office Visit     Reviewed patients past medical history and pertinent chart review prior to patients visit today.     Patient has  present at appointment today.     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 Discussed natural history and etiology of rosacea today. Counseled on dividing treatments into targeting the erythema versus acneiform lesions.    -Reviewed symptoms of ocular rosacea: Symptoms of this condition include: bloodshot and watery eyes, eyes that feel dry, irritated, or gritty, burning, stinging in the eyes, blurred vision, light sensitivity. Patient denies these symptoms today.     Plan:  -discontinue Aveidaoxia as I think it is too irritating for his skin  -Use a gentle cleanser BID prior to applying medication and moisturizer to face.   -Start azelaic acid 15% gel BID to affected areas of the face. Findings of recent studies showing azelaic acid actually out performing metronidazole, permetherin and ivermectin topicals. Patient would like to start with this and will reevaluate after 3-4 months of use.   -Patient may notice some dryness but advised to use a non-comedogenic moisturizer such as Cetaphil cream, Cera Ve Cream, or Vanicream for dryness after applying medication or throughout the day as needed.   -Start moisturizer with SPF 30+ to face and neck in the morning after applying medication. Given  samples of La Roche Possay ultra light sunscreen fluid.   -Avoid extended sun exposure and reapply sunscreen every 2-3 hours when sun exposed.      -increase dosage of doxycycline 100 mg BID for 3 months, if well controlled we may decrease dosage at follow up.  I would like to follow up with patient to assess response in 3 months.   -Recommend that patient try to avoid calcium-containing products around the time of taking the medication.  Instructed to take with a full glass of fluid and food, not lying down.  Side effects of photosensitivity, headaches, GI upset discussed. Recommend sun protection.     Follow up in 3-4 months, sooner PRN new or worsening concerns.     Kelsey Gordon, APRN CNP on 7/15/2022 at 11:48 AM   _______________________________________    CC: Rosacea (As well as pigmentation not clearing with current meds)    HPI:  Mr. Oliver Agarwal is a(n) 57 year old male who presents today as a new patient for Acne and rosacea.  He has had this for several years.  He has used a few topical medications, some were helpful and some were not.  He is currently on medication called Aveidaoxia which is a combination of niacinamide, metronidazole, and ivermectin.  He says this makes his face more red when he applies it.  He does not know if it is helping in the long run but it seems to make things brighter when he uses it.  He was also given doxycycline 50 mg to use twice daily.  He says he mostly uses it once a day because he forgets the other dosage.  He sometimes has done 50 mg twice daily.  In the last week he is got a flare of pimples and redness on the entire nose.  This is happened a couple times in the past but is not common for him.    When asked about a jacobo gritty feeling in the eyes he says he does have clinically dry eyes and was told to use an over-the-counter eyedrop which does help.  He says his eye doctor told him to get his face rash checked so he wonders if he was already aware he  had rosacea.  I advised him to let his eye doctor know he has rosacea at his next follow-up.    Patient is otherwise feeling well, without additional skin concerns.      Physical Exam:  SKIN: Focused examination of face and neck was performed.  -Generalized erythema and inflammatory papules scattered on the chin, cheeks, glabella, temples  -Nose has moderate to severe erythema and several inflammatory papules affecting the entire nose     - No other lesions of concern on areas examined.     Medications:  Current Outpatient Medications   Medication     doxycycline monohydrate (MONODOX) 50 MG capsule     fluticasone (FLONASE) 50 MCG/ACT nasal spray     fluticasone-salmeterol (ADVAIR) 250-50 MCG/ACT inhaler     PROAIR  (90 Base) MCG/ACT inhaler     No current facility-administered medications for this visit.      Past Medical History:   Patient Active Problem List   Diagnosis     CARDIOVASCULAR SCREENING; LDL GOAL LESS THAN 160     Atopic rhinitis     Prediabetes     Hypovitaminosis D     Mild persistent asthma     Deaf - reads lips well     Corneal erosion, right     Blepharitis of both eyes with rosacea     Dry eyes     Morbid obesity (H)     Asthmatic bronchitis     Dysthymia     Hearing difficulty     Sleep apnea     Acute pulmonary embolism, unspecified pulmonary embolism type, unspecified whether acute cor pulmonale present (H)     Past Medical History:   Diagnosis Date     Obesity 10/7/2011        Maru Muniz MD  [address] [place] [address] on close of this encounter.       Again, thank you for allowing me to participate in the care of your patient.        Sincerely,        Kelsey Gordon, BROOK CNP

## 2022-07-15 NOTE — PROGRESS NOTES
[facility] Dermatology Note  Encounter Date: Jul 15, 2022  Office Visit     Reviewed patients past medical history and pertinent chart review prior to patients visit today.     Patient has  present at appointment today.     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 Discussed natural history and etiology of rosacea today. Counseled on dividing treatments into targeting the erythema versus acneiform lesions.    -Reviewed symptoms of ocular rosacea: Symptoms of this condition include: bloodshot and watery eyes, eyes that feel dry, irritated, or gritty, burning, stinging in the eyes, blurred vision, light sensitivity. Patient denies these symptoms today.     Plan:  -discontinue Aveidaoxia as I think it is too irritating for his skin  -Use a gentle cleanser BID prior to applying medication and moisturizer to face.   -Start azelaic acid 15% gel BID to affected areas of the face. Findings of recent studies showing azelaic acid actually out performing metronidazole, permetherin and ivermectin topicals. Patient would like to start with this and will reevaluate after 3-4 months of use.   -Patient may notice some dryness but advised to use a non-comedogenic moisturizer such as Cetaphil cream, Cera Ve Cream, or Vanicream for dryness after applying medication or throughout the day as needed.   -Start moisturizer with SPF 30+ to face and neck in the morning after applying medication. Given samples of La Roche Possay ultra light sunscreen fluid.   -Avoid extended sun exposure and reapply sunscreen every 2-3 hours when sun exposed.      -increase dosage of doxycycline 100 mg BID for 3 months, if well controlled we may decrease dosage at follow up.  I would like  to follow up with patient to assess response in 3 months.   -Recommend that patient try to avoid calcium-containing products around the time of taking the medication.  Instructed to take with a full glass of fluid and food, not lying down.  Side effects of photosensitivity, headaches, GI upset discussed. Recommend sun protection.     Follow up in 3-4 months, sooner PRN new or worsening concerns.     Kelsey Gordon, APRN CNP on 7/15/2022 at 11:48 AM   _______________________________________    CC: Rosacea (As well as pigmentation not clearing with current meds)    HPI:  Mr. Oliver Agarwal is a(n) 57 year old male who presents today as a new patient for Acne and rosacea.  He has had this for several years.  He has used a few topical medications, some were helpful and some were not.  He is currently on medication called Aveidaoxia which is a combination of niacinamide, metronidazole, and ivermectin.  He says this makes his face more red when he applies it.  He does not know if it is helping in the long run but it seems to make things brighter when he uses it.  He was also given doxycycline 50 mg to use twice daily.  He says he mostly uses it once a day because he forgets the other dosage.  He sometimes has done 50 mg twice daily.  In the last week he is got a flare of pimples and redness on the entire nose.  This is happened a couple times in the past but is not common for him.    When asked about a jacobo gritty feeling in the eyes he says he does have clinically dry eyes and was told to use an over-the-counter eyedrop which does help.  He says his eye doctor told him to get his face rash checked so he wonders if he was already aware he had rosacea.  I advised him to let his eye doctor know he has rosacea at his next follow-up.    Patient is otherwise feeling well, without additional skin concerns.      Physical Exam:  SKIN: Focused examination of face and neck was performed.  -Generalized erythema and  inflammatory papules scattered on the chin, cheeks, glabella, temples  -Nose has moderate to severe erythema and several inflammatory papules affecting the entire nose     - No other lesions of concern on areas examined.     Medications:  Current Outpatient Medications   Medication     doxycycline monohydrate (MONODOX) 50 MG capsule     fluticasone (FLONASE) 50 MCG/ACT nasal spray     fluticasone-salmeterol (ADVAIR) 250-50 MCG/ACT inhaler     PROAIR  (90 Base) MCG/ACT inhaler     No current facility-administered medications for this visit.      Past Medical History:   Patient Active Problem List   Diagnosis     CARDIOVASCULAR SCREENING; LDL GOAL LESS THAN 160     Atopic rhinitis     Prediabetes     Hypovitaminosis D     Mild persistent asthma     Deaf - reads lips well     Corneal erosion, right     Blepharitis of both eyes with rosacea     Dry eyes     Morbid obesity (H)     Asthmatic bronchitis     Dysthymia     Hearing difficulty     Sleep apnea     Acute pulmonary embolism, unspecified pulmonary embolism type, unspecified whether acute cor pulmonale present (H)     Past Medical History:   Diagnosis Date     Obesity 10/7/2011        Maru Muniz MD  [address] [place] [address] on close of this encounter.

## 2022-07-29 DIAGNOSIS — J45.30 MILD PERSISTENT ASTHMA WITHOUT COMPLICATION: ICD-10-CM

## 2022-07-29 RX ORDER — ALBUTEROL SULFATE 90 UG/1
AEROSOL, METERED RESPIRATORY (INHALATION)
Qty: 8.5 G | Refills: 11 | Status: SHIPPED | OUTPATIENT
Start: 2022-07-29 | End: 2023-06-16

## 2022-07-29 NOTE — TELEPHONE ENCOUNTER
"Last Written Prescription Date:  6/30/22  Last Fill Quantity: 8.5,  # refills: 1   Last office visit provider:  6/13/22     Requested Prescriptions   Pending Prescriptions Disp Refills     PROAIR  (90 Base) MCG/ACT inhaler [Pharmacy Med Name: PROAIR HFA ORAL INH (200  PFS) 8.5G] 8.5 g 1     Sig: INHALE 2 PUFFS BY MOUTH INTO THE LUNGS EVERY 4 TO 6 HOURS AS NEEDED FOR SHORTNESS OF BREATH, DIFFICULT BREATHING OR WHEEZING.       Asthma Maintenance Inhalers - Anticholinergics Passed - 7/29/2022  3:50 AM        Passed - Patient is age 12 years or older        Passed - Asthma control assessment score within normal limits in last 6 months     Please review ACT score.           Passed - Medication is active on med list        Passed - Recent (6 mo) or future (30 days) visit within the authorizing provider's specialty     Patient had office visit in the last 6 months or has a visit in the next 30 days with authorizing provider or within the authorizing provider's specialty.  See \"Patient Info\" tab in inbasket, or \"Choose Columns\" in Meds & Orders section of the refill encounter.           Short-Acting Beta Agonist Inhalers Protocol  Passed - 7/29/2022  3:50 AM        Passed - Patient is age 12 or older        Passed - Asthma control assessment score within normal limits in last 6 months     Please review ACT score.           Passed - Medication is active on med list        Passed - Recent (6 mo) or future (30 days) visit within the authorizing provider's specialty     Patient had office visit in the last 6 months or has a visit in the next 30 days with authorizing provider or within the authorizing provider's specialty.  See \"Patient Info\" tab in inbasket, or \"Choose Columns\" in Meds & Orders section of the refill encounter.                 Arina Leblanc RN 07/29/22 2:36 PM  "

## 2022-09-20 ENCOUNTER — OFFICE VISIT (OUTPATIENT)
Dept: AUDIOLOGY | Facility: CLINIC | Age: 57
End: 2022-09-20
Payer: COMMERCIAL

## 2022-09-20 DIAGNOSIS — H91.93 BILATERAL HEARING LOSS, UNSPECIFIED HEARING LOSS TYPE: Primary | ICD-10-CM

## 2022-09-20 PROCEDURE — V5299 HEARING SERVICE: HCPCS | Performed by: AUDIOLOGIST

## 2022-09-20 NOTE — PROGRESS NOTES
AUDIOLOGY REPORT: HEARING AID CHECK    SUBJECTIVE: Oliver Agarwal is a 57 year old male, :  1965, was seen in the Audiology Clinic at [facility] on 22 for a check of their hearing aids. The patient was accompanied by their .      Background:   Patient is here today with the complaint of hearing aids need work.     Procedures:       SIDE: Both    : Martha    TYPE: Series 3 BTE    S/N:     WARRANTY:      The hearing aids were found to be working however the left ear hearing aid was missing the microphone cover and earmold hook. Replacements were not available in clinic and patient did not wish to send to [facility] for over 5 year old hearing aid repair. Today the patient switched the cover and earmold hook to the left ear (from the right as patient prefers to use the left ear hearing aid). Patient most likely has hearing aid assistance through [facility] hearing.     Plan:   Patient was provided the [facility] phone number to enquire about hearing aids covered by insurance through them.     NO CHARGE VISIT      Goldy Manning CCC-A  Licensed Audiologist #[id number]  2022

## 2022-12-02 DIAGNOSIS — J45.30 MILD PERSISTENT ASTHMA WITHOUT COMPLICATION: ICD-10-CM

## 2022-12-05 RX ORDER — ALBUTEROL SULFATE 90 UG/1
AEROSOL, METERED RESPIRATORY (INHALATION)
Qty: 80.4 G | OUTPATIENT
Start: 2022-12-05

## 2023-01-02 ENCOUNTER — TELEPHONE (OUTPATIENT)
Dept: SLEEP MEDICINE | Facility: CLINIC | Age: 58
End: 2023-01-02

## 2023-01-02 DIAGNOSIS — G47.33 OBSTRUCTIVE SLEEP APNEA (ADULT) (PEDIATRIC): Primary | ICD-10-CM

## 2023-01-02 NOTE — TELEPHONE ENCOUNTER
Pt requesting that cpap orders be placed before visit.  Pt states hose is leaking and is in desperate need for supplies before visit.  Pt states he plans on keeping and arriving to visit and hoping supply order can be placed before visit.  Pt informed message will be sent to provider and pt will be called once order is placed.  Pt is ok with this plan.      FELICIANO Gonzales

## 2023-01-03 NOTE — TELEPHONE ENCOUNTER
PT called and informed cpap order has been placed.  Pt will contact [facility] for supplies needed.      FELICIANO Gonzales

## 2023-01-20 ENCOUNTER — OFFICE VISIT (OUTPATIENT)
Dept: FAMILY MEDICINE | Facility: CLINIC | Age: 58
End: 2023-01-20
Payer: COMMERCIAL

## 2023-01-20 VITALS
TEMPERATURE: 97.6 F | HEART RATE: 60 BPM | HEIGHT: 68 IN | OXYGEN SATURATION: 96 % | SYSTOLIC BLOOD PRESSURE: 109 MMHG | WEIGHT: 291 LBS | BODY MASS INDEX: 44.1 KG/M2 | DIASTOLIC BLOOD PRESSURE: 68 MMHG | RESPIRATION RATE: 12 BRPM

## 2023-01-20 DIAGNOSIS — M54.50 CHRONIC LOW BACK PAIN, UNSPECIFIED BACK PAIN LATERALITY, UNSPECIFIED WHETHER SCIATICA PRESENT: Primary | ICD-10-CM

## 2023-01-20 DIAGNOSIS — G89.29 CHRONIC LOW BACK PAIN, UNSPECIFIED BACK PAIN LATERALITY, UNSPECIFIED WHETHER SCIATICA PRESENT: Primary | ICD-10-CM

## 2023-01-20 PROCEDURE — 90682 RIV4 VACC RECOMBINANT DNA IM: CPT | Performed by: FAMILY MEDICINE

## 2023-01-20 PROCEDURE — 99214 OFFICE O/P EST MOD 30 MIN: CPT | Mod: 25 | Performed by: FAMILY MEDICINE

## 2023-01-20 PROCEDURE — G0008 ADMIN INFLUENZA VIRUS VAC: HCPCS | Performed by: FAMILY MEDICINE

## 2023-01-20 RX ORDER — CYCLOBENZAPRINE HCL 10 MG
5-10 TABLET ORAL 3 TIMES DAILY PRN
Qty: 30 TABLET | Refills: 1 | Status: SHIPPED | OUTPATIENT
Start: 2023-01-20

## 2023-01-20 ASSESSMENT — ASTHMA QUESTIONNAIRES
ACT_TOTALSCORE: 20
QUESTION_3 LAST FOUR WEEKS HOW OFTEN DID YOUR ASTHMA SYMPTOMS (WHEEZING, COUGHING, SHORTNESS OF BREATH, CHEST TIGHTNESS OR PAIN) WAKE YOU UP AT NIGHT OR EARLIER THAN USUAL IN THE MORNING: NOT AT ALL
QUESTION_1 LAST FOUR WEEKS HOW MUCH OF THE TIME DID YOUR ASTHMA KEEP YOU FROM GETTING AS MUCH DONE AT WORK, SCHOOL OR AT HOME: A LITTLE OF THE TIME
QUESTION_2 LAST FOUR WEEKS HOW OFTEN HAVE YOU HAD SHORTNESS OF BREATH: ONCE OR TWICE A WEEK
ACT_TOTALSCORE: 20
QUESTION_5 LAST FOUR WEEKS HOW WOULD YOU RATE YOUR ASTHMA CONTROL: WELL CONTROLLED
QUESTION_4 LAST FOUR WEEKS HOW OFTEN HAVE YOU USED YOUR RESCUE INHALER OR NEBULIZER MEDICATION (SUCH AS ALBUTEROL): TWO OR THREE TIMES PER WEEK

## 2023-01-20 ASSESSMENT — PATIENT HEALTH QUESTIONNAIRE - PHQ9
SUM OF ALL RESPONSES TO PHQ QUESTIONS 1-9: 6
10. IF YOU CHECKED OFF ANY PROBLEMS, HOW DIFFICULT HAVE THESE PROBLEMS MADE IT FOR YOU TO DO YOUR WORK, TAKE CARE OF THINGS AT HOME, OR GET ALONG WITH OTHER PEOPLE: SOMEWHAT DIFFICULT
SUM OF ALL RESPONSES TO PHQ QUESTIONS 1-9: 6

## 2023-01-20 ASSESSMENT — PAIN SCALES - GENERAL: PAINLEVEL: NO PAIN (1)

## 2023-01-20 NOTE — PROGRESS NOTES
"Assessment/Plan    Chronic intermittent low back pain.  Suspect muscle strain.  Differential includes lumbar disc disease.  Given duration of symptoms, I think MRI is reasonable.  We will also refer to physical therapy and start as needed cyclobenzaprine.  Risk of sedation reviewed.  Encouraged patient to stop taking oxycodone from previous prescription.    Patient declines pneumococcal and COVID vaccines today.    Requests MRI result via BOTH MYCHART AND PHONE CALL.    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    Chronic back pain.  Patient denies a history of back surgery or spinal injections.  He has never had an MRI of his back.  He participated in physical therapy in 2017.    Back pain recurred after heavy lifting in summer 2022.  Thoracic and lumbar pain.  More pronounced on the left.  Sometimes radiates to the buttocks.  Intermittent.  Sometimes wakes patient during the night.  Was experiencing some paresthesias of the left foot, but these resolved.  No recent incontinence.  Tried oxycodone left over from an old prescription.    Exam  /68 (BP Location: Right arm, Patient Position: Sitting, Cuff Size: Adult Regular)   Pulse 60   Temp 97.6  F (36.4  C) (Temporal)   Resp 12   Ht 1.738 m (5' 8.43\")   Wt 132 kg (291 lb)   SpO2 96%   BMI 43.70 kg/m      No lumbar spinous process or SI joint tenderness  5/5 strength b/l with flexion at hip, lower leg flexion and extension at knee, and dorsiflexion and plantarflexion at ankle  "

## 2023-01-20 NOTE — PATIENT INSTRUCTIONS
Physical therapy franki will call you    Phone numbers for MRI at [facility]: [phone]

## 2023-01-21 PROBLEM — Z86.711 HISTORY OF PULMONARY EMBOLISM: Status: ACTIVE | Noted: 2020-09-28

## 2023-01-21 PROBLEM — K76.0 FATTY LIVER: Status: ACTIVE | Noted: 2023-01-21

## 2023-02-02 DIAGNOSIS — J06.9 VIRAL UPPER RESPIRATORY TRACT INFECTION WITH COUGH: ICD-10-CM

## 2023-02-03 RX ORDER — PREDNISONE 20 MG/1
40 TABLET ORAL DAILY
Qty: 10 TABLET | Refills: 0 | OUTPATIENT
Start: 2023-02-03

## 2023-02-03 NOTE — TELEPHONE ENCOUNTER
Refused; prescribed by Urgent Care June 2022; see PCP  EDUIN Salcedo RN  [facility]

## 2023-02-06 ENCOUNTER — VIRTUAL VISIT (OUTPATIENT)
Dept: PEDIATRICS | Facility: CLINIC | Age: 58
End: 2023-02-06
Payer: COMMERCIAL

## 2023-02-06 DIAGNOSIS — M25.511 ACUTE PAIN OF RIGHT SHOULDER: Primary | ICD-10-CM

## 2023-02-06 DIAGNOSIS — R05.2 SUBACUTE COUGH: ICD-10-CM

## 2023-02-06 PROCEDURE — 99214 OFFICE O/P EST MOD 30 MIN: CPT | Mod: 95 | Performed by: INTERNAL MEDICINE

## 2023-02-06 NOTE — PATIENT INSTRUCTIONS
You will be called to schedule an X-ray of your shoulder. You will also be called to schedule an orthopedic appointment to discuss treatment options.    Consider getting a pneumonia vaccine.     Try a humidifier by the bed a night to help with the morning dry cough.

## 2023-02-06 NOTE — PROGRESS NOTES
"Oliver is a 58 year old who is being evaluated via a billable telephone visit.      What phone number would you like to be contacted at? See demographics  How would you like to obtain your AVS? [facility]    Distant Location (provider location):  On-site    Assessment & Plan     Acute pain of right shoulder  Unclear if this is flare of arthritis vs adhesive capsulitis vs rotator cuff strain. Offered referral to orthopedics, but he would also like to get an X-ray prior to this visit. This was ordered for him as well.  - XR Shoulder Right G/E 3 Views; Future  - Orthopedic  Referral; Future    Cough  Also, he notes some dry cough in the mornings will try nighttime humidifier and let us know if not improving.          BMI:   Estimated body mass index is 43.7 kg/m  as calculated from the following:    Height as of 1/20/23: 1.738 m (5' 8.43\").    Weight as of 1/20/23: 132 kg (291 lb).       Patient Instructions   You will be called to schedule an X-ray of your shoulder. You will also be called to schedule an orthopedic appointment to discuss treatment options.    Consider getting a pneumonia vaccine.     Try a humidifier by the bed a night to help with the morning dry cough.      Return in about 6 months (around 8/6/2023).    Angelica Vyas MD  [facility] CHERRIE Boyd is a 58 year old, presenting for the following health issues:  No chief complaint on file.      [facility]     Oliver calls in with a video  for evaluation of R shoulder pain.     Hx of R shoulder dislocation in the 1990s. Had been ok until recently, but went bowling about 2 weeks ago and switched to a heavier bowling ball. Because of this, his bag has been heavier.     Shoulder mobility is limited and hard to lift his arm up even to type or sign, any movement hurts the shoulder.     Is going to have an MRI for his back at some point in a later date.     Review of Systems   Constitutional, MSK systems " are negative, except as otherwise noted.      Objective           Vitals:  No vitals were obtained today due to virtual visit.    Physical Exam   PSYCH: Alert and oriented times 3  Remainder of exam unable to be completed due to telephone visits              Phone call duration: 20 minutes

## 2023-03-24 ENCOUNTER — MYC MEDICAL ADVICE (OUTPATIENT)
Dept: PEDIATRICS | Facility: CLINIC | Age: 58
End: 2023-03-24
Payer: COMMERCIAL

## 2023-03-24 NOTE — TELEPHONE ENCOUNTER
Maru Muniz MD  Ea Sb3/5 Pal KENNETH (Rn) 23 minutes ago (4:29 PM)     AF  Patient can address these concerns at upcoming appt with Dr. James.     Maru Muniz MD   Internal Medicine/Pediatrics   [facility]      Sent a Complixt message to the patient informing him of Dr. Muniz's comments/recommendations     María CERDA RN   Patient Advocate Liaison (PAL)  [facility]   [phone]

## 2023-03-24 NOTE — TELEPHONE ENCOUNTER
See patient's GPNXhart message   - Patient states that he is due for an A1c check   - Patient states that he donated plasma and was told that his protein level was high at 1.28   - Patient wondering if he can come in for a lab only appointment to have his A1c checked   - Appears that the patient is scheduled for an upcoming appointment on 4/14/2023 with Dr. James     Hemoglobin A1C   Date Value Ref Range Status   07/11/2022 5.5 0.0 - 5.6 % Final     Comment:     Normal <5.7%   Prediabetes 5.7-6.4%    Diabetes 6.5% or higher     Note: Adopted from ADA consensus guidelines.   07/07/2020 5.7 (H) 0 - 5.6 % Final     Comment:     Normal <5.7% Prediabetes 5.7-6.4%  Diabetes 6.5% or higher - adopted from ADA   consensus guidelines.       Lab Results   Component Value Date    ALBUMIN 4.0 12/14/2020     Dr. Muniz, please review and advise.     María CERDA RN   Patient Advocate Liaison (PAL)  [facility]   [phone]

## 2023-04-08 ENCOUNTER — HEALTH MAINTENANCE LETTER (OUTPATIENT)
Age: 58
End: 2023-04-08

## 2023-04-13 ENCOUNTER — OFFICE VISIT (OUTPATIENT)
Dept: SLEEP MEDICINE | Facility: CLINIC | Age: 58
End: 2023-04-13
Payer: COMMERCIAL

## 2023-04-13 VITALS
DIASTOLIC BLOOD PRESSURE: 78 MMHG | OXYGEN SATURATION: 93 % | RESPIRATION RATE: 12 BRPM | HEART RATE: 68 BPM | SYSTOLIC BLOOD PRESSURE: 123 MMHG | HEIGHT: 68 IN | WEIGHT: 284.6 LBS | BODY MASS INDEX: 43.13 KG/M2

## 2023-04-13 DIAGNOSIS — E66.01 MORBID OBESITY WITH BMI OF 40.0-44.9, ADULT (H): ICD-10-CM

## 2023-04-13 DIAGNOSIS — G47.33 OSA (OBSTRUCTIVE SLEEP APNEA): Primary | ICD-10-CM

## 2023-04-13 PROCEDURE — 99203 OFFICE O/P NEW LOW 30 MIN: CPT | Performed by: NURSE PRACTITIONER

## 2023-04-13 ASSESSMENT — SLEEP AND FATIGUE QUESTIONNAIRES
HOW LIKELY ARE YOU TO NOD OFF OR FALL ASLEEP WHILE SITTING QUIETLY AFTER LUNCH WITHOUT ALCOHOL: WOULD NEVER DOZE
HOW LIKELY ARE YOU TO NOD OFF OR FALL ASLEEP WHILE WATCHING TV: MODERATE CHANCE OF DOZING
HOW LIKELY ARE YOU TO NOD OFF OR FALL ASLEEP WHILE SITTING AND READING: SLIGHT CHANCE OF DOZING
HOW LIKELY ARE YOU TO NOD OFF OR FALL ASLEEP WHEN YOU ARE A PASSENGER IN A CAR FOR AN HOUR WITHOUT A BREAK: WOULD NEVER DOZE
HOW LIKELY ARE YOU TO NOD OFF OR FALL ASLEEP IN A CAR, WHILE STOPPED FOR A FEW MINUTES IN TRAFFIC: WOULD NEVER DOZE
HOW LIKELY ARE YOU TO NOD OFF OR FALL ASLEEP WHILE SITTING INACTIVE IN A PUBLIC PLACE: WOULD NEVER DOZE
HOW LIKELY ARE YOU TO NOD OFF OR FALL ASLEEP WHILE SITTING AND TALKING TO SOMEONE: SLIGHT CHANCE OF DOZING
HOW LIKELY ARE YOU TO NOD OFF OR FALL ASLEEP WHILE LYING DOWN TO REST IN THE AFTERNOON WHEN CIRCUMSTANCES PERMIT: WOULD NEVER DOZE
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
## 2023-04-13 NOTE — PATIENT INSTRUCTIONS
"MY INFORMATION ON SLEEP APNEA-  Oliver Agarwal    DOCTOR : BROOK Carey CNP  SLEEP CENTER :      MY CONTACT NUMBER:   [facility]  [phone]  [facility]   [phone]  [facility]   [phone]      [facility]  [phone]  [facility]   [phone]    [facility] [place] [address], [address]  [place] [address]  Phone: [phone]    Hours:  Mon - Fri: 8:00 a.m. - 4:30 p.m.  Sat: Closed  Sun: Closed      Key Points:  1. What is Obstructive Sleep Apnea (ARNULFO)? ARNULFO is the most common type of sleep apnea. Apnea literally means, \"without breath.\" It is characterized by repetitive pauses in breathing, despite continued effort to breathe, and is usually associated with a reduction in blood oxygen saturation. Apneas can last 10 to over 60 seconds. It is caused by narrowing or collapse of the upper airway as muscles relax during sleep.   2. What are the consequences of ARNULFO? Symptoms include: daytime sleepiness- possibly increasing the risk of falling asleep while driving, unrefreshing/restless sleep, snoring, insomnia, waking frequently to urinate, waking with heartburn or reflux, reduced concentration and memory, and morning headaches. Other health consequences may include development of high blood pressure. Untreated ARNULFO also can contribute to heart disease, stroke and diabetes.   3. What are the treatment options? In most situations, sleep apnea is a lifelong disease that must be managed with daily therapy. Continuous Positive Airway (CPAP) is the most reliable treatment. A mouthguard to hold your jaw forward is usually the next most reliable option. Other options include postioning devices (to keep you off your back), nasal valves, tongue retaining device, weight loss, surgery. There is more detail about these options toward the end of this " document.  4. What are the most important things to remember about using CPAP?     WHERE CAN I FIND MORE INFORMATION?    American Academy of Sleep Medicine Patient information on sleep disorders:  http://yoursleep.aasmnet.org    CPAP -  WHY AND HOW?                 Continuous positive airway pressure, or CPAP, is the most effective treatment for obstructive sleep apnea. It works by blowing room air, through a mask, to hold your throat open. A decision to use CPAP is a major step forward in the pursuit of a healthier life. The successful use of CPAP will help you breathe easier, sleep better and live healthier. Using CPAP can be a positive experience if you keep these fregoso points in mind:  Commitment  CPAP is not a quick fix for your problem. It involves a long-term commitment to improve your sleep and your health.    Communication  Stay in close communication with both your sleep doctor and your CPAP supplier. Ask lots of questions and seek help when you need it.    Consistency  Use CPAP all night, every night and for every nap. You will receive the maximum health benefits from CPAP when you use it every time that you sleep. This will also make it easier for your body to adjust to the treatment.    Correction  The first machine and mask that you try may not be the best ones for you. Work with your sleep doctor and your CPAP supplier to make corrections to your equipment selection. Ask about trying a different type of machine or mask if you have ongoing problems. Make sure that your mask is a good fit and learn to use your equipment properly.    Challenge  Tell a family member or close friend to ask you each morning if you used your CPAP the previous night. Have someone to challenge you to give it your best effort.    Connection   Your adjustment to CPAP will be easier if you are able to connect with others who use the same treatment. Ask your sleep doctor if there is a support group in your area for people who have  "sleep apnea, or look for one on the Internet.  Comfort   Increase your level of comfort by using a saline spray, decongestant or heated humidifier if CPAP irritates your nose, mouth or throat. Use your unit's \"ramp\" setting to slowly get used to the air pressure level. There may be soft pads you can buy that will fit over your mask straps. Look on www.CPAP.com for accessories that can help make CPAP use more comfortable.  Cleaning   Clean your mask, tubing and headgear on a regular basis. Put this time in your schedule so that you don't forget to do it. Check and replace the filters for your CPAP unit and humidifier.    Completion   Although you are never finished with CPAP therapy, you should reward yourself by celebrating the completion of your first month of treatment. Expect this first month to be your hardest period of adjustment. It will involve some trial and error as you find the machine, mask and pressure settings that are right for you.    Continuation  After your first month of treatment, continue to make a daily commitment to use your CPAP all night, every night and for every nap.    CPAP-Tips to starting with success:  Begin using your CPAP for short periods of time during the day while you watch TV or read.    Use CPAP every night and for every nap. Using it less often reduces the health benefits and makes it harder for your body to get used to it.    Newer CPAP models are virtually silent; however, if you find the sound of your CPAP machine to be bothersome, place the unit under your bed to dampen the sound.     Make small adjustments to your mask, tubing, straps and headgear until you get the right fit. Tightening the mask may actually worsen the leak.  If it leaves significant marks on your face or irritates the bridge of your nose, it may not be the best mask for you.  Speak with the person who supplied the mask and consider trying other masks. Insurances will allow you to try different masks " during the first month of starting CPAP.  Insurance also covers a new mask, hose and filter about every 6 months.    Use a saline nasal spray to ease mild nasal congestion. Neti-Pot or saline nasal rinses may also help. Nasal gel sprays can help reduce nasal dryness.  Biotene mouthwash can be helpful to protect your teeth if you experience frequent dry mouth.  Dry mouth may be a sign of air escaping out of your mouth or out of the mask in the case of a full face mask.  Speak with your provider if you expect that is the case.     Take a nasal decongestant to relieve more severe nasal or sinus congestion.  Do not use Afrin (oxymetazoline) nasal spray more than 3 days in a row.  Speak with your sleep doctor if your nasal congestion is chronic.    Use a heated humidifier that fits your CPAP model to enhance your breathing comfort. Adjust the heat setting up if you get a dry nose or throat, down if you get condensation in the hose or mask.  Position the CPAP lower than you so that any condensation in the hose drains back into the machine rather than towards the mask.    Try a system that uses nasal pillows if traditional masks give you problems.    Clean your mask, tubing and headgear once a week. Make sure the equipment dries fully.    Regularly check and replace the filters for your CPAP unit and humidifier.    Work closely with your sleep provider and your CPAP supplier to make sure that you have the machine, mask and air pressure setting that works best for you. It is better to stop using it and call your provider to solve problems than to lay awake all night frustrated with the device.  Weight Loss:    Weight loss decreases severity of sleep apnea in most people with obesity. For those with mild obesity who have developed snoring with weight gain, even 15-30 pound weight loss can improve and occasionally eliminate sleep apnea.  Structured and life-long dietary and health habits are necessary to lose weight and keep  healthier weight levels.     Though there are significant health benefits from weight loss, long-term weight loss is very difficult to achieve- studies show success with dietary management in less than 10% of people. In addition, substantial weight loss may require years of dietary control and may be difficult if patients have severe obesity. In these cases, surgical management may be considered.    If you are interested in methods for weight loss, you should review the options discussed at the National Institutes of Health patient information sites:     http://win.niddk.nih.gov/publications/index.htm  http:/www.health.nih.gov/topic/WeightLossDieting    Bariatric programs offer counseling in all methods of weight loss:    Http:/www.[facility].org/Specialties/WeightLossSurgeryandMedicalMgmt/htm    Your BMI is Body mass index is 42.73 kg/m .    Weight management plan: Patient was referred to their PCP to discuss a diet and exercise plan.    Body mass index (BMI) is one way to tell whether you are at a healthy weight, overweight, or obese. It measures your weight in relation to your height.  A BMI of 18.5 to 24.9 is in the healthy range. A person with a BMI of 25 to 29.9 is considered overweight, and someone with a BMI of 30 or greater is considered obese.  Another way to find out if you are at risk for health problems caused by overweight and obesity is to measure your waist. If you are a woman and your waist is more than 35 inches, or if you are a man and your waist is more than 40 inches, your risk of disease may be higher.  More than two-thirds of American adults are considered overweight or obese. Being overweight or obese increases the risk for further weight gain.  Excess weight may lead to heart disease and diabetes. Creating and following plans for healthy eating and physical activity may help you improve your health.    Methods for maintaining or losing weight.    Weight control is part of healthy  lifestyle and includes exercise, emotional health, and healthy eating habits.  Careful eating habits lifelong is the mainstay of weight control.  Though there are significant health benefits from weight loss, long-term weight loss with diet alone may be very difficult to achieve- studies show long-term success with dietary management in less than 10% of people. Attaining a healthy weight may be especially difficult to achieve in those with severe obesity. In some cases, medications, devices and surgical management might be considered.    What can you do?    If you are overweight or obese and are interested in methods for weight loss, you should discuss this with your provider. In addition, we recommend that you review healthy life styles and methods for weight loss available through the National Institutes of Health patient information sites:     http://win.niddk.nih.gov/publications/index.htm

## 2023-04-14 ENCOUNTER — OFFICE VISIT (OUTPATIENT)
Dept: PEDIATRICS | Facility: CLINIC | Age: 58
End: 2023-04-14
Payer: COMMERCIAL

## 2023-04-14 VITALS
WEIGHT: 286 LBS | BODY MASS INDEX: 42.94 KG/M2 | HEART RATE: 71 BPM | OXYGEN SATURATION: 98 % | SYSTOLIC BLOOD PRESSURE: 137 MMHG | DIASTOLIC BLOOD PRESSURE: 68 MMHG | RESPIRATION RATE: 14 BRPM | TEMPERATURE: 97.1 F

## 2023-04-14 DIAGNOSIS — Z12.5 SCREENING FOR PROSTATE CANCER: ICD-10-CM

## 2023-04-14 DIAGNOSIS — R77.8 ELEVATED TOTAL PROTEIN: ICD-10-CM

## 2023-04-14 DIAGNOSIS — Z23 NEED FOR SHINGLES VACCINE: ICD-10-CM

## 2023-04-14 DIAGNOSIS — Z13.220 SCREENING CHOLESTEROL LEVEL: Primary | ICD-10-CM

## 2023-04-14 DIAGNOSIS — J45.30 MILD PERSISTENT ASTHMA WITHOUT COMPLICATION: ICD-10-CM

## 2023-04-14 DIAGNOSIS — Z13.1 SCREENING FOR DIABETES MELLITUS: ICD-10-CM

## 2023-04-14 DIAGNOSIS — R79.9 ABNORMAL FINDING OF BLOOD CHEMISTRY, UNSPECIFIED: ICD-10-CM

## 2023-04-14 DIAGNOSIS — Z13.0 SCREENING, IRON DEFICIENCY ANEMIA: ICD-10-CM

## 2023-04-14 LAB
ALBUMIN UR-MCNC: NEGATIVE MG/DL
APPEARANCE UR: CLEAR
BILIRUB UR QL STRIP: NEGATIVE
COLOR UR AUTO: YELLOW
ERYTHROCYTE [DISTWIDTH] IN BLOOD BY AUTOMATED COUNT: 12.8 % (ref 10–15)
GLUCOSE UR STRIP-MCNC: NEGATIVE MG/DL
HBA1C MFR BLD: 5.6 % (ref 0–5.6)
HCT VFR BLD AUTO: 44.8 % (ref 40–53)
HGB BLD-MCNC: 15.6 G/DL (ref 13.3–17.7)
HGB UR QL STRIP: NEGATIVE
KETONES UR STRIP-MCNC: NEGATIVE MG/DL
LEUKOCYTE ESTERASE UR QL STRIP: NEGATIVE
MCH RBC QN AUTO: 31.5 PG (ref 26.5–33)
MCHC RBC AUTO-ENTMCNC: 34.8 G/DL (ref 31.5–36.5)
MCV RBC AUTO: 90 FL (ref 78–100)
NITRATE UR QL: NEGATIVE
PH UR STRIP: 6 [PH] (ref 5–7)
PLATELET # BLD AUTO: 130 10E3/UL (ref 150–450)
RBC # BLD AUTO: 4.96 10E6/UL (ref 4.4–5.9)
RBC #/AREA URNS AUTO: ABNORMAL /HPF
SP GR UR STRIP: 1.02 (ref 1–1.03)
SQUAMOUS #/AREA URNS AUTO: ABNORMAL /LPF
UROBILINOGEN UR STRIP-ACNC: 0.2 E.U./DL
WBC # BLD AUTO: 4.7 10E3/UL (ref 4–11)
WBC #/AREA URNS AUTO: ABNORMAL /HPF

## 2023-04-14 PROCEDURE — 81001 URINALYSIS AUTO W/SCOPE: CPT | Performed by: STUDENT IN AN ORGANIZED HEALTH CARE EDUCATION/TRAINING PROGRAM

## 2023-04-14 PROCEDURE — 82043 UR ALBUMIN QUANTITATIVE: CPT | Performed by: STUDENT IN AN ORGANIZED HEALTH CARE EDUCATION/TRAINING PROGRAM

## 2023-04-14 PROCEDURE — 80061 LIPID PANEL: CPT | Performed by: STUDENT IN AN ORGANIZED HEALTH CARE EDUCATION/TRAINING PROGRAM

## 2023-04-14 PROCEDURE — 83036 HEMOGLOBIN GLYCOSYLATED A1C: CPT | Performed by: STUDENT IN AN ORGANIZED HEALTH CARE EDUCATION/TRAINING PROGRAM

## 2023-04-14 PROCEDURE — G0103 PSA SCREENING: HCPCS | Performed by: STUDENT IN AN ORGANIZED HEALTH CARE EDUCATION/TRAINING PROGRAM

## 2023-04-14 PROCEDURE — 36415 COLL VENOUS BLD VENIPUNCTURE: CPT | Performed by: STUDENT IN AN ORGANIZED HEALTH CARE EDUCATION/TRAINING PROGRAM

## 2023-04-14 PROCEDURE — 82570 ASSAY OF URINE CREATININE: CPT | Performed by: STUDENT IN AN ORGANIZED HEALTH CARE EDUCATION/TRAINING PROGRAM

## 2023-04-14 PROCEDURE — 80053 COMPREHEN METABOLIC PANEL: CPT | Performed by: STUDENT IN AN ORGANIZED HEALTH CARE EDUCATION/TRAINING PROGRAM

## 2023-04-14 PROCEDURE — 99213 OFFICE O/P EST LOW 20 MIN: CPT | Performed by: STUDENT IN AN ORGANIZED HEALTH CARE EDUCATION/TRAINING PROGRAM

## 2023-04-14 PROCEDURE — 85027 COMPLETE CBC AUTOMATED: CPT | Performed by: STUDENT IN AN ORGANIZED HEALTH CARE EDUCATION/TRAINING PROGRAM

## 2023-04-14 RX ORDER — ALBUTEROL SULFATE 90 UG/1
AEROSOL, METERED RESPIRATORY (INHALATION)
Qty: 8.5 G | Refills: 11 | OUTPATIENT
Start: 2023-04-14

## 2023-04-14 ASSESSMENT — PATIENT HEALTH QUESTIONNAIRE - PHQ9
SUM OF ALL RESPONSES TO PHQ QUESTIONS 1-9: 0
SUM OF ALL RESPONSES TO PHQ QUESTIONS 1-9: 0
10. IF YOU CHECKED OFF ANY PROBLEMS, HOW DIFFICULT HAVE THESE PROBLEMS MADE IT FOR YOU TO DO YOUR WORK, TAKE CARE OF THINGS AT HOME, OR GET ALONG WITH OTHER PEOPLE: NOT DIFFICULT AT ALL

## 2023-04-14 NOTE — PROGRESS NOTES
Assessment & Plan     Patient was told he had elevated protein (beta protein?) when donating plasma. Asymptomatic. Recommend obtain labs (CMP, CBC, urinalysis, microalbumin) and if elevated total protein on CMP return for lab only visit for SPEP, UPEP, smear, quant immunoglobulins. If abnormal would need hem/onc referral.    Screening cholesterol level  - Lipid Profile (Chol, Trig, HDL, LDL calc); Future  - Lipid Profile (Chol, Trig, HDL, LDL calc)    Screening for diabetes mellitus  - Comprehensive metabolic panel (BMP + Alb, Alk Phos, ALT, AST, Total. Bili, TP); Future  - Hemoglobin A1c; Future  - Comprehensive metabolic panel (BMP + Alb, Alk Phos, ALT, AST, Total. Bili, TP)  - Hemoglobin A1c    Screening, iron deficiency anemia  - CBC with platelets; Future  - CBC with platelets    Elevated total protein  - Albumin Random Urine Quantitative with Creat Ratio; Future  - UA with Microscopic reflex to Culture - lab collect; Future  - Albumin Random Urine Quantitative with Creat Ratio  - UA with Microscopic reflex to Culture - lab collect    Abnormal finding of blood chemistry, unspecified  - Hemoglobin A1c; Future  - Hemoglobin A1c    Screening for prostate cancer  - PSA, screen; Future  - PSA, screen                 Nyal James MD  [facility] CHERRIE Boyd is a 58 year old, presenting for the following health issues:  Follow Up (Diabetes concerns /)         View : No data to display.              Answers for HPI/ROS submitted by the patient on 4/14/2023  If you checked off any problems, how difficult have these problems made it for you to do your work, take care of things at home, or get along with other people?: Not difficult at all  PHQ9 TOTAL SCORE: 0  What is the reason for your visit today? : stupid      Concern - A1C  Description:   - Patient states that he is due for an A1c check   - Patient states that he donated plasma and was told that his protein level was high at  1.28     Diabetes Follow-up  How often are you checking your blood sugar? A few times a week  What time of day are you checking your blood sugars (select all that apply)?  Before meals  Have you had any blood sugars above 200?  No  Have you had any blood sugars below 70?  No    What symptoms do you notice when your blood sugar is low?  None    What concerns do you have today about your diabetes? Blood sugar numbers      Do you have any of these symptoms? (Select all that apply)  No numbness or tingling in feet.  No redness, sores or blisters on feet.  No complaints of excessive thirst.  No reports of blurry vision.  No significant changes to weight.      BP Readings from Last 2 Encounters:   04/14/23 137/68   04/13/23 123/78     Hemoglobin A1C (%)   Date Value   07/11/2022 5.5   12/21/2021 5.6   07/07/2020 5.7 (H)   01/23/2020 5.6     LDL Cholesterol Calculated (mg/dL)   Date Value   07/11/2022 116 (H)   06/11/2021 104 (H)   01/23/2020 73                 Objective    /68 (BP Location: Right arm, Patient Position: Sitting, Cuff Size: Adult Large)   Pulse 71   Temp 97.1  F (36.2  C) (Temporal)   Resp 14   Wt 129.7 kg (286 lb)   SpO2 98%   BMI 42.94 kg/m    Body mass index is 42.94 kg/m .  Physical Exam   GEN: Alert and appropriately interactive for age  EYES: Eyes grossly normal to inspection, conjunctivae and sclerae normal  RESP: Breathing comfortably on room air   CV: Warm and well perfused peripheral extremities   MS: no gross musculoskeletal defects noted, no edema  NEURO: Alert and oriented. Gait normal. Speech fluent.    PSYCH: Affect normal/bright. Appearance well groomed.

## 2023-04-15 LAB
ALBUMIN SERPL BCG-MCNC: 4.4 G/DL (ref 3.5–5.2)
ALP SERPL-CCNC: 63 U/L (ref 40–129)
ALT SERPL W P-5'-P-CCNC: 48 U/L (ref 10–50)
ANION GAP SERPL CALCULATED.3IONS-SCNC: 13 MMOL/L (ref 7–15)
AST SERPL W P-5'-P-CCNC: 35 U/L (ref 10–50)
BILIRUB SERPL-MCNC: 0.4 MG/DL
BUN SERPL-MCNC: 20.7 MG/DL (ref 6–20)
CALCIUM SERPL-MCNC: 9.9 MG/DL (ref 8.6–10)
CHLORIDE SERPL-SCNC: 103 MMOL/L (ref 98–107)
CHOLEST SERPL-MCNC: 152 MG/DL
CREAT SERPL-MCNC: 1.04 MG/DL (ref 0.67–1.17)
CREAT UR-MCNC: 146 MG/DL
DEPRECATED HCO3 PLAS-SCNC: 25 MMOL/L (ref 22–29)
GFR SERPL CREATININE-BSD FRML MDRD: 84 ML/MIN/1.73M2
GLUCOSE SERPL-MCNC: 82 MG/DL (ref 70–99)
HDLC SERPL-MCNC: 50 MG/DL
LDLC SERPL CALC-MCNC: 79 MG/DL
MICROALBUMIN UR-MCNC: <12 MG/L
MICROALBUMIN/CREAT UR: NORMAL MG/G{CREAT}
NONHDLC SERPL-MCNC: 102 MG/DL
POTASSIUM SERPL-SCNC: 3.6 MMOL/L (ref 3.4–5.3)
PROT SERPL-MCNC: 7.1 G/DL (ref 6.4–8.3)
PSA SERPL DL<=0.01 NG/ML-MCNC: 0.52 NG/ML (ref 0–3.5)
SODIUM SERPL-SCNC: 141 MMOL/L (ref 136–145)
TRIGL SERPL-MCNC: 115 MG/DL

## 2023-05-12 ENCOUNTER — OFFICE VISIT (OUTPATIENT)
Dept: DERMATOLOGY | Facility: CLINIC | Age: 58
End: 2023-05-12
Payer: COMMERCIAL

## 2023-05-12 DIAGNOSIS — H01.139 EYELID ECZEMA, UNSPECIFIED LATERALITY: ICD-10-CM

## 2023-05-12 DIAGNOSIS — L71.9 ROSACEA: Primary | ICD-10-CM

## 2023-05-12 PROCEDURE — 99214 OFFICE O/P EST MOD 30 MIN: CPT | Performed by: PHYSICIAN ASSISTANT

## 2023-05-12 RX ORDER — DOXYCYCLINE 50 MG/1
CAPSULE ORAL
Qty: 90 CAPSULE | Refills: 3 | Status: SHIPPED | OUTPATIENT
Start: 2023-05-12 | End: 2023-11-14

## 2023-05-12 RX ORDER — DOXYCYCLINE 100 MG/1
CAPSULE ORAL
Qty: 120 CAPSULE | Refills: 0 | Status: SHIPPED | OUTPATIENT
Start: 2023-05-12 | End: 2023-06-16

## 2023-05-12 RX ORDER — TACROLIMUS 1 MG/G
OINTMENT TOPICAL
Qty: 30 G | Refills: 5 | Status: SHIPPED | OUTPATIENT
Start: 2023-05-12

## 2023-05-12 RX ORDER — METRONIDAZOLE 7.5 MG/G
GEL TOPICAL
Qty: 45 G | Refills: 11 | Status: SHIPPED | OUTPATIENT
Start: 2023-05-12 | End: 2023-11-14

## 2023-05-12 NOTE — PATIENT INSTRUCTIONS
For the rosacea    AM  Wash with CeraVe   Apply metrogel to entire face  Apply tacrolimus ointment to eyelids when they are red/irritated  Moisturizer with sunscreen - CeraVe AM, NeutrogenBobby gutiérrezeeno.... many options but we like SPF 30 or higher     Start taking doxycycline 50 mg daily    If you are flaring you can increase to doxy 100 mg twice per day for 1 month then go back to the doxy 50 mg daily

## 2023-05-12 NOTE — PROGRESS NOTES
HPI:   Chief complaints: Oliver Agarwal is a pleasant 58 year old male who presents for recheck of rosacea and facial eczema. He has been taking doxy 100 mg BID on and off for months. He is not sure which cream he is supposed to be using on his face. He does still get red bumps on the cheeks and his nose is red. He has persistent irritated dry flaky skin on the eyelids. He used tacrolimus in the past which worked great but he has been out of this.       PHYSICAL EXAM:    There were no vitals taken for this visit.  Skin exam performed as follows: Type 2 skin. Mood appropriate  Alert and Oriented X 3. Well developed, well nourished in no distress.  General appearance: Normal  Head including face: Normal  Eyes: conjunctiva and lids: Normal  Mouth: Lips, teeth, gums: Normal  Neck: Normal  Skin: Scalp and body hair: See below    Papules and pustules with background erythema on face  Dermatitis on the left upper eyelid    ASSESSMENT/PLAN:     1. Acne Rosacea - advised on diagnosis and treatment options. Discussed chronic condition of unknown etiology. Discussed anti-inflammatories, sunscreen, PO and topical medications.    --Switch to doxy 50 mg daily  --Start metrogel daily  2. Eyelid eczema  --Start Tacrolimus ointment BID PRN            Follow-up: 6 months - he will do FSE at this time as well  CC:   Scribed By: Adriana Villanueva, MS, PA-C

## 2023-05-12 NOTE — LETTER
5/12/2023         RE: Oliver Agarwal  [address]  [place] [address]        Dear Colleague,    Thank you for referring your patient, Oliver Agarwal, to the [facility]. Please see a copy of my visit note below.    HPI:   Chief complaints: Oliver Agarwal is a pleasant 58 year old male who presents for recheck of rosacea and facial eczema. He has been taking doxy 100 mg BID on and off for months. He is not sure which cream he is supposed to be using on his face. He does still get red bumps on the cheeks and his nose is red. He has persistent irritated dry flaky skin on the eyelids. He used tacrolimus in the past which worked great but he has been out of this.       PHYSICAL EXAM:    There were no vitals taken for this visit.  Skin exam performed as follows: Type 2 skin. Mood appropriate  Alert and Oriented X 3. Well developed, well nourished in no distress.  General appearance: Normal  Head including face: Normal  Eyes: conjunctiva and lids: Normal  Mouth: Lips, teeth, gums: Normal  Neck: Normal  Skin: Scalp and body hair: See below    Papules and pustules with background erythema on face  Dermatitis on the left upper eyelid    ASSESSMENT/PLAN:     1. Acne Rosacea - advised on diagnosis and treatment options. Discussed chronic condition of unknown etiology. Discussed anti-inflammatories, sunscreen, PO and topical medications.    --Switch to doxy 50 mg daily  --Start metrogel daily  2. Eyelid eczema  --Start Tacrolimus ointment BID PRN            Follow-up: 6 months - he will do FSE at this time as well  CC:   Scribed By: Adriana Villanueva, MS, PAYOVANA          Again, thank you for allowing me to participate in the care of your patient.        Sincerely,        Adriana Villanueva PA-C

## 2023-05-18 ENCOUNTER — TELEPHONE (OUTPATIENT)
Dept: DERMATOLOGY | Facility: CLINIC | Age: 58
End: 2023-05-18
Payer: COMMERCIAL

## 2023-05-18 NOTE — CONFIDENTIAL NOTE
Received fax from pharmacy. Tacrolimus  not covered. Please send alternative, or we can try to do PA.    Thank you,  Anisa PARADA RN  Dermatology   [phone]

## 2023-05-18 NOTE — CONFIDENTIAL NOTE
Prior Authorization Retail Medication Request    Medication/Dose: Tacrolimus 0.1% ointment  ICD code (if different than what is on RX):    Previously Tried and Failed:    Rationale:      Insurance Name:    Insurance ID:        Pharmacy Information (if different than what is on RX)  Name:    Phone:

## 2023-05-24 NOTE — TELEPHONE ENCOUNTER
Central Prior Authorization Team   Phone: [phone]      PA Initiation    Medication: TACROLIMUS 0.1 % EX OINT  Insurance Company: [employer] ([facility]) - Phone [phone] Fax [phone]  Pharmacy Filling the Rx: [id number] - [place], [place] - [address] AT [facility] [address]  Filling Pharmacy Phone: [phone]  Filling Pharmacy Fax:    Start Date: 5/24/2023

## 2023-05-25 NOTE — TELEPHONE ENCOUNTER
Prior Authorization Approval    Medication: TACROLIMUS 0.1 % EX OINT  Authorization Effective Date: 5/25/2023  Authorization Expiration Date: 12/31/2023  Insurance Company: [employer] ([facility]) - Phone [phone] Fax [phone]  Which Pharmacy is filling the prescription: [id number] - [place], [place] - [address] AT [facility] [address]  Pharmacy Notified: Yes  Patient Notified: Yes (patient picked up yesterday)

## 2023-05-31 ENCOUNTER — HOSPITAL ENCOUNTER (OUTPATIENT)
Dept: MRI IMAGING | Facility: CLINIC | Age: 58
Discharge: HOME | End: 2023-05-31
Attending: FAMILY MEDICINE | Admitting: FAMILY MEDICINE
Payer: COMMERCIAL

## 2023-05-31 DIAGNOSIS — M54.50 CHRONIC LOW BACK PAIN, UNSPECIFIED BACK PAIN LATERALITY, UNSPECIFIED WHETHER SCIATICA PRESENT: ICD-10-CM

## 2023-05-31 DIAGNOSIS — G89.29 CHRONIC LOW BACK PAIN, UNSPECIFIED BACK PAIN LATERALITY, UNSPECIFIED WHETHER SCIATICA PRESENT: ICD-10-CM

## 2023-05-31 PROCEDURE — 72148 MRI LUMBAR SPINE W/O DYE: CPT

## 2023-06-04 ENCOUNTER — OFFICE VISIT (OUTPATIENT)
Dept: URGENT CARE | Facility: URGENT CARE | Age: 58
End: 2023-06-04
Payer: COMMERCIAL

## 2023-06-04 VITALS
HEART RATE: 66 BPM | OXYGEN SATURATION: 96 % | TEMPERATURE: 99 F | BODY MASS INDEX: 42.94 KG/M2 | RESPIRATION RATE: 12 BRPM | SYSTOLIC BLOOD PRESSURE: 127 MMHG | WEIGHT: 286 LBS | DIASTOLIC BLOOD PRESSURE: 74 MMHG

## 2023-06-04 DIAGNOSIS — J40 BRONCHITIS: Primary | ICD-10-CM

## 2023-06-04 PROBLEM — M51.369 DDD (DEGENERATIVE DISC DISEASE), LUMBAR: Status: ACTIVE | Noted: 2023-06-04

## 2023-06-04 PROCEDURE — 99213 OFFICE O/P EST LOW 20 MIN: CPT | Performed by: FAMILY MEDICINE

## 2023-06-04 RX ORDER — CODEINE PHOSPHATE AND GUAIFENESIN 10; 100 MG/5ML; MG/5ML
1-2 SOLUTION ORAL EVERY 4 HOURS PRN
Qty: 180 ML | Refills: 0 | Status: SHIPPED | OUTPATIENT
Start: 2023-06-04 | End: 2023-06-16

## 2023-06-04 RX ORDER — PREDNISONE 50 MG/1
50 TABLET ORAL DAILY
Qty: 5 TABLET | Refills: 0 | Status: SHIPPED | OUTPATIENT
Start: 2023-06-04 | End: 2023-06-09

## 2023-06-04 ASSESSMENT — ENCOUNTER SYMPTOMS
COUGH: 1
SHORTNESS OF BREATH: 0
FEVER: 0

## 2023-06-05 NOTE — PROGRESS NOTES
Assessment & Plan     Bronchitis  Patient has findings suggestive of bronchitis, he already completed antibiotic therapy and placed on Advair without significant improvement.  His history of asthma, I recommend he start prednisone burst, Robitussin-AC for symptom control.  Pulmonary exam not suggestive of pneumonia, chest x-ray not indicated.  - predniSONE (DELTASONE) 50 MG tablet  Dispense: 5 tablet; Refill: 0  - guaiFENesin-codeine (ROBITUSSIN AC) 100-10 MG/5ML solution  Dispense: 180 mL; Refill: 0                   Return in about 2 weeks (around 6/18/2023) for If symptoms do not improve or gets worse..    Adriano Mansfield MD  [facility] [place]    Lisa Boyd is a 58 year old, presenting for the following health issues:  Urgent Care (Decline ) and Cough (Inhaler is not helping, not improving, no fever, Covid tested negative 6/2/23)         View : No data to display.              HPI     Patient is a pleasant 58-year-old male who presents with concerns of ongoing cough despite antibiotic therapy for the last couple of weeks.  No fevers or difficulty breathing.      Review of Systems   Constitutional: Negative for fever.   Respiratory: Positive for cough. Negative for shortness of breath.    Cardiovascular: Negative for chest pain.            Objective    /74   Pulse 66   Temp 99  F (37.2  C) (Oral)   Resp 12   Wt 129.7 kg (286 lb)   SpO2 96%   BMI 42.94 kg/m    Body mass index is 42.94 kg/m .  Physical Exam  Vitals reviewed.   HENT:      Left Ear: Tympanic membrane normal.      Nose: No congestion or rhinorrhea.   Cardiovascular:      Rate and Rhythm: Normal rate and regular rhythm.   Pulmonary:      Effort: Pulmonary effort is normal.      Breath sounds: Normal breath sounds.

## 2023-06-15 ENCOUNTER — TELEPHONE (OUTPATIENT)
Dept: PEDIATRICS | Facility: CLINIC | Age: 58
End: 2023-06-15
Payer: COMMERCIAL

## 2023-06-15 NOTE — TELEPHONE ENCOUNTER
Tried calling patient, no answer. If patient calls back, per our provider today, he would need to be seen again in UC if he feels he needs other medications. Urgent Care doesn't typically do refills. Pt should keep appt tomorrow with pcp or be seen in UC today if feeling worse.  Nan Montero CMA  '

## 2023-06-15 NOTE — TELEPHONE ENCOUNTER
Providers --    Patient called regarding appointment from 6/4/2023.   -- Symptoms still present: coughing non-stop   -- Feels like it is still bronchitis  -- Not functioning like normal  -- Phlegm - white from chest  -- Patient has been using all of the medications prescribed for him  -- Patient states that he is NOT improving.   Patient did schedule an appointment for Friday (6/16/2023) in [place] but was hoping you could prescribe another round of the prednisone.     AVS: 6/4/2023: Return in about 2 weeks (around 6/18/2023) for If symptoms do not improve or gets worse..    Arina Flower, BSN RN  [facility]

## 2023-06-16 ENCOUNTER — OFFICE VISIT (OUTPATIENT)
Dept: INTERNAL MEDICINE | Facility: CLINIC | Age: 58
End: 2023-06-16
Payer: COMMERCIAL

## 2023-06-16 VITALS
HEIGHT: 69 IN | WEIGHT: 279.7 LBS | DIASTOLIC BLOOD PRESSURE: 76 MMHG | OXYGEN SATURATION: 95 % | HEART RATE: 65 BPM | BODY MASS INDEX: 41.43 KG/M2 | SYSTOLIC BLOOD PRESSURE: 134 MMHG

## 2023-06-16 DIAGNOSIS — J45.30 MILD PERSISTENT ASTHMA WITHOUT COMPLICATION: ICD-10-CM

## 2023-06-16 DIAGNOSIS — J45.31 MILD PERSISTENT ASTHMA WITH ACUTE EXACERBATION: Primary | ICD-10-CM

## 2023-06-16 PROCEDURE — 99213 OFFICE O/P EST LOW 20 MIN: CPT | Performed by: PHYSICIAN ASSISTANT

## 2023-06-16 RX ORDER — ALBUTEROL SULFATE 90 UG/1
AEROSOL, METERED RESPIRATORY (INHALATION)
Qty: 8.5 G | Refills: 11 | Status: SHIPPED | OUTPATIENT
Start: 2023-06-16 | End: 2024-04-30

## 2023-06-16 RX ORDER — PREDNISONE 20 MG/1
TABLET ORAL
Qty: 20 TABLET | Refills: 0 | Status: SHIPPED | OUTPATIENT
Start: 2023-06-16 | End: 2023-11-03

## 2023-06-16 NOTE — PROGRESS NOTES
"  Assessment & Plan     Mild persistent asthma with acute exacerbation    - predniSONE (DELTASONE) 20 MG tablet; Take 3 tabs by mouth daily x 3 days, then 2 tabs daily x 3 days, then 1 tab daily x 3 days, then 1/2 tab daily x 3 days.    Mild persistent asthma without complication    - PROAIR  (90 Base) MCG/ACT inhaler; INHALE 2 PUFFS BY MOUTH INTO THE LUNGS EVERY 4 TO 6 HOURS AS NEEDED FOR SHORTNESS OF BREATH, DIFFICULT BREATHING OR WHEEZING.                 Lo Villeda PA-C  [facility] GREGOR Boyd is a 58 year old, presenting for the following health issues:  URI      URI  This is a new problem. The current episode started in the past 7 days. The problem occurs constantly. The problem has been waxing and waning.    Stuffy nose   Due to language barrier, an  was present during the history-taking and subsequent discussion (and for part of the physical exam) with this patient.    Acute Illness  Acute illness concerns: cough  Onset/Duration: 1 weeks   Symptoms:  Fever: No  Chills/Sweats: No  Headache (location?): No  Sinus Pressure: YES  Conjunctivitis:  No  Ear Pain: no  Rhinorrhea: No  Congestion: No  Sore Throat: No  Cough: YES-non-productive  Wheeze: No  Decreased Appetite: No  Nausea: No  Vomiting: No  Diarrhea: No  Dysuria/Freq.: No  Dysuria or Hematuria: No  Fatigue/Achiness: No  Sick/Strep Exposure: No  Therapies tried and outcome:         Review of Systems         Objective    /76   Pulse 65   Ht 1.74 m (5' 8.5\")   Wt 126.9 kg (279 lb 11.2 oz)   SpO2 95%   BMI 41.91 kg/m    Body mass index is 41.91 kg/m .  Physical Exam   GENERAL: healthy, alert and no distress  NECK: no adenopathy and no asymmetry, masses, or scars  RESP: lungs clear to auscultation - no rales, rhonchi or wheezes  CV: regular rates and rhythm and normal S1 S2, no S3 or S4  MS: no gross musculoskeletal defects noted, no edema  SKIN: no suspicious lesions " or rashes

## 2023-08-09 NOTE — PATIENT INSTRUCTIONS
"FUTURE APPOINTMENTS  Follow up in 4 weeks.    TOPICAL MEDICATION INSTRUCTIONS - for control of rosacea on mid-portion of face  Ivermectin (Soolantra) 1% cream.    Apply a thin layer to each affected area(s) of rosacea on nose and cheeks one time per day, everyday regardless if symptoms are present or not.    Keep in mind to also regularly use moisturizer, as this preventative measure can help maintain your skin's natural moisture barrier.    Apply moisturizer after application of medication.  Discontinue use of, but do not discard metronidazole gel    ORAL ANTIBIOTIC - for control of rosacea  Take by mouth doxycycline 50 mg two time(s) a day.    Avoid excessive alcohol, caffeine, chocolate, spicy food consumption or sun exposure.    TOPICAL STEROID INSTRUCTIONS - for control of eczema on eyelids  Desonide 0.05% cream.    Apply an amount equal to half of a fingertip (0.25 g) to the affected area(s) on the eyelids, two times per day for 3-5 days.    \"Fingertip Amount\"      This is a weak strength steroid, and it can be used on the face.    Topical steroid use is for short-term treatment only. If after initial treatment, you are using this for prolonged periods of time, return to clinic for re-evaluation of treatment.    Keep in mind to also regularly use moisturizer, as this preventative measure can help maintain your skin's natural moisture barrier.    Use Aquaphor ointment for moisturizer on the chin.    Apply in this order after showering : 1) topical medications, 2) Cetaphil facial moisturizer.  "
PAIN SCALE 8 OF 10.

## 2023-09-05 ENCOUNTER — TELEPHONE (OUTPATIENT)
Dept: PEDIATRICS | Facility: CLINIC | Age: 58
End: 2023-09-05
Payer: COMMERCIAL

## 2023-09-05 NOTE — TELEPHONE ENCOUNTER
Patient Quality Outreach Health Maintenance - PAL RN    Summary:    PAL RN contacted pt regarding overdue health maintenance    Patient is due/failing the following:   Asthma  -  ACT needed  Physical Preventive Adult Physical    Health Maintenance Due   Topic Date Due    ADVANCE CARE PLANNING  Never done    MEDICARE ANNUAL WELLNESS VISIT  04/04/2014    ZOSTER IMMUNIZATION (1 of 2) Never done    Pneumococcal Vaccine: Pediatrics (0 to 5 Years) and At-Risk Patients (6 to 64 Years) (2 - PCV) 06/13/2018    ASTHMA ACTION PLAN  01/23/2021    COVID-19 Vaccine (4 - Pfizer series) 02/08/2022    ASTHMA CONTROL TEST  07/20/2023    INFLUENZA VACCINE (1) 09/01/2023       Type of outreach:    Sent Mail'Inside message.  Advised appointment and attached ACT.    - Advised patient if any questions or concerns comes up to call the PAL RN.   - Patient given opportunity to ask questions and at this time there is nothing further needed.     Questions for provider review:    None                                                                                     Chart routed to none.    Annabelle Mata, RN

## 2023-09-06 ENCOUNTER — OFFICE VISIT (OUTPATIENT)
Dept: FAMILY MEDICINE | Facility: CLINIC | Age: 58
End: 2023-09-06
Payer: COMMERCIAL

## 2023-09-06 VITALS
HEIGHT: 68 IN | RESPIRATION RATE: 15 BRPM | DIASTOLIC BLOOD PRESSURE: 70 MMHG | HEART RATE: 60 BPM | SYSTOLIC BLOOD PRESSURE: 112 MMHG | BODY MASS INDEX: 44.38 KG/M2 | WEIGHT: 292.8 LBS | TEMPERATURE: 97.2 F | OXYGEN SATURATION: 95 %

## 2023-09-06 DIAGNOSIS — J45.51 SEVERE PERSISTENT ASTHMA WITH EXACERBATION (H): Primary | ICD-10-CM

## 2023-09-06 DIAGNOSIS — R05.1 ACUTE COUGH: ICD-10-CM

## 2023-09-06 PROCEDURE — 99214 OFFICE O/P EST MOD 30 MIN: CPT

## 2023-09-06 RX ORDER — PREDNISONE 20 MG/1
TABLET ORAL
Qty: 20 TABLET | Refills: 0 | Status: SHIPPED | OUTPATIENT
Start: 2023-09-06 | End: 2023-09-18

## 2023-09-06 RX ORDER — CODEINE PHOSPHATE/GUAIFENESIN 10-100MG/5
5 LIQUID (ML) ORAL EVERY 4 HOURS PRN
Qty: 118 ML | Refills: 0 | Status: SHIPPED | OUTPATIENT
Start: 2023-09-06 | End: 2023-11-03

## 2023-09-06 ASSESSMENT — PAIN SCALES - GENERAL: PAINLEVEL: NO PAIN (0)

## 2023-09-06 ASSESSMENT — ENCOUNTER SYMPTOMS: COUGH: 1

## 2023-09-06 NOTE — PROGRESS NOTES
Assessment & Plan     Acute cough  - guaiFENesin-codeine (GUAIFENESIN AC) 100-10 MG/5ML syrup; Take 5 mLs by mouth every 4 hours as needed for congestion    Severe persistent asthma with exacerbation  - predniSONE (DELTASONE) 20 MG tablet; Take 3 tablets (60 mg) by mouth daily for 3 days, THEN 2 tablets (40 mg) daily for 3 days, THEN 1 tablet (20 mg) daily for 3 days, THEN 0.5 tablets (10 mg) daily for 3 days.  - Adult Pulmonary Medicine  Referral; Future    Patient's symptoms fit with asthma exacerbation.  Given that he is already on Advair 250-50 twice daily and is pretty good about taking his medication, I recommend he follow-up with pulmonology related to his asthma control.  I did discuss the long-term risks of frequent prednisone use, so he is amenable to following up with him regarding this issue.  Patient did have a subjective fever yesterday, I find no focal exam findings on exam, so I do not believe we need an x-ray at this time.  Codeine formulation also prescribed for patient.    Isaias Coelho NP  [facility]    Lisa Boyd is a 58 year old, presenting for the following health issues:  Cough (Wondering about need for prednisone or cough syrup) and Asthma  Coughing and wheezing since yesterday, sinus pressure. Some shortness of breath related to asthma. Some waking up. Prednisone and codeine have been helpful in the past. Coughing is both productive and non-productive. Discharge is mostly clear.  Subjective fever yesterday that improved today.  Patient does still take Advair and inhalers. Not helpful enough for symptoms.      9/6/2023     1:58 PM   Additional Questions   Roomed by Priya MORE     Patient declined general health questionnaire and asthma control test (ACT)    Cough         Concern - Cough  Onset: unsure  Description: dry non stop coughing  Intensity: moderate  Progression of Symptoms:  same  Accompanying Signs & Symptoms: sinus pressure (blowing  "nose a lot)  Previous history of similar problem: has had this before, yes  Precipitating factors:        Worsened by: n/a  Alleviating factors:        Improved by: hot bath, prednisone, cough syrup with codeine  Therapies tried and outcome: n/a      Review of Systems   Respiratory:  Positive for cough.       Constitutional, HEENT, cardiovascular, pulmonary, gi and gu systems are negative, except as otherwise noted.      Objective    /70 (BP Location: Right arm, Patient Position: Sitting, Cuff Size: Adult Large)   Pulse 60   Temp 97.2  F (36.2  C) (Temporal)   Resp 15   Ht 1.73 m (5' 8.11\")   Wt 132.8 kg (292 lb 12.8 oz)   SpO2 95%   BMI 44.38 kg/m    Body mass index is 44.38 kg/m .  Physical Exam   GENERAL: healthy, alert and no distress  RESP: diffuse expiratory wheezes.  CV: regular rate and rhythm, normal S1 S2, no S3 or S4, no murmur, click or rub  PSYCH: mentation appears normal, affect normal/bright          "

## 2023-11-02 DIAGNOSIS — J45.30 MILD PERSISTENT ASTHMA WITHOUT COMPLICATION: ICD-10-CM

## 2023-11-02 RX ORDER — FLUTICASONE PROPIONATE AND SALMETEROL 250; 50 UG/1; UG/1
POWDER RESPIRATORY (INHALATION)
Qty: 180 EACH | Refills: 0 | Status: SHIPPED | OUTPATIENT
Start: 2023-11-02 | End: 2024-03-18

## 2023-11-03 ENCOUNTER — OFFICE VISIT (OUTPATIENT)
Dept: FAMILY MEDICINE | Facility: CLINIC | Age: 58
End: 2023-11-03
Payer: COMMERCIAL

## 2023-11-03 VITALS
SYSTOLIC BLOOD PRESSURE: 119 MMHG | TEMPERATURE: 98.3 F | OXYGEN SATURATION: 93 % | RESPIRATION RATE: 16 BRPM | BODY MASS INDEX: 45.83 KG/M2 | WEIGHT: 292 LBS | DIASTOLIC BLOOD PRESSURE: 64 MMHG | HEIGHT: 67 IN | HEART RATE: 61 BPM

## 2023-11-03 DIAGNOSIS — R05.1 ACUTE COUGH: Primary | ICD-10-CM

## 2023-11-03 DIAGNOSIS — J45.41 MODERATE PERSISTENT ASTHMA WITH ACUTE EXACERBATION: ICD-10-CM

## 2023-11-03 PROCEDURE — 99213 OFFICE O/P EST LOW 20 MIN: CPT | Performed by: STUDENT IN AN ORGANIZED HEALTH CARE EDUCATION/TRAINING PROGRAM

## 2023-11-03 PROCEDURE — 99207 PR NO CHARGE LOS: CPT | Performed by: STUDENT IN AN ORGANIZED HEALTH CARE EDUCATION/TRAINING PROGRAM

## 2023-11-03 RX ORDER — CODEINE PHOSPHATE/GUAIFENESIN 10-100MG/5
5 LIQUID (ML) ORAL EVERY 4 HOURS PRN
Qty: 118 ML | Refills: 0 | Status: SHIPPED | OUTPATIENT
Start: 2023-11-03 | End: 2024-06-29

## 2023-11-03 RX ORDER — PREDNISONE 20 MG/1
TABLET ORAL
Qty: 20 TABLET | Refills: 0 | Status: SHIPPED | OUTPATIENT
Start: 2023-11-03 | End: 2024-08-05

## 2023-11-03 ASSESSMENT — ASTHMA QUESTIONNAIRES: ACT_TOTALSCORE: 21

## 2023-11-03 NOTE — PROGRESS NOTES
"  Assessment & Plan     Acute cough  Moderate persistent asthma with acute exacerbation  Patient declined completing ACT survey.  Patient was last seen in a couple months ago for same complaint.  Was prescribed guaifenesin AC and prednisone taper.  Patient reports that he was feeling well until a few days ago when he started getting a cough.  Was wheezing yesterday.  Reports compliance with his medications including Advair twice daily.  Previously referred to pulmonology, however were not able to reach patient.  Discussed with patient that it is important for him to follow-up with pulmonology, patient was agreeable.  Offered to make appointment for pulmonology today, patient declined.  Reported that he will call them himself.  Patient did have crackles in the mid right lung, recommended CXR, patient declined.  - guaiFENesin-codeine (GUAIFENESIN AC) 100-10 MG/5ML syrup  Dispense: 118 mL; Refill: 0  - predniSONE (DELTASONE) 20 MG tablet  Dispense: 20 tablet; Refill: 0      Review of external notes as documented elsewhere in note       BMI:   Estimated body mass index is 45.83 kg/m  as calculated from the following:    Height as of this encounter: 1.7 m (5' 6.93\").    Weight as of this encounter: 132.5 kg (292 lb).           Aliza Olvera MD  [facility] is a 58 year old, presenting for the following health issues:  Asthma (Asthma and cough)        11/3/2023     1:21 PM   Additional Questions   Roomed by    Accompanied by        HPI             Review of Systems   Constitutional: Negative for fever and chills.   Respiratory: Positive for cough.  Cardiovascular: Negative for chest pain or chest pressure.   Gastrointestinal: Negative for nausea, vomiting, diarrhea or abdominal pain.        Objective    /64 (BP Location: Left arm, Patient Position: Sitting, Cuff Size: Adult Large)   Pulse 61   Temp 98.3  F (36.8  C) (Temporal)   Resp 16   Ht 1.7 m (5' " "6.93\")   Wt 132.5 kg (292 lb)   SpO2 93%   BMI 45.83 kg/m    Body mass index is 45.83 kg/m .  Physical Exam   General: Well developed, well nourished.  Skin:  Dry without rash.    Head:  Normocephalic-atraumatic.    Eye:  Normal conjunctivae.     Respiratory: Mild crackles in the mid right lung, otherwise clear to auscultation.  No respiratory distress.  Gastrointestinal:  Non-distended.    Musculoskeletal:  No deformity or edema.  Neurologic: No focal deficits.        Prior to immunization administration, verified patients identity using patient s name and date of birth. Please see Immunization Activity for additional information.     Screening Questionnaire for Adult Immunization    Are you sick today?   No   Do you have allergies to medications, food, a vaccine component or latex?   Yes   Have you ever had a serious reaction after receiving a vaccination?   No   Do you have a long-term health problem with heart, lung, kidney, or metabolic disease (e.g., diabetes), asthma, a blood disorder, no spleen, complement component deficiency, a cochlear implant, or a spinal fluid leak?  Are you on long-term aspirin therapy?   Yes   Do you have cancer, leukemia, HIV/AIDS, or any other immune system problem?   No   Do you have a parent, brother, or sister with an immune system problem?   No   In the past 3 months, have you taken medications that affect  your immune system, such as prednisone, other steroids, or anticancer drugs; drugs for the treatment of rheumatoid arthritis, Crohn s disease, or psoriasis; or have you had radiation treatments?   Yes   Have you had a seizure, or a brain or other nervous system problem?   No   During the past year, have you received a transfusion of blood or blood    products, or been given immune (gamma) globulin or antiviral drug?   No   For women: Are you pregnant or is there a chance you could become       pregnant during the next month?   No   Have you received any vaccinations in the " past 4 weeks?   No     Immunization questionnaire was positive for at least one answer.  Notified provider.      Patient instructed to remain in clinic for 15 minutes afterwards, and to report any adverse reactions.     Screening performed by Clint Monzon MA on 11/3/2023 at 1:26 PM.

## 2023-11-03 NOTE — PROGRESS NOTES
Seen 9/6/23:  Acute cough  Severe persistent asthma with exacerbation  - guaiFENesin-codeine (GUAIFENESIN AC) 100-10 MG/5ML syrup; Take 5 mLs by mouth every 4 hours as needed for congestion  - predniSONE (DELTASONE) 20 MG tablet; Take 3 tablets (60 mg) by mouth daily for 3 days, THEN 2 tablets (40 mg) daily for 3 days, THEN 1 tablet (20 mg) daily for 3 days, THEN 0.5 tablets (10 mg) daily for 3 days.  - Adult Pulmonary Medicine  Referral   Patient's symptoms fit with asthma exacerbation.  Given that he is already on Advair 250-50 twice daily and is pretty good about taking his medication, I recommend he follow-up with pulmonology related to his asthma control.  I did discuss the long-term risks of frequent prednisone use, so he is amenable to following up with him regarding this issue.  Patient did have a subjective fever yesterday, I find no focal exam findings on exam, so I do not believe we need an x-ray at this time.  Codeine formulation also prescribed for patient.    - per chart review, looks like Pulm tried calling pt to schedule 9/18/23:  Thank you for your referral to the [facility] [place]. We have been unable to schedule the referral after several contact attempts.  If you have any questions or concerns, please contact our office at Dept: [phone].    Today, pt reports has been better. Has had a cough since Monday, does not seem to be getting better. Normally gets codeine and prednisone and feels better.   No fevers.   Neg covid test at home  Cough was initially productive, mostly clear.

## 2023-11-14 ENCOUNTER — OFFICE VISIT (OUTPATIENT)
Dept: DERMATOLOGY | Facility: CLINIC | Age: 58
End: 2023-11-14
Payer: COMMERCIAL

## 2023-11-14 DIAGNOSIS — L71.9 ROSACEA: ICD-10-CM

## 2023-11-14 PROCEDURE — 99214 OFFICE O/P EST MOD 30 MIN: CPT | Performed by: PHYSICIAN ASSISTANT

## 2023-11-14 RX ORDER — DOXYCYCLINE 50 MG/1
CAPSULE ORAL
Qty: 90 CAPSULE | Refills: 3 | Status: SHIPPED | OUTPATIENT
Start: 2023-11-14 | End: 2024-03-31

## 2023-11-14 RX ORDER — METRONIDAZOLE 7.5 MG/G
GEL TOPICAL
Qty: 45 G | Refills: 11 | Status: SHIPPED | OUTPATIENT
Start: 2023-11-14 | End: 2024-03-31

## 2023-11-14 RX ORDER — DOXYCYCLINE 100 MG/1
CAPSULE ORAL
Qty: 120 CAPSULE | Refills: 0 | Status: SHIPPED | OUTPATIENT
Start: 2023-11-14 | End: 2024-08-21

## 2023-11-14 ASSESSMENT — PAIN SCALES - GENERAL: PAINLEVEL: NO PAIN (0)

## 2023-11-14 NOTE — PROGRESS NOTES
HPI:   Chief complaints: Oliver Agarwal is a pleasant 58 year old male who presents for recheck of rosacea. He tried to decrease to doxy 50 mg daily but flared with this. He has resumed doxy 100 mg BID. He is using metrogel on his nose but not on his cheeks.        PHYSICAL EXAM:    There were no vitals taken for this visit.  Skin exam performed as follows: Type 2 skin. Mood appropriate  Alert and Oriented X 3. Well developed, well nourished in no distress.  General appearance: Normal  Head including face: Normal  Eyes: conjunctiva and lids: Normal  Mouth: Lips, teeth, gums: Normal  Neck: Normal  Skin: Scalp and body hair: See below    Papules and pustules with background erythema on face      ASSESSMENT/PLAN:     Acne Rosacea - flaring today. advised on diagnosis and treatment options. Discussed chronic condition of unknown etiology. Discussed anti-inflammatories, sunscreen, PO and topical medications.    --Resume doxycycline 100 mg BID x 2 months then try to transition back to doxy 50 mg daily  --Start metrogel BID to entire face (not just nose)  --If metrogel to entire face is ineffective, he can call for compounded triple cream            Follow-up: yearly if doing well/PRN sooner  CC:   Scribed By: Adriana Villanueva, MS, PAYOVANA

## 2023-11-14 NOTE — LETTER
11/14/2023         RE: Oliver Agarwal  [address]  [place] [address]        Dear Colleague,    Thank you for referring your patient, Oliver Agarwal, to the [facility]. Please see a copy of my visit note below.    HPI:   Chief complaints: Oliver Agarwal is a pleasant 58 year old male who presents for recheck of rosacea. He tried to decrease to doxy 50 mg daily but flared with this. He has resumed doxy 100 mg BID. He is using metrogel on his nose but not on his cheeks.        PHYSICAL EXAM:    There were no vitals taken for this visit.  Skin exam performed as follows: Type 2 skin. Mood appropriate  Alert and Oriented X 3. Well developed, well nourished in no distress.  General appearance: Normal  Head including face: Normal  Eyes: conjunctiva and lids: Normal  Mouth: Lips, teeth, gums: Normal  Neck: Normal  Skin: Scalp and body hair: See below    Papules and pustules with background erythema on face      ASSESSMENT/PLAN:     Acne Rosacea - flaring today. advised on diagnosis and treatment options. Discussed chronic condition of unknown etiology. Discussed anti-inflammatories, sunscreen, PO and topical medications.    --Resume doxycycline 100 mg BID x 2 months then try to transition back to doxy 50 mg daily  --Start metrogel BID to entire face (not just nose)  --If metrogel to entire face is ineffective, he can call for compounded triple cream            Follow-up: yearly if doing well/PRN sooner  CC:   Scribed By: Adriana Villanueva, MS, PAReginaC        Again, thank you for allowing me to participate in the care of your patient.        Sincerely,        Adriana Villanueva PA-C

## 2023-11-14 NOTE — PATIENT INSTRUCTIONS
Use metrogel twice per day    Restart the doxycycline 100 mg twice per day for 2 months then transition back to doxycycline 50 mg once per day    If this does not help, then send me a message over [employer] and I will order the triple cream for you.

## 2023-12-28 DIAGNOSIS — R79.9 ABNORMAL FINDING OF BLOOD CHEMISTRY, UNSPECIFIED: ICD-10-CM

## 2023-12-28 DIAGNOSIS — Z13.1 SCREENING FOR DIABETES MELLITUS: Primary | ICD-10-CM

## 2024-01-15 ENCOUNTER — TELEPHONE (OUTPATIENT)
Dept: PEDIATRICS | Facility: CLINIC | Age: 59
End: 2024-01-15
Payer: COMMERCIAL

## 2024-01-15 NOTE — TELEPHONE ENCOUNTER
Patient Quality Outreach Health Maintenance - PAL RN    Summary:    PAL RN contacted pt regarding overdue health maintenance    Patient is due/failing the following:   Depression  -  PHQ-9 needed  Physical Annual Wellness Visit    Health Maintenance Due   Topic Date Due    ADVANCE CARE PLANNING  Never done    MEDICARE ANNUAL WELLNESS VISIT  04/04/2014    ZOSTER IMMUNIZATION (1 of 2) Never done    Pneumococcal Vaccine: Pediatrics (0 to 5 Years) and At-Risk Patients (6 to 64 Years) (2 of 2 - PCV) 06/13/2018    ASTHMA ACTION PLAN  01/23/2021    COVID-19 Vaccine (4 - 2023-24 season) 09/01/2023    PHQ-9  10/14/2023    ANNUAL REVIEW OF HM ORDERS  01/20/2024       Type of outreach:    Sent [employer] message.  Patient was scheduled for Medicare Annual Wellness Exam but not with a provider who does physicals.  TC has sent a message to patient to reply with his availability.  I mentioned to reply to the TC in my message, included PHQ-9.    - Advised patient if any questions or concerns comes up to call the PAL RN.   - Patient given opportunity to ask questions and at this time there is nothing further needed.     Questions for provider review:    None                                                                                     Chart routed to none.    Annabelle Mata, RN

## 2024-03-18 DIAGNOSIS — J45.30 MILD PERSISTENT ASTHMA WITHOUT COMPLICATION: ICD-10-CM

## 2024-03-18 RX ORDER — FLUTICASONE PROPIONATE AND SALMETEROL 250; 50 UG/1; UG/1
POWDER RESPIRATORY (INHALATION)
Qty: 60 EACH | Refills: 1 | Status: SHIPPED | OUTPATIENT
Start: 2024-03-18 | End: 2024-05-17

## 2024-03-28 ENCOUNTER — NURSE TRIAGE (OUTPATIENT)
Dept: PEDIATRICS | Facility: CLINIC | Age: 59
End: 2024-03-28
Payer: COMMERCIAL

## 2024-03-28 NOTE — TELEPHONE ENCOUNTER
Hi wound up in Dr. Muniz's in basket.  I do not see a clinical question.  Please advise patient to be seen, UC if needed.     Thank you,    Shavonne Arnett PA-C

## 2024-03-28 NOTE — TELEPHONE ENCOUNTER
"This was completed with     Nurse Triage SBAR    Is this a 2nd Level Triage? YES, LICENSED PRACTITIONER REVIEW IS REQUIRED    Situation: patient calling with continuous cough     Background: patient has diagnosis of asthma. He has a dry cough that started on Tuesday. He has tried different cough syrups, cough drops and using his Proair inhaler with minimal relief. He denies shortness of breath but states he \"feels it in his lungs\" due to his asthma. No wheezing noted. Patient started with a congested/runny nose yesterday. Used some allergy nasal spray with minimal relief. Afebrile.     Assessment: patient initially calling requesting cough medication that he has had in the past guaifenesin-codeine due to his cough being constant. Patient does report some pain in his forehead believing it may be related to sinuses    Protocol Recommended Disposition:   See in Office Today or Tomorrow    Recommendation: to be seen today or tomorrow per protocol.  Will route to PCP and RN team to assist with scheduling and advise.   Patient states he will see whomever he ca if needed    PLEASE TEXT PATIENT. HE IS DEAF AND DOES NOT ALWAYS SEE THE  FLASHING ON HIS PHONE. USES       Routed to provider    Does the patient meet one of the following criteria for ADS visit consideration? No  Reason for Disposition   Continuous (nonstop) coughing interferes with work or school and no improvement using cough treatment per Care Advice    Additional Information   Negative: Bluish (or gray) lips or face   Negative: SEVERE difficulty breathing (e.g., struggling for each breath, speaks in single words)   Negative: Rapid onset of cough and has hives   Negative: Coughing started suddenly after medicine, an allergic food or bee sting   Negative: Difficulty breathing after exposure to flames, smoke, or fumes   Negative: Sounds like a life-threatening emergency to the triager   Negative: Previous asthma attacks and this " feels like asthma attack   Negative: Dry cough (non-productive; no sputum or minimal clear sputum) and within 14 days of COVID-19 Exposure   Negative: MODERATE difficulty breathing (e.g., speaks in phrases, SOB even at rest, pulse 100-120) and still present when not coughing   Negative: Chest pain present when not coughing   Negative: Passed out (i.e., fainted, collapsed and was not responding)   Negative: Patient sounds very sick or weak to the triager   Negative: MILD difficulty breathing (e.g., minimal/no SOB at rest, SOB with walking, pulse <100) and still present when not coughing   Negative: Coughed up > 1 tablespoon (15 ml) blood (Exception: Blood-tinged sputum.)   Negative: Fever > 103 F (39.4 C)   Negative: Fever > 101 F (38.3 C) and over 60 years of age   Negative: Fever > 100.0 F (37.8 C) and has diabetes mellitus or a weak immune system (e.g., HIV positive, cancer chemotherapy, organ transplant, splenectomy, chronic steroids)   Negative: Fever > 100.0 F (37.8 C) and bedridden (e.g., CVA, chronic illness, recovering from surgery)   Negative: Increasing ankle swelling   Negative: Wheezing is present   Negative: SEVERE coughing spells (e.g., whooping sound after coughing, vomiting after coughing)   Negative: Coughing up tiffany-colored (reddish-brown) or blood-tinged sputum   Negative: Fever present > 3 days (72 hours)   Negative: Fever returns after gone for over 24 hours and symptoms worse or not improved   Negative: Using nasal washes and pain medicine > 24 hours and sinus pain persists   Negative: Known COPD or other severe lung disease (i.e., bronchiectasis, cystic fibrosis, lung surgery) and worsening symptoms (i.e., increased sputum purulence or amount, increased breathing difficulty)    Protocols used: Cough-A-OH

## 2024-03-31 ENCOUNTER — MYC REFILL (OUTPATIENT)
Dept: INTERNAL MEDICINE | Facility: CLINIC | Age: 59
End: 2024-03-31
Payer: COMMERCIAL

## 2024-03-31 ENCOUNTER — MYC REFILL (OUTPATIENT)
Dept: DERMATOLOGY | Facility: CLINIC | Age: 59
End: 2024-03-31
Payer: COMMERCIAL

## 2024-03-31 DIAGNOSIS — L71.9 ROSACEA: ICD-10-CM

## 2024-03-31 DIAGNOSIS — J45.30 MILD PERSISTENT ASTHMA WITHOUT COMPLICATION: ICD-10-CM

## 2024-03-31 NOTE — LETTER
April 5, 2024      Oliver Agarwal  [address]  [place] [address]        Dear Oliver,       We recently received a refill request for PROAIR  (90 Base) MCG/ACT inhaler which, unfortunately has been denied. Our records show you are overdue for a visit as the last visit on file was 6/13/22. Please call the clinic at [phone] or you may schedule through [facility].    Thank you,    [facility]

## 2024-04-01 RX ORDER — DOXYCYCLINE 50 MG/1
CAPSULE ORAL
Qty: 90 CAPSULE | Refills: 1 | Status: SHIPPED | OUTPATIENT
Start: 2024-04-01 | End: 2024-09-24

## 2024-04-01 RX ORDER — METRONIDAZOLE 7.5 MG/G
GEL TOPICAL
Qty: 45 G | Refills: 3 | Status: SHIPPED | OUTPATIENT
Start: 2024-04-01

## 2024-04-01 NOTE — TELEPHONE ENCOUNTER
Patient has been to multiple clinics in the past year for asthma.    Has not seen primary care provider since 6/13/2022

## 2024-04-01 NOTE — TELEPHONE ENCOUNTER
Routing to provider for approval/denial since doxy not on protocol    Anisa PARADA RN  Dermatology   [phone]

## 2024-04-01 NOTE — TELEPHONE ENCOUNTER
Sent to pharmacy, please notify patient.   I do need to see him yearly to keep refilling prescriptions please make sure he schedules as I am out until November currently

## 2024-04-02 RX ORDER — ALBUTEROL SULFATE 90 UG/1
AEROSOL, METERED RESPIRATORY (INHALATION)
Qty: 8.5 G | Refills: 11 | OUTPATIENT
Start: 2024-04-02

## 2024-04-16 NOTE — PROGRESS NOTES
Chief Complaint   Patient presents with     Eye Problem Right Eye     eye pain   accompanied by an   Started having irritation on Sunday, and rubbed his R eye in the morning, pain and tearing that has waxed and waned ever since  Tried a drop of red eye relief which did not help so decided to come in  Associated factors: rosacea  No history of injury or surgery    Do you wear contact lenses? No    HPI    Symptoms:     Redness   Tearing   Photophobia      Duration:  3 days   Frequency:  Constant       Do you have eye pain now?:  Yes   Location:  OD   Pain Level:  Severe Pain (7)   Pain Frequency:  Constant                Atiya Kenny, OD     See Review Of Systems     HPI and ROS performed by Optometrist      Medical, surgical and family histories reviewed and updated 12/5/2017.         OBJECTIVE: See Ophthalmology exam    ASSESSMENT:    ICD-10-CM    1. 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

## 2024-04-29 ENCOUNTER — DOCUMENTATION ONLY (OUTPATIENT)
Dept: LAB | Facility: CLINIC | Age: 59
End: 2024-04-29

## 2024-04-29 NOTE — PROGRESS NOTES
Oliver Agarwal has an upcoming lab appointment:    Future Appointments   Date Time Provider Department [place]   4/29/2024  2:30 PM [facility]    4/30/2024 12:40 PM Shavonne Arnett PA-C [facility]     Patient is scheduled for the following lab(s):   Scheduling Notes: lab for A1c and liver   Made On:  Change Notes:  Changed:  Changed: 4/29/2024 8:52 AM  4/29/2024 8:53 AM  4/29/2024 8:55 AM  4/29/2024 8:56 AM By:  By:  By:  By: CIRO UMANZOR LAKISHA NYATICH, YVONNE NYATICH, YVONNE       There is no order available. Please review and place either future orders or HMPO (Review of Health Maintenance Protocol Orders), as appropriate.    Health Maintenance Due   Topic    ANNUAL REVIEW OF HM ORDERS      Terese Vu

## 2024-04-29 NOTE — PROGRESS NOTES
Called the patient and spoke with patient via video relay service.    Gave him the message below and we canceled the lab appointment for today.  Confirmed the appointment for tomorrow.    Annabelle Mata RN

## 2024-04-29 NOTE — PROGRESS NOTES
I don't see that he is due for labs.  He is having a med check with Shavonne tomorrow.    Please call patient and see what labs he is expecting.    Maru Muniz MD  Internal Medicine/Pediatrics  [facility]

## 2024-04-30 ENCOUNTER — OFFICE VISIT (OUTPATIENT)
Dept: PEDIATRICS | Facility: CLINIC | Age: 59
End: 2024-04-30
Payer: COMMERCIAL

## 2024-04-30 VITALS
WEIGHT: 311.8 LBS | DIASTOLIC BLOOD PRESSURE: 62 MMHG | SYSTOLIC BLOOD PRESSURE: 112 MMHG | OXYGEN SATURATION: 93 % | HEART RATE: 71 BPM | BODY MASS INDEX: 48.94 KG/M2 | RESPIRATION RATE: 18 BRPM | TEMPERATURE: 98.5 F

## 2024-04-30 DIAGNOSIS — R73.03 PREDIABETES: ICD-10-CM

## 2024-04-30 DIAGNOSIS — M25.562 LEFT KNEE PAIN, UNSPECIFIED CHRONICITY: ICD-10-CM

## 2024-04-30 DIAGNOSIS — J45.30 MILD PERSISTENT ASTHMA WITHOUT COMPLICATION: Primary | ICD-10-CM

## 2024-04-30 DIAGNOSIS — K76.0 FATTY LIVER: ICD-10-CM

## 2024-04-30 LAB — HBA1C MFR BLD: 5.8 % (ref 0–5.6)

## 2024-04-30 PROCEDURE — 99214 OFFICE O/P EST MOD 30 MIN: CPT | Performed by: PHYSICIAN ASSISTANT

## 2024-04-30 PROCEDURE — 36415 COLL VENOUS BLD VENIPUNCTURE: CPT | Performed by: PHYSICIAN ASSISTANT

## 2024-04-30 PROCEDURE — 83036 HEMOGLOBIN GLYCOSYLATED A1C: CPT | Performed by: PHYSICIAN ASSISTANT

## 2024-04-30 PROCEDURE — 80053 COMPREHEN METABOLIC PANEL: CPT | Performed by: PHYSICIAN ASSISTANT

## 2024-04-30 RX ORDER — BUDESONIDE AND FORMOTEROL FUMARATE DIHYDRATE 160; 4.5 UG/1; UG/1
2 AEROSOL RESPIRATORY (INHALATION) 2 TIMES DAILY
Qty: 10.2 G | Refills: 3 | Status: SHIPPED | OUTPATIENT
Start: 2024-04-30 | End: 2024-08-05

## 2024-04-30 RX ORDER — ALBUTEROL SULFATE 90 UG/1
AEROSOL, METERED RESPIRATORY (INHALATION)
Qty: 8.5 G | Refills: 3 | Status: SHIPPED | OUTPATIENT
Start: 2024-04-30 | End: 2024-07-10

## 2024-04-30 ASSESSMENT — PAIN SCALES - GENERAL: PAINLEVEL: MILD PAIN (2)

## 2024-04-30 NOTE — PROGRESS NOTES
"  Assessment & Plan     Mild persistent asthma without complication    - budesonide-formoterol (SYMBICORT) 160-4.5 MCG/ACT Inhaler; Inhale 2 puffs into the lungs 2 times daily  - PROAIR  (90 Base) MCG/ACT inhaler; INHALE 2 PUFFS BY MOUTH INTO THE LUNGS EVERY 4 TO 6 HOURS AS NEEDED FOR SHORTNESS OF BREATH, DIFFICULT BREATHING OR WHEEZING.    - We will have the patient try Symbicort two times daily for the treatment of asthma.  This is instead of the Advair.  If he does not have coverage for other control therapy, he would like to change back to the Advair 500 and see if that is better.    - Patient was asked to followup in about 3 month after changing his asthma medication to see how his breathing and symptoms are doing.  He should be seen sooner if needed.      Prediabetes    - Hemoglobin A1c; Future  - Hemoglobin A1c    Left knee pain, unspecified chronicity    - Orthopedic  Referral; Future    Fatty liver    - Comprehensive metabolic panel (BMP + Alb, Alk Phos, ALT, AST, Total. Bili, TP); Future  - Comprehensive metabolic panel (BMP + Alb, Alk Phos, ALT, AST, Total. Bili, TP)      Patient understands and agrees with the plan today.        BMI  Estimated body mass index is 48.94 kg/m  as calculated from the following:    Height as of 11/3/23: 1.7 m (5' 6.93\").    Weight as of this encounter: 141.4 kg (311 lb 12.8 oz).       Lisa Boyd is a 59 year old, presenting for the following health issues:  Diabetes (Leg cramp ), Follow Up, Knee Pain (LEFT KNEE CHRONIC ), and Recheck Medication        4/30/2024     1:01 PM   Additional Questions   Roomed by ROLAND Blancas   Accompanied by N/A         4/30/2024     1:01 PM   Patient Reported Additional Medications   Patient reports taking the following new medications n/a     Knee Pain    History of Present Illness     Asthma:  He presents for follow up of asthma.  He has no cough, some wheezing, and no shortness of breath.  He is using a relief " medication 2-3 times per day. He typically misses taking his controller medication 2 time(s) per week. Patient is aware of the following triggers: unaware of any triggers. The patient has not had a visit to the Emergency Room, Urgent Care or Hospital due to asthma since the last clinic visit.     Diabetes:   He presents for follow up of diabetes.    He is not checking blood glucose.        He is concerned about frequent infections and other.   He is having excessive thirst, blurry vision and weight gain.            He eats 2-3 servings of fruits and vegetables daily.He consumes 2 sweetened beverage(s) daily.He exercises with enough effort to increase his heart rate 20 to 29 minutes per day.  He exercises with enough effort to increase his heart rate 3 or less days per week.   He is taking medications regularly.     Patient is here for a followup on his asthma medication.  He is currently taking Advair 250 and reports that it is working pretty well for him, but he does feel like he could have better control of the asthma.  He states that he has been on the Advair 500 in the past and almost felt like that was too much, but he also is not sure that the Advair 250 is enough.  He did need prednisone about 2 times this year due to his asthma flaring up.      He also would like a referral for his knee pain.  This is chronic and it have been many many years, over 10 years since he had any imaging.  But, he is having some issues with the knee pain again, worse in the cold winter months, and he would like to address this again.      He also has pre-diabetes and would like his A1c rechecked.         Review of Systems  Constitutional, neuro, ENT, endocrine, pulmonary, cardiac, gastrointestinal, genitourinary, musculoskeletal, integument and psychiatric systems are negative, except as otherwise noted.      Objective    /62   Pulse 71   Temp 98.5  F (36.9  C) (Oral)   Resp 18   Wt 141.4 kg (311 lb 12.8 oz)   SpO2 93%    BMI 48.94 kg/m    Body mass index is 48.94 kg/m .  Physical Exam   GENERAL: alert and no distress  EYES: Eyes grossly normal to inspection, PERRL and conjunctivae and sclerae normal  NECK: no adenopathy, no asymmetry, masses, or scars  RESP: lungs clear to auscultation - no rales, rhonchi or wheezes  CV: regular rate and rhythm, normal S1 S2, no S3 or S4, no murmur, click or rub, no peripheral edema  MS: no gross musculoskeletal defects noted, no edema          Signed Electronically by: Shavonne Arnett PA-C

## 2024-05-01 LAB
ALBUMIN SERPL BCG-MCNC: 4.4 G/DL (ref 3.5–5.2)
ALP SERPL-CCNC: 71 U/L (ref 40–150)
ALT SERPL W P-5'-P-CCNC: 44 U/L (ref 0–70)
ANION GAP SERPL CALCULATED.3IONS-SCNC: 10 MMOL/L (ref 7–15)
AST SERPL W P-5'-P-CCNC: 30 U/L (ref 0–45)
BILIRUB SERPL-MCNC: 0.7 MG/DL
BUN SERPL-MCNC: 17.8 MG/DL (ref 6–20)
CALCIUM SERPL-MCNC: 9.6 MG/DL (ref 8.6–10)
CHLORIDE SERPL-SCNC: 104 MMOL/L (ref 98–107)
CREAT SERPL-MCNC: 0.93 MG/DL (ref 0.67–1.17)
DEPRECATED HCO3 PLAS-SCNC: 25 MMOL/L (ref 22–29)
EGFRCR SERPLBLD CKD-EPI 2021: >90 ML/MIN/1.73M2
GLUCOSE SERPL-MCNC: 73 MG/DL (ref 70–99)
POTASSIUM SERPL-SCNC: 4.1 MMOL/L (ref 3.4–5.3)
PROT SERPL-MCNC: 7.3 G/DL (ref 6.4–8.3)
SODIUM SERPL-SCNC: 139 MMOL/L (ref 135–145)

## 2024-05-17 DIAGNOSIS — J45.30 MILD PERSISTENT ASTHMA WITHOUT COMPLICATION: ICD-10-CM

## 2024-05-17 RX ORDER — FLUTICASONE PROPIONATE AND SALMETEROL 250; 50 UG/1; UG/1
POWDER RESPIRATORY (INHALATION)
Qty: 60 EACH | Refills: 0 | Status: SHIPPED | OUTPATIENT
Start: 2024-05-17 | End: 2024-06-29

## 2024-05-17 NOTE — RESULT ENCOUNTER NOTE
Milvia Boyd ,    The results from your recent lab work are within normal limits.    The A1c is stable and just in the pre-diabetes range.  Please continue to work on diet and exercise to help keep this number below 6.5.        Thank you for choosing [facility] for your health care needs,      Shavonne Arnett PA-C

## 2024-05-17 NOTE — TELEPHONE ENCOUNTER
Medication is being filled for 1 time refill only due to:   Outdated ACT.  Deb Reynoso RN, BSN  [facility]

## 2024-05-20 ASSESSMENT — ASTHMA QUESTIONNAIRES
ACT_TOTALSCORE: 17
QUESTION_5 LAST FOUR WEEKS HOW WOULD YOU RATE YOUR ASTHMA CONTROL: COMPLETELY CONTROLLED
ACT_TOTALSCORE: 17
QUESTION_3 LAST FOUR WEEKS HOW OFTEN DID YOUR ASTHMA SYMPTOMS (WHEEZING, COUGHING, SHORTNESS OF BREATH, CHEST TIGHTNESS OR PAIN) WAKE YOU UP AT NIGHT OR EARLIER THAN USUAL IN THE MORNING: FOUR OR MORE NIGHTS A WEEK
QUESTION_2 LAST FOUR WEEKS HOW OFTEN HAVE YOU HAD SHORTNESS OF BREATH: ONCE OR TWICE A WEEK
QUESTION_1 LAST FOUR WEEKS HOW MUCH OF THE TIME DID YOUR ASTHMA KEEP YOU FROM GETTING AS MUCH DONE AT WORK, SCHOOL OR AT HOME: A LITTLE OF THE TIME
QUESTION_4 LAST FOUR WEEKS HOW OFTEN HAVE YOU USED YOUR RESCUE INHALER OR NEBULIZER MEDICATION (SUCH AS ALBUTEROL): TWO OR THREE TIMES PER WEEK

## 2024-06-08 ENCOUNTER — HEALTH MAINTENANCE LETTER (OUTPATIENT)
Age: 59
End: 2024-06-08

## 2024-06-29 ENCOUNTER — OFFICE VISIT (OUTPATIENT)
Dept: URGENT CARE | Facility: URGENT CARE | Age: 59
End: 2024-06-29
Payer: COMMERCIAL

## 2024-06-29 VITALS
HEART RATE: 76 BPM | SYSTOLIC BLOOD PRESSURE: 108 MMHG | DIASTOLIC BLOOD PRESSURE: 72 MMHG | OXYGEN SATURATION: 94 % | RESPIRATION RATE: 18 BRPM | TEMPERATURE: 97.9 F

## 2024-06-29 DIAGNOSIS — J45.31 MILD PERSISTENT ASTHMA WITH EXACERBATION: ICD-10-CM

## 2024-06-29 DIAGNOSIS — U07.1 INFECTION DUE TO 2019 NOVEL CORONAVIRUS: Primary | ICD-10-CM

## 2024-06-29 PROCEDURE — 99214 OFFICE O/P EST MOD 30 MIN: CPT | Performed by: PHYSICIAN ASSISTANT

## 2024-06-29 RX ORDER — PREDNISONE 20 MG/1
40 TABLET ORAL DAILY
Qty: 10 TABLET | Refills: 0 | Status: SHIPPED | OUTPATIENT
Start: 2024-06-29 | End: 2024-07-04

## 2024-06-29 RX ORDER — BENZONATATE 100 MG/1
CAPSULE ORAL
Qty: 45 CAPSULE | Refills: 0 | Status: SHIPPED | OUTPATIENT
Start: 2024-06-29

## 2024-06-29 NOTE — PATIENT INSTRUCTIONS
Start paxlovid for COVID infectoin    For your asthma we will give you a steroid burst    You can use over-the-counter medications as below for your symptom relief    If you notice your symptoms continue to worsen, you develop worsening shortness of breath or chest pain or your blood oxygen is consistently below 92% go to the emergency room    Pain, malaise and inflammation:  Ibuprofen and Tylenol for pain and inflammation.  I prefer ibuprofen  Ibuprofen 400-600 mg (2-3 of the 200 mg OTC tablets or 400-600 mg of the children's liquid) up to 4 times daily with food or milk  Tylenol 500-1000 mg every 8 hours as needed    For nasal congestion and drainage  Flonase/fluticasone nasal spray 2 sprays in each nostril once a day for 1 - 4 weeks.  This may take several days to become effective.  Consider saline nasal rinses  Vicks VapoRub  Humidified air or steam    Cough:  Mucinex or guaifenesin can help get mucus out of your body and help with cough.  Dextromethorphan is a cough suppressant that may be helpful  Tessalon Perles may be prescribed  Vicks VapoRub  Humidified air or steam  Teaspoon of honey    Ear fullness or pain:  Flonase as above  Ibuprofen as above  Otovent, which is a balloon you blow up with your nose and helps pop the ears and regulate the pressure can be bought off of [employer] and works quite well

## 2024-06-29 NOTE — PROGRESS NOTES
Chief Complaint   Patient presents with    Urgent Care     Positive for Covid, sick x 1 week, worse over past 2 days. Also has asthma. Having trouble breathing.        ASSESSMENT/PLAN:  Oliver was seen today for urgent care.    Diagnoses and all orders for this visit:    Infection due to 2019 novel coronavirus  -     nirmatrelvir and ritonavir (PAXLOVID) 300 mg/100 mg therapy pack; Take 3 tablets by mouth 2 times daily for 5 days  -     benzonatate (TESSALON) 100 MG capsule; Take 1-2 capsules by mouth up to 3 x a day as needed with food  -     predniSONE (DELTASONE) 20 MG tablet; Take 2 tablets (40 mg) by mouth daily for 5 days    Mild persistent asthma with exacerbation    No need to repeat COVID testing today.  Patient vitally stable at the moment and safe for discharge home with outpatient management.  Multiple risk factors for severe infection.  Will start Paxlovid today   Discussed available options, compared and contrasted current studies, indications, efficacy, safety profile, med interactions, etc.  Wheeze heard throughout the exam.  Started on prednisone for asthma exacerbation secondary to COVID-19.  Tessalon for cough    Symptomatic cares and expected length of symptoms discussed at length and outlined in AVS  Return precautions also discussed    Goldy Carrillo PA-C  38 minutes spent by me on the date of the encounter doing chart review, history and exam, documentation and further activities per the note    SUBJECTIVE:  Oliver is a 59 year old male who presents to urgent care with COVID.  He had a mild runny nose for about 5 days and then yesterday developed cough, fever, chills, body aches, wheeze and shortness of breath.  It has been worse today. Has asthma, recently seen for this and seems to be well-controlled on budesonide and formoterol twice daily.  No chest pain.  Tested positive for COVID yesterday    Phone  used today    ROS: Pertinent ROS neg other than the symptoms  noted above in the HPI.     OBJECTIVE:  /72   Pulse 76   Temp 97.9  F (36.6  C) (Temporal)   Resp 18   SpO2 94%    GENERAL: alert and no distress  EYES: Eyes grossly normal to inspection, PERRL and conjunctivae and sclerae normal  HENT: ear canals and TM's normal, nose and mouth without ulcers or lesions, no significant pharynx erythema  RESP: Wheeze heard bilaterally mid to lower lobes, cough  CV: regular rate and rhythm, normal S1 S2, no S3 or S4, no murmur, click or rub, no peripheral edema   MS: no gross musculoskeletal defects noted, no edema  SKIN: no suspicious lesions or rashes    DIAGNOSTICS    No results found for any visits on 06/29/24.     Current Outpatient Medications   Medication Sig Dispense Refill    budesonide-formoterol (SYMBICORT) 160-4.5 MCG/ACT Inhaler Inhale 2 puffs into the lungs 2 times daily 10.2 g 3    doxycycline hyclate (VIBRAMYCIN) 100 MG capsule Take 1 capsule (100 mg) by mouth 2 times daily 180 capsule 1    fluticasone (FLONASE) 50 MCG/ACT nasal spray INHALE 2 SPRAYS INTO EACH NOSTRIL DAILY AS DIRECTED 48 g 1    azelaic acid (FINACIA) 15 % external gel Apply to face for rosacea BID 50 g 11    cyclobenzaprine (FLEXERIL) 10 MG tablet Take 0.5-1 tablets (5-10 mg) by mouth 3 times daily as needed for muscle spasms 30 tablet 1    doxycycline monohydrate (MONODOX) 100 MG capsule 1 cap PO BID with food and full glass of water 120 capsule 0    doxycycline monohydrate (MONODOX) 50 MG capsule 1 tablet PO daily 90 capsule 1    fluticasone-salmeterol (ADVAIR DISKUS) 250-50 MCG/ACT inhaler INHALE 1 PUFF INTO THE LUNGS EVERY 12 HOURS (Patient not taking: Reported on 6/29/2024) 60 each 0    guaiFENesin-codeine (GUAIFENESIN AC) 100-10 MG/5ML syrup Take 5 mLs by mouth every 4 hours as needed for congestion 118 mL 0    metroNIDAZOLE (METROGEL) 0.75 % external gel Apply to face BID 45 g 3    predniSONE (DELTASONE) 20 MG tablet Take 3 tabs by mouth daily x 3 days, then 2 tabs daily x 3 days,  then 1 tab daily x 3 days, then 1/2 tab daily x 3 days. 20 tablet 0    PROAIR  (90 Base) MCG/ACT inhaler INHALE 2 PUFFS BY MOUTH INTO THE LUNGS EVERY 4 TO 6 HOURS AS NEEDED FOR SHORTNESS OF BREATH, DIFFICULT BREATHING OR WHEEZING. 8.5 g 3    tacrolimus (PROTOPIC) 0.1 % external ointment Apply to AA BID PRN 30 g 5     No current facility-administered medications for this visit.      Patient Active Problem List   Diagnosis    Atopic rhinitis    Prediabetes    Hypovitaminosis D    Mild persistent asthma    Deaf - reads lips well    Corneal erosion, right    Blepharitis of both eyes with rosacea    Dry eyes    Morbid obesity (H)    Asthmatic bronchitis    Dysthymia    Sleep apnea    History of pulmonary embolism    Fatty liver    DDD (degenerative disc disease), lumbar      Past Medical History:   Diagnosis Date    Obesity 10/07/2011     No past surgical history on file.  Family History   Problem Relation Age of Onset    Heart Disease Other     Glaucoma No family hx of     Macular Degeneration No family hx of      Social History     Tobacco Use    Smoking status: Former     Current packs/day: 0.00     Types: Cigarettes     Quit date: 1999     Years since quittin.1    Smokeless tobacco: Never   Substance Use Topics    Alcohol use: No              Was in the room greater than 15 minutes.  Patient did take mask off at times.  The use of Dragon/Tellwiki dictation services may have been used to construct the content in this note; any grammatical or spelling errors are non-intentional. Please contact the author of this note directly if you are in need of any clarification.

## 2024-07-02 ENCOUNTER — TRANSFERRED RECORDS (OUTPATIENT)
Dept: HEALTH INFORMATION MANAGEMENT | Facility: CLINIC | Age: 59
End: 2024-07-02
Payer: COMMERCIAL

## 2024-07-10 ENCOUNTER — MYC REFILL (OUTPATIENT)
Dept: PEDIATRICS | Facility: CLINIC | Age: 59
End: 2024-07-10
Payer: COMMERCIAL

## 2024-07-10 DIAGNOSIS — J45.30 MILD PERSISTENT ASTHMA WITHOUT COMPLICATION: ICD-10-CM

## 2024-07-11 RX ORDER — ALBUTEROL SULFATE 90 UG/1
AEROSOL, METERED RESPIRATORY (INHALATION)
Qty: 8.5 G | Refills: 3 | Status: SHIPPED | OUTPATIENT
Start: 2024-07-11 | End: 2024-10-02

## 2024-07-29 DIAGNOSIS — R73.03 PREDIABETES: Primary | ICD-10-CM

## 2024-07-29 NOTE — TELEPHONE ENCOUNTER
"Received call from patient. Patient is requesting refill for test strips. Patient states he has had episodes of brief dizziness and wants to be sure he is monitoring his blood sugar in case his blood sugar is the cause. Patient states last episode of dizziness was a week ago. Patient states he wasn't sure if he drank enough water or had too much salt, or if it was related to not eating enough food. Lasted 1-2 minutes. This happened one time when he was driving and he pulled over. Patient states he rests and the dizziness goes away. Patient feels he drinks enough water daily and avoids sugary drinks. Left ear has been ringing for the past month, off and on, patient \"has been ignoring it\", no ear pain. Patient put on his hearing aid, and this ringing is a new sound for him. Patient is thinking about talking to audiologist about this.     No current symptoms, patient is requesting refill for test strip rx. Please review and advise.     Patient would prefer call back with update. Ok to [facility].     Negro ARROYO RN 7/29/2024 at 8:01 AM      "

## 2024-07-30 NOTE — TELEPHONE ENCOUNTER
Called patient and he refused to schedule an appointment at this time as he is busy, but he will call back and make one to discuss symptoms.     Nalini Martin, CMA

## 2024-08-05 ENCOUNTER — ANCILLARY PROCEDURE (OUTPATIENT)
Dept: GENERAL RADIOLOGY | Facility: CLINIC | Age: 59
End: 2024-08-05
Attending: PHYSICIAN ASSISTANT
Payer: COMMERCIAL

## 2024-08-05 ENCOUNTER — OFFICE VISIT (OUTPATIENT)
Dept: FAMILY MEDICINE | Facility: CLINIC | Age: 59
End: 2024-08-05
Payer: COMMERCIAL

## 2024-08-05 VITALS
RESPIRATION RATE: 20 BRPM | DIASTOLIC BLOOD PRESSURE: 84 MMHG | TEMPERATURE: 98.6 F | OXYGEN SATURATION: 98 % | BODY MASS INDEX: 47.87 KG/M2 | HEART RATE: 70 BPM | SYSTOLIC BLOOD PRESSURE: 112 MMHG | WEIGHT: 305 LBS | HEIGHT: 67 IN

## 2024-08-05 DIAGNOSIS — J45.31 MILD PERSISTENT ASTHMA WITH EXACERBATION: Primary | ICD-10-CM

## 2024-08-05 DIAGNOSIS — J45.31 MILD PERSISTENT ASTHMA WITH EXACERBATION: ICD-10-CM

## 2024-08-05 LAB
FLUAV RNA SPEC QL NAA+PROBE: NEGATIVE
FLUBV RNA RESP QL NAA+PROBE: NEGATIVE
RSV RNA SPEC NAA+PROBE: NEGATIVE
SARS-COV-2 RNA RESP QL NAA+PROBE: NEGATIVE

## 2024-08-05 PROCEDURE — 71046 X-RAY EXAM CHEST 2 VIEWS: CPT | Mod: TC | Performed by: RADIOLOGY

## 2024-08-05 PROCEDURE — 99214 OFFICE O/P EST MOD 30 MIN: CPT | Performed by: PHYSICIAN ASSISTANT

## 2024-08-05 PROCEDURE — 87637 SARSCOV2&INF A&B&RSV AMP PRB: CPT

## 2024-08-05 RX ORDER — FLUTICASONE PROPIONATE AND SALMETEROL 250; 50 UG/1; UG/1
1 POWDER RESPIRATORY (INHALATION) EVERY 12 HOURS
Qty: 60 EACH | Refills: 3 | Status: SHIPPED | OUTPATIENT
Start: 2024-08-05 | End: 2024-09-24 | Stop reason: DRUGHIGH

## 2024-08-05 RX ORDER — AZITHROMYCIN 250 MG/1
TABLET, FILM COATED ORAL
Qty: 6 TABLET | Refills: 0 | Status: SHIPPED | OUTPATIENT
Start: 2024-08-05 | End: 2024-08-10

## 2024-08-05 RX ORDER — PREDNISONE 20 MG/1
20 TABLET ORAL DAILY
Qty: 5 TABLET | Refills: 0 | Status: SHIPPED | OUTPATIENT
Start: 2024-08-05 | End: 2024-08-10

## 2024-08-05 ASSESSMENT — ENCOUNTER SYMPTOMS
SORE THROAT: 0
FEVER: 0
DIZZINESS: 0
HEADACHES: 0
PALPITATIONS: 0
FATIGUE: 0
HEMATURIA: 0
FACIAL ASYMMETRY: 0
ACTIVITY CHANGE: 0
SEIZURES: 0
SINUS PRESSURE: 1
ABDOMINAL PAIN: 0
DYSURIA: 0
SHORTNESS OF BREATH: 0
COLOR CHANGE: 0
RHINORRHEA: 1
CHILLS: 0
COUGH: 1
DIAPHORESIS: 0
ARTHRALGIAS: 0
EYE PAIN: 0
BACK PAIN: 0
VOMITING: 0
SINUS PAIN: 1
UNEXPECTED WEIGHT CHANGE: 0

## 2024-08-05 ASSESSMENT — PATIENT HEALTH QUESTIONNAIRE - PHQ9
SUM OF ALL RESPONSES TO PHQ QUESTIONS 1-9: 11
10. IF YOU CHECKED OFF ANY PROBLEMS, HOW DIFFICULT HAVE THESE PROBLEMS MADE IT FOR YOU TO DO YOUR WORK, TAKE CARE OF THINGS AT HOME, OR GET ALONG WITH OTHER PEOPLE: SOMEWHAT DIFFICULT
SUM OF ALL RESPONSES TO PHQ QUESTIONS 1-9: 11

## 2024-08-05 NOTE — PROGRESS NOTES
"  Assessment & Plan     Mild persistent asthma with exacerbation  Cough, nasal congestion, sinus pressure and intermittent dizziness over the last 3 days.  Using over-the-counter allergy medication without any improvement.  On exam he does have crackles/Rales in the left lower lung and is also very tight.  He does not appear to be in any respiratory distress.  Sats were 98% on room air.  Suspect a mild asthma exacerbation.  Will place him on prednisone 20 mg x 5 days and azithromycin.  Side effects of both medication discussed.  He will also be heading out of town on a trip for [employer] so will test for COVID, influenza and RSV.  Recommend push fluids and get plenty of rest.  Tylenol and ibuprofen for fevers aches and pains.  Due to the crackles/rales in the left lower lobe we will get a chest x-ray.  He would like to switch back to Advair 2 50-5 due to Symbicort causing a dry throat.  Advair sent to the pharmacy.  Symptoms for prompt reevaluation were discussed.  If symptoms worsen or do not improve he is to follow-up.  Patient agreed to this plan.  Questions were answered  - XR Chest 2 Views; Future  - predniSONE (DELTASONE) 20 MG tablet; Take 1 tablet (20 mg) by mouth daily for 5 days  - azithromycin (ZITHROMAX) 250 MG tablet; Take 2 tablets (500 mg) by mouth daily for 1 day, THEN 1 tablet (250 mg) daily for 4 days.  - Symptomatic Influenza A/B, RSV, & SARS-CoV2 PCR (COVID-19); Future  - fluticasone-salmeterol (ADVAIR) 250-50 MCG/ACT inhaler; Inhale 1 puff into the lungs every 12 hours        BMI  Estimated body mass index is 47.77 kg/m  as calculated from the following:    Height as of this encounter: 1.702 m (5' 7\").    Weight as of this encounter: 138.3 kg (305 lb).   Weight management plan: Discussed healthy diet and exercise guidelines          Lisa Boyd is a 59 year old, presenting for the following health issues:  Sinus Problem (Cough and sinus concern x 2-3 days ago. Pt reports he lives in " the basement of his building and temp is very cold. Has tried OTC meds to control his sinuses. Also, has been experiencing vertigo x 2 months. )        8/5/2024     1:41 PM   Additional Questions   Roomed by Michelle Preston   Accompanied by carlos     Oliver is a 59-year-old male who presents to the clinic today for evaluation on cough, nasal congestion, sinus pressure, and intermittent dizziness.  He has had symptoms for the last 2 to 3 days.  He states that his cough is productive in nature.  He denies any fevers or chills.  No sick contacts that he is aware of.  He has been using over-the-counter allergy medications that have not been helpful.  He states that he is living in a basement where is very musty.  He states that he has been having intermittent dizziness as well.  He does have underlying history of asthma.  Recently was switched from Advair to Symbicort.  He states that he is getting a dry throat from the Symbicort and would like to switch back to Advair.  He was switched to Symbicort due to insurance purposes.  He feels that insurance will pay for the Advair at this point.  Denies any chest pain, shortness of breath.    Sinus Problem   Associated symptoms include congestion, sinus pressure and cough. Pertinent negatives include no chills, no ear pain, no sore throat and no shortness of breath.       Review of Systems   Constitutional:  Negative for activity change, chills, diaphoresis, fatigue, fever and unexpected weight change.   HENT:  Positive for congestion, postnasal drip, rhinorrhea, sinus pressure and sinus pain. Negative for ear pain and sore throat.    Eyes:  Negative for pain and visual disturbance.   Respiratory:  Positive for cough. Negative for shortness of breath.    Cardiovascular:  Negative for chest pain and palpitations.   Gastrointestinal:  Negative for abdominal pain and vomiting.   Genitourinary:  Negative for dysuria and hematuria.   Musculoskeletal:  Negative for arthralgias and  "back pain.   Skin:  Negative for color change and rash.   Neurological:  Negative for dizziness, seizures, syncope, facial asymmetry and headaches.   All other systems reviewed and are negative.          Objective    /84 (BP Location: Right arm, Patient Position: Sitting, Cuff Size: Adult Regular)   Pulse 70   Temp 98.6  F (37  C) (Oral)   Resp 20   Ht 1.702 m (5' 7\")   Wt 138.3 kg (305 lb)   SpO2 98%   BMI 47.77 kg/m    Body mass index is 47.77 kg/m .  Physical Exam  Vitals and nursing note reviewed.   Constitutional:       General: He is not in acute distress.     Appearance: Normal appearance. He is well-developed and well-groomed. He is not ill-appearing or toxic-appearing.   HENT:      Head: Normocephalic and atraumatic.      Right Ear: Tympanic membrane, ear canal and external ear normal.      Left Ear: Tympanic membrane, ear canal and external ear normal.      Mouth/Throat:      Lips: Pink.      Mouth: Mucous membranes are moist.      Palate: No mass.      Pharynx: Oropharynx is clear. Uvula midline.      Tonsils: No tonsillar exudate or tonsillar abscesses.   Eyes:      General: Lids are normal.         Right eye: No discharge.         Left eye: No discharge.   Neck:      Trachea: Trachea normal.   Cardiovascular:      Rate and Rhythm: Normal rate and regular rhythm.      Heart sounds: S1 normal and S2 normal. No murmur heard.  Pulmonary:      Effort: Pulmonary effort is normal. No bradypnea, accessory muscle usage or prolonged expiration.      Breath sounds: Normal air entry. No decreased air movement. Examination of the left-lower field reveals rhonchi. Rhonchi present. No decreased breath sounds, wheezing or rales.   Abdominal:      General: Bowel sounds are normal. There is no distension.      Palpations: Abdomen is soft.      Tenderness: There is no abdominal tenderness. There is no guarding or rebound.   Musculoskeletal:      Cervical back: Full passive range of motion without pain and " neck supple.      Right lower leg: No edema.      Left lower leg: No edema.   Lymphadenopathy:      Cervical: No cervical adenopathy.   Skin:     General: Skin is warm and dry.      Findings: No lesion or rash.   Neurological:      General: No focal deficit present.      Mental Status: He is alert.      Cranial Nerves: No cranial nerve deficit.      Gait: Gait is intact.      Deep Tendon Reflexes:      Reflex Scores:       Patellar reflexes are 2+ on the right side and 2+ on the left side.  Psychiatric:         Attention and Perception: Attention normal.         Mood and Affect: Mood normal.         Speech: Speech normal.                Signed Electronically by: Kanu Grubbs PA-C    Answers submitted by the patient for this visit:  Patient Health Questionnaire (Submitted on 8/5/2024)  If you checked off any problems, how difficult have these problems made it for you to do your work, take care of things at home, or get along with other people?: Somewhat difficult  PHQ9 TOTAL SCORE: 11

## 2024-08-14 ENCOUNTER — TELEPHONE (OUTPATIENT)
Dept: PULMONOLOGY | Facility: CLINIC | Age: 59
End: 2024-08-14
Payer: COMMERCIAL

## 2024-08-14 DIAGNOSIS — R05.3 CHRONIC COUGH: ICD-10-CM

## 2024-08-14 DIAGNOSIS — J45.909 ASTHMA: Primary | ICD-10-CM

## 2024-08-14 NOTE — TELEPHONE ENCOUNTER
[facility] Call Center    Phone Message    May a detailed message be left on voicemail: yes     Reason for Call: Appointment Intake    Referring Provider Name: Dr. Oleg Coelho  Diagnosis and/or Symptoms: Asthma/chronic cough    Pt is scheduled to establish care 8/16/24 with Noni DOE CNP, will have PFT's completed @ [facility] earlier that same morning-needs orders placed for PFT's (pended).     Action Taken: Other: Pulm    Travel Screening: Not Applicable

## 2024-08-16 DIAGNOSIS — G47.33 OBSTRUCTIVE SLEEP APNEA (ADULT) (PEDIATRIC): Primary | ICD-10-CM

## 2024-08-19 ENCOUNTER — MYC MEDICAL ADVICE (OUTPATIENT)
Dept: PEDIATRICS | Facility: CLINIC | Age: 59
End: 2024-08-19
Payer: COMMERCIAL

## 2024-08-19 NOTE — TELEPHONE ENCOUNTER
See patient's [place] message   - Patient wondering if he needs to recheck his A1c   - Patient states that his blood glucose today (8/19/2024) at 6:26 AM was 155 mg/dL   - Patient wondering what the normal blood glucose range is     Component      Latest Ref Rng 4/30/2024  1:50 PM   Hemoglobin A1C      0.0 - 5.6 % 5.8 (H)      Per Micaela, the glucose Reference Range is: 70-99 mg/dL     Dr. Muniz, please review and advise. Is patient okay to wait to have his A1c rechecked as it was last completed 3 months ago? Would you recommend that it be rechecked around 10/30/2024 (6 months from his previous check)?     María CERDA RN   [facility]

## 2024-08-20 NOTE — TELEPHONE ENCOUNTER
He can have it rechecked now, especially if he is getting fasting readings of 155.    Looks like he is seeing Dr. Chappell tomorrow - it can be done at that time, just advise patient to ask Dr. Chappell.    Maru Muniz MD  Internal Medicine/Pediatrics  [facility]

## 2024-08-20 NOTE — TELEPHONE ENCOUNTER
Chico is a 24 year old year old female here for an OB check.  She is      .  Gestational Age: 24w1d.  Concerns today include: none    She reports fetal movement.  She denies bleeding.  She reports cramping.  She reports nausea.  She reports breast tenderness.    GLU:Neg  CRYSTAL: Neg  KET: Neg  SG:>1.030  BLO:Neg  PH: 5.5  PRO: Ne URO.2  NIT: Neg LEUK: Tr    Late pregnancy booklet was given to patient, including [facility] Birth Plan and Pre-admission forms. She was given \"High iron foods\" form so she is aware which some foods that are iron enriched for her diet. Patient informed on tdap vaccination and was given VIS form regarding information for injection to be given at 28-36wks gestation. She was also given the \"Am I in labor\" letter.  All of these forms were give to patient in the green information folder at this 24 weeks gestation appointment.     RH(-) No    Orders entered for CBC.   Sent a [employer] message to the patient   - Informed the patient of Dr. Muniz's comments/recommendations     María CERDA RN   [facility]

## 2024-08-21 ENCOUNTER — OFFICE VISIT (OUTPATIENT)
Dept: PEDIATRICS | Facility: CLINIC | Age: 59
End: 2024-08-21
Payer: COMMERCIAL

## 2024-08-21 VITALS
BODY MASS INDEX: 46.38 KG/M2 | RESPIRATION RATE: 20 BRPM | TEMPERATURE: 97.1 F | SYSTOLIC BLOOD PRESSURE: 104 MMHG | HEART RATE: 57 BPM | OXYGEN SATURATION: 94 % | HEIGHT: 68 IN | DIASTOLIC BLOOD PRESSURE: 66 MMHG

## 2024-08-21 DIAGNOSIS — J45.30 MILD PERSISTENT ASTHMA WITHOUT COMPLICATION: Primary | ICD-10-CM

## 2024-08-21 DIAGNOSIS — E66.813 CLASS 3 OBESITY: ICD-10-CM

## 2024-08-21 DIAGNOSIS — R73.03 PREDIABETES: ICD-10-CM

## 2024-08-21 LAB — HBA1C MFR BLD: 5.9 % (ref 0–5.6)

## 2024-08-21 PROCEDURE — 99214 OFFICE O/P EST MOD 30 MIN: CPT | Performed by: INTERNAL MEDICINE

## 2024-08-21 PROCEDURE — 83036 HEMOGLOBIN GLYCOSYLATED A1C: CPT | Performed by: INTERNAL MEDICINE

## 2024-08-21 PROCEDURE — 36415 COLL VENOUS BLD VENIPUNCTURE: CPT | Performed by: INTERNAL MEDICINE

## 2024-08-21 RX ORDER — FLUTICASONE PROPIONATE AND SALMETEROL 500; 50 UG/1; UG/1
1 POWDER RESPIRATORY (INHALATION) EVERY 12 HOURS
Qty: 180 EACH | Refills: 3 | Status: SHIPPED | OUTPATIENT
Start: 2024-08-21

## 2024-08-21 ASSESSMENT — PAIN SCALES - GENERAL: PAINLEVEL: NO PAIN (0)

## 2024-08-21 NOTE — PROGRESS NOTES
Assessment & Plan       ICD-10-CM    1. Mild persistent asthma without complication  J45.30 fluticasone-salmeterol (ADVAIR) 500-50 MCG/ACT inhaler      2. Prediabetes  R73.03 Hemoglobin A1c     Hemoglobin A1c      3. Class 3 obesity (H)  E66.01         Asthma, noting increased overall sx since changing to Advair. OK to increase to 500 mcg dosing. New rx sent to his pharmacy.    Pre-diabetes. We reviewed goals for glucose control.   Encouraged weight loss, still in the class 3 obesity range.     Results for orders placed or performed in visit on 08/21/24   Hemoglobin A1c     Status: Abnormal   Result Value Ref Range    Hemoglobin A1C 5.9 (H) 0.0 - 5.6 %     A1C remains in the pre-diabetes range. No medications are needed at this time.     Cy Chappell MD      Lisa Boyd is a 59 year old, presenting for the following health issues:  Diabetes        8/21/2024     8:24 AM   Additional Questions   Roomed by Ella      Accompanied by ESL interpretor        8/21/2024   Declines Weight   Did patient decline having their weight taken? Yes          8/21/2024     8:24 AM   Patient Reported Additional Medications   Patient reports taking the following new medications none     HPI     Pre-diabetes Follow-up    How often are you checking your blood sugar? A few times a week  What time of day are you checking your blood sugars (select all that apply)?  Before meals  Have you had any blood sugars above 200?  No  Have you had any blood sugars below 70?  No  What symptoms do you notice when your blood sugar is low?  None  What concerns do you have today about your diabetes? None   Do you have any of these symptoms? (Select all that apply)  Excessive thirst and Weight gain    He notes some higher blood sugar readings at home, as high as 155.   We reviewed past A1C results.  Also reviewed weight. Discussed role of obesity with blood sugar levels.     BP Readings from Last 2 Encounters:   08/21/24 104/66   08/05/24  "112/84     Hemoglobin A1C (%)   Date Value   04/30/2024 5.8 (H)   12/28/2023 5.5   07/07/2020 5.7 (H)   01/23/2020 5.6     LDL Cholesterol Calculated (mg/dL)   Date Value   04/14/2023 79   07/11/2022 116 (H)   06/11/2021 104 (H)   01/23/2020 73       Also has asthma. Recent exacerbation. Required po steroids.  Had a change with his controller inhaler, changed to Advair.   He feels the 250 mcg dose is not working as well as the previous Symbicort. Would like to increase to 500 mcg.         Objective    /66 (BP Location: Right arm, Patient Position: Sitting, Cuff Size: Adult Large)   Pulse 57   Temp 97.1  F (36.2  C) (Tympanic)   Resp 20   Ht 1.727 m (5' 8\")   SpO2 94%   BMI 46.38 kg/m    Body mass index is 46.38 kg/m .  Physical Exam   GEN: No distress  SKIN: No rashes  NECK: Supple  LUNGS: Clear to auscultation bilaterally. No rhonchi, rales, wheezes or retractions.  CV: Regular rate and rhythm.  No murmurs, rubs or gallops. Pulses 2+ radial.   EXTR: no edema          Signed Electronically by: Cy Chappell MD    "

## 2024-09-24 ENCOUNTER — OFFICE VISIT (OUTPATIENT)
Dept: PULMONOLOGY | Facility: CLINIC | Age: 59
End: 2024-09-24
Attending: NURSE PRACTITIONER
Payer: COMMERCIAL

## 2024-09-24 ENCOUNTER — LAB (OUTPATIENT)
Dept: LAB | Facility: HOSPITAL | Age: 59
End: 2024-09-24
Payer: COMMERCIAL

## 2024-09-24 VITALS
OXYGEN SATURATION: 94 % | HEIGHT: 67 IN | BODY MASS INDEX: 48.15 KG/M2 | HEART RATE: 58 BPM | WEIGHT: 306.8 LBS | SYSTOLIC BLOOD PRESSURE: 116 MMHG | DIASTOLIC BLOOD PRESSURE: 74 MMHG

## 2024-09-24 DIAGNOSIS — J45.40 MODERATE PERSISTENT ASTHMA WITHOUT COMPLICATION: ICD-10-CM

## 2024-09-24 DIAGNOSIS — R05.3 CHRONIC COUGH: ICD-10-CM

## 2024-09-24 DIAGNOSIS — J45.909 ASTHMA: ICD-10-CM

## 2024-09-24 DIAGNOSIS — J45.40 MODERATE PERSISTENT ASTHMA WITHOUT COMPLICATION: Primary | ICD-10-CM

## 2024-09-24 DIAGNOSIS — J30.2 SEASONAL ALLERGIC RHINITIS, UNSPECIFIED TRIGGER: ICD-10-CM

## 2024-09-24 LAB
BASOPHILS # BLD AUTO: 0 10E3/UL (ref 0–0.2)
BASOPHILS NFR BLD AUTO: 1 %
EOSINOPHIL # BLD AUTO: 0.5 10E3/UL (ref 0–0.7)
EOSINOPHIL NFR BLD AUTO: 7 %
ERYTHROCYTE [DISTWIDTH] IN BLOOD BY AUTOMATED COUNT: 12.5 % (ref 10–15)
HCT VFR BLD AUTO: 46.5 % (ref 40–53)
HGB BLD-MCNC: 16.1 G/DL (ref 13.3–17.7)
HGB BLD-MCNC: 16.2 G/DL
IMM GRANULOCYTES # BLD: 0 10E3/UL
IMM GRANULOCYTES NFR BLD: 0 %
LYMPHOCYTES # BLD AUTO: 1.8 10E3/UL (ref 0.8–5.3)
LYMPHOCYTES NFR BLD AUTO: 28 %
MCH RBC QN AUTO: 31.4 PG (ref 26.5–33)
MCHC RBC AUTO-ENTMCNC: 34.6 G/DL (ref 31.5–36.5)
MCV RBC AUTO: 91 FL (ref 78–100)
MONOCYTES # BLD AUTO: 0.6 10E3/UL (ref 0–1.3)
MONOCYTES NFR BLD AUTO: 9 %
NEUTROPHILS # BLD AUTO: 3.5 10E3/UL (ref 1.6–8.3)
NEUTROPHILS NFR BLD AUTO: 55 %
NRBC # BLD AUTO: 0 10E3/UL
NRBC BLD AUTO-RTO: 0 /100
PLATELET # BLD AUTO: 149 10E3/UL (ref 150–450)
RBC # BLD AUTO: 5.13 10E6/UL (ref 4.4–5.9)
WBC # BLD AUTO: 6.4 10E3/UL (ref 4–11)

## 2024-09-24 PROCEDURE — 85018 HEMOGLOBIN: CPT | Mod: QW

## 2024-09-24 PROCEDURE — 94726 PLETHYSMOGRAPHY LUNG VOLUMES: CPT | Performed by: INTERNAL MEDICINE

## 2024-09-24 PROCEDURE — 94729 DIFFUSING CAPACITY: CPT | Performed by: INTERNAL MEDICINE

## 2024-09-24 PROCEDURE — 36415 COLL VENOUS BLD VENIPUNCTURE: CPT

## 2024-09-24 PROCEDURE — 94375 RESPIRATORY FLOW VOLUME LOOP: CPT | Performed by: INTERNAL MEDICINE

## 2024-09-24 PROCEDURE — 99204 OFFICE O/P NEW MOD 45 MIN: CPT | Performed by: NURSE PRACTITIONER

## 2024-09-24 PROCEDURE — 82785 ASSAY OF IGE: CPT

## 2024-09-24 PROCEDURE — 85025 COMPLETE CBC W/AUTO DIFF WBC: CPT

## 2024-09-24 ASSESSMENT — ASTHMA QUESTIONNAIRES
QUESTION_2 LAST FOUR WEEKS HOW OFTEN HAVE YOU HAD SHORTNESS OF BREATH: ONCE OR TWICE A WEEK
QUESTION_1 LAST FOUR WEEKS HOW MUCH OF THE TIME DID YOUR ASTHMA KEEP YOU FROM GETTING AS MUCH DONE AT WORK, SCHOOL OR AT HOME: SOME OF THE TIME
QUESTION_3 LAST FOUR WEEKS HOW OFTEN DID YOUR ASTHMA SYMPTOMS (WHEEZING, COUGHING, SHORTNESS OF BREATH, CHEST TIGHTNESS OR PAIN) WAKE YOU UP AT NIGHT OR EARLIER THAN USUAL IN THE MORNING: ONCE OR TWICE
EMERGENCY_ROOM_LAST_YEAR_TOTAL: THREE OR MORE
ACT_TOTALSCORE: 18
QUESTION_5 LAST FOUR WEEKS HOW WOULD YOU RATE YOUR ASTHMA CONTROL: WELL CONTROLLED
ACT_TOTALSCORE: 18
QUESTION_4 LAST FOUR WEEKS HOW OFTEN HAVE YOU USED YOUR RESCUE INHALER OR NEBULIZER MEDICATION (SUCH AS ALBUTEROL): TWO OR THREE TIMES PER WEEK

## 2024-09-24 NOTE — PATIENT INSTRUCTIONS
It was a pleasure seeing you in clinic today. This is what we discussed:    We will get some blood work to see if you have allergy triggers to your symptoms.   Continue the Advair (500), 1 puff twice daily. Rinse and gargle after use.   If you have another flare let me know. We will increase your inhaler.   Use your albuterol every 4 hours as needed for cough, shortness of breath, wheeze, or chest tightness.   Follow up 6 months after repeat lung test.   If you have worsening symptoms, questions, or need to speak with the nurse please call [phone].

## 2024-09-24 NOTE — PROGRESS NOTES
Pulmonary Outpatient Consult Note  September 24, 2024      Assessment and Plan:   Oliver Agarwal is a 59 year old M, former smoker, with history of asthma, ARNULFO, allergic rhinitis, PE (2020, took Xarelto for 6 months) presenting today for evaluation of asthma.   Reports asthma diagnosis as a young adult.  Managed well on albuterol alone for many years.  For the past 20 years or so has been on ICS/LABA.  Reports 1-2 exacerbations yearly. He has become concerned after requiring his action plan 4 times in the last year.  Symptoms resolved and returned to baseline in between flares.  History of environmental allergies.  Unsure if these are triggering the increase symptoms.    PFTs today show moderately severe obstruction, no bronchodilator response tested. There is scooping of flow-volume loop. There is hyperinflation and air trapping.  Diffusing capacity when corrected for hemoglobin is normal. FVC and pleth testing did not meet ATS criteria.  CXR on 8/5 was normal. CT chest in 2020 demonstrated normal lung parenchyma.  No emphysema. There was a small area of GGO, and had COVID-19 at the time.  Eosinophils in 2020 were normal at 0.3.   Lung exam and SpO2 on room air normal today.    #. Asthma,?  COPD overlap: ACT 18 today. Given his smoking history, hyperinflation, and scooping noted on flow-volume loop I am suspicious he has both asthma and COPD.  Without bronchodilator response tested cannot confirm this. He has no emphysema so at this could be poorly controlled asthma. Cannot rule out allergic trigger. He recently increased his Advair dose to 500/50, less than 1 month ago.  As he has noted some improvement with this we will continue this use for the next 6 months and retest spirometry. Low threshold to add LAMA if he has another exacerbation or no change in spirometry.  Continue Advair (500), 1 puff twice daily.  Rinse and gargle after each use.  Albuterol as needed  IgE and CBC with differential. If abnormal  consider Singulair trial or referral to allergy.  Repeat spirometry with bronchodilator response in 6 months.  #.  Allergic rhinitis: Possibly contributing to symptoms.  No current nasal congestion or postnasal drip.   Continue Flonase nasal spray  #.  HCM: UTD with COVID-vaccine (12/2021), PPSV23, Tdap (2009)  Has not had PCV 20.  Will address at follow-up.  Recommend updated COVID booster and Tdap  Recommend seasonal flu vaccine in the next few months    If symptoms exacerbate: Prednisone 40 mg daily x 5 days.  Encourage albuterol use every 4 hours while awake.    RTC 6 months, after PFTs    Noni Grant, CNP  Pulmonary Medicine  ______________________________________________________________________________    CC:   Chief Complaint   Patient presents with    Consult     J45.51 (ICD-10-CM) - Severe persistent asthma with exacerbation (H28)  PFT 9/24/24     HPI:   Oliver presents today for evaluation of asthma.  He is being seen with the help of an in person .  Diagnosed with asthma as a young adult. Managed well with albuterol alone for many years. Started a maintenance inhaler around 2002.     Treated for exacerbation on 8/5/2024. Viral panel negative. Given azithromycin and prednisone. Has need action plan 4 times over the past year. Typically needs action plan a few times per year.     Reported asthma triggers: cold air, smoke, strong perfumes. Did not have obvious issues with poor air quality or humid weather over the summer.   Has some seasonal allergies.   Symbicort caused dry throat, switched to Advair 250-50.         11/3/2023     1:21 PM 5/20/2024     9:14 AM 9/24/2024    10:00 AM   ACT Total Scores   ACT TOTAL SCORE (Goal Greater than or Equal to 20) 21 17 18   In the past 12 months, how many times did you visit the emergency room for your asthma without being admitted to the hospital? 0 0 3   In the past 12 months, how many times were you hospitalized overnight because of your asthma?  0 0 0     PMH:  Patient Active Problem List    Diagnosis Date Noted    DDD (degenerative disc disease), lumbar 06/04/2023     Priority: Medium    Fatty liver 01/21/2023     Priority: Medium    History of pulmonary embolism 09/28/2020     Priority: Medium     Unprovoked PE in 7/2020. Oliver self-discontinued Xarelto.      Class 3 obesity (H) 06/18/2018     Priority: Medium    Dry eyes 12/19/2017     Priority: Medium    Corneal erosion, right 12/05/2017     Priority: Medium    Blepharitis of both eyes with rosacea 12/05/2017     Priority: Medium    Deaf - reads lips well 02/07/2014     Priority: Medium    Mild persistent asthma 04/12/2013     Priority: Medium    Dysthymia 01/17/2012     Priority: Medium    Hypovitaminosis D 12/27/2011     Priority: Medium    Atopic rhinitis 12/13/2011     Priority: Medium     (Problem list name updated by automated process. Provider to review and confirm.)      Prediabetes 12/13/2011     Priority: Medium    Sleep apnea 10/12/2006     Priority: Medium    Asthmatic bronchitis 06/07/2003     Priority: Medium     PSH:  No past surgical history on file.    Current Meds:  Current Outpatient Medications   Medication Sig Dispense Refill    azelaic acid (FINACIA) 15 % external gel Apply to face for rosacea BID 50 g 11    blood glucose (NO BRAND SPECIFIED) test strip Use to test blood sugar 1 times daily or as directed. 100 strip 5    cyclobenzaprine (FLEXERIL) 10 MG tablet Take 0.5-1 tablets (5-10 mg) by mouth 3 times daily as needed for muscle spasms 30 tablet 1    doxycycline monohydrate (MONODOX) 50 MG capsule 1 tablet PO daily 90 capsule 1    fluticasone (FLONASE) 50 MCG/ACT nasal spray INHALE 2 SPRAYS INTO EACH NOSTRIL DAILY AS DIRECTED 48 g 1    fluticasone-salmeterol (ADVAIR) 500-50 MCG/ACT inhaler Inhale 1 puff into the lungs every 12 hours. 180 each 3    metroNIDAZOLE (METROGEL) 0.75 % external gel Apply to face BID 45 g 3    PROAIR  (90 Base) MCG/ACT inhaler INHALE 2  "PUFFS BY MOUTH INTO THE LUNGS EVERY 4 TO 6 HOURS AS NEEDED FOR SHORTNESS OF BREATH, DIFFICULT BREATHING OR WHEEZING. 8.5 g 3    tacrolimus (PROTOPIC) 0.1 % external ointment Apply to AA BID PRN 30 g 5    benzonatate (TESSALON) 100 MG capsule Take 1-2 capsules by mouth up to 3 x a day as needed with food (Patient not taking: Reported on 9/24/2024) 45 capsule 0    fluticasone-salmeterol (ADVAIR) 250-50 MCG/ACT inhaler Inhale 1 puff into the lungs every 12 hours 60 each 3     No current facility-administered medications for this visit.     Allergies:  Allergies   Allergen Reactions    Egg White (Egg Protein) Difficulty breathing     Social Hx:  Marital status: lives with wife   Resides in a basement of building, ? concern for mold.  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    service: none  Pets: cats   Smoking history: 17 pack year history  Alcohol use: none    Recreational drug use: no current use, no vape  Hobbies: past woodworking. None currently.     Family HX: family history includes Heart Disease in an other family member.    ROS:   10-point review performed and notable for the above-mentioned symptoms. The remainder reviewed and negative.     Physical Exam:  /74 (BP Location: Left arm, Patient Position: Sitting, Cuff Size: Adult Large)   Pulse 58   Ht 1.702 m (5' 7\")   Wt 139.2 kg (306 lb 12.8 oz)   SpO2 94%   BMI 48.05 kg/m      Physical Exam  Constitutional:       General: He is not in acute distress.     Appearance: He is obese. He is not ill-appearing.   Cardiovascular:      Rate and Rhythm: Normal rate and regular rhythm.      Heart sounds: Normal heart sounds.   Pulmonary:      Effort: Pulmonary effort is normal. No respiratory distress.      Breath sounds: No wheezing or rhonchi.   Musculoskeletal:      Right lower leg: No edema.      Left lower leg: No edema.   Neurological:      Mental Status: He is alert.   Psychiatric:         Behavior: Behavior normal. "         PFT's     9/24/2024:      Impression:  Full Pulmonary Function Test is abnormal.  PFTs are consistent with moderate obstructive disease.  There is hyperinflation.  There is air-trapping.  Diffusion capacity when corrected for hemoglobin is not reduced.    Labs:   personally reviewed in the EMR.   Latest Reference Range & Units 07/20/20 15:44   Absolute Eosinophils 0.0 - 0.7 10e9/L 0.3     Imaging studies: personally reviewed and interpreted. Below are the Radiology interpretations.    XR CHEST 2 VIEWS, DATE: 8/5/2024  INDICATION:  Mild persistent asthma with exacerbation  COMPARISON: CT 7/9/2020                                                                IMPRESSION: Minimal scarring versus atelectasis in the left lung base. Lungs otherwise clear without pulmonary consolidation or pleural fluid. Normal heart size and pulmonary vascularity. Degenerative changes thoracic spine.    CT CHEST PULMONARY EMBOLISM W CONTRAST, DATE/TIME: 7/9/2020 2:57 AM  INDICATION: Pleuritic chest pain. Elevated d-dimer.  COMPARISON: None.  FINDINGS:  ANGIOGRAM CHEST: No saddle or central pulmonary emboli. Small peripheral pulmonary emboli right lower lobe posteriorly. No aneurysm involving the thoracic aorta. No CT evidence of heart strain.  LUNGS AND PLEURA: No pneumothorax. No infiltrates. The right base image 141 series 7 demonstrates area of groundglass opacity.                                                              IMPRESSION:  1.  Pulmonary emboli right lower lobe posteriorly.  2.  Small clot burden.  3.  Groundglass opacity right base which may represent area of infarction however inflammatory or infectious process could have a similar appearance. Covid-19 pneumonia can have this appearance.  4.  Hepatic steatosis.

## 2024-09-25 LAB
DLCOCOR-%PRED-PRE: 120 %
DLCOCOR-PRE: 30.9 ML/MIN/MMHG
DLCOUNC-%PRED-PRE: 125 %
DLCOUNC-PRE: 32.21 ML/MIN/MMHG
DLCOUNC-PRED: 25.63 ML/MIN/MMHG
EXPTIME-PRE: 13.53 SEC
FEF2575-%PRED-PRE: 21 %
FEF2575-PRE: 0.58 L/SEC
FEF2575-PRED: 2.75 L/SEC
FEFMAX-%PRED-PRE: 52 %
FEFMAX-PRE: 4.54 L/SEC
FEFMAX-PRED: 8.72 L/SEC
FEV1-%PRED-PRE: 52 %
FEV1-PRE: 1.64 L
FEV1FEV6-PRE: 57 %
FEV1FEV6-PRED: 79 %
FEV1FVC-PRE: 51 %
FEV1FVC-PRED: 79 %
FEV1SVC-PRE: 47 %
FEV1SVC-PRED: 73 %
FIFMAX-PRE: 5.25 L/SEC
FRCPLETH-%PRED-PRE: 131 %
FRCPLETH-PRE: 4.48 L
FRCPLETH-PRED: 3.41 L
FVC-%PRED-PRE: 82 %
FVC-PRE: 3.23 L
FVC-PRED: 3.92 L
IC-%PRED-PRE: 121 %
IC-PRE: 3.49 L
IC-PRED: 2.87 L
IGE SERPL-ACNC: 231 KU/L (ref 0–114)
RVPLETH-%PRED-PRE: 197 %
RVPLETH-PRE: 4.48 L
RVPLETH-PRED: 2.27 L
TLCPLETH-%PRED-PRE: 122 %
TLCPLETH-PRE: 7.98 L
TLCPLETH-PRED: 6.52 L
VA-%PRED-PRE: 108 %
VA-PRE: 6.43 L
VC-%PRED-PRE: 82 %
VC-PRE: 3.49 L
VC-PRED: 4.23 L

## 2024-09-25 RX ORDER — MONTELUKAST SODIUM 10 MG/1
10 TABLET ORAL AT BEDTIME
Qty: 30 TABLET | Refills: 11 | Status: SHIPPED | OUTPATIENT
Start: 2024-09-25

## 2024-10-02 DIAGNOSIS — J45.30 MILD PERSISTENT ASTHMA WITHOUT COMPLICATION: ICD-10-CM

## 2024-10-02 RX ORDER — ALBUTEROL SULFATE 90 UG/1
INHALANT RESPIRATORY (INHALATION)
Qty: 8.5 G | Refills: 3 | Status: SHIPPED | OUTPATIENT
Start: 2024-10-02

## 2024-12-30 DIAGNOSIS — L71.9 ACNE ROSACEA: ICD-10-CM

## 2024-12-30 RX ORDER — DOXYCYCLINE 100 MG/1
100 CAPSULE ORAL 2 TIMES DAILY
Qty: 180 CAPSULE | Refills: 1 | Status: CANCELLED | OUTPATIENT
Start: 2024-12-30

## 2024-12-30 NOTE — TELEPHONE ENCOUNTER
Patient Contact    Attempt # 1    Was call answered?  No left message with American Sign language for patient to call regarding refill request. Needs appointment- patient should call clinic back and ask for a derm nurse to help schedule appointment      Thank you,    Lashonda ADLERRN BSN  [facility]- [phone]

## 2025-01-21 ENCOUNTER — TELEPHONE (OUTPATIENT)
Dept: PEDIATRICS | Facility: CLINIC | Age: 60
End: 2025-01-21
Payer: COMMERCIAL

## 2025-01-21 DIAGNOSIS — R73.03 PREDIABETES: Primary | ICD-10-CM

## 2025-01-21 NOTE — TELEPHONE ENCOUNTER
RN called and spoke with patient via . Patient wants his A1c checked. Patient says he wants to keep monitoring it. No other labs or concerns.     Negro ARROYO RN 1/21/2025 at 4:13 PM

## 2025-01-21 NOTE — TELEPHONE ENCOUNTER
Reason for Call:  Appointment Request    Patient requesting this type of appt: Lab    Requested provider: Cy Chappell    Reason patient unable to be scheduled: Appointment requires orders that are not found in chart    When does patient want to be seen/preferred time: 3-7 days    Comments: Patient is hoping to have orders sent over for blood work, he is refusing appointment. He just wants blood work done   Could we send this information to you in [facility] or would you prefer to receive a phone call?:   Patient would prefer a phone call   Okay to leave a detailed message?: Yes at Home number on file [phone] (home)    Call taken on 1/21/2025 at 1:26 PM by Adriana Stanford

## 2025-02-22 DIAGNOSIS — J45.30 MILD PERSISTENT ASTHMA WITHOUT COMPLICATION: ICD-10-CM

## 2025-02-23 ENCOUNTER — OFFICE VISIT (OUTPATIENT)
Dept: URGENT CARE | Facility: URGENT CARE | Age: 60
End: 2025-02-23
Payer: COMMERCIAL

## 2025-02-23 VITALS
HEART RATE: 72 BPM | RESPIRATION RATE: 20 BRPM | OXYGEN SATURATION: 92 % | DIASTOLIC BLOOD PRESSURE: 81 MMHG | SYSTOLIC BLOOD PRESSURE: 128 MMHG | TEMPERATURE: 97.6 F

## 2025-02-23 DIAGNOSIS — J45.30 MILD PERSISTENT ASTHMA WITHOUT COMPLICATION: ICD-10-CM

## 2025-02-23 DIAGNOSIS — J45.31 MILD PERSISTENT ASTHMA WITH EXACERBATION: Primary | ICD-10-CM

## 2025-02-23 DIAGNOSIS — J01.01 ACUTE RECURRENT MAXILLARY SINUSITIS: ICD-10-CM

## 2025-02-23 RX ORDER — ALBUTEROL SULFATE 90 UG/1
INHALANT RESPIRATORY (INHALATION)
Qty: 8.5 G | Refills: 3 | Status: SHIPPED | OUTPATIENT
Start: 2025-02-23

## 2025-02-23 RX ORDER — FLUTICASONE PROPIONATE AND SALMETEROL 500; 50 UG/1; UG/1
1 POWDER RESPIRATORY (INHALATION) EVERY 12 HOURS
Qty: 180 EACH | Refills: 3 | Status: SHIPPED | OUTPATIENT
Start: 2025-02-23

## 2025-02-23 RX ORDER — PREDNISONE 20 MG/1
40 TABLET ORAL DAILY
Qty: 10 TABLET | Refills: 0 | Status: SHIPPED | OUTPATIENT
Start: 2025-02-23

## 2025-02-23 RX ORDER — DOXYCYCLINE 100 MG/1
100 CAPSULE ORAL 2 TIMES DAILY
Qty: 20 CAPSULE | Refills: 0 | Status: SHIPPED | OUTPATIENT
Start: 2025-02-23 | End: 2025-03-05

## 2025-02-23 NOTE — PROGRESS NOTES
"  Assessment & Plan   Problem List Items Addressed This Visit       Mild persistent asthma    Relevant Medications    albuterol (PROAIR HFA) 108 (90 Base) MCG/ACT inhaler    fluticasone-salmeterol (ADVAIR) 500-50 MCG/ACT inhaler     Other Visit Diagnoses       Mild persistent asthma with exacerbation    -  Primary    Relevant Medications    predniSONE (DELTASONE) 20 MG tablet    albuterol (PROAIR HFA) 108 (90 Base) MCG/ACT inhaler    fluticasone-salmeterol (ADVAIR) 500-50 MCG/ACT inhaler    Acute recurrent maxillary sinusitis        Relevant Medications    predniSONE (DELTASONE) 20 MG tablet    albuterol (PROAIR HFA) 108 (90 Base) MCG/ACT inhaler    fluticasone-salmeterol (ADVAIR) 500-50 MCG/ACT inhaler    doxycycline hyclate (VIBRAMYCIN) 100 MG capsule           Patient advised if symptoms persist, worsen or new symptoms arise they are to seek medical care.  All patients questions addressed. Patient verbalized understanding and agreement with plan.          BMI  Estimated body mass index is 48.05 kg/m  as calculated from the following:    Height as of 9/24/24: 1.702 m (5' 7\").    Weight as of 9/24/24: 139.2 kg (306 lb 12.8 oz).           No follow-ups on file.      Lisa Boyd is a 60 year old, presenting for the following health issues:  Cough (C/o cough w/wheezing x3-4 days, getting worse. Also having sinus drainage. ) and Medication Request (Wants refill on medication for skin problem)      2/23/2025     1:36 PM   Additional Questions   Roomed by Rachel   Visit was completed with utilization of video   HPI               Review of Systems  Constitutional, HEENT, cardiovascular, pulmonary, GI, , musculoskeletal, neuro, skin, endocrine and psych systems are negative, except as otherwise noted.      Objective    /81 (BP Location: Right arm, Patient Position: Sitting, Cuff Size: Adult Large)   Pulse 72   Temp 97.6  F (36.4  C) (Tympanic)   Resp 20   SpO2 92%   There is no " height or weight on file to calculate BMI.  Physical Exam   GENERAL: alert and no distress  EYES: Eyes grossly normal to inspection,  conjunctivae and sclerae normal  HENT: ear canals and TM's normal, nose congestion bogginess purulent drainage.  And mouth without ulcers or lesions  NECK: no adenopathy, no asymmetry, masses, or scars  RESP: Respirations are regular nonlabored lungs are clear wheeze noted not cleared with cough  CV: regular rate and rhythm, normal S1 S2  PSYCH: mentation appears normal, affect normal/bright            Signed Electronically by: Lilian Velázquez NP

## 2025-02-24 RX ORDER — FLUTICASONE PROPIONATE AND SALMETEROL 250; 50 UG/1; UG/1
1 POWDER RESPIRATORY (INHALATION) EVERY 12 HOURS
OUTPATIENT
Start: 2025-02-24

## 2025-02-24 NOTE — TELEPHONE ENCOUNTER
Clinic RN: Please investigate patient's chart or contact patient if the information cannot be found because the medication is listed as historical or discontinued. Confirm patient is taking this medication. Document findings and route refill encounter to provider for approval or denial.  Deb Reynoso RN, BSN  [facility]

## 2025-02-24 NOTE — TELEPHONE ENCOUNTER
"\"  fluticasone-salmeterol (WIXELA INHUB) 250-50 MCG/DOSE inhaler (Discontinued) 1 Inhaler 11 1/17/2020 7/20/2020 No   Sig: INHALE 1 PUFFS BY MOUTH INTO THE LUNGS EVERY 12 HOURS   Patient not taking: Reported on 7/7/2020   Sent to pharmacy as: fluticasone-salmeterol (WIXELA INHUB) 250-50 MCG/DOSE inhaler   Class: E-Prescribe   Reason for Discontinue: Alternate therapy   Order: [id number]   E-Prescribing Status: Receipt confirmed by pharmacy (1/17/2020  3:18 PM CST)     This Order Has Been Discontinued  Order Status Reason By On   Discontinued Alternate therapy Kwabena Del Valle PA-C 7/20/20 1532   \"    Per chart review on current med list:  \"   Disp Refills Start End HELGA   fluticasone-salmeterol (ADVAIR) 500-50 MCG/ACT inhaler 180 each 3 2/23/2025 -- No   Sig - Route: Inhale 1 puff into the lungs every 12 hours. - Inhalation   Sent to pharmacy as: Fluticasone-Salmeterol 500-50 MCG/ACT Inhalation Aerosol Powder Breath Activated (ADVAIR)   Class: E-Prescribe   Order: [id number]   E-Prescribing Status: Receipt confirmed by pharmacy (2/23/2025  1:53 PM CST)   Prescribing Provider's NPI: [id number]  [id number] - [place], [place] - [address] AT [facility] [address]   \"  Attempted to reach patient to inquire about inhaler refill request. Left voicemail via  to callback. Appears Wixela may have been swapped for Advair previously. Advair on current med list and recently refilled. Upon callback please inquire if patient is now taking Advair instead, inform recently refilled by urgent care provider. Thanks!  Rubio Hair, RN on 2/24/2025 at 3:55 PM        "

## 2025-02-24 NOTE — TELEPHONE ENCOUNTER
Patient called back. Patient is taking advair. Advised that [facility] did sent in a script for the advair. Patient asked what tier the medication was, I advised you need to ask your insurance company that. Patient also asking what the cost will be at his pharmacy. Advised I do not have access to that and he would need to contact the pharmacy it was sent to.       Patient requested a derm appointment, transferred to derm scheduling line for assistance with scheduling.    DERIK Nicole on 2/24/2025 at 5:36 PM

## 2025-02-26 ENCOUNTER — TELEPHONE (OUTPATIENT)
Dept: DERMATOLOGY | Facility: CLINIC | Age: 60
End: 2025-02-26
Payer: COMMERCIAL

## 2025-02-26 NOTE — TELEPHONE ENCOUNTER
Health Call Center    Phone Message    May a detailed message be left on voicemail: no    Reason for Call: Other: Patient has an upcoming appt. Patient is requesting a different  for his appointment.  He does not want Job #[id number], Shavonne Perry. Patient does not feel comfortable as this Intrepreter is a coworker. Please be advised. Thanks.       Action Taken: te sent    Travel Screening: Not Applicable     Date of Service:

## 2025-02-27 NOTE — TELEPHONE ENCOUNTER
Called  services and gave info below. They will get that into his file and will not assign that .    Anisa PARADA RN  Dermatology   [phone]

## 2025-03-05 ENCOUNTER — ANCILLARY PROCEDURE (OUTPATIENT)
Dept: GENERAL RADIOLOGY | Facility: CLINIC | Age: 60
End: 2025-03-05
Attending: INTERNAL MEDICINE
Payer: COMMERCIAL

## 2025-03-05 ENCOUNTER — OFFICE VISIT (OUTPATIENT)
Dept: PEDIATRICS | Facility: CLINIC | Age: 60
End: 2025-03-05
Payer: COMMERCIAL

## 2025-03-05 ENCOUNTER — TELEPHONE (OUTPATIENT)
Dept: PEDIATRICS | Facility: CLINIC | Age: 60
End: 2025-03-05

## 2025-03-05 VITALS
OXYGEN SATURATION: 93 % | SYSTOLIC BLOOD PRESSURE: 116 MMHG | BODY MASS INDEX: 48.05 KG/M2 | RESPIRATION RATE: 20 BRPM | DIASTOLIC BLOOD PRESSURE: 68 MMHG | HEART RATE: 77 BPM | HEIGHT: 67 IN

## 2025-03-05 DIAGNOSIS — R05.8 PRODUCTIVE COUGH: ICD-10-CM

## 2025-03-05 DIAGNOSIS — R05.9 COUGH, UNSPECIFIED TYPE: Primary | ICD-10-CM

## 2025-03-05 DIAGNOSIS — J45.31 MILD PERSISTENT ASTHMA WITH ACUTE EXACERBATION: Primary | ICD-10-CM

## 2025-03-05 PROCEDURE — 3074F SYST BP LT 130 MM HG: CPT | Performed by: INTERNAL MEDICINE

## 2025-03-05 PROCEDURE — 99214 OFFICE O/P EST MOD 30 MIN: CPT | Performed by: INTERNAL MEDICINE

## 2025-03-05 PROCEDURE — 71046 X-RAY EXAM CHEST 2 VIEWS: CPT | Mod: TC | Performed by: RADIOLOGY

## 2025-03-05 PROCEDURE — 3078F DIAST BP <80 MM HG: CPT | Performed by: INTERNAL MEDICINE

## 2025-03-05 RX ORDER — PREDNISONE 20 MG/1
TABLET ORAL
Qty: 20 TABLET | Refills: 0 | Status: SHIPPED | OUTPATIENT
Start: 2025-03-05

## 2025-03-05 RX ORDER — CODEINE PHOSPHATE AND GUAIFENESIN 10; 100 MG/5ML; MG/5ML
1 SOLUTION ORAL EVERY 6 HOURS PRN
Qty: 118 ML | Refills: 0 | Status: SHIPPED | OUTPATIENT
Start: 2025-03-05 | End: 2025-03-05

## 2025-03-05 RX ORDER — BENZONATATE 200 MG/1
200 CAPSULE ORAL 3 TIMES DAILY PRN
Qty: 30 CAPSULE | Refills: 1 | Status: SHIPPED | OUTPATIENT
Start: 2025-03-05

## 2025-03-05 RX ORDER — METRONIDAZOLE 7.5 MG/G
GEL TOPICAL
Qty: 45 G | Refills: 3 | Status: SHIPPED | OUTPATIENT
Start: 2025-03-05

## 2025-03-05 ASSESSMENT — ASTHMA QUESTIONNAIRES
QUESTION_2 LAST FOUR WEEKS HOW OFTEN HAVE YOU HAD SHORTNESS OF BREATH: ONCE A DAY
QUESTION_3 LAST FOUR WEEKS HOW OFTEN DID YOUR ASTHMA SYMPTOMS (WHEEZING, COUGHING, SHORTNESS OF BREATH, CHEST TIGHTNESS OR PAIN) WAKE YOU UP AT NIGHT OR EARLIER THAN USUAL IN THE MORNING: FOUR OR MORE NIGHTS A WEEK
ACT_TOTALSCORE: 12
QUESTION_1 LAST FOUR WEEKS HOW MUCH OF THE TIME DID YOUR ASTHMA KEEP YOU FROM GETTING AS MUCH DONE AT WORK, SCHOOL OR AT HOME: A LITTLE OF THE TIME
QUESTION_5 LAST FOUR WEEKS HOW WOULD YOU RATE YOUR ASTHMA CONTROL: POORLY CONTROLLED
ACT_TOTALSCORE: 12
QUESTION_4 LAST FOUR WEEKS HOW OFTEN HAVE YOU USED YOUR RESCUE INHALER OR NEBULIZER MEDICATION (SUCH AS ALBUTEROL): TWO OR THREE TIMES PER WEEK

## 2025-03-13 ENCOUNTER — OFFICE VISIT (OUTPATIENT)
Dept: DERMATOLOGY | Facility: CLINIC | Age: 60
End: 2025-03-13
Payer: COMMERCIAL

## 2025-03-13 DIAGNOSIS — L71.9 ROSACEA: ICD-10-CM

## 2025-03-13 RX ORDER — DOXYCYCLINE 50 MG/1
CAPSULE ORAL
Qty: 90 CAPSULE | Refills: 3 | Status: SHIPPED | OUTPATIENT
Start: 2025-03-13

## 2025-03-13 RX ORDER — METRONIDAZOLE 7.5 MG/G
GEL TOPICAL
Qty: 45 G | Refills: 3 | Status: SHIPPED | OUTPATIENT
Start: 2025-03-13

## 2025-03-13 NOTE — LETTER
3/13/2025      Oliver Agarwal  [address]  [place] [address]      Dear Colleague,    Thank you for referring your patient, Oliver Agarwal, to the [facility]. Please see a copy of my visit note below.    [facility] Dermatology Note  Encounter Date: Mar 13, 2025  Office Visit     Reviewed patients past medical history and pertinent chart review prior to patients visit today.     Dermatology Problem List:  # Rosacea  -MetroGel twice daily, doxycycline 50 mg daily  ____________________________________________    Assessment & Plan:     # Rosacea   - Rosacea is a common, chronic inflammatory condition with relapsing-remitting course.  - Continue using metronidazole gel twice daily to the entire face. Refills provided.  - Continue doxycycline at the subantimicrobial dose, 50 mg daily.  He is tolerating this medication well and typically only takes this 2-3 times weekly. I did offer the option of decreasing the dosage even further do doxycycline 20 mg but he would like to continue with his current regimen which is reasonable. We discussed that the subantimicrobial dose is less associated with concern for antibiotic resistance and also has reduced concern for side effects such as GI upset or photosensitivity. Encouraged diligent photo protection. Sunscreen recommendations provided.    Follow up: annually for refills, prn for new or changing lesions or new concerns     All risks, benefits and alternatives were discussed with patient.  Patient is in agreement and understands the assessment and plan.  All questions were answered.  Lauryn West PA-C  [facility]ermat[facility]  _______________________________________    CC: Rosacea (Follow up today. Needs refill of meds today)     HPI:  Mr. Oliver Agarwal is a(n) 60 year old male who presents today as a return patient for rosacea follow up. Visit accompanied by Sidra who is an in  person  who assisted during today's visit.  He was last seen in dermatology on 11/14/2023 for his acne rosacea at which time he was started on MetroGel twice daily.  He was also resumed on doxycycline 100 mg twice daily for 2 months and then was instructed to transition to Doxy 50 mg daily.    He has since ran out of this medication which prompted him to schedule this visit.  He feels his skin is doing really well on this regimen.  He is only taking doxycycline 50 mg 2-3 times a week which works well to maintain his rosacea. He is not experiencing any side effects with the medications.    Patient is otherwise feeling well, without additional skin concerns.    Physical Exam:  SKIN: Focused examination of face only was performed.  - no active acneiform lesions, background of erythema on cheeks and nose    - No other lesions of concern on areas examined.     Medications:  Current Outpatient Medications   Medication Sig Dispense Refill     albuterol (PROAIR HFA) 108 (90 Base) MCG/ACT inhaler INHALE 2 PUFFS BY MOUTH INTO THE LUNGS EVERY 4 TO 6 HOURS AS NEEDED FOR SHORTNESS OF BREATH, DIFFICULT BREATHING OR WHEEZING. 8.5 g 3     azelaic acid (FINACIA) 15 % external gel Apply to face for rosacea BID 50 g 11     benzonatate (TESSALON) 200 MG capsule Take 1 capsule (200 mg) by mouth 3 times daily as needed for cough. 30 capsule 1     blood glucose (NO BRAND SPECIFIED) test strip Use to test blood sugar 1 times daily or as directed. 100 strip 5     doxycycline monohydrate (MONODOX) 50 MG capsule 1 tablet PO daily 30 capsule 0     fluticasone (FLONASE) 50 MCG/ACT nasal spray INHALE 2 SPRAYS INTO EACH NOSTRIL DAILY AS DIRECTED 48 g 1     fluticasone-salmeterol (ADVAIR) 500-50 MCG/ACT inhaler Inhale 1 puff into the lungs every 12 hours. 180 each 3     metroNIDAZOLE (METROGEL) 0.75 % external gel Apply to face BID 45 g 3     montelukast (SINGULAIR) 10 MG tablet Take 1 tablet (10 mg) by mouth at bedtime.  30 tablet 11     predniSONE (DELTASONE) 20 MG tablet Take 3 tabs by mouth daily x 3 days, then 2 tabs daily x 3 days, then 1 tab daily x 3 days, then 1/2 tab daily x 3 days. 20 tablet 0     tacrolimus (PROTOPIC) 0.1 % external ointment Apply to AA BID PRN 30 g 5     No current facility-administered medications for this visit.      Past Medical History:   Patient Active Problem List   Diagnosis     Atopic rhinitis     Prediabetes     Hypovitaminosis D     Mild persistent asthma     Deaf - reads lips well     Corneal erosion, right     Blepharitis of both eyes with rosacea     Dry eyes     Class 3 obesity     Asthmatic bronchitis     Dysthymia     Sleep apnea     History of pulmonary embolism     Fatty liver     DDD (degenerative disc disease), lumbar     Past Medical History:   Diagnosis Date     Obesity 10/07/2011       CC Referred Self, MD  No address on file on close of this encounter.       Again, thank you for allowing me to participate in the care of your patient.        Sincerely,        Tricia West PA-C    Electronically signed

## 2025-03-13 NOTE — PATIENT INSTRUCTIONS
"SUNSCREEN RECOMMENDATIONS:  1. Use SPF 30 or greater sunscreen with \"broad-spectrum\" coverage, we recommend looking for zinc or titanium oxide in the ingredients   2. Reapply every 2 hours or after exiting the water.   3. Use a daily sunscreen which can often be found in a daily moisturizer or foundation.  4. A shot-glass amount of sunscreen is needed to attain proper coverage for one full-body application.   5. A broad brimmed hat and UPF clothing is a great way to protect your skin from the sun.       Recommended products:  Sunscreen brands for sensitive skin:  - Vanicream  - Neutrogena  - Cetaphil  - CeraVe     Daily sunscreens, moisturizers, tinted sunscreens:  - EltaMD UV Daily, EltaMD UV Clear  - Neutrogena UltraSheer or Neutrogena Clear Face  - CeraVe AM Facial Moisturizing Lotion  - Cetaphil daily moisturizer with sunscreen  - Blue lizard   - Tizo Tinted Face mineral sunscreen  -SuperGoop Unseen Sunscreen  -Coola (tinted)        Gentle Skin Care    Below is a list of products our providers recommend for gentle skin care.    Moisturizers:  Lighter: Exederm Intensive Moisture Cream, Cetaphil Cream, CeraVe, Aveeno Positively Radiant and Vanicream Light   Thicker: Aquaphor Ointment, Vaseline, Petroleum Jelly, Eucerin Original Healing Cream, Vanicream Cream, CeraVe Healing Ointment, Aquaphor Body Spray  Avoid Lotions (too thin)  Mild Cleansers:  Dove Fragrance-free Bar or Wash  CeraVe   Vanicream Cleansing Bar  Cetaphil Cleanser   Aquaphor 2-in-1 Gentle Wash and Shampoo  Dove Baby Wash  Exederm Body Wash       Laundry Products:  All Free and Clear Products  Cheer Free  Generic brands are okay as long as they are fragrance-free  Avoid fabric softeners and dryer sheets   Sunscreens: SPF 30 or greater   Sunscreens that contain zinc oxide and/or titanium dioxide should be applied. These are physical blockers. One or both of these should be listed in the active Ingredients.  Any other listed ingredients under the " active ingredients would be a chemically-based sunscreen, which might be irritating.  Spray sunscreens should be avoided because these are typically chemical sunscreens.      Shampoo and Conditioners:  Free and Clear by Vanfidelia Malikaphor 2-in-1 Gentle Wash and Shampoo   Oils:  Mineral Oil   Emu Oil   For some patients: coconut (raw, unrefined, organic) and sunflower seed oil      Keep in mind:  Generic products are an okay substitute, but make sure they are fragrance-free.  Reading the product ingredients list is very important.  Avoid product that have fragrance added to them.   Organic does not mean fragrance-free. In fact, patients with sensitive skin can become quite irritated by some organic products.     Recommendations:  Daily bathing. Make sure you are applying a good moisturizer after bathing every time.  Apply moisturizing creams at least twice daily to the whole body. Your provider may recommend a lighter or heavier moisturizer based on the severity of your skin condition and that time of year it is.  Creams are more moisturizing than lotions.     Care Plan:  Keep bathing and showering short, less than 15 minutes.  Always use lukewarm warm when possible. AVOID hot or cold water.  DO NOT use bubble bath.  Limit the use of soaps. Focus on skin folds, face, armpits, groin, and feet towards the end of the bath.  Do NOT vigorously scrub when you cleanse the skin.  After bathing, PAT your skin lightly with a towel. DO NOT rub or scrub when drying.  ALWAYS apply a moisturizer immediately after bathing. This helps to lock in moisture. *IF YOU WERE PRESCRIBED A TOPICAL MEDICATION, APPLY YOUR MEDICATION FIRST, AND THEN COVER WITH YOUR DAILY MOISTURIZER.*  Reapply moisturizing agents to your whole body at least twice daily.    Other helpful tips:  Do not use products such as powders, perfumes, or colognes on your skin.  Diffusers can be harsh on sensitive skin. Use with caution if you have sensitive skin.  Avoid  saunas and steam baths. This temperature is too hot.  Avoid tight or scratchy clothing, such as wool.  Always wash new clothing before wearing them for the first time.  Sometimes a humidifier or vaporizer can be used at night can help the dry skin. Remember to keep these items clean to avoid mold growth.    Who should I call with questions?  [facility]: [phone]  [facility]: [phone]  For urgent needs outside of business hours call the [facility] at [phone] and ask for the dermatology resident on call.

## 2025-03-13 NOTE — PROGRESS NOTES
[facility] Dermatology Note  Encounter Date: Mar 13, 2025  Office Visit     Reviewed patients past medical history and pertinent chart review prior to patients visit today.     Dermatology Problem List:  # Rosacea  -MetroGel twice daily, doxycycline 50 mg daily  ____________________________________________    Assessment & Plan:     # Rosacea   - Rosacea is a common, chronic inflammatory condition with relapsing-remitting course.  - Continue using metronidazole gel twice daily to the entire face. Refills provided.  - Continue doxycycline at the subantimicrobial dose, 50 mg daily.  He is tolerating this medication well and typically only takes this 2-3 times weekly. I did offer the option of decreasing the dosage even further do doxycycline 20 mg but he would like to continue with his current regimen which is reasonable. We discussed that the subantimicrobial dose is less associated with concern for antibiotic resistance and also has reduced concern for side effects such as GI upset or photosensitivity. Encouraged diligent photo protection. Sunscreen recommendations provided.    Follow up: annually for refills, prn for new or changing lesions or new concerns     All risks, benefits and alternatives were discussed with patient.  Patient is in agreement and understands the assessment and plan.  All questions were answered.  Lauryn West PA-C  [facility] Dermatology  _______________________________________    CC: Rosacea (Follow up today. Needs refill of meds today)     HPI:  Mr. Oliver Agarwal is a(n) 60 year old male who presents today as a return patient for rosacea follow up. Visit accompanied by Sidra who is an in person  who assisted during today's visit.  He was last seen in dermatology on 11/14/2023 for his acne rosacea at which time he was started on MetroGel twice daily.  He was also resumed on doxycycline 100 mg twice daily for 2 months and then was  instructed to transition to Doxy 50 mg daily.    He has since ran out of this medication which prompted him to schedule this visit.  He feels his skin is doing really well on this regimen.  He is only taking doxycycline 50 mg 2-3 times a week which works well to maintain his rosacea. He is not experiencing any side effects with the medications.    Patient is otherwise feeling well, without additional skin concerns.    Physical Exam:  SKIN: Focused examination of face only was performed.  - no active acneiform lesions, background of erythema on cheeks and nose    - No other lesions of concern on areas examined.     Medications:  Current Outpatient Medications   Medication Sig Dispense Refill    albuterol (PROAIR HFA) 108 (90 Base) MCG/ACT inhaler INHALE 2 PUFFS BY MOUTH INTO THE LUNGS EVERY 4 TO 6 HOURS AS NEEDED FOR SHORTNESS OF BREATH, DIFFICULT BREATHING OR WHEEZING. 8.5 g 3    azelaic acid (FINACIA) 15 % external gel Apply to face for rosacea BID 50 g 11    benzonatate (TESSALON) 200 MG capsule Take 1 capsule (200 mg) by mouth 3 times daily as needed for cough. 30 capsule 1    blood glucose (NO BRAND SPECIFIED) test strip Use to test blood sugar 1 times daily or as directed. 100 strip 5    doxycycline monohydrate (MONODOX) 50 MG capsule 1 tablet PO daily 30 capsule 0    fluticasone (FLONASE) 50 MCG/ACT nasal spray INHALE 2 SPRAYS INTO EACH NOSTRIL DAILY AS DIRECTED 48 g 1    fluticasone-salmeterol (ADVAIR) 500-50 MCG/ACT inhaler Inhale 1 puff into the lungs every 12 hours. 180 each 3    metroNIDAZOLE (METROGEL) 0.75 % external gel Apply to face BID 45 g 3    montelukast (SINGULAIR) 10 MG tablet Take 1 tablet (10 mg) by mouth at bedtime. 30 tablet 11    predniSONE (DELTASONE) 20 MG tablet Take 3 tabs by mouth daily x 3 days, then 2 tabs daily x 3 days, then 1 tab daily x 3 days, then 1/2 tab daily x 3 days. 20 tablet 0    tacrolimus (PROTOPIC) 0.1 % external ointment Apply to AA BID PRN 30 g 5     No current  facility-administered medications for this visit.      Past Medical History:   Patient Active Problem List   Diagnosis    Atopic rhinitis    Prediabetes    Hypovitaminosis D    Mild persistent asthma    Deaf - reads lips well    Corneal erosion, right    Blepharitis of both eyes with rosacea    Dry eyes    Class 3 obesity    Asthmatic bronchitis    Dysthymia    Sleep apnea    History of pulmonary embolism    Fatty liver    DDD (degenerative disc disease), lumbar     Past Medical History:   Diagnosis Date    Obesity 10/07/2011       CC Referred Self, MD  No address on file on close of this encounter.

## 2025-06-15 ENCOUNTER — HEALTH MAINTENANCE LETTER (OUTPATIENT)
Age: 60
End: 2025-06-15

## 2025-08-27 ENCOUNTER — OFFICE VISIT (OUTPATIENT)
Dept: SLEEP MEDICINE | Facility: CLINIC | Age: 60
End: 2025-08-27
Payer: COMMERCIAL

## 2025-08-27 VITALS
BODY MASS INDEX: 43.24 KG/M2 | OXYGEN SATURATION: 93 % | WEIGHT: 275.5 LBS | SYSTOLIC BLOOD PRESSURE: 110 MMHG | DIASTOLIC BLOOD PRESSURE: 69 MMHG | HEART RATE: 55 BPM | RESPIRATION RATE: 16 BRPM | HEIGHT: 67 IN

## 2025-08-27 DIAGNOSIS — R73.03 PREDIABETES: ICD-10-CM

## 2025-08-27 DIAGNOSIS — L71.9 ROSACEA: ICD-10-CM

## 2025-08-27 DIAGNOSIS — Z86.711 HISTORY OF PULMONARY EMBOLISM: ICD-10-CM

## 2025-08-27 DIAGNOSIS — G47.33 OSA (OBSTRUCTIVE SLEEP APNEA): Primary | ICD-10-CM

## 2025-08-27 DIAGNOSIS — E66.01 MORBID OBESITY (H): ICD-10-CM

## 2025-08-27 PROCEDURE — 99215 OFFICE O/P EST HI 40 MIN: CPT | Performed by: NURSE PRACTITIONER

## 2025-08-27 PROCEDURE — 3074F SYST BP LT 130 MM HG: CPT | Performed by: NURSE PRACTITIONER

## 2025-08-27 PROCEDURE — 3078F DIAST BP <80 MM HG: CPT | Performed by: NURSE PRACTITIONER

## 2025-08-27 PROCEDURE — 1126F AMNT PAIN NOTED NONE PRSNT: CPT | Performed by: NURSE PRACTITIONER

## 2025-08-27 RX ORDER — METRONIDAZOLE TOPICAL 7.5 MG/G
GEL TOPICAL
Qty: 45 G | Refills: 3 | Status: SHIPPED | OUTPATIENT
Start: 2025-08-27

## 2025-08-27 ASSESSMENT — SLEEP AND FATIGUE QUESTIONNAIRES
HOW LIKELY ARE YOU TO NOD OFF OR FALL ASLEEP WHILE WATCHING TV: WOULD NEVER DOZE
HOW LIKELY ARE YOU TO NOD OFF OR FALL ASLEEP WHILE LYING DOWN TO REST IN THE AFTERNOON WHEN CIRCUMSTANCES PERMIT: WOULD NEVER DOZE
HOW LIKELY ARE YOU TO NOD OFF OR FALL ASLEEP WHEN YOU ARE A PASSENGER IN A CAR FOR AN HOUR WITHOUT A BREAK: WOULD NEVER DOZE
HOW LIKELY ARE YOU TO NOD OFF OR FALL ASLEEP WHILE SITTING INACTIVE IN A PUBLIC PLACE: WOULD NEVER DOZE
HOW LIKELY ARE YOU TO NOD OFF OR FALL ASLEEP IN A CAR, WHILE STOPPED FOR A FEW MINUTES IN TRAFFIC: WOULD NEVER DOZE
HOW LIKELY ARE YOU TO NOD OFF OR FALL ASLEEP WHILE SITTING QUIETLY AFTER LUNCH WITHOUT ALCOHOL: WOULD NEVER DOZE
HOW LIKELY ARE YOU TO NOD OFF OR FALL ASLEEP WHILE SITTING AND TALKING TO SOMEONE: WOULD NEVER DOZE
HOW LIKELY ARE YOU TO NOD OFF OR FALL ASLEEP WHILE SITTING AND READING: WOULD NEVER DOZE

## 2025-08-28 DIAGNOSIS — J45.30 MILD PERSISTENT ASTHMA WITHOUT COMPLICATION: ICD-10-CM

## 2025-08-28 RX ORDER — ALBUTEROL SULFATE 90 UG/1
INHALANT RESPIRATORY (INHALATION)
Qty: 8.5 G | Refills: 3 | Status: SHIPPED | OUTPATIENT
Start: 2025-08-28

## 2025-08-29 ENCOUNTER — TELEPHONE (OUTPATIENT)
Dept: DERMATOLOGY | Facility: CLINIC | Age: 60
End: 2025-08-29
Payer: COMMERCIAL

## 2025-08-29 DIAGNOSIS — L71.9 ROSACEA: ICD-10-CM

## 2025-09-02 RX ORDER — DOXYCYCLINE 50 MG/1
CAPSULE ORAL
Qty: 90 CAPSULE | Refills: 3 | Status: SHIPPED | OUTPATIENT
Start: 2025-09-02

## 2025-09-02 RX ORDER — METRONIDAZOLE TOPICAL 7.5 MG/G
GEL TOPICAL
Qty: 45 G | Refills: 3 | Status: SHIPPED | OUTPATIENT
Start: 2025-09-02